# Patient Record
Sex: MALE | Race: OTHER | NOT HISPANIC OR LATINO | URBAN - METROPOLITAN AREA
[De-identification: names, ages, dates, MRNs, and addresses within clinical notes are randomized per-mention and may not be internally consistent; named-entity substitution may affect disease eponyms.]

---

## 2022-06-02 ENCOUNTER — EMERGENCY (EMERGENCY)
Facility: HOSPITAL | Age: 47
LOS: 0 days | Discharge: HOME | End: 2022-06-02
Attending: STUDENT IN AN ORGANIZED HEALTH CARE EDUCATION/TRAINING PROGRAM
Payer: COMMERCIAL

## 2022-06-02 VITALS
TEMPERATURE: 98 F | HEIGHT: 68 IN | WEIGHT: 175.05 LBS | HEART RATE: 100 BPM | SYSTOLIC BLOOD PRESSURE: 126 MMHG | DIASTOLIC BLOOD PRESSURE: 84 MMHG | RESPIRATION RATE: 19 BRPM | OXYGEN SATURATION: 97 %
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
## 2022-06-02 DIAGNOSIS — S92.342A DISPLACED FRACTURE OF FOURTH METATARSAL BONE, LEFT FOOT, INITIAL ENCOUNTER FOR CLOSED FRACTURE: ICD-10-CM

## 2022-06-02 DIAGNOSIS — S92.352A DISPLACED FRACTURE OF FIFTH METATARSAL BONE, LEFT FOOT, INITIAL ENCOUNTER FOR CLOSED FRACTURE: ICD-10-CM
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
## 2022-06-02 DIAGNOSIS — M79.89 OTHER SPECIFIED SOFT TISSUE DISORDERS: ICD-10-CM

## 2022-06-02 PROCEDURE — 99284 EMERGENCY DEPT VISIT MOD MDM: CPT

## 2022-06-02 PROCEDURE — 73610 X-RAY EXAM OF ANKLE: CPT | Mod: 26,LT

## 2022-06-02 PROCEDURE — 73630 X-RAY EXAM OF FOOT: CPT | Mod: 26,LT

## 2022-06-02 RX ORDER — OXYCODONE AND ACETAMINOPHEN 5; 325 MG/1; MG/1
1 TABLET ORAL
Qty: 12 | Refills: 0
Start: 2022-06-02 | End: 2022-06-04

## 2022-06-02 RX ORDER — HYDROCORTISONE/PRAMOXINE 2.5 %-1 %
1 CREAM WITH APPLICATOR RECTAL
Qty: 60 | Refills: 0
Start: 2022-06-02 | End: 2022-06-08

## 2022-06-02 NOTE — CONSULT NOTE ADULT - ASSESSMENT
47M with left foot 4th and 5th metatarsal fractures  - pt seen and evaluated  - afebrile, neurovascular status intact  - mild edema noted to lateral aspect of left foot, pain upon palpation on lateral column, ankle ROM 5/5, no open lesions, no bruising no ecchymosis noted, edema noted to left lateral foot  - xray read:  Fourth and fifth left metatarsal fractures.  - compressive dressing applied with surgical shoe  - pt to remain WB to heel in a surgical shoe to left foot  - pt to follow up with Dr. Joseph Waterhouse at [phone] within 1 week of discharge  - discussed with attending

## 2022-06-02 NOTE — CONSULT NOTE ADULT - SUBJECTIVE AND OBJECTIVE BOX
Patient is a 47y old  Male who presents with a chief complaint of left foot pain    HPI:  47 year old male with no past medical history presents today c/o left foot swelling, pt reports that on Friday he twisted his L foot after stepping the wrong way, pt reports having persistent swelling more than pain, he did go to an urgent care center yesterday, told to have a frature on xray and sent to the ER, pt denies any other injuries    PAST MEDICAL & SURGICAL HISTORY:  No pertinent past medical history          MEDICATIONS  (STANDING):    MEDICATIONS  (PRN):      Allergies      Intolerances        VITALS:    Vital Signs Last 24 Hrs  T(C): 36.4 (02 Jun 2022 11:11), Max: 36.4 (02 Jun 2022 11:11)  T(F): 97.5 (02 Jun 2022 11:11), Max: 97.5 (02 Jun 2022 11:11)  HR: 100 (02 Jun 2022 11:11) (100 - 100)  BP: 126/84 (02 Jun 2022 11:11) (126/84 - 126/84)  BP(mean): --  RR: 19 (02 Jun 2022 11:11) (19 - 19)  SpO2: 97% (02 Jun 2022 11:11) (97% - 97%)    LABS:                CAPILLARY BLOOD GLUCOSE              LOWER EXTREMITY PHYSICAL EXAM:    Vascular: DP/PT 2/4, B/L, CFT <3 seconds B/L, Temperature gradient warm to warm, B/L.   Neuro: Epicritic sensation intact to the level of digits, B/L.  Musculoskeletal/Ortho: mild edema noted to lateral aspect of left foot, pain upon palpation on lateral column, ankle ROM 5/5  Skin: no open lesions, no brusiing, no ecchymosis noted, edema noted to left lateral foot        RADIOLOGY & ADDITIONAL STUDIES:  < from: Xray Foot AP + Lateral + Oblique, Left (06.02.22 @ 13:01) >    ACC: [id number] EXAM:  XR FOOT COMP MIN 3 VIEWS LT                        ACC: [id number] EXAM:  XR ANKLE COMP MIN 3 VIEWS LT                          PROCEDURE DATE:  06/02/2022          INTERPRETATION:  Left ankle and left foot. Patient has local swelling.    Left ankle. 3 views.    There is mild swelling of the lateral malleolus. Bones are intact.    Left foot. 3 views.    There are midshaft fractures of the fourth and fifth metatarsals with   mild displacement.    IMPRESSION: Fourth and fifth left metatarsal fractures.    --- End of Report ---            GITA PRIETO MD; Attending Radiologist  This document has been electronically signed. Jun 2 2022  2:12PM    < end of copied text >

## 2022-06-02 NOTE — ED PROVIDER NOTE - CONSTITUTIONAL, MLM
normal... Well appearing, awake, alert, oriented to person, place, time/situation and in no apparent distress. Well appearing, awake, alert, oriented to person, place, time/situation and in no apparent distress, pt denies pain

## 2022-06-02 NOTE — ED PROVIDER NOTE - PROGRESS NOTE DETAILS
podiatry called, will come and evaluate the patient podiatry called, resident should be in the ER in 15-20 min pt seen by podiatry, pt to follow up with Dr Joseph Waterhouse, [phone], follow up in on e wwek , weight bearing to heel

## 2022-06-02 NOTE — ED PROVIDER NOTE - OBJECTIVE STATEMENT
47 year old male with no past medical history presents today c/o left foot swelling, pt reports that on Friday he twisted his L foot after stepping the wrong way 47 year old male with no past medical history presents today c/o left foot swelling, pt reports that on Friday he twisted his L foot after stepping the wrong way, pt reports having persistent swelling more than pain, he did go to an urgent care center yesterday, told to have a frature on xray and sent to the ER, pt denies any other injuries

## 2022-06-02 NOTE — ED PROVIDER NOTE - PATIENT PORTAL LINK FT
You can access the FollowMyHealth Patient Portal offered by [facility] by registering at the following website: [email or web address]. By joining [employer]’s FollowMyHealth portal, you will also be able to view your health information using other applications (apps) compatible with our system.

## 2022-06-16 ENCOUNTER — INPATIENT (INPATIENT)
Facility: HOSPITAL | Age: 47
LOS: 1 days | Discharge: HOME | End: 2022-06-18
Attending: INTERNAL MEDICINE | Admitting: INTERNAL MEDICINE
Payer: MEDICAID

## 2022-06-16 ENCOUNTER — TRANSCRIPTION ENCOUNTER (OUTPATIENT)
Age: 47
End: 2022-06-16

## 2022-06-16 VITALS
HEART RATE: 95 BPM | OXYGEN SATURATION: 100 % | HEIGHT: 68 IN | TEMPERATURE: 98 F | RESPIRATION RATE: 16 BRPM | SYSTOLIC BLOOD PRESSURE: 133 MMHG | DIASTOLIC BLOOD PRESSURE: 85 MMHG

## 2022-06-16 DIAGNOSIS — S92.309A FRACTURE OF UNSPECIFIED METATARSAL BONE(S), UNSPECIFIED FOOT, INITIAL ENCOUNTER FOR CLOSED FRACTURE: ICD-10-CM

## 2022-06-16 LAB
A1C WITH ESTIMATED AVERAGE GLUCOSE RESULT: 5 % — SIGNIFICANT CHANGE UP (ref 4–5.6)
ALBUMIN SERPL ELPH-MCNC: 3.3 G/DL — SIGNIFICANT CHANGE UP (ref 3.3–5)
ALP SERPL-CCNC: 257 U/L — HIGH (ref 40–120)
ALT FLD-CCNC: 35 U/L — SIGNIFICANT CHANGE UP (ref 4–41)
ANION GAP SERPL CALC-SCNC: 13 MMOL/L — SIGNIFICANT CHANGE UP (ref 7–14)
APTT BLD: 35.1 SEC — SIGNIFICANT CHANGE UP (ref 27–36.3)
AST SERPL-CCNC: 75 U/L — HIGH (ref 4–40)
BASOPHILS # BLD AUTO: 0.06 K/UL — SIGNIFICANT CHANGE UP (ref 0–0.2)
BASOPHILS NFR BLD AUTO: 0.5 % — SIGNIFICANT CHANGE UP (ref 0–2)
BILIRUB SERPL-MCNC: 0.9 MG/DL — SIGNIFICANT CHANGE UP (ref 0.2–1.2)
BLD GP AB SCN SERPL QL: NEGATIVE — SIGNIFICANT CHANGE UP
BUN SERPL-MCNC: 3 MG/DL — LOW (ref 7–23)
CALCIUM SERPL-MCNC: 8.7 MG/DL — SIGNIFICANT CHANGE UP (ref 8.4–10.5)
CHLORIDE SERPL-SCNC: 103 MMOL/L — SIGNIFICANT CHANGE UP (ref 98–107)
CO2 SERPL-SCNC: 24 MMOL/L — SIGNIFICANT CHANGE UP (ref 22–31)
CREAT SERPL-MCNC: 0.59 MG/DL — SIGNIFICANT CHANGE UP (ref 0.5–1.3)
EGFR: 120 ML/MIN/1.73M2 — SIGNIFICANT CHANGE UP
EOSINOPHIL # BLD AUTO: 0.25 K/UL — SIGNIFICANT CHANGE UP (ref 0–0.5)
EOSINOPHIL NFR BLD AUTO: 1.9 % — SIGNIFICANT CHANGE UP (ref 0–6)
ESTIMATED AVERAGE GLUCOSE: 97 — SIGNIFICANT CHANGE UP
GLUCOSE SERPL-MCNC: 90 MG/DL — SIGNIFICANT CHANGE UP (ref 70–99)
HCT VFR BLD CALC: 43 % — SIGNIFICANT CHANGE UP (ref 39–50)
HGB BLD-MCNC: 14.8 G/DL — SIGNIFICANT CHANGE UP (ref 13–17)
IANC: 9.57 K/UL — HIGH (ref 1.8–7.4)
IMM GRANULOCYTES NFR BLD AUTO: 0.6 % — SIGNIFICANT CHANGE UP (ref 0–1.5)
INR BLD: 1.05 RATIO — SIGNIFICANT CHANGE UP (ref 0.88–1.16)
LYMPHOCYTES # BLD AUTO: 15.7 % — SIGNIFICANT CHANGE UP (ref 13–44)
LYMPHOCYTES # BLD AUTO: 2.08 K/UL — SIGNIFICANT CHANGE UP (ref 1–3.3)
MACROCYTES BLD QL: SIGNIFICANT CHANGE UP
MANUAL SMEAR VERIFICATION: SIGNIFICANT CHANGE UP
MCHC RBC-ENTMCNC: 34.4 GM/DL — SIGNIFICANT CHANGE UP (ref 32–36)
MCHC RBC-ENTMCNC: 42.4 PG — HIGH (ref 27–34)
MCV RBC AUTO: 123.2 FL — HIGH (ref 80–100)
MONOCYTES # BLD AUTO: 1.21 K/UL — HIGH (ref 0–0.9)
MONOCYTES NFR BLD AUTO: 9.1 % — SIGNIFICANT CHANGE UP (ref 2–14)
NEUTROPHILS # BLD AUTO: 9.57 K/UL — HIGH (ref 1.8–7.4)
NEUTROPHILS NFR BLD AUTO: 72.2 % — SIGNIFICANT CHANGE UP (ref 43–77)
NRBC # BLD: 0 /100 WBCS — SIGNIFICANT CHANGE UP
NRBC # FLD: 0 K/UL — SIGNIFICANT CHANGE UP
PLAT MORPH BLD: ABNORMAL
PLATELET # BLD AUTO: 271 K/UL — SIGNIFICANT CHANGE UP (ref 150–400)
PLATELET CLUMP BLD QL SMEAR: ABNORMAL
PLATELET COUNT - ESTIMATE: NORMAL — SIGNIFICANT CHANGE UP
POTASSIUM SERPL-MCNC: 3.8 MMOL/L — SIGNIFICANT CHANGE UP (ref 3.5–5.3)
POTASSIUM SERPL-SCNC: 3.8 MMOL/L — SIGNIFICANT CHANGE UP (ref 3.5–5.3)
PROT SERPL-MCNC: 6.5 G/DL — SIGNIFICANT CHANGE UP (ref 6–8.3)
PROTHROM AB SERPL-ACNC: 12.2 SEC — SIGNIFICANT CHANGE UP (ref 10.5–13.4)
RBC # BLD: 3.49 M/UL — LOW (ref 4.2–5.8)
RBC # FLD: 13 % — SIGNIFICANT CHANGE UP (ref 10.3–14.5)
RBC BLD AUTO: ABNORMAL
RH IG SCN BLD-IMP: POSITIVE — SIGNIFICANT CHANGE UP
SARS-COV-2 RNA SPEC QL NAA+PROBE: SIGNIFICANT CHANGE UP
SODIUM SERPL-SCNC: 140 MMOL/L — SIGNIFICANT CHANGE UP (ref 135–145)
STOMATOCYTES BLD QL SMEAR: SLIGHT — SIGNIFICANT CHANGE UP
WBC # BLD: 13.25 K/UL — HIGH (ref 3.8–10.5)
WBC # FLD AUTO: 13.25 K/UL — HIGH (ref 3.8–10.5)

## 2022-06-16 PROCEDURE — 99285 EMERGENCY DEPT VISIT HI MDM: CPT

## 2022-06-16 PROCEDURE — 93306 TTE W/DOPPLER COMPLETE: CPT | Mod: 26

## 2022-06-16 RX ORDER — ACETAMINOPHEN 500 MG
650 TABLET ORAL EVERY 6 HOURS
Refills: 0 | Status: DISCONTINUED | OUTPATIENT
Start: 2022-06-16 | End: 2022-06-18

## 2022-06-16 RX ADMIN — Medication 650 MILLIGRAM(S): at 21:49

## 2022-06-16 RX ADMIN — Medication 650 MILLIGRAM(S): at 20:39

## 2022-06-16 NOTE — ED PROVIDER NOTE - ATTENDING CONTRIBUTION TO CARE
I have personally performed a face to face medical and diagnostic evaluation of the patient. I have discussed with and reviewed the Resident's note and agree with the History, ROS, Physical Exam and MDM unless otherwise indicated. A brief summary of my personal evaluation and impression can be found below.    47M no pmh no meds presents to ED at request of podiatrist for admission for surgery of fx of L 4th metatarsal reports had an inversion injury x2 weeks prior was told to come in today for surgery. NO other complaints. Denies numbness, tingling or loss of sensation. Denies loss of motor function. Denies n/v/f/c/cp/sob. Denies headache, syncope, lightheadedness, dizziness. Denies chest palpitations, abdominal pain. Denies edema. Denies dysuria, hematuria, BRBPR, tarry stools, diarrhea, constipation. Reports has no pain at this time, does not need pain meds.     All other ROS negative, except as above and as per HPI and ROS section.    VITALS: Initial triage and subsequent vitals have been reviewed by me.  GEN APPEARANCE: WDWN, alert, non-toxic, NAD  HEAD: Atraumatic.  EYES: PERRLa, EOMI, vision grossly intact.   NECK: Supple  CV: RRR, S1S2, no c/r/m/g. Cap refill <2 seconds. No bruits.   LUNGS: CTAB. No abnormal breath sounds.  ABDOMEN: Soft, NTND. No guarding or rebound.   MSK/EXT: L foot in ACE wrap, DNVI. No CVA ttp. Pelvis stable. No peripheral edema.  NEURO: Alert, follows commands. Weight bearing normal. Speech normal. Sensation and motor normal x4 extremities.   SKIN: Warm, dry and intact. No rash.  PSYCH: Appropriate    Plan/MDM: 47M no pmh no meds presents to ED at request of podiatrist for admission for surgery of fx of L 4th metatarsal reports had an inversion injury x2 weeks prior was told to come in today for surgery. NO other complaints. Denies numbness, tingling or loss of sensation. Denies loss of motor function. exam vss non toxic PE as above ddx c/f  known 4th MT fx, will get labs, discuss w pod, per note w pt will admit to dr. harris.

## 2022-06-16 NOTE — ED ADULT NURSE NOTE - CHIEF COMPLAINT QUOTE
Patient fx left fourth metatarsal 2 weeks ago and being treated by Dr. Joseph Waterhouse. Patient advised to go to [facility] ED today for admission for surgery. Patient to be admitted to Dr. Nance, podiatry per note sent by MD. Complaining of left foot pain.

## 2022-06-16 NOTE — H&P ADULT - NSHPPHYSICALEXAM_GEN_ALL_CORE
Vital Signs Last 24 Hrs  T(C): 36.7 (16 Jun 2022 11:00), Max: 36.8 (16 Jun 2022 07:18)  T(F): 98.1 (16 Jun 2022 11:00), Max: 98.2 (16 Jun 2022 07:18)  HR: 94 (16 Jun 2022 11:00) (94 - 95)  BP: 134/89 (16 Jun 2022 11:00) (133/85 - 134/89)  BP(mean): --  RR: 15 (16 Jun 2022 11:00) (15 - 16)  SpO2: 100% (16 Jun 2022 11:00) (100% - 100%)    Appearance: Normal	  HEENT:   Normal oral mucosa, PERRL, EOMI	  Lymphatic: No lymphadenopathy , no edema  Cardiovascular: Normal S1 S2, No JVD, No murmurs , Peripheral pulses palpable 2+ bilaterally  Respiratory: Lungs clear to auscultation, normal effort 	  Gastrointestinal:  Soft, Non-tender, + BS	  Skin: No rashes, No ecchymoses, No cyanosis, warm to touch  Musculoskeletal: Normal range of motion, normal strength  Psychiatry:  Mood & affect appropriate  Ext: LE dressing

## 2022-06-16 NOTE — ED PROVIDER NOTE - PHYSICAL EXAMINATION
General appearance: NAD, conversant, afebrile    Eyes: anicteric sclerae, NANCY, EOMI   HENT: Atraumatic; oropharynx clear, MMM and no ulcerations, no pharyngeal erythema or exudate   Neck: Trachea midline; Full range of motion, supple   Pulm: CTA bl, normal respiratory effort and no intercostal retractions, normal work of breathing   CV: RRR, No murmurs, rubs, or gallops. 2+ peripheral pulses.   Abdomen: Soft, non-tender, non-distended; no guarding or rebound   Extremities: L 4th metatarsal ttp, mildly swollen. Motor and sensory intact   Skin: Dry, normal temperature, turgor and texture; no rash, ulcers or subcutaneous nodules   Psych: Appropriate affect, cooperative; alert and oriented to person, place and time

## 2022-06-16 NOTE — ED PROVIDER NOTE - OBJECTIVE STATEMENT
48yo M w/ no pmh presenting with left foot pain. Pt fractured L 4th metatarsal 3w ago, was seen in ED and followed by podiatry. Presents today for admission for surgery by Dr. Joseph Waterhouse from podiatry. Pt still has pain in L lateral dorsum of foot but it is tolerable. He has been using the hard sole shoe. Occasionally gets swollen but better with ACE wrap. He has no complaints today otherwise.

## 2022-06-16 NOTE — CONSULT NOTE ADULT - ASSESSMENT
47M with left foot 4th and 5th met fx  -   - afebrile, neurovascular status intact  - xrays: 4th and 5th met fracture of left foot  - rec admit to medicine  - pod plan for left foot 4th and 5th met ORIF tomorrow 6/17 at 4pm  - NPO after midnight  - please document medical clearance under local anesthesia and IV sedation  - discussed with attending 47M with left foot 4th and 5th met fx  - pt seen and evaluated  - afebrile, neurovascular status intact  - xrays: 4th and 5th met fracture of left foot  - rec admit to medicine  - pod plan for left foot 4th and 5th met ORIF tomorrow 6/17 at 4pm  - NPO after midnight  - please document medical clearance under local anesthesia and IV sedation  - discussed with attending

## 2022-06-16 NOTE — ED ADULT NURSE NOTE - OBJECTIVE STATEMENT
pt a&ox4 sent to ED for surgery by Dr. Nance on left 4th metatarsal fx from 2 weeks ago. pt able to wiggle toes. pt denies pain at this time. pt denies pmhx. pt denies chest pain, sob, nausea, vomiting, headache, dizziness. pt respirations even and unlabored with no accessory muscle use. 18g to the LAC. labs collected and sent. pt in NAD and resting in stretcher at this time,

## 2022-06-16 NOTE — H&P ADULT - HISTORY OF PRESENT ILLNESS
Patient is a 48 Y/O Male with no pmh presenting with left foot pain. Pt fractured L 4th metatarsal 3w ago, was seen in ED and followed by podiatry. Presents today for admission for surgery by Dr. Joseph Waterhouse from podiatry. Pt still has pain in L lateral dorsum of foot but it is tolerable. He has been using the hard sole shoe. Occasionally gets swollen but better with ACE wrap. He has no complaints today otherwise

## 2022-06-16 NOTE — H&P ADULT - ASSESSMENT
Patient is a 48 Y/O Male with no pmh presenting with left foot pain. Pt fractured L 4th metatarsal 3w ago, was seen in ED and followed by podiatry. Presents today for admission for surgery by Dr. Joseph Waterhouse from podiatry. Pt still has pain in L lateral dorsum of foot but it is tolerable. He has been using the hard sole shoe. Occasionally gets swollen but better with ACE wrap. He has no complaints today otherwise.       #  fractured L 4th metatarsal  podiatry eval appreciated  pain control  EKG/Echo   plan for OR tomorrow   bedrest    DVT and gI PPX  Patient is a 46 Y/O Male with no pmh presenting with left foot pain. Pt fractured L 4th metatarsal 3w ago, was seen in ED and followed by podiatry. Presents today for admission for surgery by Dr. Joseph Waterhouse from podiatry. Pt still has pain in L lateral dorsum of foot but it is tolerable. He has been using the hard sole shoe. Occasionally gets swollen but better with ACE wrap. He has no complaints today otherwise.       #  fractured L 4th metatarsal  podiatry eval appreciated  pain control  EKG/Echo   plan for OR tomorrow   bedrest  patient is medically optimized for OR tomorrow  Echo noted     DVT and gI PPX

## 2022-06-16 NOTE — CONSULT NOTE ADULT - SUBJECTIVE AND OBJECTIVE BOX
Patient is a 47y old  Male who presents with a chief complaint of left foot fx    HPI:  46yo M w/ no pmh presenting with left foot pain. Pt fractured L 4th metatarsal 3w ago, was seen in ED and followed by podiatry. Presents today for admission for surgery by Dr. Joseph Waterhouse from podiatry. Pt still has pain in L lateral dorsum of foot but it is tolerable. He has been using the hard sole shoe. Occasionally gets swollen but better with ACE wrap. He has no complaints today otherwise    PAST MEDICAL & SURGICAL HISTORY:  No pertinent past medical history          MEDICATIONS  (STANDING):    MEDICATIONS  (PRN):  acetaminophen     Tablet .. 650 milliGRAM(s) Oral every 6 hours PRN Temp greater or equal to 38C (100.4F), Mild Pain (1 - 3)      Allergies    No Known Allergies    Intolerances        VITALS:    Vital Signs Last 24 Hrs  T(C): 36.7 (16 Jun 2022 11:00), Max: 36.8 (16 Jun 2022 07:18)  T(F): 98.1 (16 Jun 2022 11:00), Max: 98.2 (16 Jun 2022 07:18)  HR: 94 (16 Jun 2022 11:00) (94 - 95)  BP: 134/89 (16 Jun 2022 11:00) (133/85 - 134/89)  BP(mean): --  RR: 15 (16 Jun 2022 11:00) (15 - 16)  SpO2: 100% (16 Jun 2022 11:00) (100% - 100%)    LABS:                          14.8   13.25 )-----------( 271      ( 16 Jun 2022 07:54 )             43.0       06-16    140  |  103  |  3<L>  ----------------------------<  90  3.8   |  24  |  0.59    Ca    8.7      16 Jun 2022 07:54    TPro  6.5  /  Alb  3.3  /  TBili  0.9  /  DBili  x   /  AST  75<H>  /  ALT  35  /  AlkPhos  257<H>  06-16      CAPILLARY BLOOD GLUCOSE          PT/INR - ( 16 Jun 2022 07:54 )   PT: 12.2 sec;   INR: 1.05 ratio         PTT - ( 16 Jun 2022 07:54 )  PTT:35.1 sec    LOWER EXTREMITY PHYSICAL EXAM:    Vascular: DP/PT 2/4, B/L, CFT <3 seconds B/L, Temperature gradient warm to warm, B/L.   Neuro: Epicritic sensation intact to the level of digits, B/L.  Musculoskeletal/Ortho: unremarkable  Skin: no open lesions, no bruising      RADIOLOGY & ADDITIONAL STUDIES:    < from: Xray Foot AP + Lateral + Oblique, Left (06.02.22 @ 13:01) >    ACC: [id number] EXAM:  XR FOOT COMP MIN 3 VIEWS LT                        ACC: [id number] EXAM:  XR ANKLE COMP MIN 3 VIEWS LT                          PROCEDURE DATE:  06/02/2022          INTERPRETATION:  Left ankle and left foot. Patient has local swelling.    Left ankle. 3 views.    There is mild swelling of the lateral malleolus. Bones are intact.    Left foot. 3 views.    There are midshaft fractures of the fourth and fifth metatarsals with   mild displacement.    IMPRESSION: Fourth and fifth left metatarsal fractures.    --- End of Report ---            GITA PRIETO MD; Attending Radiologist  This document has been electronically signed. Jun 2 2022  2:12PM    < end of copied text >

## 2022-06-16 NOTE — ED PROVIDER NOTE - NS ED MD DISPO ISOLATION TYPES
None 57M morbidly obese male w/ PMHx of Hepatic Cirrhosis, ascites, thrombocytopenia, CHF, HTN, HLD, recent admx for Hepatic Encephalopathy, noncompliant with lactulose who was admitted to OSH  for hepatic encephalopathy, acute blood lose anemia (?variceal bleed, GI ulcer) and possible SBP. He was transferred to [facility] for endoscopy since he is high risk for planned procedure. He is also here for evaluation by Liver Transplant team, now s/p EGD showing gastritis no varices, colonoscopy without any source of bleed, now s/p VCE without source of bleed. Now with uptrending Cr concerning for pre-renal vs. HRS. New B'/L LE cellulitis on ancef.     Cellulitis of leg, right.  Plan: Off vancomycin  appreciate ID recs  c/w Ancef 2gm q8hr end date to be determined by ID likely through sunday.     Lower extremity edema.  Plan: 2/2 decompensated liver disease   Lasix 60mg IV BID  2d echo with hyperdynamic ventricle EF 75%.     Other cirrhosis of liver.  Plan: EtOH cirrhosis 2/2 GIB now resolved  - varices: present on CT, but no varices on recent EGD  - HE: present on admission to [facility], now resolved on Rifaximin 550mg PO BID and lactulose Q8hrs  - HCC: no focal lesions on CT  -+ascites s/p para x2, last on 10/22 with 5.7L removed, +SBP on 10/14 with ascitic cultures +Ecoli off treatment, Lasix  60 IV BID.      Gastrointestinal hemorrhage with melena.  Plan: - s/p egd and colonoscopy, no bleed source, VCE showing brown stool throughout without source of bleed. Likely hemorrhoidal- anemic again today  - Protonix PO daily  - c/w Carafate  - sbp ppx on cipro 500mg daily  -s/p 1 unit prbc.     Anemia due to acute blood loss.  Plan: - s/p EGD, colonoscopy and VCE without source of bleed- c-scope showing internal hemorrhoids, nonbleeding. Suspect this was source of previous blood.  - PPI as above  - Monitor HH daily, continue to monitor, transfuse if Hb <7 last unit 11/12  -Spoke with GI who rec Heme onc consult for pancytopenia as no GIB has been identified.      (acute kidney injury). Plan: Cr elevated, presumed to be from intravascular depletion. Baseline appears to be around 1.4-   - renal US - has ruled out hydro, unremarkable kidneys  - c/w midodrine to 10mg tid /dc octretide/ for presumed HRS but likely due to fluid overload  - PVRs have been normal  - strict I/O monitoring.     Congestive heart failure, unspecified HF chronicity, unspecified heart failure type.  Plan: - TTE done October 2019 shows normal systolic and diastolic function so no evidence of heart failure on this admission.  IV diuresis is 2/2 his decompensated liver disease.   - 2d echo Ef 70-75%.      Prophylactic measure.  Plan: hold pharmacologic dvt ppx given GI bleed and thrombocytopenia  SCDs.     Back pain.  Plan: Chronic back pain worse with hospital bed.  Tylenol at home will try Tramadol here.  Does not need pain meds on DC he attributes it to his lack of mobility in hospital.    57M morbidly obese male w/ PMHx of Hepatic Cirrhosis, ascites, thrombocytopenia, CHF, HTN, HLD, recent admx for Hepatic Encephalopathy, noncompliant with lactulose who was admitted to OSH  for hepatic encephalopathy, acute blood lose anemia (?variceal bleed, GI ulcer) and possible SBP. He was transferred to [facility] for endoscopy since he is high risk for planned procedure. He is also here for evaluation by Liver Transplant team, now s/p EGD showing gastritis no varices, colonoscopy without any source of bleed, now s/p VCE without source of bleed. Now with uptrending Cr concerning for pre-renal vs. HRS. New B/L LE cellulitis on Ancef.     Cellulitis of leg, right.  Plan: Off vancomycin  appreciate ID recs  c/w Ancef 2gm q8hr end date to be determined by ID likely through sunday.     Lower extremity edema.  Plan: 2/2 decompensated liver disease   Lasix 60mg IV BID  2d echo with hyperdynamic ventricle EF 75%.     Other cirrhosis of liver.  Plan: EtOH cirrhosis 2/2 GIB now resolved  - varices: present on CT, but no varices on recent EGD  - HE: present on admission to [facility], now resolved on Rifaximin 550mg PO BID and lactulose Q8hrs  - HCC: no focal lesions on CT  -+ascites s/p para x2, last on 10/22 with 5.7L removed, +SBP on 10/14 with ascitic cultures +Ecoli off treatment, Lasix  60 IV BID.      Gastrointestinal hemorrhage with melena.  Plan: - s/p egd and colonoscopy, no bleed source, VCE showing brown stool throughout without source of bleed. Likely hemorrhoidal- anemic again today  - Protonix PO daily  - c/w Carafate  - sbp ppx on cipro 500mg daily  -s/p 1 unit prbc.     Anemia due to acute blood loss.  Plan: - s/p EGD, colonoscopy and VCE without source of bleed- c-scope showing internal hemorrhoids, nonbleeding. Suspect this was source of previous blood.  - PPI as above  - Monitor HH daily, continue to monitor, transfuse if Hb <7 last unit 11/12  -Spoke with GI who rec Heme onc consult for pancytopenia as no GIB has been identified.      (acute kidney injury). Plan: Cr elevated, presumed to be from intravascular depletion. Baseline appears to be around 1.4-   - renal US - has ruled out hydro, unremarkable kidneys  - c/w midodrine to 10mg tid /dc octretide/ for presumed HRS but likely due to fluid overload  - PVRs have been normal  - strict I/O monitoring.     Congestive heart failure, unspecified HF chronicity, unspecified heart failure type.  Plan: - TTE done October 2019 shows normal systolic and diastolic function so no evidence of heart failure on this admission.  IV diuresis is 2/2 his decompensated liver disease.   - 2d echo Ef 70-75%.      Prophylactic measure.  Plan: hold pharmacologic dvt ppx given GI bleed and thrombocytopenia  SCDs.     Back pain.  Plan: Chronic back pain worse with hospital bed.  Tylenol at home will try Tramadol here.  Does not need pain meds on DC he attributes it to his lack of mobility in hospital.    57M morbidly obese male w/ PMHx of Hepatic Cirrhosis, ascites, thrombocytopenia, CHF, HTN, HLD, recent admx for Hepatic Encephalopathy, noncompliant with lactulose who was admitted to OSH  for hepatic encephalopathy, acute blood lose anemia (?variceal bleed, GI ulcer) and possible SBP. He was transferred to [facility] for endoscopy since he is high risk for planned procedure. He is also here for evaluation by Liver Transplant team, now s/p EGD showing gastritis no varices, colonoscopy without any source of bleed, now s/p VCE without source of bleed. Patient received multiple blood transfusions secondary to anemia.  Bilateral lower extremity edema - treated with lasix.  2D echo reveals hyperdynamic left ventricle with EF of 75%.  Now with uptrending Cr concerning for pre-renal vs. HRS. New B/L LE cellulitis - ID consulted - treated with Ancef - improvement noted.             Other cirrhosis of liver.  Plan: EtOH cirrhosis 2/2 GIB now resolved  - varices: present on CT, but no varices on recent EGD  - HE: present on admission to [facility], now resolved on Rifaximin 550mg PO BID and lactulose Q8hrs  - HCC: no focal lesions on CT  -+ascites s/p para x2, last on 10/22 with 5.7L removed, +SBP on 10/14 with ascitic cultures +Ecoli off treatment, Lasix  60 IV BID.      Gastrointestinal hemorrhage with melena.  Plan: - s/p egd and colonoscopy, no bleed source, VCE showing brown stool throughout without source of bleed. Likely hemorrhoidal- anemic again today  - Protonix PO daily  - c/w Carafate  - sbp ppx on cipro 500mg daily  -s/p 1 unit prbc.     Anemia due to acute blood loss.  Plan: - s/p EGD, colonoscopy and VCE without source of bleed- c-scope showing internal hemorrhoids, nonbleeding. Suspect this was source of previous blood.  - PPI as above  - Monitor HH daily, continue to monitor, transfuse if Hb <7 last unit 11/12  -Spoke with GI who rec Heme onc consult for pancytopenia as no GIB has been identified.      (acute kidney injury). Plan: Cr elevated, presumed to be from intravascular depletion. Baseline appears to be around 1.4-   - renal US - has ruled out hydro, unremarkable kidneys  - c/w midodrine to 10mg tid /dc octretide/ for presumed HRS but likely due to fluid overload  - PVRs have been normal  - strict I/O monitoring.     Congestive heart failure, unspecified HF chronicity, unspecified heart failure type.  Plan: - TTE done October 2019 shows normal systolic and diastolic function so no evidence of heart failure on this admission.  IV diuresis is 2/2 his decompensated liver disease.   - 2d echo Ef 70-75%.      Prophylactic measure.  Plan: hold pharmacologic dvt ppx given GI bleed and thrombocytopenia  SCDs.     Back pain.  Plan: Chronic back pain worse with hospital bed.  Tylenol at home will try Tramadol here.  Does not need pain meds on DC he attributes it to his lack of mobility in hospital.    57M morbidly obese male w/ PMHx of Hepatic Cirrhosis, ascites, thrombocytopenia, CHF, HTN, HLD, recent admx for Hepatic Encephalopathy, noncompliant with lactulose who was admitted to OSH  for hepatic encephalopathy, acute blood lose anemia (?variceal bleed, GI ulcer) and possible SBP. He was transferred to [facility] for endoscopy since he is high risk for planned procedure. He is also here for evaluation by Liver Transplant team, now s/p EGD showing gastritis no varices, colonoscopy without any source of bleed, now s/p VCE without source of bleed. Patient received multiple blood transfusions secondary to anemia.  Hematology was consulted for pancytopenia - they believe it is bone marrow suppression secondary to chronic alcohol use - they recommend outpatient follow up.  Bilateral lower extremity edema - treated with lasix.  2D echo reveals hyperdynamic left ventricle with EF of 75%.  Now with uptrending Cr concerning for pre-renal vs. HRS. New B/L LE cellulitis - ID consulted - treated with Ancef - improvement noted.                (acute kidney injury). Plan: Cr elevated, presumed to be from intravascular depletion. Baseline appears to be around 1.4-   - renal US - has ruled out hydro, unremarkable kidneys  - c/w midodrine to 10mg tid /dc octretide/ for presumed HRS but likely due to fluid overload  - PVRs have been normal  - strict I/O monitoring.     Congestive heart failure, unspecified HF chronicity, unspecified heart failure type.  Plan: - TTE done October 2019 shows normal systolic and diastolic function so no evidence of heart failure on this admission.  IV diuresis is 2/2 his decompensated liver disease.   - 2d echo Ef 70-75%.      Prophylactic measure.  Plan: hold pharmacologic dvt ppx given GI bleed and thrombocytopenia  SCDs.     Back pain.  Plan: Chronic back pain worse with hospital bed.  Tylenol at home will try Tramadol here.  Does not need pain meds on DC he attributes it to his lack of mobility in hospital.    57M morbidly obese male w/ PMHx of Hepatic Cirrhosis, ascites, thrombocytopenia, CHF, HTN, HLD, recent admx for Hepatic Encephalopathy, noncompliant with lactulose who was admitted to OSH  for hepatic encephalopathy, acute blood lose anemia (?variceal bleed, GI ulcer) and possible SBP. He was transferred to [facility] for endoscopy since he is high risk for planned procedure. He is also here for evaluation by Liver Transplant team, now s/p EGD showing gastritis no varices, colonoscopy without any source of bleed, now s/p VCE without source of bleed. Patient received multiple blood transfusions secondary to anemia.  Hematology was consulted for pancytopenia - they believe it is bone marrow suppression secondary to chronic alcohol use - they recommend outpatient follow up.  Hepatology consulted - treated with rifaxamin, lactulose, cipro, albumin - blood work monitored daily.  Uptrending creatinine, concerning for pre-renal vs. HRS - Renal consulted - Renal US has ruled out hydro, unremarkable kidneys; PVRs have been normal; strict I&O monitoring.  B/L LE cellulitis noted - ID consulted - treated with Ancef - improvement noted.  Bilateral lower extremity edema - treated with lasix.  2D echo reveals hyperdynamic left ventricle with EF of 75%.  Cleared for discharge by Dr. Leon, with PMD, nephrology, hepatology, and hematology follow up.       57M morbidly obese male w/ PMHx of Hepatic Cirrhosis, ascites, thrombocytopenia, CHF, HTN, HLD, recent admx for Hepatic Encephalopathy, noncompliant with lactulose who was admitted to OSH  for hepatic encephalopathy, acute blood lose anemia (?variceal bleed, GI ulcer) and possible SBP. He was transferred to [facility] for endoscopy since he is high risk for planned procedure. He is also here for evaluation by Liver Transplant team, now s/p EGD showing gastritis no varices, colonoscopy without any source of bleed, now s/p VCE without source of bleed. Patient received multiple blood transfusions secondary to anemia.  Hematology was consulted for pancytopenia - they believe it is bone marrow suppression secondary to chronic alcohol use - they recommend outpatient follow up.  Hepatology consulted - treated with rifaxamin, lactulose, cipro, albumin - blood work monitored daily.  Uptrending creatinine, concerning for pre-renal vs. HRS - Renal consulted - Renal US has ruled out hydro, unremarkable kidneys; PVRs have been normal; strict I&O monitoring.  B/L LE cellulitis noted - ID consulted - treated with Ancef - improvement noted.  Bilateral lower extremity edema - treated with lasix + aldactone.  2D echo reveals hyperdynamic left ventricle with EF of 75%.  Cleared for discharge by Dr. Leon, with PMD, nephrology, hepatology, and hematology follow up.

## 2022-06-16 NOTE — ED ADULT TRIAGE NOTE - CHIEF COMPLAINT QUOTE
Patient fx left fourth metatarsal 2 weeks ago and being treated by Dr. Joseph Waterhouse. Patient advised to go to [facility] ED today for admission for surgery. Patient to be admitted to Dr. Nance, podiatry per note sent by MD. Complaining of left foot pain.

## 2022-06-16 NOTE — ED PROVIDER NOTE - CLINICAL SUMMARY MEDICAL DECISION MAKING FREE TEXT BOX
48yo M w/ no pmh presenting with L 4th metatarsal fracture, due for surgery by podiatry tomorrow. will contact podiatry and admit. will get pre-op labs.

## 2022-06-17 ENCOUNTER — TRANSCRIPTION ENCOUNTER (OUTPATIENT)
Age: 47
End: 2022-06-17

## 2022-06-17 LAB
ALBUMIN SERPL ELPH-MCNC: 3 G/DL — LOW (ref 3.3–5)
ALP SERPL-CCNC: 231 U/L — HIGH (ref 40–120)
ALT FLD-CCNC: 31 U/L — SIGNIFICANT CHANGE UP (ref 4–41)
ANION GAP SERPL CALC-SCNC: 9 MMOL/L — SIGNIFICANT CHANGE UP (ref 7–14)
APTT BLD: 33 SEC — SIGNIFICANT CHANGE UP (ref 27–36.3)
AST SERPL-CCNC: 57 U/L — HIGH (ref 4–40)
BASOPHILS # BLD AUTO: 0.06 K/UL — SIGNIFICANT CHANGE UP (ref 0–0.2)
BASOPHILS NFR BLD AUTO: 0.6 % — SIGNIFICANT CHANGE UP (ref 0–2)
BILIRUB SERPL-MCNC: 1 MG/DL — SIGNIFICANT CHANGE UP (ref 0.2–1.2)
BLD GP AB SCN SERPL QL: NEGATIVE — SIGNIFICANT CHANGE UP
BUN SERPL-MCNC: 3 MG/DL — LOW (ref 7–23)
CALCIUM SERPL-MCNC: 8.4 MG/DL — SIGNIFICANT CHANGE UP (ref 8.4–10.5)
CHLORIDE SERPL-SCNC: 103 MMOL/L — SIGNIFICANT CHANGE UP (ref 98–107)
CO2 SERPL-SCNC: 26 MMOL/L — SIGNIFICANT CHANGE UP (ref 22–31)
CREAT SERPL-MCNC: 0.54 MG/DL — SIGNIFICANT CHANGE UP (ref 0.5–1.3)
EGFR: 123 ML/MIN/1.73M2 — SIGNIFICANT CHANGE UP
EOSINOPHIL # BLD AUTO: 0.31 K/UL — SIGNIFICANT CHANGE UP (ref 0–0.5)
EOSINOPHIL NFR BLD AUTO: 2.9 % — SIGNIFICANT CHANGE UP (ref 0–6)
FOLATE SERPL-MCNC: 2 NG/ML — LOW (ref 3.1–17.5)
GLUCOSE SERPL-MCNC: 92 MG/DL — SIGNIFICANT CHANGE UP (ref 70–99)
HCT VFR BLD CALC: 39.3 % — SIGNIFICANT CHANGE UP (ref 39–50)
HGB BLD-MCNC: 13.9 G/DL — SIGNIFICANT CHANGE UP (ref 13–17)
IANC: 7.52 K/UL — HIGH (ref 1.8–7.4)
IMM GRANULOCYTES NFR BLD AUTO: 0.5 % — SIGNIFICANT CHANGE UP (ref 0–1.5)
INR BLD: 1.05 RATIO — SIGNIFICANT CHANGE UP (ref 0.88–1.16)
LYMPHOCYTES # BLD AUTO: 1.75 K/UL — SIGNIFICANT CHANGE UP (ref 1–3.3)
LYMPHOCYTES # BLD AUTO: 16.5 % — SIGNIFICANT CHANGE UP (ref 13–44)
MCHC RBC-ENTMCNC: 35.4 GM/DL — SIGNIFICANT CHANGE UP (ref 32–36)
MCHC RBC-ENTMCNC: 43 PG — HIGH (ref 27–34)
MCV RBC AUTO: 121.7 FL — HIGH (ref 80–100)
MONOCYTES # BLD AUTO: 0.92 K/UL — HIGH (ref 0–0.9)
MONOCYTES NFR BLD AUTO: 8.7 % — SIGNIFICANT CHANGE UP (ref 2–14)
NEUTROPHILS # BLD AUTO: 7.52 K/UL — HIGH (ref 1.8–7.4)
NEUTROPHILS NFR BLD AUTO: 70.8 % — SIGNIFICANT CHANGE UP (ref 43–77)
NRBC # BLD: 0 /100 WBCS — SIGNIFICANT CHANGE UP
NRBC # FLD: 0 K/UL — SIGNIFICANT CHANGE UP
PLATELET # BLD AUTO: 229 K/UL — SIGNIFICANT CHANGE UP (ref 150–400)
POTASSIUM SERPL-MCNC: 4.4 MMOL/L — SIGNIFICANT CHANGE UP (ref 3.5–5.3)
POTASSIUM SERPL-SCNC: 4.4 MMOL/L — SIGNIFICANT CHANGE UP (ref 3.5–5.3)
PROT SERPL-MCNC: 6.1 G/DL — SIGNIFICANT CHANGE UP (ref 6–8.3)
PROTHROM AB SERPL-ACNC: 12.2 SEC — SIGNIFICANT CHANGE UP (ref 10.5–13.4)
RBC # BLD: 3.23 M/UL — LOW (ref 4.2–5.8)
RBC # FLD: 12.8 % — SIGNIFICANT CHANGE UP (ref 10.3–14.5)
RH IG SCN BLD-IMP: POSITIVE — SIGNIFICANT CHANGE UP
SODIUM SERPL-SCNC: 138 MMOL/L — SIGNIFICANT CHANGE UP (ref 135–145)
TSH SERPL-MCNC: 2.03 UIU/ML — SIGNIFICANT CHANGE UP (ref 0.27–4.2)
VIT B12 SERPL-MCNC: 293 PG/ML — SIGNIFICANT CHANGE UP (ref 200–900)
WBC # BLD: 10.61 K/UL — HIGH (ref 3.8–10.5)
WBC # FLD AUTO: 10.61 K/UL — HIGH (ref 3.8–10.5)

## 2022-06-17 PROCEDURE — 73630 X-RAY EXAM OF FOOT: CPT | Mod: 26,LT

## 2022-06-17 DEVICE — IMPLANTABLE DEVICE: Type: IMPLANTABLE DEVICE | Site: LEFT | Status: FUNCTIONAL

## 2022-06-17 DEVICE — SCREW LOKG SLF-TPNG W/ STARDRIVE RECESS 2.X16MM: Type: IMPLANTABLE DEVICE | Site: LEFT | Status: FUNCTIONAL

## 2022-06-17 DEVICE — SCREW LOKG SLF-TPNG W/ STARDRIVE RECESS 2.X12MM: Type: IMPLANTABLE DEVICE | Site: LEFT | Status: FUNCTIONAL

## 2022-06-17 DEVICE — K-WIRE SYNTHES TROCAR POINT 1.6MM X 150MM: Type: IMPLANTABLE DEVICE | Site: LEFT | Status: FUNCTIONAL

## 2022-06-17 DEVICE — SCREW LOKG SLF-TPNG W/ STARDRIVE RECESS 2.X14MM: Type: IMPLANTABLE DEVICE | Site: LEFT | Status: FUNCTIONAL

## 2022-06-17 RX ORDER — OXYCODONE AND ACETAMINOPHEN 5; 325 MG/1; MG/1
1 TABLET ORAL EVERY 4 HOURS
Refills: 0 | Status: DISCONTINUED | OUTPATIENT
Start: 2022-06-17 | End: 2022-06-18

## 2022-06-17 RX ORDER — OXYCODONE HYDROCHLORIDE 5 MG/1
5 TABLET ORAL ONCE
Refills: 0 | Status: DISCONTINUED | OUTPATIENT
Start: 2022-06-17 | End: 2022-06-17

## 2022-06-17 RX ORDER — SODIUM CHLORIDE 9 MG/ML
1000 INJECTION, SOLUTION INTRAVENOUS
Refills: 0 | Status: DISCONTINUED | OUTPATIENT
Start: 2022-06-17 | End: 2022-06-17

## 2022-06-17 RX ORDER — ONDANSETRON 8 MG/1
4 TABLET, FILM COATED ORAL ONCE
Refills: 0 | Status: DISCONTINUED | OUTPATIENT
Start: 2022-06-17 | End: 2022-06-17

## 2022-06-17 RX ORDER — FOLIC ACID 0.8 MG
1 TABLET ORAL DAILY
Refills: 0 | Status: DISCONTINUED | OUTPATIENT
Start: 2022-06-17 | End: 2022-06-18

## 2022-06-17 RX ORDER — HYDROMORPHONE HYDROCHLORIDE 2 MG/ML
0.5 INJECTION INTRAMUSCULAR; INTRAVENOUS; SUBCUTANEOUS
Refills: 0 | Status: DISCONTINUED | OUTPATIENT
Start: 2022-06-17 | End: 2022-06-17

## 2022-06-17 RX ORDER — PREGABALIN 225 MG/1
1000 CAPSULE ORAL DAILY
Refills: 0 | Status: DISCONTINUED | OUTPATIENT
Start: 2022-06-17 | End: 2022-06-18

## 2022-06-17 RX ADMIN — PREGABALIN 1000 MICROGRAM(S): 225 CAPSULE ORAL at 21:56

## 2022-06-17 RX ADMIN — OXYCODONE AND ACETAMINOPHEN 1 TABLET(S): 5; 325 TABLET ORAL at 21:55

## 2022-06-17 RX ADMIN — PREGABALIN 1000 MICROGRAM(S): 225 CAPSULE ORAL at 11:32

## 2022-06-17 RX ADMIN — Medication 1 MILLIGRAM(S): at 21:55

## 2022-06-17 RX ADMIN — OXYCODONE AND ACETAMINOPHEN 1 TABLET(S): 5; 325 TABLET ORAL at 23:12

## 2022-06-17 NOTE — DISCHARGE NOTE PROVIDER - HOSPITAL COURSE
Patient is a 46 Y/O Male with no pmh presenting with left foot pain. Pt fractured L 4th metatarsal 3w ago, was seen in ED and followed by podiatry. Presents today for admission for surgery by Dr. Joseph Waterhouse from podiatry. Pt still has pain in L lateral dorsum of foot but it is tolerable. He has been using the hard sole shoe. Occasionally gets swollen but better with ACE wrap. TTE EF 64%, with diastolic LV dysfunction, per attnd medically optimized for surgery. S/P ORIF 6/17/22, assessed by podiatry able to ambulate with crutches. To follow up with podiatry outpatient for further care.     On _________, discussed with .__________, patient is medically cleared and optimized for discharge today. All medications were reviewed with attending, and sent to mutually agreed upon pharmacy.  Reviewed discharge medications with patient; All new medications requiring new prescription sent to pharmacy of patients choice. Reviewed need for prescription for previous home medicaitons and new prescriptions sent if requested. Patient in agreement and understands.   Patient is a 46 Y/O Male with no pmh presenting with left foot pain. Pt fractured L 4th metatarsal 3w ago, was seen in ED and followed by podiatry. Presents today for admission for surgery by Dr. Joseph Waterhouse from podiatry. Pt still has pain in L lateral dorsum of foot but it is tolerable. He has been using the hard sole shoe. Occasionally gets swollen but better with ACE wrap. TTE EF 64%, with diastolic LV dysfunction, per attnd medically optimized for surgery. S/P ORIF 6/17/22, assessed by podiatry able to ambulate with crutches. To follow up with podiatry outpatient for further care.     Case reviewed with attending; patient is medically cleared and optimized for discharge today.   Reviewed discharge medications with patient; All new medications requiring new prescription sent to pharmacy of patients choice. Reviewed need for prescription for previous home medicaitons and new prescriptions sent if requested. Patient in agreement and understands.   Patient is a 46 Y/O Male with no pmh presenting with left foot pain. Pt fractured L 4th metatarsal 3w ago, was seen in ED and followed by podiatry. Presents today for admission for surgery by Dr. Joseph Waterhouse from podiatry. Pt still has pain in L lateral dorsum of foot but it is tolerable. He has been using the hard sole shoe. Occasionally gets swollen but better with ACE wrap. TTE EF 64%, with diastolic LV dysfunction, per attnd medically optimized for surgery. S/P ORIF 6/17/22, assessed by podiatry able to ambulate with crutches. To follow up with podiatry outpatient for further care.     Case reviewed with attending; patient is medically cleared and optimized for discharge today.

## 2022-06-17 NOTE — PRE-OP CHECKLIST - COMMENTS
Patient Instructions by Roz Fontenot MD at 12/28/17 02:50 PM     Author:  Roz Fontenot MD Service:  (none) Author Type:  Physician     Filed:  12/28/17 02:51 PM Encounter Date:  12/28/2017 Status:  Signed     :  Roz Fontenot MD (Physician)            Try ice and over the counter antiinflammatory such as ibuprofen/aleve (naproxen) and avoid excessive chewing (ie gum, raisins, caramels etc).  May take a few weeks to completely settle, and may recur.    followup with your pcp if needed.      See handout provided as well       Revision History        User Key Date/Time User Provider Type Action    > [N/A] 12/28/17 02:51 PM Roz Fontenot MD Physician Sign             pt had water with medication

## 2022-06-17 NOTE — DISCHARGE NOTE PROVIDER - NSDCFUADDAPPT_GEN_ALL_CORE_FT
Podiatry Discharge Instructions:  Follow up: Please follow up with Dr. Waterhouse within 1 week of discharge from the hospital, please call [phone] for appointment and discuss that you recently were seen in the hospital.  Wound Care: Please leave your dressing clean dry intact until your follow up appointment.  Weight bearing: Please remain non-weight bearing to left foot using crutches.

## 2022-06-17 NOTE — DISCHARGE NOTE PROVIDER - CARE PROVIDER_API CALL
Waterhouse, Joseph C (DPM)  Surgery  [address] [place] [address]  Phone: [phone]  Fax: [phone]  Follow Up Time: 1 week

## 2022-06-17 NOTE — DISCHARGE NOTE PROVIDER - NSDCMRMEDTOKEN_GEN_ALL_CORE_FT
Percocet 5 mg-325 mg oral tablet: 1 tab(s) orally every 6 hours MDD:four   acetaminophen 325 mg oral tablet: 2 tab(s) orally every 6 hours, As needed, Temp greater or equal to 38C (100.4F), Mild Pain (1 - 3)  Percocet 5 mg-325 mg oral tablet: 1 tab(s) orally every 6 hours MDD:four

## 2022-06-17 NOTE — PRE-OP CHECKLIST - ISOLATION PRECAUTIONS
NOTIFICATION RETURN TO WORK / SCHOOL 
 
2/17/2020 8:27 AM 
 
Mr. Matthew [facility] [address] To Whom It May Concern: 
 
Parker Meeks is currently under the care of Clif Michel Dr. He was seen in our office today (02/17/20). He will return to school today. If there are questions or concerns please have the patient contact our office.  
 
 
 
Sincerely, 
 
 
Steffi Rushing MD 
 
                                
 
 none

## 2022-06-17 NOTE — DISCHARGE NOTE PROVIDER - NSFOLLOWUPCLINICS_GEN_ALL_ED_FT
[facility] General Internal Medicine  General Internal Medicine  [address] [place] [address]  Phone: [phone]  Fax:

## 2022-06-17 NOTE — BRIEF OPERATIVE NOTE - COMMENTS
s/p left foot 4/5th metatarsal ORIF   remain NWB to left foot using crutches   keep dressing clean dry and intact until follow

## 2022-06-18 ENCOUNTER — TRANSCRIPTION ENCOUNTER (OUTPATIENT)
Age: 47
End: 2022-06-18

## 2022-06-18 VITALS
RESPIRATION RATE: 18 BRPM | OXYGEN SATURATION: 96 % | SYSTOLIC BLOOD PRESSURE: 116 MMHG | DIASTOLIC BLOOD PRESSURE: 76 MMHG | HEART RATE: 79 BPM | TEMPERATURE: 98 F

## 2022-06-18 LAB
ANION GAP SERPL CALC-SCNC: 11 MMOL/L — SIGNIFICANT CHANGE UP (ref 7–14)
BASOPHILS # BLD AUTO: 0.04 K/UL — SIGNIFICANT CHANGE UP (ref 0–0.2)
BASOPHILS NFR BLD AUTO: 0.3 % — SIGNIFICANT CHANGE UP (ref 0–2)
BUN SERPL-MCNC: 4 MG/DL — LOW (ref 7–23)
CALCIUM SERPL-MCNC: 8.3 MG/DL — LOW (ref 8.4–10.5)
CHLORIDE SERPL-SCNC: 103 MMOL/L — SIGNIFICANT CHANGE UP (ref 98–107)
CO2 SERPL-SCNC: 23 MMOL/L — SIGNIFICANT CHANGE UP (ref 22–31)
CREAT SERPL-MCNC: 0.55 MG/DL — SIGNIFICANT CHANGE UP (ref 0.5–1.3)
D DIMER BLD IA.RAPID-MCNC: 154 NG/ML DDU — SIGNIFICANT CHANGE UP
EGFR: 122 ML/MIN/1.73M2 — SIGNIFICANT CHANGE UP
EOSINOPHIL # BLD AUTO: 0.23 K/UL — SIGNIFICANT CHANGE UP (ref 0–0.5)
EOSINOPHIL NFR BLD AUTO: 2 % — SIGNIFICANT CHANGE UP (ref 0–6)
GLUCOSE SERPL-MCNC: 95 MG/DL — SIGNIFICANT CHANGE UP (ref 70–99)
HCT VFR BLD CALC: 36.2 % — LOW (ref 39–50)
HGB BLD-MCNC: 12.7 G/DL — LOW (ref 13–17)
IANC: 9.01 K/UL — HIGH (ref 1.8–7.4)
IMM GRANULOCYTES NFR BLD AUTO: 0.6 % — SIGNIFICANT CHANGE UP (ref 0–1.5)
LYMPHOCYTES # BLD AUTO: 1.32 K/UL — SIGNIFICANT CHANGE UP (ref 1–3.3)
LYMPHOCYTES # BLD AUTO: 11.2 % — LOW (ref 13–44)
MAGNESIUM SERPL-MCNC: 1.9 MG/DL — SIGNIFICANT CHANGE UP (ref 1.6–2.6)
MCHC RBC-ENTMCNC: 35.1 GM/DL — SIGNIFICANT CHANGE UP (ref 32–36)
MCHC RBC-ENTMCNC: 42.5 PG — HIGH (ref 27–34)
MCV RBC AUTO: 121.1 FL — HIGH (ref 80–100)
MONOCYTES # BLD AUTO: 1.12 K/UL — HIGH (ref 0–0.9)
MONOCYTES NFR BLD AUTO: 9.5 % — SIGNIFICANT CHANGE UP (ref 2–14)
NEUTROPHILS # BLD AUTO: 9.01 K/UL — HIGH (ref 1.8–7.4)
NEUTROPHILS NFR BLD AUTO: 76.4 % — SIGNIFICANT CHANGE UP (ref 43–77)
NRBC # BLD: 0 /100 WBCS — SIGNIFICANT CHANGE UP
NRBC # FLD: 0 K/UL — SIGNIFICANT CHANGE UP
PHOSPHATE SERPL-MCNC: 3.5 MG/DL — SIGNIFICANT CHANGE UP (ref 2.5–4.5)
PLATELET # BLD AUTO: 196 K/UL — SIGNIFICANT CHANGE UP (ref 150–400)
POTASSIUM SERPL-MCNC: 3.9 MMOL/L — SIGNIFICANT CHANGE UP (ref 3.5–5.3)
POTASSIUM SERPL-SCNC: 3.9 MMOL/L — SIGNIFICANT CHANGE UP (ref 3.5–5.3)
RBC # BLD: 2.99 M/UL — LOW (ref 4.2–5.8)
RBC # FLD: 12.9 % — SIGNIFICANT CHANGE UP (ref 10.3–14.5)
SODIUM SERPL-SCNC: 137 MMOL/L — SIGNIFICANT CHANGE UP (ref 135–145)
WBC # BLD: 11.79 K/UL — HIGH (ref 3.8–10.5)
WBC # FLD AUTO: 11.79 K/UL — HIGH (ref 3.8–10.5)

## 2022-06-18 PROCEDURE — 71045 X-RAY EXAM CHEST 1 VIEW: CPT | Mod: 26

## 2022-06-18 RX ORDER — ACETAMINOPHEN 500 MG
2 TABLET ORAL
Qty: 0 | Refills: 0 | DISCHARGE
Start: 2022-06-18

## 2022-06-18 RX ADMIN — OXYCODONE AND ACETAMINOPHEN 1 TABLET(S): 5; 325 TABLET ORAL at 08:24

## 2022-06-18 RX ADMIN — Medication 650 MILLIGRAM(S): at 11:55

## 2022-06-18 RX ADMIN — OXYCODONE AND ACETAMINOPHEN 1 TABLET(S): 5; 325 TABLET ORAL at 04:55

## 2022-06-18 RX ADMIN — PREGABALIN 1000 MICROGRAM(S): 225 CAPSULE ORAL at 11:19

## 2022-06-18 RX ADMIN — OXYCODONE AND ACETAMINOPHEN 1 TABLET(S): 5; 325 TABLET ORAL at 03:22

## 2022-06-18 RX ADMIN — OXYCODONE AND ACETAMINOPHEN 1 TABLET(S): 5; 325 TABLET ORAL at 09:34

## 2022-06-18 RX ADMIN — PREGABALIN 1000 MICROGRAM(S): 225 CAPSULE ORAL at 11:20

## 2022-06-18 RX ADMIN — Medication 650 MILLIGRAM(S): at 11:19

## 2022-06-18 RX ADMIN — Medication 1 MILLIGRAM(S): at 11:19

## 2022-06-18 NOTE — DISCHARGE NOTE NURSING/CASE MANAGEMENT/SOCIAL WORK - NSDCPEFALRISK_GEN_ALL_CORE
For information on Fall & Injury Prevention, visit: [email or web address] OR  [email or web address] OR  https://www.cdc.gov/steadi/patient.html
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
## 2022-06-18 NOTE — PHYSICAL THERAPY INITIAL EVALUATION ADULT - ADDITIONAL COMMENTS
Pt performed crutch training with PT however appeared to be very unsteady and unsafe to amb with axillary crutches, hence pt trained with rolling walker with min assist /CG at times to maintain safety and balance. Pt advised to amb with assist only until regain more balance or training with the rolling walker.

## 2022-06-18 NOTE — PROGRESS NOTE ADULT - ASSESSMENT
47M with s/p left foot 4th and 5th met ORIF  - pt seen and evaluated  - afebrile, neurovascular status intact  - xrays: 4th and 5th met ORIF  - posterior splint kept CDI  - pod stable for d/c   - follow up info in discharge paperwork  - discussed with attending
Patient is a 46 Y/O Male with no pmh presenting with left foot pain. Pt fractured L 4th metatarsal 3w ago, was seen in ED and followed by podiatry. Presents today for admission for surgery by Dr. Joseph Waterhouse from podiatry. Pt still has pain in L lateral dorsum of foot but it is tolerable. He has been using the hard sole shoe. Occasionally gets swollen but better with ACE wrap. He has no complaints today otherwise.       #  fractured L 4th metatarsal  podiatry eval appreciated  pain control  EKG/Echo   Echo noted   S/P OR  NWB per podiatry   D/C planing     # macrocytic anemia  low folic acid and B12 level  started on supplement  D/C on supplement     DVT and gI PPX 
Patient is a 48 Y/O Male with no pmh presenting with left foot pain. Pt fractured L 4th metatarsal 3w ago, was seen in ED and followed by podiatry. Presents today for admission for surgery by Dr. Joseph Waterhouse from podiatry. Pt still has pain in L lateral dorsum of foot but it is tolerable. He has been using the hard sole shoe. Occasionally gets swollen but better with ACE wrap. He has no complaints today otherwise.       #  fractured L 4th metatarsal  podiatry eval appreciated  pain control  EKG/Echo   plan for OR  patient is medically optimized for OR tomorrow  Echo noted     # macrocytic anemia  low folic acid and B12 level  started on supplement  D/C on supplement     DVT and gI PPX

## 2022-06-18 NOTE — PHYSICAL THERAPY INITIAL EVALUATION ADULT - ACTIVE RANGE OF MOTION EXAMINATION, REHAB EVAL
except not tested on left ankle/foot s/p Sx/bilateral upper extremity Active ROM was WFL (within functional limits)/bilateral  lower extremity Active ROM was WFL (within functional limits)

## 2022-06-18 NOTE — DISCHARGE NOTE NURSING/CASE MANAGEMENT/SOCIAL WORK - PATIENT PORTAL LINK FT
You can access the FollowMyHealth Patient Portal offered by [facility] by registering at the following website: [email or web address]. By joining [facility]’s FollowMyHealth portal, you will also be able to view your health information using other applications (apps) compatible with our system.

## 2022-06-18 NOTE — PROGRESS NOTE ADULT - SUBJECTIVE AND OBJECTIVE BOX
Name of Patient : PRIYANKA PITTMAN  MRN: [id number]  Date of visit: 06-17-22    Subjective: Patient seen and examined. No new events except as noted.   doing okay  OR today     REVIEW OF SYSTEMS:    CONSTITUTIONAL: No weakness, fevers or chills  EYES/ENT: No visual changes;  No vertigo or throat pain   NECK: No pain or stiffness  RESPIRATORY: No cough, wheezing, hemoptysis; No shortness of breath  CARDIOVASCULAR: No chest pain or palpitations  GASTROINTESTINAL: No abdominal or epigastric pain. No nausea, vomiting, or hematemesis; No diarrhea or constipation. No melena or hematochezia.  GENITOURINARY: No dysuria, frequency or hematuria  NEUROLOGICAL: No numbness or weakness  SKIN: No itching, burning, rashes, or lesions   All other review of systems is negative unless indicated above.    MEDICATIONS:  MEDICATIONS  (STANDING):  cyanocobalamin 1000 MICROGram(s) Oral daily  cyanocobalamin Injectable 1000 MICROGram(s) IntraMuscular daily  folic acid 1 milliGRAM(s) Oral daily      PHYSICAL EXAM:  T(C): 36.7 (06-17-22 @ 17:25), Max: 37.1 (06-17-22 @ 09:43)  HR: 67 (06-17-22 @ 18:30) (67 - 84)  BP: 122/72 (06-17-22 @ 18:30) (102/70 - 141/85)  RR: 17 (06-17-22 @ 18:30) (12 - 20)  SpO2: 98% (06-17-22 @ 18:30) (94% - 100%)  Wt(kg): --  I&O's Summary        Appearance: Normal	  HEENT:  PERRLA   Lymphatic: No lymphadenopathy   Cardiovascular: Normal S1 S2, no JVD  Respiratory: normal effort , clear  Gastrointestinal:  Soft, Non-tender  Skin: No rashes,  warm to touch  Psychiatry:  Mood & affect appropriate  Musculuskeletal: LE dressing       All labs, Imaging and EKGs personally reviewed                             13.9   10.61 )-----------( 229      ( 17 Jun 2022 06:20 )             39.3               06-17    138  |  103  |  3<L>  ----------------------------<  92  4.4   |  26  |  0.54    Ca    8.4      17 Jun 2022 06:20    TPro  6.1  /  Alb  3.0<L>  /  TBili  1.0  /  DBili  x   /  AST  57<H>  /  ALT  31  /  AlkPhos  231<H>  06-17    PT/INR - ( 17 Jun 2022 06:20 )   PT: 12.2 sec;   INR: 1.05 ratio         PTT - ( 17 Jun 2022 06:20 )  PTT:33.0 sec                                     
Patient is a 47y old  Male who presents with a chief complaint of LT foot pain (17 Jun 2022 10:03)       INTERVAL HPI/OVERNIGHT EVENTS:  Patient seen and evaluated at bedside.  Pt is resting comfortable in NAD. Denies N/V/F/C.    Allergies    No Known Allergies    Intolerances        Vital Signs Last 24 Hrs  T(C): 36.9 (18 Jun 2022 10:57), Max: 36.9 (18 Jun 2022 05:52)  T(F): 98.5 (18 Jun 2022 10:57), Max: 98.5 (18 Jun 2022 10:57)  HR: 79 (18 Jun 2022 10:57) (67 - 92)  BP: 116/76 (18 Jun 2022 10:57) (102/70 - 123/85)  BP(mean): 87 (17 Jun 2022 18:04) (61 - 87)  RR: 18 (18 Jun 2022 10:57) (12 - 20)  SpO2: 96% (18 Jun 2022 10:57) (93% - 100%)    LABS:                        12.7   11.79 )-----------( 196      ( 18 Jun 2022 05:55 )             36.2     06-18    137  |  103  |  4<L>  ----------------------------<  95  3.9   |  23  |  0.55    Ca    8.3<L>      18 Jun 2022 05:55  Phos  3.5     06-18  Mg     1.90     06-18    TPro  6.1  /  Alb  3.0<L>  /  TBili  1.0  /  DBili  x   /  AST  57<H>  /  ALT  31  /  AlkPhos  231<H>  06-17    PT/INR - ( 17 Jun 2022 06:20 )   PT: 12.2 sec;   INR: 1.05 ratio         PTT - ( 17 Jun 2022 06:20 )  PTT:33.0 sec    CAPILLARY BLOOD GLUCOSE          Lower Extremity Physical Exam:  Vascular: DP/PT 2/4, B/L, CFT <3 seconds B/L, Temperature gradient warm to warm, B/L.   Neuro: Epicritic sensation intact to the level of digits, B/L.  Musculoskeletal/Ortho: unremarkable  Skin: keep CDI        RADIOLOGY & ADDITIONAL TESTS:  
Patient is a 47y old  Male who presents with a chief complaint of LT foot pain (17 Jun 2022 10:03)      INTERVAL HPI/OVERNIGHT EVENTS:   Pt is scheduled for left foot 4th and 5th met ORIF with Dr. Waterhouse at 1pm . Patient is aware of procedure and is NPO since midnight.    MEDICATIONS  (STANDING):  cyanocobalamin 1000 MICROGram(s) Oral daily  cyanocobalamin Injectable 1000 MICROGram(s) IntraMuscular daily  folic acid 1 milliGRAM(s) Oral daily    MEDICATIONS  (PRN):  acetaminophen     Tablet .. 650 milliGRAM(s) Oral every 6 hours PRN Temp greater or equal to 38C (100.4F), Mild Pain (1 - 3)      Allergies    No Known Allergies    Intolerances        Vital Signs Last 24 Hrs  T(C): 37.1 (17 Jun 2022 09:43), Max: 37.1 (17 Jun 2022 09:43)  T(F): 98.8 (17 Jun 2022 09:43), Max: 98.8 (17 Jun 2022 09:43)  HR: 84 (17 Jun 2022 09:43) (79 - 98)  BP: 133/88 (17 Jun 2022 09:43) (130/77 - 141/85)  BP(mean): --  RR: 19 (17 Jun 2022 09:43) (16 - 19)  SpO2: 97% (17 Jun 2022 09:43) (95% - 100%)    LABS:                        13.9   10.61 )-----------( 229      ( 17 Jun 2022 06:20 )             39.3     06-17    138  |  103  |  3<L>  ----------------------------<  92  4.4   |  26  |  0.54    Ca    8.4      17 Jun 2022 06:20    TPro  6.1  /  Alb  3.0<L>  /  TBili  1.0  /  DBili  x   /  AST  57<H>  /  ALT  31  /  AlkPhos  231<H>  06-17    PT/INR - ( 17 Jun 2022 06:20 )   PT: 12.2 sec;   INR: 1.05 ratio         PTT - ( 17 Jun 2022 06:20 )  PTT:33.0 sec    CAPILLARY BLOOD GLUCOSE          RADIOLOGY & ADDITIONAL TESTS:    Plan:   To OR today at 1pm with Dr. Waterhouse for left foot 4th and 5th met ORIF.   CXR on sunrise.  EKG on sunrise.  Medical/Cardiac clearance since 6/17 and documented in chart.  Consent signed and in chart.  Procedure was explained to patient in detail. All alternatives, risks and complications were discussed. All questions answered.
Name of Patient : PRIYANKA PITTMAN  MRN: [id number]  Date of visit: 06-18-22       Subjective: Patient seen and examined. No new events except as noted.   S/P OR   doing okay  non weight bearing     REVIEW OF SYSTEMS:    CONSTITUTIONAL: No weakness, fevers or chills  EYES/ENT: No visual changes;  No vertigo or throat pain   NECK: No pain or stiffness  RESPIRATORY: No cough, wheezing, hemoptysis; No shortness of breath  CARDIOVASCULAR: No chest pain or palpitations  GASTROINTESTINAL: No abdominal or epigastric pain. No nausea, vomiting, or hematemesis; No diarrhea or constipation. No melena or hematochezia.  GENITOURINARY: No dysuria, frequency or hematuria  NEUROLOGICAL: No numbness or weakness  SKIN: No itching, burning, rashes, or lesions   All other review of systems is negative unless indicated above.    MEDICATIONS:  MEDICATIONS  (STANDING):  cyanocobalamin 1000 MICROGram(s) Oral daily  cyanocobalamin Injectable 1000 MICROGram(s) IntraMuscular daily  folic acid 1 milliGRAM(s) Oral daily      PHYSICAL EXAM:  T(C): 36.9 (06-18-22 @ 10:57), Max: 36.9 (06-18-22 @ 05:52)  HR: 79 (06-18-22 @ 10:57) (67 - 92)  BP: 116/76 (06-18-22 @ 10:57) (108/72 - 123/85)  RR: 18 (06-18-22 @ 10:57) (17 - 19)  SpO2: 96% (06-18-22 @ 10:57) (93% - 98%)  Wt(kg): --  I&O's Summary    17 Jun 2022 07:01  -  18 Jun 2022 07:00  --------------------------------------------------------  IN: 240 mL / OUT: 0 mL / NET: 240 mL          Appearance: Normal	  HEENT:  PERRLA   Lymphatic: No lymphadenopathy   Cardiovascular: Normal S1 S2, no JVD  Respiratory: normal effort , clear  Gastrointestinal:  Soft, Non-tender  Skin: No rashes,  warm to touch  Psychiatry:  Mood & affect appropriate  Musculuskeletal: LE dressing       All labs, Imaging and EKGs personally reviewed         06-17-22 @ 07:01  -  06-18-22 @ 07:00  --------------------------------------------------------  IN: 240 mL / OUT: 0 mL / NET: 240 mL                            12.7   11.79 )-----------( 196      ( 18 Jun 2022 05:55 )             36.2               06-18    137  |  103  |  4<L>  ----------------------------<  95  3.9   |  23  |  0.55    Ca    8.3<L>      18 Jun 2022 05:55  Phos  3.5     06-18  Mg     1.90     06-18    TPro  6.1  /  Alb  3.0<L>  /  TBili  1.0  /  DBili  x   /  AST  57<H>  /  ALT  31  /  AlkPhos  231<H>  06-17    PT/INR - ( 17 Jun 2022 06:20 )   PT: 12.2 sec;   INR: 1.05 ratio         PTT - ( 17 Jun 2022 06:20 )  PTT:33.0 sec

## 2022-06-18 NOTE — DISCHARGE NOTE NURSING/CASE MANAGEMENT/SOCIAL WORK - NSDCFUADDAPPT_GEN_ALL_CORE_FT
Podiatry Discharge Instructions:  Follow up: Please follow up with Dr. Waterhouse within 1 week of discharge from the hospital, please call [phone] for appointment and discuss that you recently were seen in the hospital.  Wound Care: Please leave your dressing clean dry intact until your follow up appointment.  Weight bearing: Please remain non-weight bearing to left foot using crutches.

## 2022-07-19 NOTE — PRE-OP CHECKLIST - HEART RATE (BEATS/MIN)
Pt called requesting a work note for being off from 7/5/22 to 7/19/22. She states that she was sick with sinus issues and symptoms of ovid even though her test was negative. She need a note for her employer. She went back to work today. She will pick the note up and drop off blank [facility] paperwork around 3pm today.  Please give pt a call [phone]

## 2022-08-22 ENCOUNTER — APPOINTMENT (OUTPATIENT)
Dept: URBAN - METROPOLITAN AREA CLINIC 208 | Age: 47
Setting detail: DERMATOLOGY
End: 2022-09-01

## 2022-08-22 DIAGNOSIS — L72.8 OTHER FOLLICULAR CYSTS OF THE SKIN AND SUBCUTANEOUS TISSUE: ICD-10-CM

## 2022-08-22 DIAGNOSIS — K64.4 RESIDUAL HEMORRHOIDAL SKIN TAGS: ICD-10-CM

## 2022-08-22 PROCEDURE — OTHER ORDER TESTS: OTHER

## 2022-08-22 PROCEDURE — OTHER PRESCRIPTION: OTHER

## 2022-08-22 PROCEDURE — OTHER COUNSELING: OTHER

## 2022-08-22 PROCEDURE — OTHER PRESCRIPTION MEDICATION MANAGEMENT: OTHER

## 2022-08-22 PROCEDURE — 99203 OFFICE O/P NEW LOW 30 MIN: CPT

## 2022-08-22 RX ORDER — DOXYCYCLINE HYCLATE 100 MG/1
CAPSULE, GELATIN COATED ORAL
Qty: 20 | Refills: 0 | Status: ERX | COMMUNITY
Start: 2022-08-22

## 2022-08-22 RX ORDER — CLINDAMYCIN PHOSPHATE 10 MG/G
GEL TOPICAL
Qty: 60 | Refills: 0 | Status: ERX | COMMUNITY
Start: 2022-08-22

## 2022-08-22 NOTE — PROCEDURE: COUNSELING
Patient Specific Counseling (Will Not Stick From Patient To Patient): Wash with hibiclens in the shower once daily until healed\\nAdd sitz baths or warm compresses for comfort.\\n\\nLower on the right buttock – abscess vs cyst:\\n\\nUse over the counter Hibiclens wash daily in the shower (can find at any drugstore):\\nhttps://www.[employer].Me/Hibiclens-Antimicrobial-Antiseptic-Cleanser-Cleansing/dp/K53JZ45PR7/ref=pd_lpo_2?pd_rd_i=X44ZR24PM6&psc=1\\n\\nTake doxycycline pills for 10 days.\\nUse clindamycin gel twice daily while flared. Patient Specific Counseling (Will Not Stick From Patient To Patient): Wash with hibiclens in the shower once daily until healed\\nAdd sitz baths or warm compresses for comfort.\\n\\nLower on the right buttock – abscess vs cyst:\\n\\nUse over the counter Hibiclens wash daily in the shower (can find at any drugstore):\\nhttps://www.[employer]/Hibiclens-Antimicrobial-Antiseptic-Cleanser-Cleansing/dp/J75CG63RK6/ref=pd_lpo_2?pd_rd_i=K35OX14HX9&psc=1\\n\\nTake doxycycline pills for 10 days.\\nUse clindamycin gel twice daily while flared.

## 2022-08-22 NOTE — PROCEDURE: PRESCRIPTION MEDICATION MANAGEMENT
Render In Strict Bullet Format?: No
Initiate Treatment: Hibiclens wash in the shower daily\\nClindamycin gel twice daily after showering\\nDoxycycline 100mg twice daily for 10 days
Detail Level: Zone

## 2022-08-22 NOTE — PROCEDURE: COUNSELING
Patient Specific Counseling (Will Not Stick From Patient To Patient): Add preparation h for comfort, follow-up with a GI doctor\\n\\nAround the anal opening - hemorrhoids:\\n\\nhttps://www.[facility].org/education/hemorrhoids\\nhttps://[facility].org/ckr-ext/Dcmnt?wykl=294576348\\n\\nYou can try the following over the counter medication – make sure to pick one with active ingredients that include phenylephrine:\\nhttps://[employer].readness.com/store/c/preparation-h-hemorrhoid-symptom-treatment-cream/ID=prod6407157-product?ext=[employer]__pla_with_promotion_online&gclsrc=aw.ds&&gclid=[id number]-N3fKnoly1naCvOVrbu0eZk2IZ_LgobaWORBZtfHYaAjF6EALw_wcB\\n\\nUltimately, you may need to see a GI doctor or colorectal surgeon for treatment Patient Specific Counseling (Will Not Stick From Patient To Patient): Add preparation h for comfort, follow-up with a GI doctor\\n\\nAround the anal opening - hemorrhoids:\\n\\nhttps://www.[facility].org/education/hemorrhoids\\nhttps://[facility].org/ckr-ext/Dcmnt?zxfg=808693989\\n\\nYou can try the following over the counter medication – make sure to pick one with active ingredients that include phenylephrine:\\nhttps://[employer]/store/c/preparation-h-hemorrhoid-symptom-treatment-cream/ID=prod6407157-product?ext=[employer]__pla_with_promotion_online&gclsrc=aw.ds&&gclid=[id number]ZCw2C-N3fKnoly1naCvOVrbu0eZk2IZ_LgobaWORBZtfHYaAjF6EALw_wcB\\n\\nUltimately, you may need to see a GI doctor or colorectal surgeon for treatment

## 2022-08-22 NOTE — HPI: SKIN LESION (MOLE CHECK)
Hpi Title: Evaluation of a Skin Lesion
How Severe Are Your Spot(S)?: moderate
Have Your Spot(S) Been Treated In The Past?: has not been treated
Additional History: Comes and goes in severity. Doesn’t have drainage but there has been some bleeding and it feels raw.

## 2023-02-20 NOTE — DISCHARGE NOTE PROVIDER - NSDCCPCAREPLAN_GEN_ALL_CORE_FT
Yefri Butler is a 84 yo male with history for hypertension no longer taking amlodipine who presents to hospital for left chest wall discomfort associated with palpitation and shortness of breath that woke patient up this am. Denies headaches, dizziness, change in vision, c diaphoresis, orthopnea, PND, abdominal pain, nausea, vomiting, or extremities weakness/numbness. No new physical limitation in the past month. Prior to event, in usual state of health.  No history of MI or TIA/stroke. Never smoker. EKG a fib RVR then converted to NSR diltiazem IV. Nitro placed on chest as well. Troponin 0.046.   CXR similar to last year 1/2022.    PRINCIPAL DISCHARGE DIAGNOSIS  Diagnosis: Metatarsal fracture  Assessment and Plan of Treatment: You were admitted to the hospital for surgery by Dr. Waterhouse. Medical attending saw you and cleared you for surgery. Currently you are recovering, and to follow up with your podiatrist for further care and evaluation in 1 week. Use crutches and take pain medication as needed.

## 2024-02-12 ENCOUNTER — TRANSCRIPTION ENCOUNTER (OUTPATIENT)
Age: 49
End: 2024-02-12

## 2024-02-12 ENCOUNTER — INPATIENT (INPATIENT)
Facility: HOSPITAL | Age: 49
LOS: 23 days | Discharge: NURSING, REHAB OR LONG-TERM CARE | End: 2024-03-07
Attending: INTERNAL MEDICINE | Admitting: INTERNAL MEDICINE
Payer: COMMERCIAL

## 2024-02-12 VITALS
WEIGHT: 164.91 LBS | HEIGHT: 68 IN | HEART RATE: 106 BPM | RESPIRATION RATE: 18 BRPM | SYSTOLIC BLOOD PRESSURE: 120 MMHG | TEMPERATURE: 98 F | DIASTOLIC BLOOD PRESSURE: 78 MMHG

## 2024-02-12 LAB
ALBUMIN SERPL ELPH-MCNC: 2.9 G/DL — LOW (ref 3.3–5)
ALP SERPL-CCNC: 408 U/L — HIGH (ref 40–120)
ALT FLD-CCNC: 52 U/L — SIGNIFICANT CHANGE UP (ref 12–78)
ANION GAP SERPL CALC-SCNC: 9 MMOL/L — SIGNIFICANT CHANGE UP (ref 5–17)
AST SERPL-CCNC: 138 U/L — HIGH (ref 15–37)
BASOPHILS # BLD AUTO: 0.05 K/UL — SIGNIFICANT CHANGE UP (ref 0–0.2)
BASOPHILS NFR BLD AUTO: 0.4 % — SIGNIFICANT CHANGE UP (ref 0–2)
BILIRUB SERPL-MCNC: 1.5 MG/DL — HIGH (ref 0.2–1.2)
BUN SERPL-MCNC: 5 MG/DL — LOW (ref 7–23)
CALCIUM SERPL-MCNC: 8.7 MG/DL — SIGNIFICANT CHANGE UP (ref 8.5–10.1)
CHLORIDE SERPL-SCNC: 108 MMOL/L — SIGNIFICANT CHANGE UP (ref 96–108)
CO2 SERPL-SCNC: 24 MMOL/L — SIGNIFICANT CHANGE UP (ref 22–31)
CREAT SERPL-MCNC: 0.87 MG/DL — SIGNIFICANT CHANGE UP (ref 0.5–1.3)
EGFR: 106 ML/MIN/1.73M2 — SIGNIFICANT CHANGE UP
EOSINOPHIL # BLD AUTO: 0.14 K/UL — SIGNIFICANT CHANGE UP (ref 0–0.5)
EOSINOPHIL NFR BLD AUTO: 1.1 % — SIGNIFICANT CHANGE UP (ref 0–6)
ETHANOL SERPL-MCNC: 75 MG/DL — HIGH (ref 0–10)
GLUCOSE SERPL-MCNC: 132 MG/DL — HIGH (ref 70–99)
HCT VFR BLD CALC: 50.4 % — HIGH (ref 39–50)
HGB BLD-MCNC: 16 G/DL — SIGNIFICANT CHANGE UP (ref 13–17)
IMM GRANULOCYTES NFR BLD AUTO: 0.2 % — SIGNIFICANT CHANGE UP (ref 0–0.9)
LIDOCAIN IGE QN: 45 U/L — SIGNIFICANT CHANGE UP (ref 13–75)
LYMPHOCYTES # BLD AUTO: 1.99 K/UL — SIGNIFICANT CHANGE UP (ref 1–3.3)
LYMPHOCYTES # BLD AUTO: 15.4 % — SIGNIFICANT CHANGE UP (ref 13–44)
MCHC RBC-ENTMCNC: 31.7 G/DL — LOW (ref 32–36)
MCHC RBC-ENTMCNC: 32.4 PG — SIGNIFICANT CHANGE UP (ref 27–34)
MCV RBC AUTO: 102 FL — HIGH (ref 80–100)
MONOCYTES # BLD AUTO: 0.71 K/UL — SIGNIFICANT CHANGE UP (ref 0–0.9)
MONOCYTES NFR BLD AUTO: 5.5 % — SIGNIFICANT CHANGE UP (ref 2–14)
NEUTROPHILS # BLD AUTO: 9.97 K/UL — HIGH (ref 1.8–7.4)
NEUTROPHILS NFR BLD AUTO: 77.4 % — HIGH (ref 43–77)
NRBC # BLD: 0 /100 WBCS — SIGNIFICANT CHANGE UP (ref 0–0)
PLATELET # BLD AUTO: 312 K/UL — SIGNIFICANT CHANGE UP (ref 150–400)
POTASSIUM SERPL-MCNC: 3.8 MMOL/L — SIGNIFICANT CHANGE UP (ref 3.5–5.3)
POTASSIUM SERPL-SCNC: 3.8 MMOL/L — SIGNIFICANT CHANGE UP (ref 3.5–5.3)
PROT SERPL-MCNC: 8.6 GM/DL — HIGH (ref 6–8.3)
RBC # BLD: 4.94 M/UL — SIGNIFICANT CHANGE UP (ref 4.2–5.8)
RBC # FLD: 14.7 % — HIGH (ref 10.3–14.5)
SODIUM SERPL-SCNC: 141 MMOL/L — SIGNIFICANT CHANGE UP (ref 135–145)
WBC # BLD: 12.89 K/UL — HIGH (ref 3.8–10.5)
WBC # FLD AUTO: 12.89 K/UL — HIGH (ref 3.8–10.5)

## 2024-02-12 PROCEDURE — 99053 MED SERV 10PM-8AM 24 HR FAC: CPT

## 2024-02-12 PROCEDURE — 99285 EMERGENCY DEPT VISIT HI MDM: CPT

## 2024-02-12 RX ORDER — SODIUM CHLORIDE 9 MG/ML
1000 INJECTION INTRAMUSCULAR; INTRAVENOUS; SUBCUTANEOUS ONCE
Refills: 0 | Status: COMPLETED | OUTPATIENT
Start: 2024-02-12 | End: 2024-02-12

## 2024-02-12 RX ORDER — ONDANSETRON 8 MG/1
4 TABLET, FILM COATED ORAL ONCE
Refills: 0 | Status: COMPLETED | OUTPATIENT
Start: 2024-02-12 | End: 2024-02-12

## 2024-02-12 RX ADMIN — SODIUM CHLORIDE 1000 MILLILITER(S): 9 INJECTION INTRAMUSCULAR; INTRAVENOUS; SUBCUTANEOUS at 23:03

## 2024-02-12 RX ADMIN — Medication 30 MILLILITER(S): at 23:03

## 2024-02-12 RX ADMIN — ONDANSETRON 4 MILLIGRAM(S): 8 TABLET, FILM COATED ORAL at 23:03

## 2024-02-12 NOTE — ED ADULT NURSE NOTE - NSFALLUNIVINTERV_ED_ALL_ED
Bed/Stretcher in lowest position, wheels locked, appropriate side rails in place/Call bell, personal items and telephone in reach/Instruct patient to call for assistance before getting out of bed/chair/stretcher/Non-slip footwear applied when patient is off stretcher/Bruno to call system/Physically safe environment - no spills, clutter or unnecessary equipment/Purposeful proactive rounding/Room/bathroom lighting operational, light cord in reach

## 2024-02-12 NOTE — ED ADULT NURSE NOTE - OBJECTIVE STATEMENT
Pt is a 48y M AOx3 with no sig pmh. pt reports generalized abdominal pain accompanied with nausea and vomiting for a few hours. When arriving in the ED pt began having chills. Pt endorses drinking late last night into early morning.

## 2024-02-12 NOTE — ED ADULT TRIAGE NOTE - CHIEF COMPLAINT QUOTE
PW severe ABD pain a/w nausea x " few hours" admits to drinking  Vodka last night into early morning .

## 2024-02-12 NOTE — ED ADULT NURSE NOTE - ED STAT RN HANDOFF DETAILS
Covering for primary RN. Handoff report given to MALLY Elizabeth in the OR. RN made aware of pts current condition/test results/reason for admission for intra-abdominal free air of unknown etiology. pt is AOx3, resting in stretcher at this time. NADN. pt is on room air. pts IVs are patent and intact no redness/swelling noted at the sites. pt has 16F gautam placed by MALLY Cool. rounding and safety checks completed. pt is vitally stable upon leaving the ED. pre-op checklist completed by MALLY Cool. any issues endorsed to oncoming RN for followup.

## 2024-02-13 ENCOUNTER — TRANSCRIPTION ENCOUNTER (OUTPATIENT)
Age: 49
End: 2024-02-13

## 2024-02-13 DIAGNOSIS — R19.8 OTHER SPECIFIED SYMPTOMS AND SIGNS INVOLVING THE DIGESTIVE SYSTEM AND ABDOMEN: ICD-10-CM

## 2024-02-13 DIAGNOSIS — Z98.890 OTHER SPECIFIED POSTPROCEDURAL STATES: Chronic | ICD-10-CM

## 2024-02-13 LAB
ALBUMIN SERPL ELPH-MCNC: 2.1 G/DL — LOW (ref 3.3–5)
ALBUMIN SERPL ELPH-MCNC: 2.5 G/DL — LOW (ref 3.3–5)
ALP SERPL-CCNC: 170 U/L — HIGH (ref 40–120)
ALP SERPL-CCNC: 240 U/L — HIGH (ref 40–120)
ALT FLD-CCNC: 33 U/L — SIGNIFICANT CHANGE UP (ref 12–78)
ALT FLD-CCNC: 34 U/L — SIGNIFICANT CHANGE UP (ref 12–78)
ANION GAP SERPL CALC-SCNC: 5 MMOL/L — SIGNIFICANT CHANGE UP (ref 5–17)
ANION GAP SERPL CALC-SCNC: 5 MMOL/L — SIGNIFICANT CHANGE UP (ref 5–17)
ANISOCYTOSIS BLD QL: SLIGHT — SIGNIFICANT CHANGE UP
APPEARANCE UR: ABNORMAL
APTT BLD: 35.9 SEC — HIGH (ref 24.5–35.6)
APTT BLD: 36.3 SEC — HIGH (ref 24.5–35.6)
AST SERPL-CCNC: 63 U/L — HIGH (ref 15–37)
AST SERPL-CCNC: 87 U/L — HIGH (ref 15–37)
BACTERIA # UR AUTO: ABNORMAL /HPF
BASE EXCESS BLDA CALC-SCNC: -3.8 MMOL/L — LOW (ref -2–3)
BASOPHILS # BLD AUTO: 0 K/UL — SIGNIFICANT CHANGE UP (ref 0–0.2)
BASOPHILS # BLD AUTO: 0 K/UL — SIGNIFICANT CHANGE UP (ref 0–0.2)
BASOPHILS NFR BLD AUTO: 0 % — SIGNIFICANT CHANGE UP (ref 0–2)
BASOPHILS NFR BLD AUTO: 0 % — SIGNIFICANT CHANGE UP (ref 0–2)
BILIRUB SERPL-MCNC: 2.8 MG/DL — HIGH (ref 0.2–1.2)
BILIRUB SERPL-MCNC: 3.5 MG/DL — HIGH (ref 0.2–1.2)
BILIRUB UR-MCNC: ABNORMAL
BLD GP AB SCN SERPL QL: SIGNIFICANT CHANGE UP
BLOOD GAS COMMENTS ARTERIAL: SIGNIFICANT CHANGE UP
BUN SERPL-MCNC: 12 MG/DL — SIGNIFICANT CHANGE UP (ref 7–23)
BUN SERPL-MCNC: 7 MG/DL — SIGNIFICANT CHANGE UP (ref 7–23)
CALCIUM SERPL-MCNC: 7.3 MG/DL — LOW (ref 8.5–10.1)
CALCIUM SERPL-MCNC: 7.5 MG/DL — LOW (ref 8.5–10.1)
CHLORIDE SERPL-SCNC: 108 MMOL/L — SIGNIFICANT CHANGE UP (ref 96–108)
CHLORIDE SERPL-SCNC: 108 MMOL/L — SIGNIFICANT CHANGE UP (ref 96–108)
CO2 BLDA-SCNC: 23 MMOL/L — SIGNIFICANT CHANGE UP (ref 19–24)
CO2 SERPL-SCNC: 24 MMOL/L — SIGNIFICANT CHANGE UP (ref 22–31)
CO2 SERPL-SCNC: 26 MMOL/L — SIGNIFICANT CHANGE UP (ref 22–31)
COLOR SPEC: SIGNIFICANT CHANGE UP
CREAT SERPL-MCNC: 0.83 MG/DL — SIGNIFICANT CHANGE UP (ref 0.5–1.3)
CREAT SERPL-MCNC: 1.45 MG/DL — HIGH (ref 0.5–1.3)
DIFF PNL FLD: NEGATIVE — SIGNIFICANT CHANGE UP
EGFR: 107 ML/MIN/1.73M2 — SIGNIFICANT CHANGE UP
EGFR: 59 ML/MIN/1.73M2 — LOW
EOSINOPHIL # BLD AUTO: 0 K/UL — SIGNIFICANT CHANGE UP (ref 0–0.5)
EOSINOPHIL # BLD AUTO: 0.27 K/UL — SIGNIFICANT CHANGE UP (ref 0–0.5)
EOSINOPHIL NFR BLD AUTO: 0 % — SIGNIFICANT CHANGE UP (ref 0–6)
EOSINOPHIL NFR BLD AUTO: 1 % — SIGNIFICANT CHANGE UP (ref 0–6)
EPI CELLS # UR: SIGNIFICANT CHANGE UP
GAS PNL BLDA: SIGNIFICANT CHANGE UP
GLUCOSE SERPL-MCNC: 126 MG/DL — HIGH (ref 70–99)
GLUCOSE SERPL-MCNC: 130 MG/DL — HIGH (ref 70–99)
GLUCOSE UR QL: NEGATIVE MG/DL — SIGNIFICANT CHANGE UP
HCO3 BLDA-SCNC: 22 MMOL/L — SIGNIFICANT CHANGE UP (ref 21–28)
HCT VFR BLD CALC: 38.7 % — LOW (ref 39–50)
HCT VFR BLD CALC: 42.7 % — SIGNIFICANT CHANGE UP (ref 39–50)
HGB BLD-MCNC: 12.5 G/DL — LOW (ref 13–17)
HGB BLD-MCNC: 13.4 G/DL — SIGNIFICANT CHANGE UP (ref 13–17)
HOROWITZ INDEX BLDA+IHG-RTO: 60 — SIGNIFICANT CHANGE UP
HYPOCHROMIA BLD QL: SLIGHT — SIGNIFICANT CHANGE UP
INR BLD: 1.13 RATIO — SIGNIFICANT CHANGE UP (ref 0.85–1.18)
INR BLD: 1.19 RATIO — HIGH (ref 0.85–1.18)
KETONES UR-MCNC: ABNORMAL MG/DL
LACTATE SERPL-SCNC: 1.7 MMOL/L — SIGNIFICANT CHANGE UP (ref 0.7–2)
LACTATE SERPL-SCNC: 2.4 MMOL/L — HIGH (ref 0.7–2)
LEUKOCYTE ESTERASE UR-ACNC: NEGATIVE — SIGNIFICANT CHANGE UP
LYMPHOCYTES # BLD AUTO: 0.58 K/UL — LOW (ref 1–3.3)
LYMPHOCYTES # BLD AUTO: 2.43 K/UL — SIGNIFICANT CHANGE UP (ref 1–3.3)
LYMPHOCYTES # BLD AUTO: 4 % — LOW (ref 13–44)
LYMPHOCYTES # BLD AUTO: 9 % — LOW (ref 13–44)
MACROCYTES BLD QL: SLIGHT — SIGNIFICANT CHANGE UP
MAGNESIUM SERPL-MCNC: 1.4 MG/DL — LOW (ref 1.6–2.6)
MAGNESIUM SERPL-MCNC: 1.8 MG/DL — SIGNIFICANT CHANGE UP (ref 1.6–2.6)
MANUAL SMEAR VERIFICATION: SIGNIFICANT CHANGE UP
MANUAL SMEAR VERIFICATION: YES — SIGNIFICANT CHANGE UP
MCHC RBC-ENTMCNC: 31.4 G/DL — LOW (ref 32–36)
MCHC RBC-ENTMCNC: 32.3 G/DL — SIGNIFICANT CHANGE UP (ref 32–36)
MCHC RBC-ENTMCNC: 32.3 PG — SIGNIFICANT CHANGE UP (ref 27–34)
MCHC RBC-ENTMCNC: 33.2 PG — SIGNIFICANT CHANGE UP (ref 27–34)
MCV RBC AUTO: 102.9 FL — HIGH (ref 80–100)
MCV RBC AUTO: 102.9 FL — HIGH (ref 80–100)
METAMYELOCYTES # FLD: 1 % — HIGH (ref 0–0)
MICROCYTES BLD QL: SLIGHT — SIGNIFICANT CHANGE UP
MONOCYTES # BLD AUTO: 1.35 K/UL — HIGH (ref 0–0.9)
MONOCYTES # BLD AUTO: 1.61 K/UL — HIGH (ref 0–0.9)
MONOCYTES NFR BLD AUTO: 11 % — SIGNIFICANT CHANGE UP (ref 2–14)
MONOCYTES NFR BLD AUTO: 5 % — SIGNIFICANT CHANGE UP (ref 2–14)
NEUTROPHILS # BLD AUTO: 12.27 K/UL — HIGH (ref 1.8–7.4)
NEUTROPHILS # BLD AUTO: 22.94 K/UL — HIGH (ref 1.8–7.4)
NEUTROPHILS NFR BLD AUTO: 58 % — SIGNIFICANT CHANGE UP (ref 43–77)
NEUTROPHILS NFR BLD AUTO: 63 % — SIGNIFICANT CHANGE UP (ref 43–77)
NEUTS BAND # BLD: 21 % — HIGH (ref 0–8)
NEUTS BAND # BLD: 27 % — HIGH (ref 0–8)
NITRITE UR-MCNC: NEGATIVE — SIGNIFICANT CHANGE UP
NRBC # BLD: 0 /100 WBCS — SIGNIFICANT CHANGE UP (ref 0–0)
NRBC # BLD: 0 /100 WBCS — SIGNIFICANT CHANGE UP (ref 0–0)
NRBC # BLD: SIGNIFICANT CHANGE UP /100 WBCS (ref 0–0)
NRBC # BLD: SIGNIFICANT CHANGE UP /100 WBCS (ref 0–0)
PCO2 BLDA: 39 MMHG — SIGNIFICANT CHANGE UP (ref 32–46)
PH BLDA: 7.35 — SIGNIFICANT CHANGE UP (ref 7.35–7.45)
PH UR: 6 — SIGNIFICANT CHANGE UP (ref 5–8)
PHOSPHATE SERPL-MCNC: 3.3 MG/DL — SIGNIFICANT CHANGE UP (ref 2.5–4.5)
PHOSPHATE SERPL-MCNC: 3.6 MG/DL — SIGNIFICANT CHANGE UP (ref 2.5–4.5)
PLAT MORPH BLD: NORMAL — SIGNIFICANT CHANGE UP
PLAT MORPH BLD: NORMAL — SIGNIFICANT CHANGE UP
PLATELET # BLD AUTO: 186 K/UL — SIGNIFICANT CHANGE UP (ref 150–400)
PLATELET # BLD AUTO: 236 K/UL — SIGNIFICANT CHANGE UP (ref 150–400)
PO2 BLDA: 105 MMHG — SIGNIFICANT CHANGE UP (ref 83–108)
POLYCHROMASIA BLD QL SMEAR: SLIGHT — SIGNIFICANT CHANGE UP
POTASSIUM SERPL-MCNC: 3.7 MMOL/L — SIGNIFICANT CHANGE UP (ref 3.5–5.3)
POTASSIUM SERPL-MCNC: 4.5 MMOL/L — SIGNIFICANT CHANGE UP (ref 3.5–5.3)
POTASSIUM SERPL-SCNC: 3.7 MMOL/L — SIGNIFICANT CHANGE UP (ref 3.5–5.3)
POTASSIUM SERPL-SCNC: 4.5 MMOL/L — SIGNIFICANT CHANGE UP (ref 3.5–5.3)
PROT SERPL-MCNC: 6.3 GM/DL — SIGNIFICANT CHANGE UP (ref 6–8.3)
PROT SERPL-MCNC: 6.5 GM/DL — SIGNIFICANT CHANGE UP (ref 6–8.3)
PROT UR-MCNC: 30 MG/DL
PROTHROM AB SERPL-ACNC: 13.4 SEC — HIGH (ref 9.5–13)
PROTHROM AB SERPL-ACNC: 14.2 SEC — HIGH (ref 9.5–13)
RBC # BLD: 3.76 M/UL — LOW (ref 4.2–5.8)
RBC # BLD: 4.15 M/UL — LOW (ref 4.2–5.8)
RBC # FLD: 14.6 % — HIGH (ref 10.3–14.5)
RBC # FLD: 14.9 % — HIGH (ref 10.3–14.5)
RBC BLD AUTO: ABNORMAL
RBC BLD AUTO: SIGNIFICANT CHANGE UP
RBC CASTS # UR COMP ASSIST: SIGNIFICANT CHANGE UP /HPF (ref 0–4)
SAO2 % BLDA: 99 % — HIGH (ref 94–98)
SODIUM SERPL-SCNC: 137 MMOL/L — SIGNIFICANT CHANGE UP (ref 135–145)
SODIUM SERPL-SCNC: 139 MMOL/L — SIGNIFICANT CHANGE UP (ref 135–145)
SP GR SPEC: >1.03 — HIGH (ref 1–1.03)
STOMATOCYTES BLD QL SMEAR: SLIGHT — SIGNIFICANT CHANGE UP
UROBILINOGEN FLD QL: 1 MG/DL — SIGNIFICANT CHANGE UP (ref 0.2–1)
WBC # BLD: 14.61 K/UL — HIGH (ref 3.8–10.5)
WBC # BLD: 26.99 K/UL — HIGH (ref 3.8–10.5)
WBC # FLD AUTO: 14.61 K/UL — HIGH (ref 3.8–10.5)
WBC # FLD AUTO: 26.99 K/UL — HIGH (ref 3.8–10.5)
WBC UR QL: SIGNIFICANT CHANGE UP /HPF (ref 0–5)

## 2024-02-13 PROCEDURE — 49000 EXPLORATION OF ABDOMEN: CPT | Mod: AS

## 2024-02-13 PROCEDURE — 99222 1ST HOSP IP/OBS MODERATE 55: CPT | Mod: 57

## 2024-02-13 PROCEDURE — 71045 X-RAY EXAM CHEST 1 VIEW: CPT | Mod: 26

## 2024-02-13 PROCEDURE — 74177 CT ABD & PELVIS W/CONTRAST: CPT | Mod: 26,MA

## 2024-02-13 PROCEDURE — 99291 CRITICAL CARE FIRST HOUR: CPT | Mod: 25

## 2024-02-13 PROCEDURE — 43840 GSTRRPHY SUTR DUOL/GSTR ULCR: CPT | Mod: AS

## 2024-02-13 PROCEDURE — 71045 X-RAY EXAM CHEST 1 VIEW: CPT | Mod: 26,77

## 2024-02-13 PROCEDURE — 36569 INSJ PICC 5 YR+ W/O IMAGING: CPT

## 2024-02-13 PROCEDURE — 99223 1ST HOSP IP/OBS HIGH 75: CPT

## 2024-02-13 PROCEDURE — 93010 ELECTROCARDIOGRAM REPORT: CPT

## 2024-02-13 RX ORDER — SODIUM CHLORIDE 9 MG/ML
1000 INJECTION, SOLUTION INTRAVENOUS
Refills: 0 | Status: DISCONTINUED | OUTPATIENT
Start: 2024-02-13 | End: 2024-02-13

## 2024-02-13 RX ORDER — ALBUMIN HUMAN 25 %
100 VIAL (ML) INTRAVENOUS EVERY 8 HOURS
Refills: 0 | Status: DISCONTINUED | OUTPATIENT
Start: 2024-02-13 | End: 2024-02-14

## 2024-02-13 RX ORDER — NOREPINEPHRINE BITARTRATE/D5W 8 MG/250ML
0.05 PLASTIC BAG, INJECTION (ML) INTRAVENOUS
Qty: 8 | Refills: 0 | Status: DISCONTINUED | OUTPATIENT
Start: 2024-02-13 | End: 2024-02-19

## 2024-02-13 RX ORDER — HYDROMORPHONE HYDROCHLORIDE 2 MG/ML
0.5 INJECTION INTRAMUSCULAR; INTRAVENOUS; SUBCUTANEOUS EVERY 4 HOURS
Refills: 0 | Status: DISCONTINUED | OUTPATIENT
Start: 2024-02-13 | End: 2024-02-14

## 2024-02-13 RX ORDER — ENOXAPARIN SODIUM 100 MG/ML
40 INJECTION SUBCUTANEOUS EVERY 24 HOURS
Refills: 0 | Status: DISCONTINUED | OUTPATIENT
Start: 2024-02-14 | End: 2024-02-14

## 2024-02-13 RX ORDER — MAGNESIUM SULFATE 500 MG/ML
2 VIAL (ML) INJECTION ONCE
Refills: 0 | Status: COMPLETED | OUTPATIENT
Start: 2024-02-13 | End: 2024-02-13

## 2024-02-13 RX ORDER — SODIUM CHLORIDE 9 MG/ML
10 INJECTION INTRAMUSCULAR; INTRAVENOUS; SUBCUTANEOUS
Refills: 0 | Status: DISCONTINUED | OUTPATIENT
Start: 2024-02-13 | End: 2024-02-19

## 2024-02-13 RX ORDER — SODIUM CHLORIDE 9 MG/ML
1000 INJECTION, SOLUTION INTRAVENOUS ONCE
Refills: 0 | Status: COMPLETED | OUTPATIENT
Start: 2024-02-13 | End: 2024-02-13

## 2024-02-13 RX ORDER — PIPERACILLIN AND TAZOBACTAM 4; .5 G/20ML; G/20ML
3.38 INJECTION, POWDER, LYOPHILIZED, FOR SOLUTION INTRAVENOUS EVERY 8 HOURS
Refills: 0 | Status: COMPLETED | OUTPATIENT
Start: 2024-02-13 | End: 2024-02-20

## 2024-02-13 RX ORDER — FOLIC ACID 0.8 MG
1 TABLET ORAL ONCE
Refills: 0 | Status: DISCONTINUED | OUTPATIENT
Start: 2024-02-13 | End: 2024-02-13

## 2024-02-13 RX ORDER — ONDANSETRON 8 MG/1
4 TABLET, FILM COATED ORAL EVERY 6 HOURS
Refills: 0 | Status: DISCONTINUED | OUTPATIENT
Start: 2024-02-13 | End: 2024-02-19

## 2024-02-13 RX ORDER — KETOROLAC TROMETHAMINE 30 MG/ML
30 SYRINGE (ML) INJECTION ONCE
Refills: 0 | Status: DISCONTINUED | OUTPATIENT
Start: 2024-02-13 | End: 2024-02-13

## 2024-02-13 RX ORDER — PANTOPRAZOLE SODIUM 20 MG/1
40 TABLET, DELAYED RELEASE ORAL DAILY
Refills: 0 | Status: DISCONTINUED | OUTPATIENT
Start: 2024-02-13 | End: 2024-02-23

## 2024-02-13 RX ORDER — PHENYLEPHRINE HYDROCHLORIDE 10 MG/ML
0.2 INJECTION INTRAVENOUS
Qty: 40 | Refills: 0 | Status: DISCONTINUED | OUTPATIENT
Start: 2024-02-13 | End: 2024-02-13

## 2024-02-13 RX ORDER — ALBUMIN HUMAN 25 %
250 VIAL (ML) INTRAVENOUS ONCE
Refills: 0 | Status: COMPLETED | OUTPATIENT
Start: 2024-02-13 | End: 2024-02-13

## 2024-02-13 RX ORDER — CHLORHEXIDINE GLUCONATE 213 G/1000ML
1 SOLUTION TOPICAL
Refills: 0 | Status: DISCONTINUED | OUTPATIENT
Start: 2024-02-13 | End: 2024-02-13

## 2024-02-13 RX ORDER — ALBUMIN HUMAN 25 %
50 VIAL (ML) INTRAVENOUS EVERY 8 HOURS
Refills: 0 | Status: DISCONTINUED | OUTPATIENT
Start: 2024-02-13 | End: 2024-02-13

## 2024-02-13 RX ORDER — FLUCONAZOLE 150 MG/1
800 TABLET ORAL ONCE
Refills: 0 | Status: DISCONTINUED | OUTPATIENT
Start: 2024-02-13 | End: 2024-02-13

## 2024-02-13 RX ORDER — HYDROMORPHONE HYDROCHLORIDE 2 MG/ML
0.5 INJECTION INTRAMUSCULAR; INTRAVENOUS; SUBCUTANEOUS
Refills: 0 | Status: DISCONTINUED | OUTPATIENT
Start: 2024-02-13 | End: 2024-02-13

## 2024-02-13 RX ORDER — CHLORHEXIDINE GLUCONATE 213 G/1000ML
1 SOLUTION TOPICAL
Refills: 0 | Status: DISCONTINUED | OUTPATIENT
Start: 2024-02-13 | End: 2024-03-07

## 2024-02-13 RX ORDER — INFLUENZA VIRUS VACCINE 15; 15; 15; 15 UG/.5ML; UG/.5ML; UG/.5ML; UG/.5ML
0.5 SUSPENSION INTRAMUSCULAR ONCE
Refills: 0 | Status: DISCONTINUED | OUTPATIENT
Start: 2024-02-13 | End: 2024-03-07

## 2024-02-13 RX ORDER — VASOPRESSIN 20 [USP'U]/ML
0.04 INJECTION INTRAVENOUS
Qty: 40 | Refills: 0 | Status: DISCONTINUED | OUTPATIENT
Start: 2024-02-13 | End: 2024-02-17

## 2024-02-13 RX ORDER — CHLORHEXIDINE GLUCONATE 213 G/1000ML
15 SOLUTION TOPICAL EVERY 12 HOURS
Refills: 0 | Status: DISCONTINUED | OUTPATIENT
Start: 2024-02-13 | End: 2024-02-24

## 2024-02-13 RX ORDER — MORPHINE SULFATE 50 MG/1
4 CAPSULE, EXTENDED RELEASE ORAL ONCE
Refills: 0 | Status: DISCONTINUED | OUTPATIENT
Start: 2024-02-13 | End: 2024-02-13

## 2024-02-13 RX ORDER — PIPERACILLIN AND TAZOBACTAM 4; .5 G/20ML; G/20ML
3.38 INJECTION, POWDER, LYOPHILIZED, FOR SOLUTION INTRAVENOUS ONCE
Refills: 0 | Status: COMPLETED | OUTPATIENT
Start: 2024-02-13 | End: 2024-02-13

## 2024-02-13 RX ORDER — FENTANYL CITRATE 50 UG/ML
100 INJECTION INTRAVENOUS ONCE
Refills: 0 | Status: DISCONTINUED | OUTPATIENT
Start: 2024-02-13 | End: 2024-02-13

## 2024-02-13 RX ORDER — SODIUM CHLORIDE 9 MG/ML
1000 INJECTION, SOLUTION INTRAVENOUS
Refills: 0 | Status: DISCONTINUED | OUTPATIENT
Start: 2024-02-13 | End: 2024-02-14

## 2024-02-13 RX ORDER — THIAMINE MONONITRATE (VIT B1) 100 MG
100 TABLET ORAL DAILY
Refills: 0 | Status: DISCONTINUED | OUTPATIENT
Start: 2024-02-13 | End: 2024-02-14

## 2024-02-13 RX ORDER — FENTANYL CITRATE 50 UG/ML
50 INJECTION INTRAVENOUS ONCE
Refills: 0 | Status: DISCONTINUED | OUTPATIENT
Start: 2024-02-13 | End: 2024-02-13

## 2024-02-13 RX ORDER — DEXMEDETOMIDINE HYDROCHLORIDE IN 0.9% SODIUM CHLORIDE 4 UG/ML
0.2 INJECTION INTRAVENOUS
Qty: 200 | Refills: 0 | Status: DISCONTINUED | OUTPATIENT
Start: 2024-02-13 | End: 2024-02-14

## 2024-02-13 RX ORDER — PHENOBARBITAL 60 MG
390 TABLET ORAL ONCE
Refills: 0 | Status: DISCONTINUED | OUTPATIENT
Start: 2024-02-13 | End: 2024-02-13

## 2024-02-13 RX ORDER — PHENOBARBITAL 60 MG
260 TABLET ORAL
Refills: 0 | Status: DISCONTINUED | OUTPATIENT
Start: 2024-02-13 | End: 2024-02-13

## 2024-02-13 RX ORDER — HYDROMORPHONE HYDROCHLORIDE 2 MG/ML
1 INJECTION INTRAMUSCULAR; INTRAVENOUS; SUBCUTANEOUS EVERY 6 HOURS
Refills: 0 | Status: DISCONTINUED | OUTPATIENT
Start: 2024-02-13 | End: 2024-02-14

## 2024-02-13 RX ORDER — FLUCONAZOLE 150 MG/1
400 TABLET ORAL ONCE
Refills: 0 | Status: COMPLETED | OUTPATIENT
Start: 2024-02-13 | End: 2024-02-13

## 2024-02-13 RX ORDER — FOLIC ACID 0.8 MG
1 TABLET ORAL DAILY
Refills: 0 | Status: DISCONTINUED | OUTPATIENT
Start: 2024-02-13 | End: 2024-02-23

## 2024-02-13 RX ADMIN — SODIUM CHLORIDE 75 MILLILITER(S): 9 INJECTION, SOLUTION INTRAVENOUS at 08:29

## 2024-02-13 RX ADMIN — DEXMEDETOMIDINE HYDROCHLORIDE IN 0.9% SODIUM CHLORIDE 3.74 MICROGRAM(S)/KG/HR: 4 INJECTION INTRAVENOUS at 19:17

## 2024-02-13 RX ADMIN — Medication 125 MILLILITER(S): at 09:59

## 2024-02-13 RX ADMIN — FLUCONAZOLE 100 MILLIGRAM(S): 150 TABLET ORAL at 08:05

## 2024-02-13 RX ADMIN — CHLORHEXIDINE GLUCONATE 1 APPLICATION(S): 213 SOLUTION TOPICAL at 07:32

## 2024-02-13 RX ADMIN — Medication 100 MILLIGRAM(S): at 13:45

## 2024-02-13 RX ADMIN — PIPERACILLIN AND TAZOBACTAM 25 GRAM(S): 4; .5 INJECTION, POWDER, LYOPHILIZED, FOR SOLUTION INTRAVENOUS at 13:57

## 2024-02-13 RX ADMIN — Medication 25 GRAM(S): at 09:29

## 2024-02-13 RX ADMIN — HYDROMORPHONE HYDROCHLORIDE 0.5 MILLIGRAM(S): 2 INJECTION INTRAMUSCULAR; INTRAVENOUS; SUBCUTANEOUS at 22:01

## 2024-02-13 RX ADMIN — VASOPRESSIN 6 UNIT(S)/MIN: 20 INJECTION INTRAVENOUS at 18:03

## 2024-02-13 RX ADMIN — HYDROMORPHONE HYDROCHLORIDE 1 MILLIGRAM(S): 2 INJECTION INTRAMUSCULAR; INTRAVENOUS; SUBCUTANEOUS at 22:34

## 2024-02-13 RX ADMIN — DEXMEDETOMIDINE HYDROCHLORIDE IN 0.9% SODIUM CHLORIDE 3.74 MICROGRAM(S)/KG/HR: 4 INJECTION INTRAVENOUS at 17:28

## 2024-02-13 RX ADMIN — HYDROMORPHONE HYDROCHLORIDE 0.5 MILLIGRAM(S): 2 INJECTION INTRAMUSCULAR; INTRAVENOUS; SUBCUTANEOUS at 21:02

## 2024-02-13 RX ADMIN — SODIUM CHLORIDE 1000 MILLILITER(S): 9 INJECTION, SOLUTION INTRAVENOUS at 02:35

## 2024-02-13 RX ADMIN — PHENYLEPHRINE HYDROCHLORIDE 5.61 MICROGRAM(S)/KG/MIN: 10 INJECTION INTRAVENOUS at 17:27

## 2024-02-13 RX ADMIN — Medication 25 GRAM(S): at 19:34

## 2024-02-13 RX ADMIN — Medication 390 MILLIGRAM(S): at 09:39

## 2024-02-13 RX ADMIN — Medication 30 MILLIGRAM(S): at 01:07

## 2024-02-13 RX ADMIN — PHENYLEPHRINE HYDROCHLORIDE 5.61 MICROGRAM(S)/KG/MIN: 10 INJECTION INTRAVENOUS at 09:16

## 2024-02-13 RX ADMIN — FENTANYL CITRATE 100 MICROGRAM(S): 50 INJECTION INTRAVENOUS at 11:50

## 2024-02-13 RX ADMIN — Medication 7.01 MICROGRAM(S)/KG/MIN: at 09:38

## 2024-02-13 RX ADMIN — Medication 260 MILLIGRAM(S): at 11:56

## 2024-02-13 RX ADMIN — MORPHINE SULFATE 4 MILLIGRAM(S): 50 CAPSULE, EXTENDED RELEASE ORAL at 02:20

## 2024-02-13 RX ADMIN — Medication 260 MILLIGRAM(S): at 15:52

## 2024-02-13 RX ADMIN — SODIUM CHLORIDE 1000 MILLILITER(S): 9 INJECTION, SOLUTION INTRAVENOUS at 15:52

## 2024-02-13 RX ADMIN — Medication 7.01 MICROGRAM(S)/KG/MIN: at 17:27

## 2024-02-13 RX ADMIN — FENTANYL CITRATE 100 MICROGRAM(S): 50 INJECTION INTRAVENOUS at 10:48

## 2024-02-13 RX ADMIN — Medication 1 MILLIGRAM(S): at 13:45

## 2024-02-13 RX ADMIN — Medication 100 MILLILITER(S): at 13:56

## 2024-02-13 RX ADMIN — PIPERACILLIN AND TAZOBACTAM 25 GRAM(S): 4; .5 INJECTION, POWDER, LYOPHILIZED, FOR SOLUTION INTRAVENOUS at 22:35

## 2024-02-13 RX ADMIN — FENTANYL CITRATE 50 MICROGRAM(S): 50 INJECTION INTRAVENOUS at 15:14

## 2024-02-13 RX ADMIN — PIPERACILLIN AND TAZOBACTAM 200 GRAM(S): 4; .5 INJECTION, POWDER, LYOPHILIZED, FOR SOLUTION INTRAVENOUS at 02:35

## 2024-02-13 RX ADMIN — DEXMEDETOMIDINE HYDROCHLORIDE IN 0.9% SODIUM CHLORIDE 3.74 MICROGRAM(S)/KG/HR: 4 INJECTION INTRAVENOUS at 09:15

## 2024-02-13 RX ADMIN — ONDANSETRON 4 MILLIGRAM(S): 8 TABLET, FILM COATED ORAL at 21:02

## 2024-02-13 RX ADMIN — HYDROMORPHONE HYDROCHLORIDE 1 MILLIGRAM(S): 2 INJECTION INTRAMUSCULAR; INTRAVENOUS; SUBCUTANEOUS at 23:34

## 2024-02-13 RX ADMIN — CHLORHEXIDINE GLUCONATE 15 MILLILITER(S): 213 SOLUTION TOPICAL at 18:07

## 2024-02-13 RX ADMIN — Medication 30 MILLIGRAM(S): at 00:37

## 2024-02-13 RX ADMIN — DEXMEDETOMIDINE HYDROCHLORIDE IN 0.9% SODIUM CHLORIDE 3.74 MICROGRAM(S)/KG/HR: 4 INJECTION INTRAVENOUS at 21:02

## 2024-02-13 RX ADMIN — Medication 100 MILLILITER(S): at 21:28

## 2024-02-13 RX ADMIN — DEXMEDETOMIDINE HYDROCHLORIDE IN 0.9% SODIUM CHLORIDE 3.74 MICROGRAM(S)/KG/HR: 4 INJECTION INTRAVENOUS at 07:32

## 2024-02-13 RX ADMIN — PANTOPRAZOLE SODIUM 40 MILLIGRAM(S): 20 TABLET, DELAYED RELEASE ORAL at 11:56

## 2024-02-13 RX ADMIN — FENTANYL CITRATE 50 MICROGRAM(S): 50 INJECTION INTRAVENOUS at 13:44

## 2024-02-13 NOTE — H&P ADULT - ASSESSMENT
49 y/o male PMHx cirrhosis, ETOH, open reduction of metatarsal fracture 2022 presents c/o epigastric pain for a few hours. Admitted with a perforated viscus.

## 2024-02-13 NOTE — PHARMACOTHERAPY INTERVENTION NOTE - COMMENTS
Recommended initiating thiamine, folic acid and multivitamin for SANCHO. Will need to start multivitamin once patient can tolerate PO medication.

## 2024-02-13 NOTE — CHART NOTE - NSCHARTNOTEFT_GEN_A_CORE
:  DENYS Guerrero dr    Indication:  SHOCK    PROCEDURE:  [x ] LIMITED ECHO  [x ] LIMITED CHEST  [ ] LIMITED RETROPERITONEAL  [ ] LIMITED ABDOMINAL  [ ] LIMITED DVT  [ ] NEEDLE GUIDANCE VASCULAR  [ ] NEEDLE GUIDANCE THORACENTESIS  [ ] NEEDLE GUIDANCE PARACENTESIS  [ ] NEEDLE GUIDANCE PERICARDIOCENTESIS  [ ] OTHER    FINDINGS:  Chest: A-line predominant, normal aeration pattern bilat. No effusions bilat.    ECHO: Grossly normal RV and LV function with no wall motion abnormalities, nor septal bowing/flattening  No pericardial effusion  IVC: unable to eval  LVOT/VTi: 22cm    INTERPRETATION:  grossly nl lung exam  grossly nl LV function. unlikely cardiogenic component to shock state    images uploaded to [employer]

## 2024-02-13 NOTE — CONSULT NOTE ADULT - CRITICAL CARE SERVICES PROVIDED
Patient tolerated all treatment without incident and was discharged post with neulasta onpro for injection tomorrow at 1330  Patient familiar with injection and removal of onpro, time to remove written on AVS as well as instructions on how to use her at home zofran and miralax if neede for constipation  Patient without other questions/concerns and was discharged to IR to have her port stitch removed, Elaine Hopping aware she is coming  Patient will RTO 2/10/22 for her next cycle  Patient is critically ill, requiring critical care services.

## 2024-02-13 NOTE — PROGRESS NOTE ADULT - ASSESSMENT
47 Y/O male POD 0 s/p [place] patch for perforated duodenal ulcer. Currently in ICU. Intubated, sedated on increasing pressor support.       PLAN:     - IVF; appropriate rehydration   - Continue ABx   - GI PPx  - Hold DVT ppx for 24 hours  - Maintain gautam catheter; strict I&Os   - Continue care per ICU   - Discussed with Dr. Blanco

## 2024-02-13 NOTE — ED PROVIDER NOTE - OBJECTIVE STATEMENT
Patient is a 48-year-old gentleman with past medical history of alcohol abuse who presents to the ED because of abdominal pain.  Patient [place]  Has pain in the upper abdomen.  Has had a couple episodes of vomiting.  No chest pain, no shortness of breath, no cough, no fever.  No history of any abdominal surgeries.  Does not have history of alcohol withdrawal.  Says he drinks a couple of shots on the weekends. No hematemesis.

## 2024-02-13 NOTE — CONSULT NOTE ADULT - SUBJECTIVE AND OBJECTIVE BOX
[facility]  INFECTIOUS DISEASES   [address] [place] [address]  Tel: [phone]     Fax: [phone]  ==============================================================================  DO Jony Baron MD Alexandra Gutman, NP   ==============================================================================    PRIYANKA PITTMAN  [id number]  Male  48y ([birth year])        Patient is a 48y old  Male who presents with a chief complaint of Perforated viscus (13 Feb 2024 07:34)      HPI:  47 y/o male PMHx cirrhosis, ETOH, open reduction of metatarsal fracture 2022 presents c/o epigastric pain for a few hours. Vomited when he came to the ER. Last BM in the afternoon. Last flatus before the BM. Patient denies fever, chills, constipation, diarrhea, melena, hematochezia, dysuria, hematuria, chest pain, shortness of breath, dizziness, cough.   (13 Feb 2024 03:01)    s/p ex lap with kyrie patch for repair of duodenal ulcer.   ID consulted for workup and antibiotic management     PAST MEDICAL & SURGICAL HISTORY:  H/O cirrhosis      S/P foot surgery          Allergies  No Known Allergies        ANTIMICROBIALS:  piperacillin/tazobactam IVPB.. 3.375 every 8 hours      MEDICATIONS  (STANDING):    fluconAZOLE IVPB   100 mL/Hr IV Intermittent (02-13-24 @ 08:05)    piperacillin/tazobactam IVPB...   200 mL/Hr IV Intermittent (02-13-24 @ 02:35)        OTHER MEDS: MEDICATIONS  (STANDING):  dexMEDEtomidine Infusion 0.2 <Continuous>  HYDROmorphone  Injectable 0.5 every 3 hours PRN  influenza   Vaccine 0.5 once  norepinephrine Infusion 0.05 <Continuous>  ondansetron Injectable 4 every 6 hours PRN  pantoprazole  Injectable 40 daily  PHENobarbital Injectable 260 every 3 hours  phenylephrine    Infusion 0.2 <Continuous>      SOCIAL HISTORY:     Smoking Cigarettes [ ]Active [ ] Former [ ]Denies   ETOH [ ]denies [ ]Former [x ]Current Use   Drug Use [ ]Never [ ] Former [ ] Active     FAMILY HISTORY:  FH: diabetes mellitus        REVIEW OF SYSTEMS  [  ] ROS unobtainable because:    [  x] All other systems negative except as noted below:	    Constitutional:  [ ] fever [ ] chills  [ ] weight loss  [ ] weakness  Skin:  [ ] rash [ ] phlebitis	  Eyes: [ ] icterus [ ] pain  [ ] discharge	  ENMT: [ ] sore throat  [ ] thrush [ ] ulcers [ ] exudates  Respiratory: [ ] dyspnea [ ] hemoptysis [ ] cough [ ] sputum	  Cardiovascular:  [ ] chest pain [ ] palpitations [ ] edema	  Gastrointestinal:  [ ] nausea [ x] vomiting [ ] diarrhea [ ] constipation [ x] pain	  Genitourinary:  [ ] dysuria [ ] frequency [ ] hematuria [ ] discharge [ ] flank pain  [ ] incontinence  Musculoskeletal:  [ ] myalgias [ ] arthralgias [ ] arthritis  [ ] back pain  Neurological:  [ ] headache [ ] seizures  [ ] confusion/altered mental status  Psychiatric:  [ ] anxiety [ ] depression	  Hematology/Lymphatics:  [ ] lymphadenopathy  Endocrine:  [ ] adrenal [ ] thyroid  Allergic/Immunologic:	 [ ] transplant [ ] seasonal    Vital Signs Last 24 Hrs  T(F): 98.2 (02-13-24 @ 07:30), Max: 98.5 (02-13-24 @ 04:17)    Vital Signs Last 24 Hrs  HR: 87 (02-13-24 @ 11:30) (70 - 109)  BP: 124/76 (02-13-24 @ 11:30) (78/58 - 161/88)  RR: 18 (02-13-24 @ 11:30)  SpO2: 96% (02-13-24 @ 11:30) (93% - 100%)  Wt(kg): --    PHYSICAL EXAM:  Constitutional: non-toxic, no distress  HEAD/EYES: anicteric, no conjunctival injection  ENT:  supple, no thrush  Cardiovascular:   normal S1, S2, no murmur, no edema  Respiratory:  clear BS bilaterally, no wheezes, no rales  GI:  soft, non-tender, normal bowel sounds  :  no gautam, no CVA tenderness  Musculoskeletal:  no synovitis, normal ROM  Neurologic: awake and alert, normal strength, no focal findings  Skin:  no rash, no erythema, no phlebitis  Heme/Onc: no lymphadenopathy   Psychiatric:  awake, alert, appropriate mood      WBC Count: 14.61 K/uL (02-13 @ 07:30)  WBC Count: 12.89 K/uL (02-12 @ 22:50)      Auto Neutrophil %: 63.0 % (02-13-24 @ 07:30)  Auto Neutrophil #: 12.27 K/uL *H* (02-13-24 @ 07:30)  Auto Neutrophil %: 77.4 % *H* (02-12-24 @ 22:50)  Auto Neutrophil #: 9.97 K/uL *H* (02-12-24 @ 22:50)                            13.4   14.61 )-----------( 186      ( 13 Feb 2024 07:30 )             42.7       02-13    139  |  108  |  7   ----------------------------<  130<H>  3.7   |  26  |  0.83    Ca    7.5<L>      13 Feb 2024 07:30  Phos  3.3     02-13  Mg     1.4     02-13    TPro  6.3  /  Alb  2.1<L>  /  TBili  2.8<H>  /  DBili  x   /  AST  87<H>  /  ALT  34  /  AlkPhos  240<H>  02-13      Creatinine Trend: 0.83<--, 0.87<--          Lipase: 45 U/L (02-12-24 @ 22:50)      Blood Gas Arterial, Lactate: 1.60 mmol/L (02-13-24 @ 08:46)  Lactate, Blood: 1.7 mmol/L (02-13-24 @ 06:52)  Lactate, Blood: 2.4 mmol/L (02-13-24 @ 02:26)      MICROBIOLOGY:      RADIOLOGY:    ACC: [id number] EXAM:  CT ABDOMEN AND PELVIS IC   ORDERED BY: SCOTT ALAS     PROCEDURE DATE:  02/13/2024          INTERPRETATION:  CLINICAL INFORMATION: Upper abdominal pain    COMPARISON: None.    CONTRAST/COMPLICATIONS:  IV Contrast: Omnipaque 350  80 cc administered  Oral Contrast: None    PROCEDURE:  CT of the Abdomen and Pelvis was performed.  Sagittal and coronal reformats were performed.    FINDINGS:  LOWER CHEST: Trace right pleural effusion. Bibasilar atelectasis.    LIVER: Hepatomegaly with mildly nodular hepatic contour and diffuse   heterogeneous regions of hypoenhancement  BILE DUCTS: Normal caliber.  GALLBLADDER: Collapsed and circumferentially thick-walled gallbladder   with pericholecystic edema  SPLEEN: Mild splenomegaly, 13.5cm craniocaudal  PANCREAS: Within normal limits.  ADRENALS: Within normal limits.  KIDNEYS/URETERS: Right upper pole renal cyst. No hydronephrosis on either   side.    BLADDER: Minimally distended.  REPRODUCTIVE ORGANS: Prostate within normal limits.    BOWEL: No bowel obstruction. Appendix is normal. Thick-walled loops of   small bowel in the left upper quadrant, may be related to edema in the   setting of portal hypertension or enteritis.  PERITONEUM: Small volume abdominal and pelvic free fluid. Scattered foci   of free intraperitoneal air, with the largest pocket just inferior to the   gallbladder fossa  VESSELS: Portosystemic collaterals including esophageal varices and   recanalized paraumbilical vein  RETROPERITONEUM/LYMPH NODES: No lymphadenopathy.  ABDOMINAL WALL: Within normal limits.  BONES: No acute osseous abnormality.    IMPRESSION:  Scattered foci of free intraperitoneal air, consistent with perforated   viscus.    Source is not immediately clear. The largest pocket is justinferior to   the gallbladder fossa.    Cirrhosis with sequelae of portal hypertension including portosystemic   collaterals, splenomegaly, and ascites.    Hepatomegaly with heterogeneous parenchymal enhancement with diffuse   regions of relative hypoenhancement, possibly representing a fatty or   other infiltrative process. Hepatic protocol MRI is recommended for   further evaluation.    Collapsed and circumferentially thick-walled gallbladder with   pericholecystic edema. This appearance is nonspecific and can be seen in   the setting of hepatocellular disease.    Findings were discussed with Dr. SCOTT ALAS [phone] 2/13/2024 2:10 AM   by Dr. Nena Fischer with read back confirmation.    NENA FISCHER MD;Attending Radiologist  This document has been electronically signed. Feb 13 2024  2:10AM      I have personally reviewed the above imaging  [facility]  INFECTIOUS DISEASES   [address] [place] [address]  Tel: [phone]     Fax: [phone]  ==============================================================================  DO Jony Baron MD Alexandra Gutman, NP   ==============================================================================    PRIYANKA PITTMAN  [id number]  Male  48y ([birth year])        Patient is a 48y old  Male who presents with a chief complaint of Perforated viscus (13 Feb 2024 07:34)      HPI:  47 y/o male PMHx cirrhosis, ETOH, open reduction of metatarsal fracture 2022 presents c/o epigastric pain for a few hours. Vomited when he came to the ER. Last BM in the afternoon. Last flatus before the BM. Patient denies fever, chills, constipation, diarrhea, melena, hematochezia, dysuria, hematuria, chest pain, shortness of breath, dizziness, cough.   (13 Feb 2024 03:01)    s/p ex lap with kyrie patch for repair of duodenal ulcer.   ID consulted for workup and antibiotic management     PAST MEDICAL & SURGICAL HISTORY:  H/O cirrhosis      S/P foot surgery          Allergies  No Known Allergies        ANTIMICROBIALS:  piperacillin/tazobactam IVPB.. 3.375 every 8 hours      MEDICATIONS  (STANDING):    fluconAZOLE IVPB   100 mL/Hr IV Intermittent (02-13-24 @ 08:05)    piperacillin/tazobactam IVPB...   200 mL/Hr IV Intermittent (02-13-24 @ 02:35)        OTHER MEDS: MEDICATIONS  (STANDING):  dexMEDEtomidine Infusion 0.2 <Continuous>  HYDROmorphone  Injectable 0.5 every 3 hours PRN  influenza   Vaccine 0.5 once  norepinephrine Infusion 0.05 <Continuous>  ondansetron Injectable 4 every 6 hours PRN  pantoprazole  Injectable 40 daily  PHENobarbital Injectable 260 every 3 hours  phenylephrine    Infusion 0.2 <Continuous>      SOCIAL HISTORY:     Smoking Cigarettes [ ]Active [ ] Former [ ]Denies   ETOH [ ]denies [ ]Former [x ]Current Use   Drug Use [ ]Never [ ] Former [ ] Active     FAMILY HISTORY:  FH: diabetes mellitus        REVIEW OF SYSTEMS  [ x ] ROS unobtainable because:  intubated and sedated   [  ] All other systems negative except as noted below:	    Constitutional:  [ ] fever [ ] chills  [ ] weight loss  [ ] weakness  Skin:  [ ] rash [ ] phlebitis	  Eyes: [ ] icterus [ ] pain  [ ] discharge	  ENMT: [ ] sore throat  [ ] thrush [ ] ulcers [ ] exudates  Respiratory: [ ] dyspnea [ ] hemoptysis [ ] cough [ ] sputum	  Cardiovascular:  [ ] chest pain [ ] palpitations [ ] edema	  Gastrointestinal:  [ ] nausea [ x] vomiting [ ] diarrhea [ ] constipation [ x] pain	  Genitourinary:  [ ] dysuria [ ] frequency [ ] hematuria [ ] discharge [ ] flank pain  [ ] incontinence  Musculoskeletal:  [ ] myalgias [ ] arthralgias [ ] arthritis  [ ] back pain  Neurological:  [ ] headache [ ] seizures  [ ] confusion/altered mental status  Psychiatric:  [ ] anxiety [ ] depression	  Hematology/Lymphatics:  [ ] lymphadenopathy  Endocrine:  [ ] adrenal [ ] thyroid  Allergic/Immunologic:	 [ ] transplant [ ] seasonal    Vital Signs Last 24 Hrs  T(F): 98.2 (02-13-24 @ 07:30), Max: 98.5 (02-13-24 @ 04:17)    Vital Signs Last 24 Hrs  HR: 87 (02-13-24 @ 11:30) (70 - 109)  BP: 124/76 (02-13-24 @ 11:30) (78/58 - 161/88)  RR: 18 (02-13-24 @ 11:30)  SpO2: 96% (02-13-24 @ 11:30) (93% - 100%)  Wt(kg): --    PHYSICAL EXAM:  Constitutional: non-toxic, no distress, intubated   HEAD/EYES: anicteric, no conjunctival injection  ENT:  supple, no thrush, +ETT, + right IJ cvc   Cardiovascular:   normal S1, S2, no murmur, no edema  Respiratory:  clear BS bilaterally, no wheezes, no rales  GI:  soft, non-tender, normal bowel sounds, surgical site covered, one FOX drain with serosanguinous fluid   :  +gautam  Musculoskeletal:  no synovitis  Neurologic: sedated   Skin:  no rash, no erythema, no phlebitis  Heme/Onc: no lymphadenopathy   Psychiatric:  sedated       WBC Count: 14.61 K/uL (02-13 @ 07:30)  WBC Count: 12.89 K/uL (02-12 @ 22:50)      Auto Neutrophil %: 63.0 % (02-13-24 @ 07:30)  Auto Neutrophil #: 12.27 K/uL *H* (02-13-24 @ 07:30)  Auto Neutrophil %: 77.4 % *H* (02-12-24 @ 22:50)  Auto Neutrophil #: 9.97 K/uL *H* (02-12-24 @ 22:50)                            13.4   14.61 )-----------( 186      ( 13 Feb 2024 07:30 )             42.7       02-13    139  |  108  |  7   ----------------------------<  130<H>  3.7   |  26  |  0.83    Ca    7.5<L>      13 Feb 2024 07:30  Phos  3.3     02-13  Mg     1.4     02-13    TPro  6.3  /  Alb  2.1<L>  /  TBili  2.8<H>  /  DBili  x   /  AST  87<H>  /  ALT  34  /  AlkPhos  240<H>  02-13      Creatinine Trend: 0.83<--, 0.87<--          Lipase: 45 U/L (02-12-24 @ 22:50)      Blood Gas Arterial, Lactate: 1.60 mmol/L (02-13-24 @ 08:46)  Lactate, Blood: 1.7 mmol/L (02-13-24 @ 06:52)  Lactate, Blood: 2.4 mmol/L (02-13-24 @ 02:26)      MICROBIOLOGY:      RADIOLOGY:    ACC: [id number] EXAM:  CT ABDOMEN AND PELVIS IC   ORDERED BY: SCOTT ALAS     PROCEDURE DATE:  02/13/2024          INTERPRETATION:  CLINICAL INFORMATION: Upper abdominal pain    COMPARISON: None.    CONTRAST/COMPLICATIONS:  IV Contrast: Omnipaque 350  80 cc administered  Oral Contrast: None    PROCEDURE:  CT of the Abdomen and Pelvis was performed.  Sagittal and coronal reformats were performed.    FINDINGS:  LOWER CHEST: Trace right pleural effusion. Bibasilar atelectasis.    LIVER: Hepatomegaly with mildly nodular hepatic contour and diffuse   heterogeneous regions of hypoenhancement  BILE DUCTS: Normal caliber.  GALLBLADDER: Collapsed and circumferentially thick-walled gallbladder   with pericholecystic edema  SPLEEN: Mild splenomegaly, 13.5cm craniocaudal  PANCREAS: Within normal limits.  ADRENALS: Within normal limits.  KIDNEYS/URETERS: Right upper pole renal cyst. No hydronephrosis on either   side.    BLADDER: Minimally distended.  REPRODUCTIVE ORGANS: Prostate within normal limits.    BOWEL: No bowel obstruction. Appendix is normal. Thick-walled loops of   small bowel in the left upper quadrant, may be related to edema in the   setting of portal hypertension or enteritis.  PERITONEUM: Small volume abdominal and pelvic free fluid. Scattered foci   of free intraperitoneal air, with the largest pocket just inferior to the   gallbladder fossa  VESSELS: Portosystemic collaterals including esophageal varices and   recanalized paraumbilical vein  RETROPERITONEUM/LYMPH NODES: No lymphadenopathy.  ABDOMINAL WALL: Within normal limits.  BONES: No acute osseous abnormality.    IMPRESSION:  Scattered foci of free intraperitoneal air, consistent with perforated   viscus.    Source is not immediately clear. The largest pocket is justinferior to   the gallbladder fossa.    Cirrhosis with sequelae of portal hypertension including portosystemic   collaterals, splenomegaly, and ascites.    Hepatomegaly with heterogeneous parenchymal enhancement with diffuse   regions of relative hypoenhancement, possibly representing a fatty or   other infiltrative process. Hepatic protocol MRI is recommended for   further evaluation.    Collapsed and circumferentially thick-walled gallbladder with   pericholecystic edema. This appearance is nonspecific and can be seen in   the setting of hepatocellular disease.    Findings were discussed with Dr. SCOTT ALAS [phone] 2/13/2024 2:10 AM   by Dr. Nena Fischer with read back confirmation.    NENA FISCHER MD;Attending Radiologist  This document has been electronically signed. Feb 13 2024  2:10AM      I have personally reviewed the above imaging

## 2024-02-13 NOTE — H&P ADULT - NSHPPHYSICALEXAM_GEN_ALL_CORE
PHYSICAL EXAM:  CONSTITUTIONAL: NAD, well-developed  HEAD:  Atraumatic, Normocephalic  EYES: Conjunctiva and sclera clear  ENMT: No tonsillar erythema, exudates, or enlargement; Moist mucous membranes, No lesions  NECK: Supple, No JVD, Normal thyroid  NERVOUS SYSTEM:  Alert & Oriented X3  RESPIRATORY: Clear to auscultation bilaterally; No rales, rhonchi, wheezing  CARDIOVASCULAR: Regular rate and rhythm. S1S2  GASTROINTESTINAL: +distended, diffuse tenderness with guarding  MUSCULOSKELETAL: 2+ Peripheral Pulses, No clubbing, cyanosis, or edema

## 2024-02-13 NOTE — PROGRESS NOTE ADULT - SUBJECTIVE AND OBJECTIVE BOX
S/P kyrie kaur   48y year old Male seen and examined at bedside in the ICU. Intubated, sedated, increasing pressor support.       Vital Signs Last 24 Hrs  T(F): 98.2 (02-13-24 @ 07:30), Max: 98.5 (02-13-24 @ 04:17)  HR: 78 (02-13-24 @ 12:00)  BP: 120/83 (02-13-24 @ 12:00)  RR: 18 (02-13-24 @ 12:00)  SpO2: 94% (02-13-24 @ 12:00)  Wt(kg): --   CAPILLARY BLOOD GLUCOSE          GENERAL: Intubated, sedated, on pressor support   CHEST/LUNG: Clear to auscultation bilaterally, respirations nonlabored  HEART: +S1S2, regular rate and rhythm  ABDOMEN: Soft, nondistended; dressings C/D/I, FOX drain with serosanguinous output    EXTREMITIES:  No calf tenderness, no edema. Intermittent pneumatic compression devices b/l LE

## 2024-02-13 NOTE — PRE-OP CHECKLIST - LOOSE TEETH
no responds to pain/responds to verbal commands/alert and oriented x 3/cranial nerves intact/sensation intact

## 2024-02-13 NOTE — CONSULT NOTE ADULT - ASSESSMENT
47 y/o male PMHx cirrhosis, ETOH, open reduction of metatarsal fracture 2022 presents c/o epigastric pain for a few hours. Vomited when he came to the ER. Last BM in the afternoon. Last flatus before the BM. Patient denies fever, chills, constipation, diarrhea, melena, hematochezia, dysuria, hematuria, chest pain, shortness of breath, dizziness, cough.  CT (I personally reviewed) Scattered foci of free intraperitoneal air, consistent with perforated viscus.  imaging also shows cirrhosis presumably from chronic alcohol use   was started on Zosyn   would start workup on cirrhosis as well     Plan:  continue Zosyn   blood cultures x2 sets  Calculate MELD score   send HIV  Hepatitis A Igm and IgG, HbsAg, HbsAb, HbcAb IgM and total, HCV Ab   surgical site per surgery     Discussed with [facility]     Carrington Gilliam, DO  Infectious Disease Attending  Reachable via Microsoft Teams or ID office: [phone]  After 5pm/weekends please call [phone] for all inquiries and new consults     47 y/o male PMHx cirrhosis, ETOH, open reduction of metatarsal fracture 2022 presents c/o epigastric pain for a few hours. Vomited when he came to the ER. Last BM in the afternoon. Last flatus before the BM. Patient denies fever, chills, constipation, diarrhea, melena, hematochezia, dysuria, hematuria, chest pain, shortness of breath, dizziness, cough.  CT (I personally reviewed) Scattered foci of free intraperitoneal air, consistent with perforated viscus.  imaging also shows cirrhosis presumably from chronic alcohol use   was started on Zosyn and fluconazole   would start workup on cirrhosis as well     Plan:  continue Zosyn   would stop fluconazole given cirrhosis and already abnormal LFTs-await culture results and if fungal organisms will re-assess need for an antifungal   blood cultures x2 sets  Calculate MELD score   send HIV  Hepatitis A Igm and IgG, HbsAg, HbsAb, HbcAb IgM and total, HCV Ab   surgical site per surgery   wean off ventilator as tolerated   consider alcohol withdrawal given cirrhosis and positive etoh level       Discussed with [facility]     Carrington Gilliam, DO  Infectious Disease Attending  Reachable via Microsoft Teams or ID office: [phone]  After 5pm/weekends please call [phone] for all inquiries and new consults

## 2024-02-13 NOTE — BRIEF OPERATIVE NOTE - NSICDXBRIEFPROCEDURE_GEN_ALL_CORE_FT
PROCEDURES:  Exploratory laparotomy 13-Feb-2024 06:49:01  Lian Rader  Closure of perforated duodenum using omental patch 13-Feb-2024 06:49:32  Lian Rader

## 2024-02-13 NOTE — H&P ADULT - PROBLEM SELECTOR PLAN 1
-Admit patient   -Emergent OR booked  -Pre-op labs, NPO, IV hydration  -Antibiotics  -Barber  -GI ppx, DVT ppx  -2 units of PRBC on hold for the OR  -CXR, EKG  -Will notify ICU  -Discussed with Dr Blanco

## 2024-02-13 NOTE — CONSULT NOTE ADULT - CRITICAL CARE ATTENDING COMMENT
48 year old with pmh etoh abuse and ?cirrhosis presents s/p duodenal perf. patient hypotensive following case. on escalating doses of pressor. bedside pocus shows good lv function. no pericardial effusion. no rv strain. will give albumin. monitor urine and drain out put. abx. id consult.  may go through etoh withdrawal. ciwa. monitor in ICU. no plan to extubation today. given increasing pressors requirements and high likelihood of etoh withdrawal

## 2024-02-13 NOTE — ED PROVIDER NOTE - CLINICAL SUMMARY MEDICAL DECISION MAKING FREE TEXT BOX
Ddx: Pancreatitis/ Alcoholic gastritis/ biliary colic  Plan: Cbc, cmp, lipase, CT abd, pain control, fluids, alcohol, level, reassess

## 2024-02-13 NOTE — CONSULT NOTE ADULT - ASSESSMENT
49 y/o male PMHx cirrhosis, ETOH, open reduction of metatarsal fracture 2022 presents c/o epigastric pain for a few hours. Admitted with a perforated viscus. Now s/p ex lap with Closure of perforated duodenum with kyrie patch. Pt left intubated and pt admitted to ICU for post operative care.    # Neuro:  //intuabted for procedure  - pt was awake and alert preop   - sedation: predex  - oositive etoh level on admission. no active signs of withdrawal. will lift sedation and assess for withdrawal and if none, will extubate    #Resp:  inutbated for procedure  -satting adequately on minimal vent settings, maintain sats>92%   - cxr wnl   -f/u ABG    #CV:  // septic shock vs vasoplegia from sedation  -HD stable prior to OR  -on low dose Lucio likely from sedation vs infx from open abd   - MAP goal>65  -will cont with IVF resuscitation   -POCUS     #GI:  // s/p exlap and kyrie patch for duodenal perf  -Diet: ngt to int suction  - GI ppx: PPI  - FOX drain serosang  -f/u surg recs    #Renal:  - fluids: LR @75/hr (s/p 4L IVF)  - gautam, cont to monitor strict I/Os  -crn wnl  - replete lytes as appropriate     #ID:  // abd coverage for perfed viscus  -cont zosyn and diflucan  -ID consult  -f/u bcx  - afebrile, wbc nl      #Heme:  //DVT ppx  - cbc stable  -minimal ebl during case  -SCDs/chemoppx: HSQ ppx tomorrow    #Endo:  //sliding scale   - maintaining goal glucose<180   - cont to monitor FS    will discuss with Dr Arauz

## 2024-02-13 NOTE — CONSULT NOTE ADULT - SUBJECTIVE AND OBJECTIVE BOX
CHIEF COMPLAINT: abd pain    HPI:  47 y/o male PMHx cirrhosis, ETOH, open reduction of metatarsal fracture 2022 presents c/o epigastric pain for a few hours. Admitted with a perforated viscus. Now s/p ex lap with Closure of perforated duodenum with kyrie patch. Pt recieved 3L IVF preop and 1L of LR intra op with EBL 30cc. Pt left intubated and pt admitted to ICU for post operative care.    PAST MEDICAL & SURGICAL HISTORY:  H/O cirrhosis      S/P foot surgery          FAMILY HISTORY:  FH: diabetes mellitus        SOCIAL HISTORY:  Smoking: unable to eval   EtOH Use: unable to eval   Marital Status:  Occupation:  Recent Travel: unable to eval   Country of Birth:  Advance Directives:    Allergies    No Known Allergies    Intolerances        HOME MEDICATIONS:    REVIEW OF SYSTEMS:  Constitutional:   Eyes:  ENT:  CV:  Resp:  GI:  :  MSK:  Integumentary:  Neurological:  Psychiatric:  Endocrine:  Hematologic/Lymphatic:  Allergic/Immunologic:  [ ] All other systems negative  [x ] Unable to assess ROS because intubated     OBJECTIVE:  ICU Vital Signs Last 24 Hrs  T(C): 36.9 (13 Feb 2024 04:17), Max: 36.9 (13 Feb 2024 04:17)  T(F): 98.5 (13 Feb 2024 04:17), Max: 98.5 (13 Feb 2024 04:17)  HR: 105 (13 Feb 2024 07:00) (104 - 109)  BP: 158/105 (13 Feb 2024 07:00) (105/69 - 158/105)  BP(mean): 121 (13 Feb 2024 07:00) (81 - 121)  ABP: --  ABP(mean): --  RR: 16 (13 Feb 2024 07:00) (16 - 19)  SpO2: 100% (13 Feb 2024 07:00) (95% - 100%)    O2 Parameters below as of 13 Feb 2024 04:17  Patient On (Oxygen Delivery Method): room air          Mode: AC/ CMV (Assist Control/ Continuous Mandatory Ventilation), RR (machine): 14, TV (machine): 450, FiO2: 100, PEEP: 5, ITime: 1, MAP: 9, PIP: 26    CAPILLARY BLOOD GLUCOSE          PHYSICAL EXAM:  GENERAL: intubated and sedated   HEAD:  Atraumatic, normocephalic  EYES: EOMI, PERRL  NECK: Supple, trachea midline, no JVD  HEART: Regular rate and rhythm  LUNGS: Unlabored respirations.  Clear to auscultation bilaterally, no crackles, wheezing, or rhonchi  ABDOMEN: Soft, nondistended with midline incision and one FOX drain   EXTREMITIES: 2+ peripheral pulses bilaterally. No clubbing, cyanosis, or edema  NERVOUS SYSTEM:  intubated and sedated and recently paralyzed      HOSPITAL MEDICATIONS:  MEDICATIONS  (STANDING):  chlorhexidine 0.12% Liquid 15 milliLiter(s) Oral Mucosa every 12 hours  chlorhexidine 2% Cloths 1 Application(s) Topical <User Schedule>  dexMEDEtomidine Infusion 0.2 MICROgram(s)/kG/Hr (3.74 mL/Hr) IV Continuous <Continuous>  fluconAZOLE IVPB 400 milliGRAM(s) IV Intermittent once  lactated ringers. 1000 milliLiter(s) (125 mL/Hr) IV Continuous <Continuous>  pantoprazole  Injectable 40 milliGRAM(s) IV Push daily  phenylephrine    Infusion 0.2 MICROgram(s)/kG/Min (5.61 mL/Hr) IV Continuous <Continuous>  piperacillin/tazobactam IVPB.. 3.375 Gram(s) IV Intermittent every 8 hours    MEDICATIONS  (PRN):  HYDROmorphone  Injectable 0.5 milliGRAM(s) IV Push every 3 hours PRN signs of pain while intubated  ondansetron Injectable 4 milliGRAM(s) IV Push every 6 hours PRN Nausea      LABS:                        16.0   12.89 )-----------( 312      ( 12 Feb 2024 22:50 )             50.4     02-12    141  |  108  |  5<L>  ----------------------------<  132<H>  3.8   |  24  |  0.87    Ca    8.7      12 Feb 2024 22:50    TPro  8.6<H>  /  Alb  2.9<L>  /  TBili  1.5<H>  /  DBili  x   /  AST  138<H>  /  ALT  52  /  AlkPhos  408<H>  02-12    PT/INR - ( 13 Feb 2024 02:25 )   PT: 13.4 sec;   INR: 1.13 ratio         PTT - ( 13 Feb 2024 02:25 )  PTT:35.9 sec  Urinalysis Basic - ( 13 Feb 2024 03:24 )    Color: Dark Yellow / Appearance: Cloudy / SG: >1.030 / pH: x  Gluc: x / Ketone: Trace mg/dL  / Bili: Small / Urobili: 1.0 mg/dL   Blood: x / Protein: 30 mg/dL / Nitrite: Negative   Leuk Esterase: Negative / RBC: 0-2 /HPF / WBC 0-2 /HPF   Sq Epi: x / Non Sq Epi: x / Bacteria: Few /HPF            MICROBIOLOGY: pending    RADIOLOGY:  [ x] Reviewed and interpreted by me

## 2024-02-13 NOTE — PATIENT PROFILE ADULT - FALL HARM RISK - HARM RISK INTERVENTIONS
Assistance with ambulation/Assistance OOB with selected safe patient handling equipment/Communicate Risk of Fall with Harm to all staff/Monitor for mental status changes/Monitor gait and stability/Reinforce activity limits and safety measures with patient and family/Sit up slowly, dangle for a short time, stand at bedside before walking/Tailored Fall Risk Interventions/Toileting schedule using arm’s reach rule for commode and bathroom/Use of alarms - bed, chair and/or voice tab/Visual Cue: Yellow wristband and red socks/Bed in lowest position, wheels locked, appropriate side rails in place/Call bell, personal items and telephone in reach/Instruct patient to call for assistance before getting out of bed or chair/Non-slip footwear when patient is out of bed/[place] to call system/Physically safe environment - no spills, clutter or unnecessary equipment/Purposeful Proactive Rounding/Room/bathroom lighting operational, light cord in reach

## 2024-02-13 NOTE — H&P ADULT - NSHPREVIEWOFSYSTEMS_GEN_ALL_CORE
REVIEW OF SYSTEMS:  CONSTITUTIONAL: No fever, weight loss, or fatigue  EYES: No eye pain, visual disturbances, discharge  ENMT:  No difficulty hearing, tinnitus, vertigo; No sinus or throat pain  NECK: No pain or stiffness  BREASTS: No pain, masses, or nipple discharge  RESPIRATORY: No cough, wheezing, chills or hemoptysis; No shortness of breath  CARDIOVASCULAR: No chest pain, palpitations, dizziness, or leg swelling  GASTROINTESTINAL: +epigastric pain, nausea, vomiting. No hematemesis; No diarrhea or constipation. No melena or hematochezia.  GENITOURINARY: No dysuria, frequency, hematuria, or incontinence  NEUROLOGICAL: No headaches, memory loss, loss of strength, numbness, or tremors  SKIN: No itching, burning, rashes, or lesions   LYMPH NODES: No enlarged glands  ENDOCRINE: No heat or cold intolerance; No hair loss  MUSCULOSKELETAL: No joint pain or swelling; No muscle, back, or extremity pain  PSYCHIATRIC: No depression, anxiety, mood swings, or difficulty sleeping  HEME/LYMPH: No easy bruising, or bleeding gums  ALLERY AND IMMUNOLOGIC: No hives or eczema

## 2024-02-13 NOTE — H&P ADULT - HISTORY OF PRESENT ILLNESS
49 y/o male PMHx cirrhosis, ETOH, open reduction of metatarsal fracture 2022 presents c/o epigastric pain for a few hours. Vomited when he came to the ER. Last BM in the afternoon. Last flatus before the BM. Patient denies fever, chills, constipation, diarrhea, melena, hematochezia, dysuria, hematuria, chest pain, shortness of breath, dizziness, cough.

## 2024-02-14 LAB
ALBUMIN SERPL ELPH-MCNC: 2.5 G/DL — LOW (ref 3.3–5)
ALBUMIN SERPL ELPH-MCNC: 2.6 G/DL — LOW (ref 3.3–5)
ALP SERPL-CCNC: 107 U/L — SIGNIFICANT CHANGE UP (ref 40–120)
ALP SERPL-CCNC: 139 U/L — HIGH (ref 40–120)
ALT FLD-CCNC: 27 U/L — SIGNIFICANT CHANGE UP (ref 12–78)
ALT FLD-CCNC: 28 U/L — SIGNIFICANT CHANGE UP (ref 12–78)
ANION GAP SERPL CALC-SCNC: 3 MMOL/L — LOW (ref 5–17)
ANION GAP SERPL CALC-SCNC: 6 MMOL/L — SIGNIFICANT CHANGE UP (ref 5–17)
ANISOCYTOSIS BLD QL: SLIGHT — SIGNIFICANT CHANGE UP
APTT BLD: 46.8 SEC — HIGH (ref 24.5–35.6)
AST SERPL-CCNC: 59 U/L — HIGH (ref 15–37)
AST SERPL-CCNC: 77 U/L — HIGH (ref 15–37)
BASE EXCESS BLDA CALC-SCNC: -5.2 MMOL/L — LOW (ref -2–3)
BASOPHILS # BLD AUTO: 0 K/UL — SIGNIFICANT CHANGE UP (ref 0–0.2)
BASOPHILS NFR BLD AUTO: 0 % — SIGNIFICANT CHANGE UP (ref 0–2)
BILIRUB SERPL-MCNC: 3.4 MG/DL — HIGH (ref 0.2–1.2)
BILIRUB SERPL-MCNC: 4.8 MG/DL — HIGH (ref 0.2–1.2)
BLOOD GAS COMMENTS ARTERIAL: SIGNIFICANT CHANGE UP
BUN SERPL-MCNC: 15 MG/DL — SIGNIFICANT CHANGE UP (ref 7–23)
BUN SERPL-MCNC: 22 MG/DL — SIGNIFICANT CHANGE UP (ref 7–23)
CALCIUM SERPL-MCNC: 7 MG/DL — LOW (ref 8.5–10.1)
CALCIUM SERPL-MCNC: 7 MG/DL — LOW (ref 8.5–10.1)
CHLORIDE SERPL-SCNC: 107 MMOL/L — SIGNIFICANT CHANGE UP (ref 96–108)
CHLORIDE SERPL-SCNC: 107 MMOL/L — SIGNIFICANT CHANGE UP (ref 96–108)
CK SERPL-CCNC: 1133 U/L — HIGH (ref 26–308)
CO2 BLDA-SCNC: 25 MMOL/L — HIGH (ref 19–24)
CO2 SERPL-SCNC: 24 MMOL/L — SIGNIFICANT CHANGE UP (ref 22–31)
CO2 SERPL-SCNC: 26 MMOL/L — SIGNIFICANT CHANGE UP (ref 22–31)
CREAT SERPL-MCNC: 1.45 MG/DL — HIGH (ref 0.5–1.3)
CREAT SERPL-MCNC: 1.59 MG/DL — HIGH (ref 0.5–1.3)
CULTURE RESULTS: NO GROWTH — SIGNIFICANT CHANGE UP
EGFR: 53 ML/MIN/1.73M2 — LOW
EGFR: 59 ML/MIN/1.73M2 — LOW
EOSINOPHIL # BLD AUTO: 0 K/UL — SIGNIFICANT CHANGE UP (ref 0–0.5)
EOSINOPHIL NFR BLD AUTO: 0 % — SIGNIFICANT CHANGE UP (ref 0–6)
GAS PNL BLDA: SIGNIFICANT CHANGE UP
GAS PNL BLDA: SIGNIFICANT CHANGE UP
GLUCOSE SERPL-MCNC: 110 MG/DL — HIGH (ref 70–99)
GLUCOSE SERPL-MCNC: 124 MG/DL — HIGH (ref 70–99)
GRAM STN FLD: SIGNIFICANT CHANGE UP
HAV IGM SER-ACNC: SIGNIFICANT CHANGE UP
HBV CORE IGM SER-ACNC: SIGNIFICANT CHANGE UP
HBV SURFACE AG SER-ACNC: SIGNIFICANT CHANGE UP
HCO3 BLDA-SCNC: 23 MMOL/L — SIGNIFICANT CHANGE UP (ref 21–28)
HCT VFR BLD CALC: 36.6 % — LOW (ref 39–50)
HCV AB S/CO SERPL IA: 0.16 S/CO — SIGNIFICANT CHANGE UP (ref 0–0.99)
HCV AB SERPL-IMP: SIGNIFICANT CHANGE UP
HGB BLD-MCNC: 11.4 G/DL — LOW (ref 13–17)
HIV 1+2 AB+HIV1 P24 AG SERPL QL IA: SIGNIFICANT CHANGE UP
HOROWITZ INDEX BLDA+IHG-RTO: 80 — SIGNIFICANT CHANGE UP
HYPOCHROMIA BLD QL: SLIGHT — SIGNIFICANT CHANGE UP
INR BLD: 1.51 RATIO — HIGH (ref 0.85–1.18)
INR BLD: 1.7 RATIO — HIGH (ref 0.85–1.18)
LACTATE SERPL-SCNC: 1 MMOL/L — SIGNIFICANT CHANGE UP (ref 0.7–2)
LACTATE SERPL-SCNC: 1.5 MMOL/L — SIGNIFICANT CHANGE UP (ref 0.7–2)
LYMPHOCYTES # BLD AUTO: 1.91 K/UL — SIGNIFICANT CHANGE UP (ref 1–3.3)
LYMPHOCYTES # BLD AUTO: 9 % — LOW (ref 13–44)
MACROCYTES BLD QL: SLIGHT — SIGNIFICANT CHANGE UP
MAGNESIUM SERPL-MCNC: 2.4 MG/DL — SIGNIFICANT CHANGE UP (ref 1.6–2.6)
MAGNESIUM SERPL-MCNC: 2.6 MG/DL — SIGNIFICANT CHANGE UP (ref 1.6–2.6)
MANUAL SMEAR VERIFICATION: SIGNIFICANT CHANGE UP
MCHC RBC-ENTMCNC: 31.1 G/DL — LOW (ref 32–36)
MCHC RBC-ENTMCNC: 32.3 PG — SIGNIFICANT CHANGE UP (ref 27–34)
MCV RBC AUTO: 103.7 FL — HIGH (ref 80–100)
MELD SCORE WITH DIALYSIS: 29 POINTS — SIGNIFICANT CHANGE UP
MELD SCORE WITHOUT DIALYSIS: 20 POINTS — SIGNIFICANT CHANGE UP
MONOCYTES # BLD AUTO: 1.91 K/UL — HIGH (ref 0–0.9)
MONOCYTES NFR BLD AUTO: 9 % — SIGNIFICANT CHANGE UP (ref 2–14)
NEUTROPHILS # BLD AUTO: 17.43 K/UL — HIGH (ref 1.8–7.4)
NEUTROPHILS NFR BLD AUTO: 74 % — SIGNIFICANT CHANGE UP (ref 43–77)
NEUTS BAND # BLD: 8 % — SIGNIFICANT CHANGE UP (ref 0–8)
NRBC # BLD: 0 /100 WBCS — SIGNIFICANT CHANGE UP (ref 0–0)
NRBC # BLD: SIGNIFICANT CHANGE UP /100 WBCS (ref 0–0)
PCO2 BLDA: 57 MMHG — HIGH (ref 32–46)
PH BLDA: 7.22 — LOW (ref 7.35–7.45)
PHENOBARB SERPL-MCNC: 16.1 UG/ML — SIGNIFICANT CHANGE UP (ref 15–40)
PHOSPHATE SERPL-MCNC: 3 MG/DL — SIGNIFICANT CHANGE UP (ref 2.5–4.5)
PHOSPHATE SERPL-MCNC: 3.6 MG/DL — SIGNIFICANT CHANGE UP (ref 2.5–4.5)
PLAT MORPH BLD: NORMAL — SIGNIFICANT CHANGE UP
PLATELET # BLD AUTO: 196 K/UL — SIGNIFICANT CHANGE UP (ref 150–400)
PO2 BLDA: 73 MMHG — LOW (ref 83–108)
POIKILOCYTOSIS BLD QL AUTO: SLIGHT — SIGNIFICANT CHANGE UP
POTASSIUM SERPL-MCNC: 4 MMOL/L — SIGNIFICANT CHANGE UP (ref 3.5–5.3)
POTASSIUM SERPL-MCNC: 5.1 MMOL/L — SIGNIFICANT CHANGE UP (ref 3.5–5.3)
POTASSIUM SERPL-SCNC: 4 MMOL/L — SIGNIFICANT CHANGE UP (ref 3.5–5.3)
POTASSIUM SERPL-SCNC: 5.1 MMOL/L — SIGNIFICANT CHANGE UP (ref 3.5–5.3)
PROT SERPL-MCNC: 6.1 GM/DL — SIGNIFICANT CHANGE UP (ref 6–8.3)
PROT SERPL-MCNC: 6.4 GM/DL — SIGNIFICANT CHANGE UP (ref 6–8.3)
PROTHROM AB SERPL-ACNC: 17.9 SEC — HIGH (ref 9.5–13)
PROTHROM AB SERPL-ACNC: 19.9 SEC — HIGH (ref 9.5–13)
RBC # BLD: 3.53 M/UL — LOW (ref 4.2–5.8)
RBC # FLD: 15 % — HIGH (ref 10.3–14.5)
RBC BLD AUTO: ABNORMAL
SAO2 % BLDA: 94 % — SIGNIFICANT CHANGE UP (ref 94–98)
SODIUM SERPL-SCNC: 136 MMOL/L — SIGNIFICANT CHANGE UP (ref 135–145)
SODIUM SERPL-SCNC: 137 MMOL/L — SIGNIFICANT CHANGE UP (ref 135–145)
SPECIMEN SOURCE: SIGNIFICANT CHANGE UP
SPECIMEN SOURCE: SIGNIFICANT CHANGE UP
WBC # BLD: 21.25 K/UL — HIGH (ref 3.8–10.5)
WBC # FLD AUTO: 21.25 K/UL — HIGH (ref 3.8–10.5)

## 2024-02-14 PROCEDURE — 76604 US EXAM CHEST: CPT | Mod: 26

## 2024-02-14 PROCEDURE — 93306 TTE W/DOPPLER COMPLETE: CPT | Mod: 26

## 2024-02-14 PROCEDURE — 99233 SBSQ HOSP IP/OBS HIGH 50: CPT

## 2024-02-14 PROCEDURE — 76705 ECHO EXAM OF ABDOMEN: CPT | Mod: 26

## 2024-02-14 PROCEDURE — 93308 TTE F-UP OR LMTD: CPT | Mod: 26

## 2024-02-14 PROCEDURE — 99291 CRITICAL CARE FIRST HOUR: CPT

## 2024-02-14 PROCEDURE — 71045 X-RAY EXAM CHEST 1 VIEW: CPT | Mod: 26

## 2024-02-14 RX ORDER — MIDAZOLAM HYDROCHLORIDE 1 MG/ML
2 INJECTION, SOLUTION INTRAMUSCULAR; INTRAVENOUS ONCE
Refills: 0 | Status: DISCONTINUED | OUTPATIENT
Start: 2024-02-14 | End: 2024-02-14

## 2024-02-14 RX ORDER — ACETAMINOPHEN 500 MG
1000 TABLET ORAL ONCE
Refills: 0 | Status: COMPLETED | OUTPATIENT
Start: 2024-02-14 | End: 2024-02-14

## 2024-02-14 RX ORDER — SODIUM CHLORIDE 9 MG/ML
1000 INJECTION, SOLUTION INTRAVENOUS ONCE
Refills: 0 | Status: COMPLETED | OUTPATIENT
Start: 2024-02-14 | End: 2024-02-14

## 2024-02-14 RX ORDER — FENTANYL CITRATE 50 UG/ML
0.5 INJECTION INTRAVENOUS
Qty: 2500 | Refills: 0 | Status: DISCONTINUED | OUTPATIENT
Start: 2024-02-14 | End: 2024-02-21

## 2024-02-14 RX ORDER — HEPARIN SODIUM 5000 [USP'U]/ML
5000 INJECTION INTRAVENOUS; SUBCUTANEOUS EVERY 8 HOURS
Refills: 0 | Status: DISCONTINUED | OUTPATIENT
Start: 2024-02-14 | End: 2024-02-16

## 2024-02-14 RX ORDER — FENTANYL CITRATE 50 UG/ML
100 INJECTION INTRAVENOUS ONCE
Refills: 0 | Status: DISCONTINUED | OUTPATIENT
Start: 2024-02-14 | End: 2024-02-14

## 2024-02-14 RX ORDER — THIAMINE MONONITRATE (VIT B1) 100 MG
500 TABLET ORAL DAILY
Refills: 0 | Status: COMPLETED | OUTPATIENT
Start: 2024-02-14 | End: 2024-02-16

## 2024-02-14 RX ORDER — PHENOBARBITAL 60 MG
130 TABLET ORAL EVERY 6 HOURS
Refills: 0 | Status: DISCONTINUED | OUTPATIENT
Start: 2024-02-14 | End: 2024-02-19

## 2024-02-14 RX ORDER — ALBUMIN HUMAN 25 %
100 VIAL (ML) INTRAVENOUS EVERY 8 HOURS
Refills: 0 | Status: DISCONTINUED | OUTPATIENT
Start: 2024-02-14 | End: 2024-02-19

## 2024-02-14 RX ORDER — PROPOFOL 10 MG/ML
20 INJECTION, EMULSION INTRAVENOUS
Qty: 1000 | Refills: 0 | Status: DISCONTINUED | OUTPATIENT
Start: 2024-02-14 | End: 2024-02-24

## 2024-02-14 RX ORDER — SODIUM CHLORIDE 9 MG/ML
1000 INJECTION, SOLUTION INTRAVENOUS
Refills: 0 | Status: DISCONTINUED | OUTPATIENT
Start: 2024-02-14 | End: 2024-02-15

## 2024-02-14 RX ADMIN — PIPERACILLIN AND TAZOBACTAM 25 GRAM(S): 4; .5 INJECTION, POWDER, LYOPHILIZED, FOR SOLUTION INTRAVENOUS at 23:10

## 2024-02-14 RX ADMIN — PIPERACILLIN AND TAZOBACTAM 25 GRAM(S): 4; .5 INJECTION, POWDER, LYOPHILIZED, FOR SOLUTION INTRAVENOUS at 06:49

## 2024-02-14 RX ADMIN — SODIUM CHLORIDE 1000 MILLILITER(S): 9 INJECTION, SOLUTION INTRAVENOUS at 12:35

## 2024-02-14 RX ADMIN — Medication 1000 MILLIGRAM(S): at 01:05

## 2024-02-14 RX ADMIN — DEXMEDETOMIDINE HYDROCHLORIDE IN 0.9% SODIUM CHLORIDE 3.74 MICROGRAM(S)/KG/HR: 4 INJECTION INTRAVENOUS at 01:07

## 2024-02-14 RX ADMIN — HYDROMORPHONE HYDROCHLORIDE 0.5 MILLIGRAM(S): 2 INJECTION INTRAMUSCULAR; INTRAVENOUS; SUBCUTANEOUS at 02:04

## 2024-02-14 RX ADMIN — DEXMEDETOMIDINE HYDROCHLORIDE IN 0.9% SODIUM CHLORIDE 3.74 MICROGRAM(S)/KG/HR: 4 INJECTION INTRAVENOUS at 06:11

## 2024-02-14 RX ADMIN — Medication 1000 MILLIGRAM(S): at 14:12

## 2024-02-14 RX ADMIN — Medication 130 MILLIGRAM(S): at 23:41

## 2024-02-14 RX ADMIN — HYDROMORPHONE HYDROCHLORIDE 1 MILLIGRAM(S): 2 INJECTION INTRAMUSCULAR; INTRAVENOUS; SUBCUTANEOUS at 05:15

## 2024-02-14 RX ADMIN — SODIUM CHLORIDE 1000 MILLILITER(S): 9 INJECTION, SOLUTION INTRAVENOUS at 00:18

## 2024-02-14 RX ADMIN — MIDAZOLAM HYDROCHLORIDE 2 MILLIGRAM(S): 1 INJECTION, SOLUTION INTRAMUSCULAR; INTRAVENOUS at 03:00

## 2024-02-14 RX ADMIN — DEXMEDETOMIDINE HYDROCHLORIDE IN 0.9% SODIUM CHLORIDE 3.74 MICROGRAM(S)/KG/HR: 4 INJECTION INTRAVENOUS at 09:22

## 2024-02-14 RX ADMIN — CHLORHEXIDINE GLUCONATE 1 APPLICATION(S): 213 SOLUTION TOPICAL at 05:42

## 2024-02-14 RX ADMIN — FENTANYL CITRATE 3.74 MICROGRAM(S)/KG/HR: 50 INJECTION INTRAVENOUS at 11:14

## 2024-02-14 RX ADMIN — MIDAZOLAM HYDROCHLORIDE 2 MILLIGRAM(S): 1 INJECTION, SOLUTION INTRAMUSCULAR; INTRAVENOUS at 04:15

## 2024-02-14 RX ADMIN — Medication 1 MILLIGRAM(S): at 11:15

## 2024-02-14 RX ADMIN — Medication 7.01 MICROGRAM(S)/KG/MIN: at 09:22

## 2024-02-14 RX ADMIN — CHLORHEXIDINE GLUCONATE 15 MILLILITER(S): 213 SOLUTION TOPICAL at 17:22

## 2024-02-14 RX ADMIN — DEXMEDETOMIDINE HYDROCHLORIDE IN 0.9% SODIUM CHLORIDE 3.74 MICROGRAM(S)/KG/HR: 4 INJECTION INTRAVENOUS at 02:49

## 2024-02-14 RX ADMIN — SODIUM CHLORIDE 125 MILLILITER(S): 9 INJECTION, SOLUTION INTRAVENOUS at 21:34

## 2024-02-14 RX ADMIN — Medication 130 MILLIGRAM(S): at 12:26

## 2024-02-14 RX ADMIN — DEXMEDETOMIDINE HYDROCHLORIDE IN 0.9% SODIUM CHLORIDE 3.74 MICROGRAM(S)/KG/HR: 4 INJECTION INTRAVENOUS at 00:18

## 2024-02-14 RX ADMIN — PROPOFOL 8.98 MICROGRAM(S)/KG/MIN: 10 INJECTION, EMULSION INTRAVENOUS at 07:47

## 2024-02-14 RX ADMIN — DEXMEDETOMIDINE HYDROCHLORIDE IN 0.9% SODIUM CHLORIDE 3.74 MICROGRAM(S)/KG/HR: 4 INJECTION INTRAVENOUS at 07:47

## 2024-02-14 RX ADMIN — PROPOFOL 8.98 MICROGRAM(S)/KG/MIN: 10 INJECTION, EMULSION INTRAVENOUS at 12:50

## 2024-02-14 RX ADMIN — Medication 50 MILLILITER(S): at 21:25

## 2024-02-14 RX ADMIN — SODIUM CHLORIDE 75 MILLILITER(S): 9 INJECTION, SOLUTION INTRAVENOUS at 05:46

## 2024-02-14 RX ADMIN — Medication 130 MILLIGRAM(S): at 17:36

## 2024-02-14 RX ADMIN — DEXMEDETOMIDINE HYDROCHLORIDE IN 0.9% SODIUM CHLORIDE 3.74 MICROGRAM(S)/KG/HR: 4 INJECTION INTRAVENOUS at 04:28

## 2024-02-14 RX ADMIN — Medication 100 MILLILITER(S): at 05:43

## 2024-02-14 RX ADMIN — HYDROMORPHONE HYDROCHLORIDE 0.5 MILLIGRAM(S): 2 INJECTION INTRAMUSCULAR; INTRAVENOUS; SUBCUTANEOUS at 01:04

## 2024-02-14 RX ADMIN — HEPARIN SODIUM 5000 UNIT(S): 5000 INJECTION INTRAVENOUS; SUBCUTANEOUS at 14:01

## 2024-02-14 RX ADMIN — HEPARIN SODIUM 5000 UNIT(S): 5000 INJECTION INTRAVENOUS; SUBCUTANEOUS at 21:25

## 2024-02-14 RX ADMIN — Medication 7.01 MICROGRAM(S)/KG/MIN: at 23:33

## 2024-02-14 RX ADMIN — FENTANYL CITRATE 100 MICROGRAM(S): 50 INJECTION INTRAVENOUS at 11:14

## 2024-02-14 RX ADMIN — PROPOFOL 8.98 MICROGRAM(S)/KG/MIN: 10 INJECTION, EMULSION INTRAVENOUS at 20:16

## 2024-02-14 RX ADMIN — SODIUM CHLORIDE 75 MILLILITER(S): 9 INJECTION, SOLUTION INTRAVENOUS at 09:22

## 2024-02-14 RX ADMIN — ENOXAPARIN SODIUM 40 MILLIGRAM(S): 100 INJECTION SUBCUTANEOUS at 09:09

## 2024-02-14 RX ADMIN — Medication 50 MILLILITER(S): at 15:00

## 2024-02-14 RX ADMIN — HYDROMORPHONE HYDROCHLORIDE 1 MILLIGRAM(S): 2 INJECTION INTRAMUSCULAR; INTRAVENOUS; SUBCUTANEOUS at 04:10

## 2024-02-14 RX ADMIN — Medication 400 MILLIGRAM(S): at 00:25

## 2024-02-14 RX ADMIN — CHLORHEXIDINE GLUCONATE 15 MILLILITER(S): 213 SOLUTION TOPICAL at 05:42

## 2024-02-14 RX ADMIN — Medication 105 MILLIGRAM(S): at 12:22

## 2024-02-14 RX ADMIN — FENTANYL CITRATE 100 MICROGRAM(S): 50 INJECTION INTRAVENOUS at 12:55

## 2024-02-14 RX ADMIN — PIPERACILLIN AND TAZOBACTAM 25 GRAM(S): 4; .5 INJECTION, POWDER, LYOPHILIZED, FOR SOLUTION INTRAVENOUS at 13:57

## 2024-02-14 RX ADMIN — PANTOPRAZOLE SODIUM 40 MILLIGRAM(S): 20 TABLET, DELAYED RELEASE ORAL at 11:14

## 2024-02-14 RX ADMIN — Medication 7.01 MICROGRAM(S)/KG/MIN: at 00:18

## 2024-02-14 RX ADMIN — SODIUM CHLORIDE 125 MILLILITER(S): 9 INJECTION, SOLUTION INTRAVENOUS at 12:49

## 2024-02-14 RX ADMIN — Medication 400 MILLIGRAM(S): at 13:55

## 2024-02-14 RX ADMIN — VASOPRESSIN 6 UNIT(S)/MIN: 20 INJECTION INTRAVENOUS at 09:22

## 2024-02-14 NOTE — PROVIDER CONTACT NOTE (EICU) - ACTION/TREATMENT ORDERED:
E-alerted by bedside team, requesting ABG and LA orders, both placed as requested, results to be followed up by bedside provider.
versed 2 mg IVP x1 for sedation

## 2024-02-14 NOTE — PROGRESS NOTE ADULT - SUBJECTIVE AND OBJECTIVE BOX
[facility]  INFECTIOUS DISEASES   [address] [place] [address]  Tel: [phone]     Fax: [phone]  ==============================================================================  DO Jony Baron MD Alexandra Gutman, NP   ==============================================================================      PRIYANKA PITTMAN  [id number]  48y ([birth year])      Interval History: remains intubated , requiring a lot for sedation, on pressors, awaiting cultures, continuing antibiotics     ROS:    [x ] Unobtainable because:intubated and sedated   [ ] All other systems negative except as noted    Constitutional: no fever, no chills  Head: no trauma  Eyes: no vision changes, no eye pain  ENT:  no sore throat, no rhinorrhea  Cardiovascular:  no chest pain, no palpitation  Respiratory:  no SOB, no cough  GI:  no abd pain, no vomiting, no diarrhea  urinary: no dysuria, no hematuria, no flank pain  musculoskeletal:  no joint pain, no joint swelling  skin:  no rash  neurology:  no headache, no seizure, no change in mental status  psych: no anxiety, no depression         Allergies  No Known Allergies        ANTIMICROBIALS:  piperacillin/tazobactam IVPB.. 3.375 every 8 hours      OTHER MEDS:  albumin human 25% IVPB 100 milliLiter(s) IV Intermittent every 8 hours  chlorhexidine 0.12% Liquid 15 milliLiter(s) Oral Mucosa every 12 hours  chlorhexidine 2% Cloths 1 Application(s) Topical <User Schedule>  fentaNYL   Infusion. 0.5 MICROgram(s)/kG/Hr IV Continuous <Continuous>  folic acid Injectable 1 milliGRAM(s) IV Push daily  heparin   Injectable 5000 Unit(s) SubCutaneous every 8 hours  influenza   Vaccine 0.5 milliLiter(s) IntraMuscular once  lactated ringers. 1000 milliLiter(s) IV Continuous <Continuous>  norepinephrine Infusion 0.05 MICROgram(s)/kG/Min IV Continuous <Continuous>  ondansetron Injectable 4 milliGRAM(s) IV Push every 6 hours PRN  pantoprazole  Injectable 40 milliGRAM(s) IV Push daily  PHENobarbital Injectable 130 milliGRAM(s) IV Push every 6 hours  propofol Infusion 20 MICROgram(s)/kG/Min IV Continuous <Continuous>  sodium chloride 0.9% lock flush 10 milliLiter(s) IV Push every 1 hour PRN  thiamine IVPB 500 milliGRAM(s) IV Intermittent daily  vasopressin Infusion 0.04 Unit(s)/Min IV Continuous <Continuous>      Physical Exam:  Vital Signs Last 24 Hrs  T(C): 37 (15 Feb 2024 01:30), Max: 41.3 (14 Feb 2024 04:00)  T(F): 98.6 (15 Feb 2024 01:30), Max: 106.3 (14 Feb 2024 04:00)  HR: 80 (15 Feb 2024 01:30) (64 - 91)  BP: 97/66 (15 Feb 2024 00:00) (96/79 - 97/66)  BP(mean): 76 (15 Feb 2024 00:00) (76 - 86)  RR: 23 (15 Feb 2024 01:30) (15 - 25)  SpO2: 94% (15 Feb 2024 01:30) (86% - 99%)    Parameters below as of 15 Feb 2024 01:00  Patient On (Oxygen Delivery Method): ventilator    O2 Concentration (%): 80    General:    NAD,  non toxic, remains intubated   Head: atraumatic, normocephalic  Eye: normal sclera and conjunctiva  ENT:    no oral lesions, neck supple, OGT  Cardio:     regular S1, S2,  no murmur  Respiratory:    clear b/l,    no wheezing  abd:     soft,   BS +,   no tenderness, surgical site intact, FOX drain   :   no CVAT,  +suprapubic tenderness,   no  gautam  Musculoskeletal:   no joint swelling,   no edema  vascular: central lines, +PIV   Skin:    no rash, multiple sites of wounds all covered   Neurologic:     no focal deficit  psych: normal affect    WBC Count: 21.25 K/uL (02-14 @ 02:00)  WBC Count: 26.99 K/uL (02-13 @ 17:30)  WBC Count: 14.61 K/uL (02-13 @ 07:30)  WBC Count: 12.89 K/uL (02-12 @ 22:50)                            11.4   21.25 )-----------( 196      ( 14 Feb 2024 02:00 )             36.6       02-14    137  |  107  |  22  ----------------------------<  110<H>  4.0   |  24  |  1.59<H>    Ca    7.0<L>      14 Feb 2024 13:50  Phos  3.0     02-14  Mg     2.4     02-14    TPro  6.1  /  Alb  2.5<L>  /  TBili  4.8<H>  /  DBili  x   /  AST  77<H>  /  ALT  28  /  AlkPhos  107  02-14      Urinalysis Basic - ( 14 Feb 2024 13:50 )    Color: x / Appearance: x / SG: x / pH: x  Gluc: 110 mg/dL / Ketone: x  / Bili: x / Urobili: x   Blood: x / Protein: x / Nitrite: x   Leuk Esterase: x / RBC: x / WBC x   Sq Epi: x / Non Sq Epi: x / Bacteria: x        Lipase: 45 U/L (02-12-24 @ 22:50)    Creatinine Trend: 1.59<--, 1.45<--, 1.45<--, 0.83<--, 0.87<--  Blood Gas Arterial, Lactate: 1.30 mmol/L (02-14-24 @ 13:54)  Lactate, Blood: 1.0 mmol/L (02-14-24 @ 05:40)  Lactate, Blood: 1.5 mmol/L (02-14-24 @ 02:00)  Blood Gas Arterial, Lactate: 1.80 mmol/L (02-13-24 @ 17:45)  Blood Gas Arterial, Lactate: 1.60 mmol/L (02-13-24 @ 08:46)  Lactate, Blood: 1.7 mmol/L (02-13-24 @ 06:52)      MICROBIOLOGY:  v  .Blood Blood  02-13-24   No growth at 24 hours  --  --      .Blood Blood  02-13-24   No growth at 24 hours  --  --      Peritoneal peritoneal fluida c/s  02-13-24 --  --    Few polymorphonuclear leukocytes seen per low power field  No organisms seen per oil power field      Clean Catch Clean Catch (Midstream)  02-13-24   No growth  --  --        RADIOLOGY:

## 2024-02-14 NOTE — CHART NOTE - NSCHARTNOTEFT_GEN_A_CORE
General Surgery PA    Patient seen and examined at bedside. Fevers improved. Currently requiring pressors. Intubated and sedated.    Abdominal exam is benign, FOX draining serous, ascitic fluid, Softly distended. Midline incision C/D/I.  : Minimal urine output    Recommended bolus and increasing IVF, discussed with ICU  ICU management appreciated  Will likely perform UGIS on POD #5  VTE prophylaxis  Discussed with Dr. Blanco

## 2024-02-14 NOTE — CHART NOTE - NSCHARTNOTEFT_GEN_A_CORE
:  DENYS Guerrero dr    Indication:  SHOCK    PROCEDURE:  [x ] LIMITED ECHO  [x ] LIMITED CHEST  [ ] LIMITED RETROPERITONEAL  [x ] LIMITED ABDOMINAL  [ ] LIMITED DVT  [ ] NEEDLE GUIDANCE VASCULAR  [ ] NEEDLE GUIDANCE THORACENTESIS  [ ] NEEDLE GUIDANCE PARACENTESIS  [ ] NEEDLE GUIDANCE PERICARDIOCENTESIS  [ ] OTHER    FINDINGS:  Chest: A-line predominant, bibasilar dense consolidations L>R    ECHO: LV> RV Grossly normal RV and LV function with no wall motion abnormalities, nor septal bowing/flattening  No pericardial effusion  IVC: unable to eval    Abd: small volume ascities with very large liver     INTERPRETATION:  lung exam with bibasilar consolidations L>R c/f pna  grossly nl lv function. unlikely cardogenic component to shock state    images uploaded to [employer]

## 2024-02-14 NOTE — PROGRESS NOTE ADULT - SUBJECTIVE AND OBJECTIVE BOX
SURGERY PROGRESS HPI:  POD#1  Pt seen and examined at bedside intubated in the ICU.    Vital Signs Last 24 Hrs  T(C): 41.3 (14 Feb 2024 04:00), Max: 41.3 (14 Feb 2024 04:00)  T(F): 106.3 (14 Feb 2024 04:00), Max: 106.3 (14 Feb 2024 04:00)  HR: 83 (14 Feb 2024 04:15) (70 - 109)  BP: 110/71 (14 Feb 2024 02:00) (78/58 - 161/88)  BP(mean): 84 (14 Feb 2024 02:00) (65 - 121)  RR: 16 (14 Feb 2024 04:15) (14 - 29)  SpO2: 86% (14 Feb 2024 04:15) (86% - 100%)  Parameters below as of 14 Feb 2024 04:15  Patient On (Oxygen Delivery Method): ventilator  O2 Concentration (%): 80    PHYSICAL EXAM:    CONSTITUTIONAL: NAD  HEENT: NGT in place  RESPIRATORY: Intubated. Clear to ausculation, bilaterally   CARDIOVASCULAR: RRR S1S2  GASTROINTESTINAL: Dressing clean/dry/intact. FOX with serosanguinous output. non distended, soft, appropriate incisional tenderness  : Barber indwelling with clear yellow urine  MUSCULOSKELETAL: calf soft, non tender b/l    I&O's Detail    12 Feb 2024 07:01  -  13 Feb 2024 07:00  --------------------------------------------------------  IN:  Total IN: 0 mL    OUT:    Drain (mL): 70 mL  Total OUT: 70 mL    Total NET: -70 mL      13 Feb 2024 07:01  -  14 Feb 2024 05:21  --------------------------------------------------------  IN:    Dexmedetomidine: 344.4 mL    IV PiggyBack: 800 mL    Lactated Ringers: 1125 mL    Lactated Ringers Bolus: 1000 mL    Norepinephrine: 393.7 mL    Phenylephrine: 281.5 mL    Sodium Chloride 0.9% Bolus: 1000 mL    Vasopressin: 60 mL  Total IN: 5004.6 mL    OUT:    Drain (mL): 950 mL    Indwelling Catheter - Urethral (mL): 200 mL    Voided (mL): 580 mL  Total OUT: 1730 mL    Total NET: 3274.6 mL    LABS:                        11.4   21.25 )-----------( 196      ( 14 Feb 2024 02:00 )             36.6     02-14    136  |  107  |  15  ----------------------------<  124<H>  5.1   |  26  |  1.45<H>    Ca    7.0<L>      14 Feb 2024 02:00  Phos  3.6     02-14  Mg     2.6     02-14    TPro  6.4  /  Alb  2.6<L>  /  TBili  3.4<H>  /  DBili  x   /  AST  59<H>  /  ALT  27  /  AlkPhos  139<H>  02-14    PT/INR - ( 14 Feb 2024 02:00 )   PT: 17.9 sec;   INR: 1.51 ratio       PTT - ( 13 Feb 2024 07:30 )  PTT:36.3 sec    Urinalysis Basic - ( 14 Feb 2024 02:00 )    Color: x / Appearance: x / SG: x / pH: x  Gluc: 124 mg/dL / Ketone: x  / Bili: x / Urobili: x   Blood: x / Protein: x / Nitrite: x   Leuk Esterase: x / RBC: x / WBC x   Sq Epi: x / Non Sq Epi: x / Bacteria: x        Assessment: 48M with duodenal perforation s/p ex lap, kyrie patch POD#1. Intubated. On Levophed and vasopressin. Febrile.    Plan:  -NPO, NGT, IV hydration  -UGIS planning POD#5  -continue local wound care, FOX care  -antibiotics per ID  -Barber, monitor urine output  -f/u labs

## 2024-02-14 NOTE — PROGRESS NOTE ADULT - ATTENDING COMMENTS
Mr. Arvizu is a 48M w/ history of cirrhosis, ETOH abuse (drinks a few glasses daily), open reduction of metatarsal fracture 2022 presents w/perforated viscus, s/p repair, now intubated with distributive shock and increased pressor requirements.  Neuro: Sedate with propofol and fentanyl gtt for vent synchrony and pain control. Loaded with phenobarb yesterday. Will add standing for JOEY agonism to help whenever he goes into withdrawal (alcohol level still elevated on admission). Phenobarb level pending. Thiamine 500mg q8hrs for 3 days for Wernicke's.  Respiratory: Patient with hypoxic respiratory failure. FiO2 requirements increasing. Bibasilar consolidations- aspiration pna vs atelectasis from pressure from abdomen? Continue with full ventilatory support. Increase peep.   Cardiovascular: POCUS with elevated VTi, less likely cardiogenic. Distributive shock. On dual vasopressors- levophed and vasopressin. Continue with IVF to keep up with FOX output.   GI: S/p Aden patch and abdominal washout for perforated duodenum. D/t hx cirrhosis, will hold off on anti-fungal coverage at this time. Reassess if cultures come back positive. NGT to suction for now. Awaiting RToBF.  Renal: EMILIA, with decreased urine output. Slightly uptrending UOP over the course of early afternoon. EMILIA has large differential: ATN from septic shock, hypoperfusion, hepatorenal syndrome, abdominal compartment syndrome-continue abdominal exams, surgery following.  Endocrine: FS q 6hrs, ISS.  ID: Continue with zosyn for septic shock. Follow up all culture data. Patient persistently febrile to 103-105. Will send a CK, but unlikely to be malignant hyperthermia. Bandemia improving.   Hematology: H&H stable. Heparin subq for DVT ppx.    Discussed with wife, mother, and brother at bedside.

## 2024-02-14 NOTE — PROGRESS NOTE ADULT - SUBJECTIVE AND OBJECTIVE BOX
History of Present Illness:  Reason for Admission: Perforated viscus  HPI:   48M w/ PMH of cirrhosis, ETOH abuse, open reduction of metatarsal fracture 2022 presents with diffuse epigastric pain X a few hours. Vomited when he came to the ER. Last BM in the afternoon. Last flatus before the BM. Patient denies fever, chills, constipation, diarrhea, melena, hematochezia, dysuria, hematuria, chest pain, shortness of breath, dizziness, cough.       Interval HPI: Pt seen and examined at bedside intubated and on pressor support in the ICU. POD#1 from exploratory laparotomy with closure of perforated duodenum using omental patch.     Vital Signs Last 24 Hrs  T(C): 41.3 (14 Feb 2024 04:00), Max: 41.3 (14 Feb 2024 04:00)  T(F): 106.3 (14 Feb 2024 04:00), Max: 106.3 (14 Feb 2024 04:00)  HR: 83 (14 Feb 2024 04:15) (70 - 109)  BP: 110/71 (14 Feb 2024 02:00) (78/58 - 161/88)  BP(mean): 84 (14 Feb 2024 02:00) (65 - 121)  RR: 16 (14 Feb 2024 04:15) (14 - 29)  SpO2: 86% (14 Feb 2024 04:15) (86% - 100%)  Parameters below as of 14 Feb 2024 04:15  Patient On (Oxygen Delivery Method): ventilator  O2 Concentration (%): 80      PHYSICAL EXAM:    GENERAL: NAD, lying in bed  HEAD:  Atraumatic, normocephalic, NGT in place  EYES: PERRLA, conjunctiva and sclera clear  NECK: Supple, trachea midline, no JVD  HEART: Regular rate and rhythm, S1 S2  LUNGS: Intubated.  coarse breath sounds to auscultation bilaterally  ABDOMEN: firm/taut to palpation and rounded, dressing clean, dry and intact, RUQ FOX drain in place with serosanguinous out put   EXTREMITIES: 2+ peripheral pulses bilaterally. No clubbing, cyanosis, or edema  NERVOUS SYSTEM:  Sedated      I&O's Detail    12 Feb 2024 07:01  -  13 Feb 2024 07:00  --------------------------------------------------------  IN:  Total IN: 0 mL    OUT:    Drain (mL): 70 mL  Total OUT: 70 mL    Total NET: -70 mL      13 Feb 2024 07:01  -  14 Feb 2024 05:21  --------------------------------------------------------  IN:    Dexmedetomidine: 344.4 mL    IV PiggyBack: 800 mL    Lactated Ringers: 1125 mL    Lactated Ringers Bolus: 1000 mL    Norepinephrine: 393.7 mL    Phenylephrine: 281.5 mL    Sodium Chloride 0.9% Bolus: 1000 mL    Vasopressin: 60 mL  Total IN: 5004.6 mL    OUT:    Drain (mL): 950 mL    Indwelling Catheter - Urethral (mL): 200 mL    Voided (mL): 580 mL  Total OUT: 1730 mL    Total NET: 3274.6 mL    LABS:                        11.4   21.25 )-----------( 196      ( 14 Feb 2024 02:00 )             36.6     02-14    136  |  107  |  15  ----------------------------<  124<H>  5.1   |  26  |  1.45<H>    Ca    7.0<L>      14 Feb 2024 02:00  Phos  3.6     02-14  Mg     2.6     02-14    TPro  6.4  /  Alb  2.6<L>  /  TBili  3.4<H>  /  DBili  x   /  AST  59<H>  /  ALT  27  /  AlkPhos  139<H>  02-14    PT/INR - ( 14 Feb 2024 02:00 )   PT: 17.9 sec;   INR: 1.51 ratio       PTT - ( 13 Feb 2024 07:30 )  PTT:36.3 sec    Urinalysis Basic - ( 14 Feb 2024 02:00 )    Color: x / Appearance: x / SG: x / pH: x  Gluc: 124 mg/dL / Ketone: x  / Bili: x / Urobili: x   Blood: x / Protein: x / Nitrite: x   Leuk Esterase: x / RBC: x / WBC x   Sq Epi: x / Non Sq Epi: x / Bacteria: x        Assessment: 48M with duodenal perforation s/p ex lap, kyrie patch POD#1. Intubated. On Levophed and vasopressin. Febrile.    Plan:  -NPO, NGT, IV hydration  -UGIS planning POD#5  -continue local wound care, FOX care  -antibiotics per ID  -Barber, monitor urine output  -f/u labs   History of Present Illness:  Reason for Admission: Perforated viscus  HPI:   48M w/ PMH of cirrhosis, ETOH abuse, open reduction of metatarsal fracture 2022 presents with diffuse epigastric pain X a few hours with several episodes of vomiting. Patient endorsed drinking a lot last night. In the ED, patient experienced another episode of vomiting. Last BM in the afternoon. Last flatus before the BM. Denies chest pain, shortness of breath, cough, or fever.  No history of any abdominal surgeries.  Does not have history of alcohol withdrawal.  Says he drinks a couple of shots on the weekends. No hematemesis.  Initial CTAP notable for intra-abdominal free air of unknown etiology.     Interval HPI: Pt seen and examined at bedside intubated and on pressor support in the ICU. Now POD#1 from exploratory laparotomy with closure of perforated duodenum using omental patch.     Vital Signs Last 24 Hrs  T(C): 41.3 (14 Feb 2024 04:00), Max: 41.3 (14 Feb 2024 04:00)  T(F): 106.3 (14 Feb 2024 04:00), Max: 106.3 (14 Feb 2024 04:00)  HR: 83 (14 Feb 2024 04:15) (70 - 109)  BP: 110/71 (14 Feb 2024 02:00) (78/58 - 161/88)  BP(mean): 84 (14 Feb 2024 02:00) (65 - 121)  RR: 16 (14 Feb 2024 04:15) (14 - 29)  SpO2: 86% (14 Feb 2024 04:15) (86% - 100%)  Parameters below as of 14 Feb 2024 04:15  Patient On (Oxygen Delivery Method): ventilator  O2 Concentration (%): 80      PHYSICAL EXAM:    GENERAL: NAD, lying in bed  HEAD:  Atraumatic, normocephalic, NGT in place  EYES: PERRLA, conjunctiva and sclera clear  NECK: Supple, trachea midline, no JVD  HEART: Regular rate and rhythm, S1 S2  LUNGS: Intubated.  coarse breath sounds to auscultation bilaterally  ABDOMEN: firm/taut to palpation and rounded, dressing clean, dry and intact, RUQ FOX drain in place with serosanguinous out put   EXTREMITIES: 2+ peripheral pulses bilaterally. No clubbing, cyanosis, or edema  NERVOUS SYSTEM:  Sedated      I&O's Detail    12 Feb 2024 07:01  -  13 Feb 2024 07:00  --------------------------------------------------------  IN:  Total IN: 0 mL    OUT:    Drain (mL): 70 mL  Total OUT: 70 mL    Total NET: -70 mL      13 Feb 2024 07:01  -  14 Feb 2024 05:21  --------------------------------------------------------  IN:    Dexmedetomidine: 344.4 mL    IV PiggyBack: 800 mL    Lactated Ringers: 1125 mL    Lactated Ringers Bolus: 1000 mL    Norepinephrine: 393.7 mL    Phenylephrine: 281.5 mL    Sodium Chloride 0.9% Bolus: 1000 mL    Vasopressin: 60 mL  Total IN: 5004.6 mL    OUT:    Drain (mL): 950 mL    Indwelling Catheter - Urethral (mL): 200 mL    Voided (mL): 580 mL  Total OUT: 1730 mL    Total NET: 3274.6 mL    LABS:                        11.4   21.25 )-----------( 196      ( 14 Feb 2024 02:00 )             36.6     02-14    136  |  107  |  15  ----------------------------<  124<H>  5.1   |  26  |  1.45<H>    Ca    7.0<L>      14 Feb 2024 02:00  Phos  3.6     02-14  Mg     2.6     02-14    TPro  6.4  /  Alb  2.6<L>  /  TBili  3.4<H>  /  DBili  x   /  AST  59<H>  /  ALT  27  /  AlkPhos  139<H>  02-14    PT/INR - ( 14 Feb 2024 02:00 )   PT: 17.9 sec;   INR: 1.51 ratio       PTT - ( 13 Feb 2024 07:30 )  PTT:36.3 sec    Urinalysis Basic - ( 14 Feb 2024 02:00 )    Color: x / Appearance: x / SG: x / pH: x  Gluc: 124 mg/dL / Ketone: x  / Bili: x / Urobili: x   Blood: x / Protein: x / Nitrite: x   Leuk Esterase: x / RBC: x / WBC x   Sq Epi: x / Non Sq Epi: x / Bacteria: x        Assessment: 48M with duodenal perforation s/p ex lap, kyrie patch POD#1. Intubated. On Levophed and vasopressin. Febrile.    Plan:  -NPO, NGT, IV hydration  -UGIS planning POD#5  -continue local wound care, FOX care  -antibiotics per ID  -Barber, monitor urine output  -f/u labs   History of Present Illness:  Reason for Admission: Perforated viscus  HPI:   48M w/ PMH of cirrhosis, ETOH abuse, open reduction of metatarsal fracture 2022 presents with diffuse epigastric pain X a few hours with several episodes of vomiting. Patient endorsed drinking a lot last night. In the ED, patient experienced another episode of vomiting. Last BM in the afternoon. Last flatus before the BM. Denies chest pain, shortness of breath, cough, or fever.  No history of any abdominal surgeries.  Does not have history of alcohol withdrawal.  Says he drinks a couple of shots on the weekends. No hematemesis.  Initial CTAP notable for intra-abdominal free air of unknown etiology, sent to OR emergently.    Interval HPI: Pt seen and examined at bedside intubated and on pressor support in the ICU. Now POD#1 from exploratory laparotomy with closure of perforated duodenum using omental patch.     Vital Signs Last 24 Hrs  T(C): 41.3 (14 Feb 2024 04:00), Max: 41.3 (14 Feb 2024 04:00)  T(F): 106.3 (14 Feb 2024 04:00), Max: 106.3 (14 Feb 2024 04:00)  HR: 83 (14 Feb 2024 04:15) (70 - 109)  BP: 110/71 (14 Feb 2024 02:00) (78/58 - 161/88)  BP(mean): 84 (14 Feb 2024 02:00) (65 - 121)  RR: 16 (14 Feb 2024 04:15) (14 - 29)  SpO2: 86% (14 Feb 2024 04:15) (86% - 100%)  Parameters below as of 14 Feb 2024 04:15  Patient On (Oxygen Delivery Method): ventilator  O2 Concentration (%): 80      PHYSICAL EXAM:    GENERAL: NAD, lying in bed  HEAD:  Atraumatic, normocephalic, NGT in place  EYES: PERRLA, conjunctiva and sclera clear  NECK: Supple, trachea midline, no JVD  HEART: Regular rate and rhythm, S1 S2  LUNGS: Intubated.  coarse breath sounds to auscultation bilaterally  ABDOMEN: firm/taut to palpation and rounded, dressing clean, dry and intact, RUQ FOX drain in place with serosanguinous out put   EXTREMITIES: 2+ peripheral pulses bilaterally. No clubbing, cyanosis, or edema  NERVOUS SYSTEM:  Sedated      I&O's Detail    12 Feb 2024 07:01  -  13 Feb 2024 07:00  --------------------------------------------------------  IN:  Total IN: 0 mL    OUT:    Drain (mL): 70 mL  Total OUT: 70 mL    Total NET: -70 mL      13 Feb 2024 07:01  -  14 Feb 2024 05:21  --------------------------------------------------------  IN:    Dexmedetomidine: 344.4 mL    IV PiggyBack: 800 mL    Lactated Ringers: 1125 mL    Lactated Ringers Bolus: 1000 mL    Norepinephrine: 393.7 mL    Phenylephrine: 281.5 mL    Sodium Chloride 0.9% Bolus: 1000 mL    Vasopressin: 60 mL  Total IN: 5004.6 mL    OUT:    Drain (mL): 950 mL    Indwelling Catheter - Urethral (mL): 200 mL    Voided (mL): 580 mL  Total OUT: 1730 mL    Total NET: 3274.6 mL    LABS:                        11.4   21.25 )-----------( 196      ( 14 Feb 2024 02:00 )             36.6     02-14    136  |  107  |  15  ----------------------------<  124<H>  5.1   |  26  |  1.45<H>    Ca    7.0<L>      14 Feb 2024 02:00  Phos  3.6     02-14  Mg     2.6     02-14    TPro  6.4  /  Alb  2.6<L>  /  TBili  3.4<H>  /  DBili  x   /  AST  59<H>  /  ALT  27  /  AlkPhos  139<H>  02-14    PT/INR - ( 14 Feb 2024 02:00 )   PT: 17.9 sec;   INR: 1.51 ratio       PTT - ( 13 Feb 2024 07:30 )  PTT:36.3 sec    Urinalysis Basic - ( 14 Feb 2024 02:00 )    Color: x / Appearance: x / SG: x / pH: x  Gluc: 124 mg/dL / Ketone: x  / Bili: x / Urobili: x   Blood: x / Protein: x / Nitrite: x   Leuk Esterase: x / RBC: x / WBC x   Sq Epi: x / Non Sq Epi: x / Bacteria: x        Assessment: 48M with duodenal perforation s/p ex lap, kyrie patch POD#1. Intubated. On Levophed and vasopressin. Febrile.    Plan:  -NPO, NGT, IV hydration  -UGIS planning POD#5  -continue local wound care, FOX care  -antibiotics per ID  -Barber, monitor urine output  -f/u labs   History of Present Illness:  Reason for Admission: Perforated viscus  HPI:   48M w/ PMH of cirrhosis, ETOH abuse, open reduction of metatarsal fracture 2022 presents with diffuse epigastric pain X a few hours with several episodes of vomiting. Patient endorsed drinking a lot last night. In the ED, patient experienced another episode of vomiting. Last BM in the afternoon. Last flatus before the BM. Denies chest pain, shortness of breath, cough, or fever.  No history of any abdominal surgeries.  Does not have history of alcohol withdrawal.  Says he drinks a couple of shots on the weekends. No hematemesis.  Initial CTAP notable for intra-abdominal free air of unknown etiology, sent to OR emergently.    Interval HPI: Pt seen and examined at bedside intubated and on pressor support in the ICU. Now POD#1 from exploratory laparotomy with closure of perforated duodenum using omental patch.     Vital Signs Last 24 Hrs  T(C): 41.3 (14 Feb 2024 04:00), Max: 41.3 (14 Feb 2024 04:00)  T(F): 106.3 (14 Feb 2024 04:00), Max: 106.3 (14 Feb 2024 04:00)  HR: 83 (14 Feb 2024 04:15) (70 - 109)  BP: 110/71 (14 Feb 2024 02:00) (78/58 - 161/88)  BP(mean): 84 (14 Feb 2024 02:00) (65 - 121)  RR: 16 (14 Feb 2024 04:15) (14 - 29)  SpO2: 86% (14 Feb 2024 04:15) (86% - 100%)  Parameters below as of 14 Feb 2024 04:15  Patient On (Oxygen Delivery Method): ventilator  O2 Concentration (%): 80      PHYSICAL EXAM:    GENERAL: NAD, lying in bed  HEAD:  Atraumatic, normocephalic, NGT in place  EYES: PERRLA, conjunctiva and sclera clear  NECK: Supple, trachea midline, no JVD  HEART: Regular rate and rhythm, S1 S2  LUNGS: Intubated.  coarse breath sounds to auscultation bilaterally  ABDOMEN: firm/taut to palpation and rounded, dressing clean, dry and intact, RUQ FOX drain in place with serosanguinous out put   EXTREMITIES: 2+ peripheral pulses bilaterally. No clubbing, cyanosis, or edema  NERVOUS SYSTEM:  Sedated, not following commands but moving all extremities      I&O's Detail    12 Feb 2024 07:01  -  13 Feb 2024 07:00  --------------------------------------------------------  IN:  Total IN: 0 mL    OUT:    Drain (mL): 70 mL  Total OUT: 70 mL    Total NET: -70 mL      13 Feb 2024 07:01  -  14 Feb 2024 05:21  --------------------------------------------------------  IN:    Dexmedetomidine: 344.4 mL    IV PiggyBack: 800 mL    Lactated Ringers: 1125 mL    Lactated Ringers Bolus: 1000 mL    Norepinephrine: 393.7 mL    Phenylephrine: 281.5 mL    Sodium Chloride 0.9% Bolus: 1000 mL    Vasopressin: 60 mL  Total IN: 5004.6 mL    OUT:    Drain (mL): 950 mL    Indwelling Catheter - Urethral (mL): 200 mL    Voided (mL): 580 mL  Total OUT: 1730 mL    Total NET: 3274.6 mL    LABS:                        11.4   21.25 )-----------( 196      ( 14 Feb 2024 02:00 )             36.6     02-14    136  |  107  |  15  ----------------------------<  124<H>  5.1   |  26  |  1.45<H>    Ca    7.0<L>      14 Feb 2024 02:00  Phos  3.6     02-14  Mg     2.6     02-14    TPro  6.4  /  Alb  2.6<L>  /  TBili  3.4<H>  /  DBili  x   /  AST  59<H>  /  ALT  27  /  AlkPhos  139<H>  02-14    PT/INR - ( 14 Feb 2024 02:00 )   PT: 17.9 sec;   INR: 1.51 ratio       PTT - ( 13 Feb 2024 07:30 )  PTT:36.3 sec    Urinalysis Basic - ( 14 Feb 2024 02:00 )    Color: x / Appearance: x / SG: x / pH: x  Gluc: 124 mg/dL / Ketone: x  / Bili: x / Urobili: x   Blood: x / Protein: x / Nitrite: x   Leuk Esterase: x / RBC: x / WBC x   Sq Epi: x / Non Sq Epi: x / Bacteria: x        Assessment: 48M with duodenal perforation s/p ex lap, kyrie patch POD#1. Intubated. On Levophed and vasopressin. Febrile.    Plan:  -NPO, NGT, IV hydration  -UGIS planning POD#5  -continue local wound care, FOX care  -antibiotics per ID  -Barber, monitor urine output  -f/u labs   History of Present Illness:  Reason for Admission: Perforated viscus  HPI:   48M w/ PMH of cirrhosis, ETOH abuse, open reduction of metatarsal fracture 2022 presents with diffuse epigastric pain X a few hours with several episodes of vomiting. Patient endorsed drinking a lot last night. In the ED, patient experienced another episode of vomiting. Last BM in the afternoon. Last flatus before the BM. Denies chest pain, shortness of breath, cough, or fever.  No history of any abdominal surgeries.  Does not have history of alcohol withdrawal.  Says he drinks a couple of shots on the weekends. No hematemesis.  Initial CTAP notable for intra-abdominal free air of unknown etiology, sent to OR emergently.    Interval HPI: Pt seen and examined at bedside intubated and on pressor support in the ICU. Now POD#1 from exploratory laparotomy with closure of perforated duodenum using omental patch.     Vital Signs Last 24 Hrs  T(C): 41.3 (14 Feb 2024 04:00), Max: 41.3 (14 Feb 2024 04:00)  T(F): 106.3 (14 Feb 2024 04:00), Max: 106.3 (14 Feb 2024 04:00)  HR: 83 (14 Feb 2024 04:15) (70 - 109)  BP: 110/71 (14 Feb 2024 02:00) (78/58 - 161/88)  BP(mean): 84 (14 Feb 2024 02:00) (65 - 121)  RR: 16 (14 Feb 2024 04:15) (14 - 29)  SpO2: 86% (14 Feb 2024 04:15) (86% - 100%)  Parameters below as of 14 Feb 2024 04:15  Patient On (Oxygen Delivery Method): ventilator  O2 Concentration (%): 80      PHYSICAL EXAM:    GENERAL: NAD, lying in bed  HEAD:  Atraumatic, normocephalic, NGT in place  EYES: PERRLA, conjunctiva and sclera clear  NECK: Trachea midline, no JVD  HEART: Regular rate and rhythm, S1 S2  LUNGS: Intubated.  coarse breath sounds to auscultation bilaterally  ABDOMEN: firm/taut to palpation and rounded, dressing clean, dry and intact, RUQ FOX drain in place with serosanguinous out put   EXTREMITIES: 2+ peripheral pulses bilaterally. No clubbing, cyanosis, or edema  NERVOUS SYSTEM:  Sedated, not following commands but moving all extremities      I&O's Detail    12 Feb 2024 07:01  -  13 Feb 2024 07:00  --------------------------------------------------------  IN:  Total IN: 0 mL    OUT:    Drain (mL): 70 mL  Total OUT: 70 mL    Total NET: -70 mL      13 Feb 2024 07:01  -  14 Feb 2024 05:21  --------------------------------------------------------  IN:    Dexmedetomidine: 344.4 mL    IV PiggyBack: 800 mL    Lactated Ringers: 1125 mL    Lactated Ringers Bolus: 1000 mL    Norepinephrine: 393.7 mL    Phenylephrine: 281.5 mL    Sodium Chloride 0.9% Bolus: 1000 mL    Vasopressin: 60 mL  Total IN: 5004.6 mL    OUT:    Drain (mL): 950 mL    Indwelling Catheter - Urethral (mL): 200 mL    Voided (mL): 580 mL  Total OUT: 1730 mL    Total NET: 3274.6 mL    LABS:                        11.4   21.25 )-----------( 196      ( 14 Feb 2024 02:00 )             36.6     02-14    136  |  107  |  15  ----------------------------<  124<H>  5.1   |  26  |  1.45<H>    Ca    7.0<L>      14 Feb 2024 02:00  Phos  3.6     02-14  Mg     2.6     02-14    TPro  6.4  /  Alb  2.6<L>  /  TBili  3.4<H>  /  DBili  x   /  AST  59<H>  /  ALT  27  /  AlkPhos  139<H>  02-14    PT/INR - ( 14 Feb 2024 02:00 )   PT: 17.9 sec;   INR: 1.51 ratio       PTT - ( 13 Feb 2024 07:30 )  PTT:36.3 sec    Urinalysis Basic - ( 14 Feb 2024 02:00 )    Color: x / Appearance: x / SG: x / pH: x  Gluc: 124 mg/dL / Ketone: x  / Bili: x / Urobili: x   Blood: x / Protein: x / Nitrite: x   Leuk Esterase: x / RBC: x / WBC x   Sq Epi: x / Non Sq Epi: x / Bacteria: x

## 2024-02-14 NOTE — PROGRESS NOTE ADULT - ASSESSMENT
Assessment and Plan:    Assessment:  48M w/ PMH of cirrhosis, ETOH abuse, with last drink on day of admission, admitted for duodenal perforation. Now s/p kyrie patch POD1, remains intubated on levo and vaso gtt, shock 2/2 sepsis vs hypovolemia.    Plan:    #Neuro:  -Encephalopathy 2/2 to sepsis vs alcohol withdrawal vs Wernicke's vs hepatic  . Although alcohol withdrawal unlikely given patients last drink was on day of admission.  -Sedated with propofol and fentanyl gtt  -Phenobarb loaded yesterday, start phenobarb 130mg Q8 for alcohol withdrawal     #Cardiovascular:  -Shock state requiring pressor support, levophed and vasopressin, 2/2 sepsis vs hypovolemia  -Maintain MAP > 65  -Obtain TTE    #Pulm:  -Intubated on full ventilator support  -POCUS notable for bilateral consolidations in the lower lobes  -Maintain O2 > 92%  -SBT daily    #GI:  Duodenal perforation:  -s/p kyrie patch POD1, surgery following  -RUQ FOX drain in place, high output noted since procedure  -Cont. LR 125cc/hr for fluid replacement/resuscitation in addition to albumin Q8 due to ascites   Cirrhosis:  -Initial CTAP notable for Cirrhosis with sequelae of portal hypertension including portosystemic   collaterals, splenomegaly, and ascites.  -Consider Hepatic protocol MRI when stable       Assessment and Plan:    Assessment:  48M w/ PMH of cirrhosis, ETOH abuse, with last drink on day of admission, admitted for duodenal perforation. Now s/p kyrie patch POD1, remains intubated on levo and vaso gtt, shock 2/2 sepsis vs hypovolemia.    Plan:    #Neuro:  -Encephalopathy 2/2 to sepsis vs alcohol withdrawal vs Wernicke's vs hepatic  . Although alcohol withdrawal unlikely given patients last drink was on day of admission.  -Sedated with propofol and fentanyl gtt  -Phenobarb loaded yesterday, start phenobarb 130mg Q8 for alcohol withdrawal     #Cardiovascular:  -Shock state requiring pressor support, levophed and vasopressin, 2/2 sepsis vs hypovolemia  -Maintain MAP > 65  -Obtain TTE  -Low threshold for stress-dose steroids    #Pulm:  -Intubated on full ventilator support  -POCUS notable for bilateral consolidations in the lower lobes  -Maintain O2 > 92%  -SBT daily    #GI:  Duodenal perforation:  -s/p kyrie patch POD1, surgery following  -RUQ FOX drain in place, high output noted since procedure  -NPO, NGT to LCWS  -Cont. LR 125cc/hr for fluid replacement/resuscitation in addition to albumin Q8 due to ascites   -GI PPX with protonix 40 mg daily  -MELD score 20  Cirrhosis:  -Initial CTAP notable for Cirrhosis with sequelae of portal hypertension including portosystemic   collaterals, splenomegaly, and ascites.  -Consider Hepatic protocol MRI when stable  -monitor BM, bowel regiment with senna and miralax once  able to tolerate PO intake low threshold for lactulose  -trend coags    #Renal:  EMILIA: 2/2 hypovolemia vs hepatorenal syndrome  -Cont. LR 125cc/hr  -strict I/Os, gautam in place  -monitor electrolytes, replete as needed  -       Assessment and Plan:    Assessment:  48M w/ PMH of cirrhosis, ETOH abuse, with last drink on day of admission, admitted for duodenal perforation. Now s/p kyrie patch POD1, remains intubated on levo and vaso gtt, shock 2/2 sepsis vs hypovolemia.    Plan:    #Neuro:  -Encephalopathy 2/2 to sepsis vs alcohol withdrawal vs Wernicke's vs hepatic  . Although alcohol withdrawal unlikely given patients last drink was on day of admission.  -Sedated with propofol and fentanyl gtt  -Phenobarb loaded yesterday, start phenobarb 130mg Q8 for alcohol withdrawal     #Cardiovascular:  -Shock state requiring pressor support, levophed and vasopressin, 2/2 sepsis vs hypovolemia  -Maintain MAP > 65  -Obtain TTE  -Low threshold for stress-dose steroids    #Pulm:  -Intubated on full ventilator support  -POCUS notable for bilateral consolidations in the lower lobes  -Maintain O2 > 92%, increasing O2 requirements over night, now on 80% FiO2  -SBT daily    #GI:  Duodenal perforation:  -s/p kyrie patch POD1, surgery following  -RUQ FOX drain in place, high output noted since procedure  -NPO, NGT to LCWS  -Cont. LR 125cc/hr for fluid replacement/resuscitation in addition to albumin Q8 due to ascites   -GI PPX with protonix 40 mg daily  -MELD score 20  Cirrhosis:  -Initial CTAP notable for Cirrhosis with sequelae of portal hypertension including portosystemic   collaterals, splenomegaly, and ascites.  -Consider Hepatic protocol MRI when stable  -monitor BM, bowel regiment with senna and miralax once  able to tolerate PO intake low threshold for lactulose  -Obtain cirrhosis work-up    #Renal:  EMILIA: 2/2 hypovolemia vs hepatorenal syndrome  -Cont. LR 125cc/hr  -strict I/Os, gautam in place  -monitor electrolytes, replete as needed    #ID:  -Septic shock 2/2 duodenal perforation  -cont. zosyn, avoid antifungal abx due to cirrhosis until culture results available, as per ID  -febrile to 105F O/N, maintain cooling blanket, tylenol PRN   -Blood, urine, peritoneal fluid sent f/u results  -Obtain HIV, hepatitis panel    #Endo:  -FS Q6    #Heme:  -DVT PPX dc lovenox, start SQ heparin    #Ethics - full code     Assessment and Plan:    Assessment:  48M w/ PMH of cirrhosis, ETOH abuse, with last drink on day of admission, admitted for duodenal perforation. Now s/p kyrie patch POD1, remains intubated on levo and vaso gtt, shock 2/2 sepsis vs hypovolemia.    Plan:    #Neuro:  -Keep sedated today for increasing O2 requirements/vent synchrony, SAT when able  -Sedated with propofol and fentanyl gtt  -Phenobarb loaded yesterday, start phenobarb 130mg Q8, monitor for alcohol withdrawal     #Cardiovascular:  -Shock state requiring pressor support, levophed and vasopressin, 2/2 sepsis vs hypovolemia  -Maintain MAP > 65  -Obtain TTE  -Low threshold for stress-dose steroids    #Pulm:  -Intubated on full ventilator support  -POCUS notable for bilateral consolidations in the lower lobes  -Maintain O2 > 92%, increasing O2 requirements over night, now on 80% FiO2  -SBT daily    #GI:  Duodenal perforation:  -s/p kyrie patch POD1, surgery following  -RUQ FOX drain in place, high output noted since procedure  -NPO, NGT to LCWS  -Cont. LR 125cc/hr for fluid replacement/resuscitation in addition to albumin Q8 due to ascites   -GI PPX with protonix 40 mg daily  -MELD score 20  Cirrhosis:  -Initial CTAP notable for Cirrhosis with sequelae of portal hypertension including portosystemic   collaterals, splenomegaly, and ascites.  -Consider Hepatic protocol MRI when stable  -monitor BM, bowel regiment with senna and miralax once  able to tolerate PO intake low threshold for lactulose    #Renal:  EMILIA: 2/2 hypovolemia vs hepatorenal syndrome vs ATN  -Cont. LR 125cc/hr  -strict I/Os, gautam in place  -monitor electrolytes, replete as needed    #ID:  -Septic shock 2/2 duodenal perforation  -cont. zosyn, avoid antifungal abx due to cirrhosis until culture results available, as per ID  -febrile to 105F O/N, maintain cooling blanket, tylenol PRN   -Blood, urine, peritoneal fluid sent f/u results  -Obtain HIV, hepatitis panel    #Endo:  -FS Q6    #Heme:  -DVT PPX dc lovenox, start SQ heparin    #Ethics - full code

## 2024-02-14 NOTE — PROGRESS NOTE ADULT - ASSESSMENT
47 y/o male PMHx cirrhosis, ETOH, open reduction of metatarsal fracture 2022 presents c/o epigastric pain for a few hours. Vomited when he came to the ER. Last BM in the afternoon. Last flatus before the BM. Patient denies fever, chills, constipation, diarrhea, melena, hematochezia, dysuria, hematuria, chest pain, shortness of breath, dizziness, cough.  CT (I personally reviewed) Scattered foci of free intraperitoneal air, consistent with perforated viscus.  imaging also shows cirrhosis presumably from chronic alcohol use   was started on Zosyn and fluconazole   would start workup on cirrhosis as well   will follow cultures and target out therapy if able     Plan:  continue Zosyn   would stop fluconazole given cirrhosis and already abnormal LFTs-await culture results and if fungal organisms will re-assess need for an antifungal   blood cultures x2 sets  Calculate MELD score :29 high mortality rate  send HIV  Hepatitis A Igm and IgG, HbsAg, HbsAb, HbcAb IgM and total, HCV Ab   surgical site per surgery   wean off ventilator as tolerated   consider alcohol withdrawal given cirrhosis and positive etoh level       Discussed with [facility]     Carrington Gilliam, DO  Infectious Disease Attending  Reachable via Microsoft Teams or ID office: [phone]  After 5pm/weekends please call [phone] for all inquiries and new consults

## 2024-02-15 LAB
ALBUMIN SERPL ELPH-MCNC: 2.7 G/DL — LOW (ref 3.3–5)
ALP SERPL-CCNC: 108 U/L — SIGNIFICANT CHANGE UP (ref 40–120)
ALT FLD-CCNC: 29 U/L — SIGNIFICANT CHANGE UP (ref 12–78)
ANION GAP SERPL CALC-SCNC: 7 MMOL/L — SIGNIFICANT CHANGE UP (ref 5–17)
ANION GAP SERPL CALC-SCNC: 7 MMOL/L — SIGNIFICANT CHANGE UP (ref 5–17)
AST SERPL-CCNC: 86 U/L — HIGH (ref 15–37)
BASE EXCESS BLDA CALC-SCNC: -5.2 MMOL/L — LOW (ref -2–3)
BASE EXCESS BLDA CALC-SCNC: -6.2 MMOL/L — LOW (ref -2–3)
BASOPHILS # BLD AUTO: 0.19 K/UL — SIGNIFICANT CHANGE UP (ref 0–0.2)
BASOPHILS NFR BLD AUTO: 0.9 % — SIGNIFICANT CHANGE UP (ref 0–2)
BILIRUB SERPL-MCNC: 5.9 MG/DL — HIGH (ref 0.2–1.2)
BLOOD GAS COMMENTS ARTERIAL: SIGNIFICANT CHANGE UP
BLOOD GAS COMMENTS ARTERIAL: SIGNIFICANT CHANGE UP
BUN SERPL-MCNC: 22 MG/DL — SIGNIFICANT CHANGE UP (ref 7–23)
BUN SERPL-MCNC: 25 MG/DL — HIGH (ref 7–23)
CALCIUM SERPL-MCNC: 7 MG/DL — LOW (ref 8.5–10.1)
CALCIUM SERPL-MCNC: 7.1 MG/DL — LOW (ref 8.5–10.1)
CHLORIDE SERPL-SCNC: 105 MMOL/L — SIGNIFICANT CHANGE UP (ref 96–108)
CHLORIDE SERPL-SCNC: 106 MMOL/L — SIGNIFICANT CHANGE UP (ref 96–108)
CK SERPL-CCNC: 1172 U/L — HIGH (ref 26–308)
CO2 BLDA-SCNC: 22 MMOL/L — SIGNIFICANT CHANGE UP (ref 19–24)
CO2 BLDA-SCNC: 25 MMOL/L — HIGH (ref 19–24)
CO2 SERPL-SCNC: 22 MMOL/L — SIGNIFICANT CHANGE UP (ref 22–31)
CO2 SERPL-SCNC: 24 MMOL/L — SIGNIFICANT CHANGE UP (ref 22–31)
CREAT SERPL-MCNC: 1.17 MG/DL — SIGNIFICANT CHANGE UP (ref 0.5–1.3)
CREAT SERPL-MCNC: 1.27 MG/DL — SIGNIFICANT CHANGE UP (ref 0.5–1.3)
EGFR: 69 ML/MIN/1.73M2 — SIGNIFICANT CHANGE UP
EGFR: 76 ML/MIN/1.73M2 — SIGNIFICANT CHANGE UP
EOSINOPHIL # BLD AUTO: 0.44 K/UL — SIGNIFICANT CHANGE UP (ref 0–0.5)
EOSINOPHIL NFR BLD AUTO: 2.1 % — SIGNIFICANT CHANGE UP (ref 0–6)
GAS PNL BLDA: SIGNIFICANT CHANGE UP
GAS PNL BLDA: SIGNIFICANT CHANGE UP
GLUCOSE SERPL-MCNC: 101 MG/DL — HIGH (ref 70–99)
GLUCOSE SERPL-MCNC: 109 MG/DL — HIGH (ref 70–99)
HCO3 BLDA-SCNC: 21 MMOL/L — SIGNIFICANT CHANGE UP (ref 21–28)
HCO3 BLDA-SCNC: 24 MMOL/L — SIGNIFICANT CHANGE UP (ref 21–28)
HCT VFR BLD CALC: 35.4 % — LOW (ref 39–50)
HGB BLD-MCNC: 11 G/DL — LOW (ref 13–17)
HOROWITZ INDEX BLDA+IHG-RTO: 80 — SIGNIFICANT CHANGE UP
HOROWITZ INDEX BLDA+IHG-RTO: 90 — SIGNIFICANT CHANGE UP
IMM GRANULOCYTES NFR BLD AUTO: 1.7 % — HIGH (ref 0–0.9)
INR BLD: 1.49 RATIO — HIGH (ref 0.85–1.18)
LYMPHOCYTES # BLD AUTO: 0.91 K/UL — LOW (ref 1–3.3)
LYMPHOCYTES # BLD AUTO: 4.4 % — LOW (ref 13–44)
MAGNESIUM SERPL-MCNC: 2.3 MG/DL — SIGNIFICANT CHANGE UP (ref 1.6–2.6)
MAGNESIUM SERPL-MCNC: 2.5 MG/DL — SIGNIFICANT CHANGE UP (ref 1.6–2.6)
MCHC RBC-ENTMCNC: 31.1 G/DL — LOW (ref 32–36)
MCHC RBC-ENTMCNC: 32.5 PG — SIGNIFICANT CHANGE UP (ref 27–34)
MCV RBC AUTO: 104.7 FL — HIGH (ref 80–100)
MONOCYTES # BLD AUTO: 1.09 K/UL — HIGH (ref 0–0.9)
MONOCYTES NFR BLD AUTO: 5.3 % — SIGNIFICANT CHANGE UP (ref 2–14)
NEUTROPHILS # BLD AUTO: 17.72 K/UL — HIGH (ref 1.8–7.4)
NEUTROPHILS NFR BLD AUTO: 85.6 % — HIGH (ref 43–77)
NRBC # BLD: 0 /100 WBCS — SIGNIFICANT CHANGE UP (ref 0–0)
PCO2 BLDA: 45 MMHG — SIGNIFICANT CHANGE UP (ref 32–46)
PCO2 BLDA: 60 MMHG — HIGH (ref 32–46)
PH BLDA: 7.2 — LOW (ref 7.35–7.45)
PH BLDA: 7.27 — LOW (ref 7.35–7.45)
PHOSPHATE SERPL-MCNC: 3.1 MG/DL — SIGNIFICANT CHANGE UP (ref 2.5–4.5)
PHOSPHATE SERPL-MCNC: 3.3 MG/DL — SIGNIFICANT CHANGE UP (ref 2.5–4.5)
PLATELET # BLD AUTO: 154 K/UL — SIGNIFICANT CHANGE UP (ref 150–400)
PO2 BLDA: 61 MMHG — LOW (ref 83–108)
PO2 BLDA: 89 MMHG — SIGNIFICANT CHANGE UP (ref 83–108)
POTASSIUM SERPL-MCNC: 3.4 MMOL/L — LOW (ref 3.5–5.3)
POTASSIUM SERPL-MCNC: 3.7 MMOL/L — SIGNIFICANT CHANGE UP (ref 3.5–5.3)
POTASSIUM SERPL-SCNC: 3.4 MMOL/L — LOW (ref 3.5–5.3)
POTASSIUM SERPL-SCNC: 3.7 MMOL/L — SIGNIFICANT CHANGE UP (ref 3.5–5.3)
PROT SERPL-MCNC: 6.3 GM/DL — SIGNIFICANT CHANGE UP (ref 6–8.3)
PROTHROM AB SERPL-ACNC: 17.5 SEC — HIGH (ref 9.5–13)
RBC # BLD: 3.38 M/UL — LOW (ref 4.2–5.8)
RBC # FLD: 15.3 % — HIGH (ref 10.3–14.5)
SAO2 % BLDA: 90.4 % — LOW (ref 94–98)
SAO2 % BLDA: 96.8 % — SIGNIFICANT CHANGE UP (ref 94–98)
SODIUM SERPL-SCNC: 135 MMOL/L — SIGNIFICANT CHANGE UP (ref 135–145)
SODIUM SERPL-SCNC: 136 MMOL/L — SIGNIFICANT CHANGE UP (ref 135–145)
WBC # BLD: 20.7 K/UL — HIGH (ref 3.8–10.5)
WBC # FLD AUTO: 20.7 K/UL — HIGH (ref 3.8–10.5)

## 2024-02-15 PROCEDURE — 93308 TTE F-UP OR LMTD: CPT | Mod: 26

## 2024-02-15 PROCEDURE — 99291 CRITICAL CARE FIRST HOUR: CPT

## 2024-02-15 PROCEDURE — 76604 US EXAM CHEST: CPT | Mod: 26

## 2024-02-15 PROCEDURE — 99233 SBSQ HOSP IP/OBS HIGH 50: CPT

## 2024-02-15 PROCEDURE — 71045 X-RAY EXAM CHEST 1 VIEW: CPT | Mod: 26

## 2024-02-15 RX ORDER — IPRATROPIUM/ALBUTEROL SULFATE 18-103MCG
3 AEROSOL WITH ADAPTER (GRAM) INHALATION EVERY 6 HOURS
Refills: 0 | Status: DISCONTINUED | OUTPATIENT
Start: 2024-02-15 | End: 2024-03-01

## 2024-02-15 RX ORDER — FUROSEMIDE 40 MG
40 TABLET ORAL ONCE
Refills: 0 | Status: COMPLETED | OUTPATIENT
Start: 2024-02-15 | End: 2024-02-15

## 2024-02-15 RX ORDER — PHENOBARBITAL 60 MG
130 TABLET ORAL
Refills: 0 | Status: DISCONTINUED | OUTPATIENT
Start: 2024-02-15 | End: 2024-02-15

## 2024-02-15 RX ORDER — PHENOBARBITAL 60 MG
130 TABLET ORAL
Refills: 0 | Status: DISCONTINUED | OUTPATIENT
Start: 2024-02-15 | End: 2024-02-16

## 2024-02-15 RX ORDER — SODIUM CHLORIDE 9 MG/ML
4 INJECTION INTRAMUSCULAR; INTRAVENOUS; SUBCUTANEOUS EVERY 6 HOURS
Refills: 0 | Status: DISCONTINUED | OUTPATIENT
Start: 2024-02-15 | End: 2024-02-17

## 2024-02-15 RX ORDER — POTASSIUM CHLORIDE 20 MEQ
10 PACKET (EA) ORAL
Refills: 0 | Status: COMPLETED | OUTPATIENT
Start: 2024-02-15 | End: 2024-02-15

## 2024-02-15 RX ADMIN — PIPERACILLIN AND TAZOBACTAM 25 GRAM(S): 4; .5 INJECTION, POWDER, LYOPHILIZED, FOR SOLUTION INTRAVENOUS at 06:52

## 2024-02-15 RX ADMIN — PIPERACILLIN AND TAZOBACTAM 25 GRAM(S): 4; .5 INJECTION, POWDER, LYOPHILIZED, FOR SOLUTION INTRAVENOUS at 14:59

## 2024-02-15 RX ADMIN — PIPERACILLIN AND TAZOBACTAM 25 GRAM(S): 4; .5 INJECTION, POWDER, LYOPHILIZED, FOR SOLUTION INTRAVENOUS at 21:39

## 2024-02-15 RX ADMIN — CHLORHEXIDINE GLUCONATE 15 MILLILITER(S): 213 SOLUTION TOPICAL at 17:53

## 2024-02-15 RX ADMIN — HEPARIN SODIUM 5000 UNIT(S): 5000 INJECTION INTRAVENOUS; SUBCUTANEOUS at 05:48

## 2024-02-15 RX ADMIN — Medication 100 MILLIEQUIVALENT(S): at 19:06

## 2024-02-15 RX ADMIN — Medication 130 MILLIGRAM(S): at 12:16

## 2024-02-15 RX ADMIN — CHLORHEXIDINE GLUCONATE 1 APPLICATION(S): 213 SOLUTION TOPICAL at 05:49

## 2024-02-15 RX ADMIN — SODIUM CHLORIDE 4 MILLILITER(S): 9 INJECTION INTRAMUSCULAR; INTRAVENOUS; SUBCUTANEOUS at 11:24

## 2024-02-15 RX ADMIN — Medication 3 MILLILITER(S): at 11:24

## 2024-02-15 RX ADMIN — CHLORHEXIDINE GLUCONATE 15 MILLILITER(S): 213 SOLUTION TOPICAL at 05:42

## 2024-02-15 RX ADMIN — PANTOPRAZOLE SODIUM 40 MILLIGRAM(S): 20 TABLET, DELAYED RELEASE ORAL at 12:17

## 2024-02-15 RX ADMIN — PROPOFOL 8.98 MICROGRAM(S)/KG/MIN: 10 INJECTION, EMULSION INTRAVENOUS at 04:28

## 2024-02-15 RX ADMIN — HEPARIN SODIUM 5000 UNIT(S): 5000 INJECTION INTRAVENOUS; SUBCUTANEOUS at 21:40

## 2024-02-15 RX ADMIN — SODIUM CHLORIDE 4 MILLILITER(S): 9 INJECTION INTRAMUSCULAR; INTRAVENOUS; SUBCUTANEOUS at 17:04

## 2024-02-15 RX ADMIN — PROPOFOL 8.98 MICROGRAM(S)/KG/MIN: 10 INJECTION, EMULSION INTRAVENOUS at 17:53

## 2024-02-15 RX ADMIN — Medication 7.01 MICROGRAM(S)/KG/MIN: at 09:32

## 2024-02-15 RX ADMIN — Medication 105 MILLIGRAM(S): at 12:17

## 2024-02-15 RX ADMIN — Medication 50 MILLILITER(S): at 05:37

## 2024-02-15 RX ADMIN — Medication 100 MILLIEQUIVALENT(S): at 20:25

## 2024-02-15 RX ADMIN — HEPARIN SODIUM 5000 UNIT(S): 5000 INJECTION INTRAVENOUS; SUBCUTANEOUS at 14:59

## 2024-02-15 RX ADMIN — FENTANYL CITRATE 3.74 MICROGRAM(S)/KG/HR: 50 INJECTION INTRAVENOUS at 17:54

## 2024-02-15 RX ADMIN — Medication 100 MILLIEQUIVALENT(S): at 06:00

## 2024-02-15 RX ADMIN — SODIUM CHLORIDE 4 MILLILITER(S): 9 INJECTION INTRAMUSCULAR; INTRAVENOUS; SUBCUTANEOUS at 23:37

## 2024-02-15 RX ADMIN — Medication 130 MILLIGRAM(S): at 05:49

## 2024-02-15 RX ADMIN — VASOPRESSIN 6 UNIT(S)/MIN: 20 INJECTION INTRAVENOUS at 09:32

## 2024-02-15 RX ADMIN — SODIUM CHLORIDE 125 MILLILITER(S): 9 INJECTION, SOLUTION INTRAVENOUS at 05:39

## 2024-02-15 RX ADMIN — Medication 50 MILLILITER(S): at 14:59

## 2024-02-15 RX ADMIN — Medication 7.01 MICROGRAM(S)/KG/MIN: at 17:53

## 2024-02-15 RX ADMIN — PROPOFOL 8.98 MICROGRAM(S)/KG/MIN: 10 INJECTION, EMULSION INTRAVENOUS at 15:34

## 2024-02-15 RX ADMIN — Medication 1 MILLIGRAM(S): at 12:17

## 2024-02-15 RX ADMIN — Medication 100 MILLIEQUIVALENT(S): at 04:30

## 2024-02-15 RX ADMIN — Medication 3 MILLILITER(S): at 17:04

## 2024-02-15 RX ADMIN — Medication 100 MILLIEQUIVALENT(S): at 21:39

## 2024-02-15 RX ADMIN — PROPOFOL 8.98 MICROGRAM(S)/KG/MIN: 10 INJECTION, EMULSION INTRAVENOUS at 05:36

## 2024-02-15 RX ADMIN — Medication 130 MILLIGRAM(S): at 17:53

## 2024-02-15 RX ADMIN — Medication 3 MILLILITER(S): at 23:37

## 2024-02-15 RX ADMIN — Medication 40 MILLIGRAM(S): at 12:16

## 2024-02-15 RX ADMIN — FENTANYL CITRATE 3.74 MICROGRAM(S)/KG/HR: 50 INJECTION INTRAVENOUS at 05:39

## 2024-02-15 NOTE — PROGRESS NOTE ADULT - ASSESSMENT
49 y/o male PMHx cirrhosis, ETOH, open reduction of metatarsal fracture 2022 presents c/o epigastric pain for a few hours. Vomited when he came to the ER. Last BM in the afternoon. Last flatus before the BM. Patient denies fever, chills, constipation, diarrhea, melena, hematochezia, dysuria, hematuria, chest pain, shortness of breath, dizziness, cough.  CT (I personally reviewed) Scattered foci of free intraperitoneal air, consistent with perforated viscus.  imaging also shows cirrhosis presumably from chronic alcohol use   was started on Zosyn and fluconazole   would start workup on cirrhosis as well   will follow cultures and target out therapy if able     2/15: cultures positive for several Streptococcus species, will continue antibiotics, still febrile and on pressors, weaning vent as tolerated. patient remains critically ill, 3rd spacing and getting albumin      Plan:  continue Zosyn   stopped fluconazole given cirrhosis and already abnormal LFTs-await culture results and if fungal organisms will re-assess need for an antifungal   blood cultures x2 sets  Calculate MELD score :29 high mortality rate  HIV nonreactive  Hepatitis A Igm and IgG, HbsAg, HbsAb, HbcAb IgM and total, HCV Ab   surgical site per surgery   wean off ventilator as tolerated     Discussed with [facility]     Carrington Gilliam, DO  Infectious Disease Attending  Reachable via Microsoft Teams or ID office: [phone]  After 5pm/weekends please call [phone] for all inquiries and new consults

## 2024-02-15 NOTE — PROGRESS NOTE ADULT - SUBJECTIVE AND OBJECTIVE BOX
[facility]  INFECTIOUS DISEASES   [address] [place] [address]  Tel: [phone]     Fax: [phone]  ==============================================================================  DO Jony Baron MD Alexandra Gutman, NP   ==============================================================================      PRIYANKA PITTMAN  MRN-[id number]  48y ([birth year])      Interval History: remains intubated , requiring  sedation, on pressors, Streptococcus in cultures, remains on antibiotics     ROS:    [x ] Unobtainable because:intubated and sedated   [ ] All other systems negative except as noted    Constitutional: no fever, no chills  Head: no trauma  Eyes: no vision changes, no eye pain  ENT:  no sore throat, no rhinorrhea  Cardiovascular:  no chest pain, no palpitation  Respiratory:  no SOB, no cough  GI:  no abd pain, no vomiting, no diarrhea  urinary: no dysuria, no hematuria, no flank pain  musculoskeletal:  no joint pain, no joint swelling  skin:  no rash  neurology:  no headache, no seizure, no change in mental status  psych: no anxiety, no depression         Allergies  No Known Allergies      ANTIMICROBIALS  influenza   Vaccine 0.5 once  piperacillin/tazobactam IVPB.. 3.375 every 8 hours      MEDICATIONS  (STANDING):  albuterol/ipratropium for Nebulization 3 every 6 hours  fentaNYL   Infusion. 0.5 <Continuous>  heparin   Injectable 5000 every 8 hours  influenza   Vaccine 0.5 once  norepinephrine Infusion 0.05 <Continuous>  pantoprazole  Injectable 40 daily  PHENobarbital Injectable 130 every 6 hours  propofol Infusion 20 <Continuous>  sodium chloride 3%  Inhalation 4 every 6 hours  vasopressin Infusion 0.04 <Continuous>      PRN  ondansetron Injectable 4 milliGRAM(s) IV Push every 6 hours PRN  PHENobarbital Injectable 130 milliGRAM(s) IV Push every 1 hour PRN      Physical Exam:  Vital Signs Last 24 Hrs  T(F): 100.3 (02-15-24 @ 16:00), Max: 101.5 (02-15-24 @ 08:00)  HR: 98 (02-15-24 @ 16:20)  BP: 123/57 (02-15-24 @ 16:00)  RR: 15 (02-15-24 @ 16:00)  SpO2: 91% (02-15-24 @ 16:20) (89% - 100%)  Wt(kg): --      General:    NAD,  non toxic, remains intubated   Head: atraumatic, normocephalic  Eye: normal sclera and conjunctiva  ENT:    neck supple, +ETT, NGT with green contents   Cardio:     regular S1, S2,  no murmur  Respiratory:    coarse BS b/l,    no wheezing  abd:     soft,   BS +,   no tenderness, surgical site intact, FOX drain with serous fluid   :    no suprapubic tenderness, , +gautam  Musculoskeletal:   no joint swelling,   no edema  vascular: central lines, +PIV   Skin:    no rash,   Neurologic:     no focal deficit  psych: normal affect    WBC Count: 20.70 K/uL (02-15 @ 03:20)  WBC Count: 21.25 K/uL (02-14 @ 02:00)  WBC Count: 26.99 K/uL (02-13 @ 17:30)  WBC Count: 14.61 K/uL (02-13 @ 07:30)  WBC Count: 12.89 K/uL (02-12 @ 22:50)                            11.0   20.70 )-----------( 154      ( 15 Feb 2024 03:20 )             35.4       02-15    135  |  106  |  25<H>  ----------------------------<  109<H>  3.7   |  22  |  1.27    Ca    7.1<L>      15 Feb 2024 03:20  Phos  3.1     02-15  Mg     2.5     02-15    TPro  6.3  /  Alb  2.7<L>  /  TBili  5.9<H>  /  DBili  x   /  AST  86<H>  /  ALT  29  /  AlkPhos  108  02-15      Creatinine Trend: 1.27<--, 1.59<--, 1.45<--, 1.45<--, 0.83<--, 0.87<--        Blood Gas Arterial, Lactate: 1.30 mmol/L (02-14-24 @ 13:54)  Lactate, Blood: 1.0 mmol/L (02-14-24 @ 05:40)  Lactate, Blood: 1.5 mmol/L (02-14-24 @ 02:00)  Blood Gas Arterial, Lactate: 1.80 mmol/L (02-13-24 @ 17:45)        MICROBIOLOGY:  Culture - Body Fluid with Gram Stain (02.13.24 @ 05:10)    Gram Stain:   Few polymorphonuclear leukocytes seen per low power field  No organisms seen per oil power field   Specimen Source: Peritoneal peritoneal fluida c/s   Culture Results:   Rare Streptococcus salivarius/vestibularis group  Rare Streptococcus mitis/oralis group        .Blood Blood  02-13-24   No growth at 24 hours  --  --      .Blood Blood  02-13-24   No growth at 24 hours  --  --      Peritoneal peritoneal fluida c/s  02-13-24 --  --    Few polymorphonuclear leukocytes seen per low power field  No organisms seen per oil power field      Clean Catch Clean Catch (Midstream)  02-13-24   No growth  --  --        RADIOLOGY:

## 2024-02-15 NOTE — PROGRESS NOTE ADULT - SUBJECTIVE AND OBJECTIVE BOX
Progress Note    PRIYANKA PITTMAN 48y (1975) Male [id number]  02-13-24 (2d)      Chief Complaint: INTRA ABDOMINAL FREE AIR OF UNKNOWN ETIOLOGY        Subjective:  Overnight, patient had an episode of emesis with likely aspiration. Still febrile, but fever curve improving.     Review of Systems: Unable to obtain.      PAST MEDICAL & SURGICAL HISTORY:  No pertinent past medical history [184995172]  H/O cirrhosis [Z87.19]  S/P foot surgery [Z98.890]      albumin human 25% IVPB 100 milliLiter(s) IV Intermittent every 8 hours  albuterol/ipratropium for Nebulization 3 milliLiter(s) Nebulizer every 6 hours  chlorhexidine 0.12% Liquid 15 milliLiter(s) Oral Mucosa every 12 hours  chlorhexidine 2% Cloths 1 Application(s) Topical <User Schedule>  fentaNYL   Infusion. 0.5 MICROgram(s)/kG/Hr IV Continuous <Continuous>  folic acid Injectable 1 milliGRAM(s) IV Push daily  heparin   Injectable 5000 Unit(s) SubCutaneous every 8 hours  influenza   Vaccine 0.5 milliLiter(s) IntraMuscular once  norepinephrine Infusion 0.05 MICROgram(s)/kG/Min IV Continuous <Continuous>  ondansetron Injectable 4 milliGRAM(s) IV Push every 6 hours PRN  pantoprazole  Injectable 40 milliGRAM(s) IV Push daily  PHENobarbital Injectable 130 milliGRAM(s) IV Push every 1 hour PRN  PHENobarbital Injectable 130 milliGRAM(s) IV Push every 6 hours  piperacillin/tazobactam IVPB.. 3.375 Gram(s) IV Intermittent every 8 hours  propofol Infusion 20 MICROgram(s)/kG/Min IV Continuous <Continuous>  sodium chloride 0.9% lock flush 10 milliLiter(s) IV Push every 1 hour PRN  sodium chloride 3%  Inhalation 4 milliLiter(s) Inhalation every 6 hours  thiamine IVPB 500 milliGRAM(s) IV Intermittent daily  vasopressin Infusion 0.04 Unit(s)/Min IV Continuous <Continuous>    Objective:  T(C): 37.7 (02-15-24 @ 12:00), Max: 39.2 (02-14-24 @ 14:00)  HR: 95 (02-15-24 @ 12:02) (64 - 102)  BP: 118/66 (02-15-24 @ 12:00) (97/60 - 119/71)  RR: 20 (02-15-24 @ 12:00) (17 - 23)  SpO2: 92% (02-15-24 @ 12:02) (91% - 100%)    PE:  GENERAL: NAD, lying in bed  HEAD:  Atraumatic, normocephalic, NGT in place  EYES: PERRLA, conjunctiva and sclera clear  NECK: Trachea midline, no JVD  HEART: Regular rate and rhythm, S1 S2  LUNGS: Intubated.  coarse breath sounds to auscultation bilaterally  ABDOMEN: firm/taut to palpation and rounded, dressing clean, dry and intact, RUQ FOX drain in place with serosanguinous out put   EXTREMITIES: 2+ peripheral pulses bilaterally. No clubbing, cyanosis, or edema  NERVOUS SYSTEM:  Sedated, not following commands but moving all extremities      02-14-24 @ 07:01  -  02-15-24 @ 07:00  --------------------------------------------------------  IN: 5539.4 mL / OUT: 2395 mL / NET: 3144.4 mL    02-15-24 @ 07:01  -  02-15-24 @ 13:25  --------------------------------------------------------  IN: 36 mL / OUT: 195 mL / NET: -159 mL        CAPILLARY BLOOD GLUCOSE      (02-15 @ 03:20)                      11.0  20.70 )-----------( 154                 35.4    Neutrophils = 17.72 (85.6%)  Lymphocytes = 0.91 (4.4%)  Eosinophils = 0.44 (2.1%)  Basophils = 0.19 (0.9%)  Monocytes = 1.09 (5.3%)  Bands = --%    02-15    135  |  106  |  25<H>  ----------------------------<  109<H>  3.7   |  22  |  1.27    Ca    7.1<L>      15 Feb 2024 03:20  Phos  3.1     02-15  Mg     2.5     02-15    TPro  6.3  /  Alb  2.7<L>  /  TBili  5.9<H>  /  DBili  x   /  AST  86<H>  /  ALT  29  /  AlkPhos  108  02-15    ( 15 Feb 2024 03:20 )   PT: 17.5 sec;   INR: 1.49 ratio;       PTT:46.8 sec  CARDIAC MARKERS ( 15 Feb 2024 03:20 )  Trop x     / CK 1172 U/L<H> / CKMB x       CARDIAC MARKERS ( 14 Feb 2024 13:50 )  Trop x     / CK 1133 U/L<H> / CKMB x           RVP:      Arterial Blood Gas:  02-15 @ 02:13  7.27/45/89/21/96.8/-6.2  ABG lactate: --  Arterial Blood Gas:  02-14 @ 13:54  7.33/41/72/22/93.0/-4.1  ABG lactate: --      Tox:         Urinalysis Basic - ( 15 Feb 2024 03:20 )    Color: x / Appearance: x / SG: x / pH: x  Gluc: 109 mg/dL / Ketone: x  / Bili: x / Urobili: x   Blood: x / Protein: x / Nitrite: x   Leuk Esterase: x / RBC: x / WBC x   Sq Epi: x / Non Sq Epi: x / Bacteria: x        WBC Trend: 20.70<--, 21.25<--, 26.99<--    Hb Trend: 11.0<--, 11.4<--, 12.5<--, 13.4<--, 16.0<--        New imaging in last 24 hours:  Consult notes reviewed:

## 2024-02-15 NOTE — PROGRESS NOTE ADULT - ASSESSMENT
Mr. Arvizu is a 48M w/ history of cirrhosis, ETOH abuse (drinks a few glasses daily), open reduction of metatarsal fracture 2022 presents w/perforated viscus, s/p repair, now intubated with distributive shock and increased pressor requirements.  Neuro: Sedate with propofol and fentanyl gtt for vent synchrony and pain control. Loaded with phenobarb yesterday. Standing phenobarb as well as prns for JOEY agonism to help whenever he goes into withdrawal (alcohol level still elevated on admission). Phenobarb level 16. Thiamine 500mg q8hrs for 3 days for Wernicke's.  Respiratory: Patient with hypoxic respiratory failure. FiO2 requirements stable. Now with possible aspiration event. CXR with congestion. Bibasilar consolidations- aspiration pna vs atelectasis from pressure from abdomen? Continue with full ventilatory support. Start diuresis today.   Cardiovascular: POCUS  enlarged IVC today. Continues to require vasopressin and levophed. Will diurese.    GI: S/p Aden patch and abdominal washout for perforated duodenum. D/t hx cirrhosis, will hold off on anti-fungal coverage at this time. Reassess if cultures come back positive. NGT to suction for now. Had a BM today. To f/u with surgery regarding feeds. Plan for UGIS post of day 5.  Renal: EMILIA, with decreased urine output. Slightly uptrending UOP over the course of early afternoon. EMILIA has large differential: ATN from septic shock, hypoperfusion, hepatorenal syndrome, abdominal compartment syndrome-continue abdominal exams, surgery following.  Endocrine: FS q 6hrs, ISS.  ID: Continue with zosyn for septic shock. Follow up all culture data. Fever curve improving. Bandemia improving.   Hematology: H&H stable. Heparin subq for DVT ppx.    Discussed with wife, mother, and sister on the phone and at bedside.      Patient is critically ill, requiring critical care services.

## 2024-02-15 NOTE — DIETITIAN INITIAL EVALUATION ADULT - PERTINENT LABORATORY DATA
02-15    135  |  106  |  25<H>  ----------------------------<  109<H>  3.7   |  22  |  1.27    Ca    7.1<L>      15 Feb 2024 03:20  Phos  3.1     02-15  Mg     2.5     02-15    TPro  6.3  /  Alb  2.7<L>  /  TBili  5.9<H>  /  DBili  x   /  AST  86<H>  /  ALT  29  /  AlkPhos  108  02-15

## 2024-02-15 NOTE — DIETITIAN INITIAL EVALUATION ADULT - PERTINENT MEDS FT
MEDICATIONS  (STANDING):  albumin human 25% IVPB 100 milliLiter(s) IV Intermittent every 8 hours  albuterol/ipratropium for Nebulization 3 milliLiter(s) Nebulizer every 6 hours  chlorhexidine 0.12% Liquid 15 milliLiter(s) Oral Mucosa every 12 hours  chlorhexidine 2% Cloths 1 Application(s) Topical <User Schedule>  fentaNYL   Infusion. 0.5 MICROgram(s)/kG/Hr (3.74 mL/Hr) IV Continuous <Continuous>  folic acid Injectable 1 milliGRAM(s) IV Push daily  heparin   Injectable 5000 Unit(s) SubCutaneous every 8 hours  influenza   Vaccine 0.5 milliLiter(s) IntraMuscular once  norepinephrine Infusion 0.05 MICROgram(s)/kG/Min (7.01 mL/Hr) IV Continuous <Continuous>  pantoprazole  Injectable 40 milliGRAM(s) IV Push daily  PHENobarbital Injectable 130 milliGRAM(s) IV Push every 6 hours  piperacillin/tazobactam IVPB.. 3.375 Gram(s) IV Intermittent every 8 hours  propofol Infusion 20 MICROgram(s)/kG/Min (8.98 mL/Hr) IV Continuous <Continuous>  sodium chloride 3%  Inhalation 4 milliLiter(s) Inhalation every 6 hours  thiamine IVPB 500 milliGRAM(s) IV Intermittent daily  vasopressin Infusion 0.04 Unit(s)/Min (6 mL/Hr) IV Continuous <Continuous>    MEDICATIONS  (PRN):  ondansetron Injectable 4 milliGRAM(s) IV Push every 6 hours PRN Nausea  PHENobarbital Injectable 130 milliGRAM(s) IV Push every 1 hour PRN agitation  sodium chloride 0.9% lock flush 10 milliLiter(s) IV Push every 1 hour PRN Pre/post blood products, medications, blood draw, and to maintain line patency

## 2024-02-15 NOTE — DIETITIAN INITIAL EVALUATION ADULT - OTHER INFO
Pt s/p ex-lap and closure of perforated duodenum (02/13). Remains intubated at this time. Pt with RN at time of visit. RD remains available.

## 2024-02-15 NOTE — PROGRESS NOTE ADULT - SUBJECTIVE AND OBJECTIVE BOX
Patient seen and examined at bedside. Remains intubated, sedated and on vasopressors.   No acute overnight events per family at bedside.     Vital Signs Last 24 Hrs  T(F): 98.8 (02-15-24 @ 05:00), Max: 105.3 (02-14-24 @ 06:00)  HR: 85 (02-15-24 @ 05:02)  BP: 98/60 (02-15-24 @ 04:30)  RR: 22 (02-15-24 @ 05:00)  SpO2: 95% (02-15-24 @ 05:02)    PHYSICAL EXAM:  GENERAL: sedated  CHEST/LUNG: intubated  HEART: Regular rate and rhythm; S1 & S2 appreciated  ABDOMEN: soft, non tender, distended. FOX x1 with serosanguineous output. Midline incision with intact staples.   EXTREMITIES:  no calf tenderness, No edema    I&O's Detail    13 Feb 2024 07:01  -  14 Feb 2024 07:00  --------------------------------------------------------  IN:    Dexmedetomidine: 428.7 mL    IV PiggyBack: 900 mL    Lactated Ringers: 1350 mL    Lactated Ringers Bolus: 1000 mL    Norepinephrine: 453.3 mL    Phenylephrine: 281.5 mL    Sodium Chloride 0.9% Bolus: 1000 mL    Vasopressin: 78 mL  Total IN: 5491.5 mL    OUT:    Drain (mL): 1050 mL    Indwelling Catheter - Urethral (mL): 240 mL    Nasogastric/Oral tube (mL): 300 mL    Voided (mL): 580 mL  Total OUT: 2170 mL    Total NET: 3321.5 mL      14 Feb 2024 07:01  -  15 Feb 2024 05:47  --------------------------------------------------------  IN:    Dexmedetomidine: 56.1 mL    FentaNYL: 212.6 mL    IV PiggyBack: 100 mL    IV PiggyBack: 300 mL    IV PiggyBack: 100 mL    Lactated Ringers: 225 mL    Lactated Ringers: 2125 mL    Lactated Ringers Bolus: 1000 mL    Norepinephrine: 470.5 mL    Propofol: 334.1 mL    Vasopressin: 132 mL  Total IN: 5055.3 mL    OUT:    Drain (mL): 1010 mL    Indwelling Catheter - Urethral (mL): 660 mL    Nasogastric/Oral tube (mL): 300 mL  Total OUT: 1970 mL    Total NET: 3085.3 mL    LABS:                        11.0   20.70 )-----------( 154      ( 15 Feb 2024 03:20 )             35.4     02-15    135  |  106  |  25<H>  ----------------------------<  109<H>  3.7   |  22  |  1.27    Ca    7.1<L>      15 Feb 2024 03:20  Phos  3.1     02-15  Mg     2.5     02-15    TPro  6.3  /  Alb  2.7<L>  /  TBili  5.9<H>  /  DBili  x   /  AST  86<H>  /  ALT  29  /  AlkPhos  108  02-15    PT/INR - ( 15 Feb 2024 03:20 )   PT: 17.5 sec;   INR: 1.49 ratio         PTT - ( 14 Feb 2024 13:50 )  PTT:46.8 sec    RADIOLOGY & ADDITIONAL STUDIES:    A/P  48M with duodenal perforation s/p ex lap, kyrie patch POD#2. Intubated. On Levophed and vasopressin.   - NPO, IVF, NGT  - UGIS planning POD#5  - continue local wound care, monitor FOX output   - abx per ID  - Barber, monitor urine output  -  sawyer vent and pressors as tolerated  - f/u AM labs  - will d/w Dr. Blanco

## 2024-02-15 NOTE — CHART NOTE - NSCHARTNOTEFT_GEN_A_CORE
: THIERRY Bynum    INDICATION: shock    PROCEDURE:  [ X] LIMITED ECHO  [X ] LIMITED CHEST  [ ] LIMITED RETROPERITONEAL  [ ] LIMITED ABDOMINAL  [ ] LIMITED DVT  [ ] NEEDLE GUIDANCE VASCULAR  [ ] NEEDLE GUIDANCE THORACENTESIS  [ ] NEEDLE GUIDANCE PARACENTESIS  [ ] NEEDLE GUIDANCE PERICARDIOCENTESIS  [ ] OTHER    FINDINGS:  Diffuse B lines. Trace-small pleural effusions bilaterally.   RV smaller than LV. No pericardial effusion. IVC plump, ~2.25cm.      INTERPRETATION:  Patient appears fluid overloaded.    Images uploaded to qpath.

## 2024-02-15 NOTE — DIETITIAN INITIAL EVALUATION ADULT - NSFNSGIIOFT_GEN_A_CORE
02-14-24 @ 07:01  -  02-15-24 @ 07:00  --------------------------------------------------------  OUT:    Nasogastric/Oral tube (mL): 500 mL  Total OUT: 500 mL    Total NET: -500 mL

## 2024-02-16 LAB
-  CEFTRIAXONE: SIGNIFICANT CHANGE UP
-  PENICILLIN: SIGNIFICANT CHANGE UP
-  VANCOMYCIN: SIGNIFICANT CHANGE UP
ALBUMIN SERPL ELPH-MCNC: 2.9 G/DL — LOW (ref 3.3–5)
ALBUMIN SERPL ELPH-MCNC: 3 G/DL — LOW (ref 3.3–5)
ALP SERPL-CCNC: 94 U/L — SIGNIFICANT CHANGE UP (ref 40–120)
ALP SERPL-CCNC: 98 U/L — SIGNIFICANT CHANGE UP (ref 40–120)
ALT FLD-CCNC: 33 U/L — SIGNIFICANT CHANGE UP (ref 12–78)
ALT FLD-CCNC: 35 U/L — SIGNIFICANT CHANGE UP (ref 12–78)
ANION GAP SERPL CALC-SCNC: 7 MMOL/L — SIGNIFICANT CHANGE UP (ref 5–17)
ANION GAP SERPL CALC-SCNC: 9 MMOL/L — SIGNIFICANT CHANGE UP (ref 5–17)
ANION GAP SERPL CALC-SCNC: 9 MMOL/L — SIGNIFICANT CHANGE UP (ref 5–17)
APTT BLD: 44.7 SEC — HIGH (ref 24.5–35.6)
AST SERPL-CCNC: 80 U/L — HIGH (ref 15–37)
AST SERPL-CCNC: 85 U/L — HIGH (ref 15–37)
BASE EXCESS BLDA CALC-SCNC: -4.2 MMOL/L — LOW (ref -2–3)
BILIRUB DIRECT SERPL-MCNC: 4.6 MG/DL — HIGH (ref 0–0.3)
BILIRUB DIRECT SERPL-MCNC: 4.6 MG/DL — HIGH (ref 0–0.3)
BILIRUB SERPL-MCNC: 6 MG/DL — HIGH (ref 0.2–1.2)
BILIRUB SERPL-MCNC: 6.2 MG/DL — HIGH (ref 0.2–1.2)
BLOOD GAS COMMENTS ARTERIAL: SIGNIFICANT CHANGE UP
BUN SERPL-MCNC: 14 MG/DL — SIGNIFICANT CHANGE UP (ref 7–23)
BUN SERPL-MCNC: 18 MG/DL — SIGNIFICANT CHANGE UP (ref 7–23)
BUN SERPL-MCNC: 20 MG/DL — SIGNIFICANT CHANGE UP (ref 7–23)
CALCIUM SERPL-MCNC: 7.1 MG/DL — LOW (ref 8.5–10.1)
CALCIUM SERPL-MCNC: 7.2 MG/DL — LOW (ref 8.5–10.1)
CALCIUM SERPL-MCNC: 7.3 MG/DL — LOW (ref 8.5–10.1)
CHLORIDE SERPL-SCNC: 104 MMOL/L — SIGNIFICANT CHANGE UP (ref 96–108)
CHLORIDE SERPL-SCNC: 104 MMOL/L — SIGNIFICANT CHANGE UP (ref 96–108)
CHLORIDE SERPL-SCNC: 105 MMOL/L — SIGNIFICANT CHANGE UP (ref 96–108)
CO2 BLDA-SCNC: 25 MMOL/L — HIGH (ref 19–24)
CO2 SERPL-SCNC: 23 MMOL/L — SIGNIFICANT CHANGE UP (ref 22–31)
CO2 SERPL-SCNC: 26 MMOL/L — SIGNIFICANT CHANGE UP (ref 22–31)
CO2 SERPL-SCNC: 26 MMOL/L — SIGNIFICANT CHANGE UP (ref 22–31)
CREAT SERPL-MCNC: 0.73 MG/DL — SIGNIFICANT CHANGE UP (ref 0.5–1.3)
CREAT SERPL-MCNC: 0.96 MG/DL — SIGNIFICANT CHANGE UP (ref 0.5–1.3)
CREAT SERPL-MCNC: 1.07 MG/DL — SIGNIFICANT CHANGE UP (ref 0.5–1.3)
EGFR: 112 ML/MIN/1.73M2 — SIGNIFICANT CHANGE UP
EGFR: 85 ML/MIN/1.73M2 — SIGNIFICANT CHANGE UP
EGFR: 97 ML/MIN/1.73M2 — SIGNIFICANT CHANGE UP
FOLATE SERPL-MCNC: 4.4 NG/ML — LOW
GAS PNL BLDA: SIGNIFICANT CHANGE UP
GLUCOSE SERPL-MCNC: 118 MG/DL — HIGH (ref 70–99)
GLUCOSE SERPL-MCNC: 128 MG/DL — HIGH (ref 70–99)
GLUCOSE SERPL-MCNC: 130 MG/DL — HIGH (ref 70–99)
GRAM STN FLD: ABNORMAL
HCO3 BLDA-SCNC: 24 MMOL/L — SIGNIFICANT CHANGE UP (ref 21–28)
HCT VFR BLD CALC: 35.9 % — LOW (ref 39–50)
HCT VFR BLD CALC: 36.2 % — LOW (ref 39–50)
HGB BLD-MCNC: 11 G/DL — LOW (ref 13–17)
HGB BLD-MCNC: 11 G/DL — LOW (ref 13–17)
HOROWITZ INDEX BLDA+IHG-RTO: 100 — SIGNIFICANT CHANGE UP
INR BLD: 1.15 RATIO — SIGNIFICANT CHANGE UP (ref 0.85–1.18)
MAGNESIUM SERPL-MCNC: 2.2 MG/DL — SIGNIFICANT CHANGE UP (ref 1.6–2.6)
MAGNESIUM SERPL-MCNC: 2.3 MG/DL — SIGNIFICANT CHANGE UP (ref 1.6–2.6)
MAGNESIUM SERPL-MCNC: 2.4 MG/DL — SIGNIFICANT CHANGE UP (ref 1.6–2.6)
MCHC RBC-ENTMCNC: 30.4 G/DL — LOW (ref 32–36)
MCHC RBC-ENTMCNC: 30.6 G/DL — LOW (ref 32–36)
MCHC RBC-ENTMCNC: 32.3 PG — SIGNIFICANT CHANGE UP (ref 27–34)
MCHC RBC-ENTMCNC: 32.4 PG — SIGNIFICANT CHANGE UP (ref 27–34)
MCV RBC AUTO: 105.9 FL — HIGH (ref 80–100)
MCV RBC AUTO: 106.2 FL — HIGH (ref 80–100)
METHOD TYPE: SIGNIFICANT CHANGE UP
NRBC # BLD: 0 /100 WBCS — SIGNIFICANT CHANGE UP (ref 0–0)
NRBC # BLD: 0 /100 WBCS — SIGNIFICANT CHANGE UP (ref 0–0)
PCO2 BLDA: 55 MMHG — HIGH (ref 32–46)
PH BLDA: 7.24 — LOW (ref 7.35–7.45)
PHOSPHATE SERPL-MCNC: 2 MG/DL — LOW (ref 2.5–4.5)
PHOSPHATE SERPL-MCNC: 2 MG/DL — LOW (ref 2.5–4.5)
PHOSPHATE SERPL-MCNC: 2.8 MG/DL — SIGNIFICANT CHANGE UP (ref 2.5–4.5)
PLATELET # BLD AUTO: 138 K/UL — LOW (ref 150–400)
PLATELET # BLD AUTO: 145 K/UL — LOW (ref 150–400)
PO2 BLDA: 96 MMHG — SIGNIFICANT CHANGE UP (ref 83–108)
POTASSIUM SERPL-MCNC: 3.2 MMOL/L — LOW (ref 3.5–5.3)
POTASSIUM SERPL-MCNC: 3.2 MMOL/L — LOW (ref 3.5–5.3)
POTASSIUM SERPL-MCNC: 3.5 MMOL/L — SIGNIFICANT CHANGE UP (ref 3.5–5.3)
POTASSIUM SERPL-SCNC: 3.2 MMOL/L — LOW (ref 3.5–5.3)
POTASSIUM SERPL-SCNC: 3.2 MMOL/L — LOW (ref 3.5–5.3)
POTASSIUM SERPL-SCNC: 3.5 MMOL/L — SIGNIFICANT CHANGE UP (ref 3.5–5.3)
PROCALCITONIN SERPL-MCNC: 15.9 NG/ML — HIGH (ref 0.02–0.1)
PROT SERPL-MCNC: 6.5 GM/DL — SIGNIFICANT CHANGE UP (ref 6–8.3)
PROT SERPL-MCNC: 6.5 GM/DL — SIGNIFICANT CHANGE UP (ref 6–8.3)
PROTHROM AB SERPL-ACNC: 13.6 SEC — HIGH (ref 9.5–13)
RBC # BLD: 3.39 M/UL — LOW (ref 4.2–5.8)
RBC # BLD: 3.41 M/UL — LOW (ref 4.2–5.8)
RBC # FLD: 15.5 % — HIGH (ref 10.3–14.5)
RBC # FLD: 15.7 % — HIGH (ref 10.3–14.5)
SAO2 % BLDA: 99.8 % — HIGH (ref 94–98)
SODIUM SERPL-SCNC: 137 MMOL/L — SIGNIFICANT CHANGE UP (ref 135–145)
SODIUM SERPL-SCNC: 137 MMOL/L — SIGNIFICANT CHANGE UP (ref 135–145)
SODIUM SERPL-SCNC: 139 MMOL/L — SIGNIFICANT CHANGE UP (ref 135–145)
SPECIMEN SOURCE: SIGNIFICANT CHANGE UP
VIT B12 SERPL-MCNC: 1374 PG/ML — HIGH (ref 232–1245)
WBC # BLD: 17.34 K/UL — HIGH (ref 3.8–10.5)
WBC # BLD: 17.68 K/UL — HIGH (ref 3.8–10.5)
WBC # FLD AUTO: 17.34 K/UL — HIGH (ref 3.8–10.5)
WBC # FLD AUTO: 17.68 K/UL — HIGH (ref 3.8–10.5)

## 2024-02-16 PROCEDURE — 31624 DX BRONCHOSCOPE/LAVAGE: CPT

## 2024-02-16 PROCEDURE — 99233 SBSQ HOSP IP/OBS HIGH 50: CPT

## 2024-02-16 PROCEDURE — 31645 BRNCHSC W/THER ASPIR 1ST: CPT

## 2024-02-16 PROCEDURE — 31500 INSERT EMERGENCY AIRWAY: CPT

## 2024-02-16 PROCEDURE — 99292 CRITICAL CARE ADDL 30 MIN: CPT | Mod: 25

## 2024-02-16 PROCEDURE — 71045 X-RAY EXAM CHEST 1 VIEW: CPT | Mod: 26

## 2024-02-16 PROCEDURE — 99291 CRITICAL CARE FIRST HOUR: CPT | Mod: 25

## 2024-02-16 PROCEDURE — 74018 RADEX ABDOMEN 1 VIEW: CPT | Mod: 26

## 2024-02-16 RX ORDER — CISATRACURIUM BESYLATE 2 MG/ML
8.5 INJECTION INTRAVENOUS ONCE
Refills: 0 | Status: COMPLETED | OUTPATIENT
Start: 2024-02-16 | End: 2024-02-16

## 2024-02-16 RX ORDER — BUMETANIDE 0.25 MG/ML
2 INJECTION INTRAMUSCULAR; INTRAVENOUS ONCE
Refills: 0 | Status: COMPLETED | OUTPATIENT
Start: 2024-02-16 | End: 2024-02-16

## 2024-02-16 RX ORDER — CISATRACURIUM BESYLATE 2 MG/ML
3 INJECTION INTRAVENOUS
Qty: 200 | Refills: 0 | Status: DISCONTINUED | OUTPATIENT
Start: 2024-02-16 | End: 2024-02-17

## 2024-02-16 RX ORDER — ENOXAPARIN SODIUM 100 MG/ML
40 INJECTION SUBCUTANEOUS EVERY 24 HOURS
Refills: 0 | Status: DISCONTINUED | OUTPATIENT
Start: 2024-02-16 | End: 2024-03-07

## 2024-02-16 RX ORDER — POTASSIUM CHLORIDE 20 MEQ
20 PACKET (EA) ORAL
Refills: 0 | Status: COMPLETED | OUTPATIENT
Start: 2024-02-16 | End: 2024-02-17

## 2024-02-16 RX ORDER — POTASSIUM CHLORIDE 20 MEQ
10 PACKET (EA) ORAL
Refills: 0 | Status: COMPLETED | OUTPATIENT
Start: 2024-02-16 | End: 2024-02-16

## 2024-02-16 RX ORDER — BUMETANIDE 0.25 MG/ML
2 INJECTION INTRAMUSCULAR; INTRAVENOUS
Qty: 20 | Refills: 0 | Status: DISCONTINUED | OUTPATIENT
Start: 2024-02-16 | End: 2024-02-17

## 2024-02-16 RX ORDER — SODIUM,POTASSIUM PHOSPHATES 278-250MG
1 POWDER IN PACKET (EA) ORAL ONCE
Refills: 0 | Status: COMPLETED | OUTPATIENT
Start: 2024-02-16 | End: 2024-02-16

## 2024-02-16 RX ORDER — FUROSEMIDE 40 MG
40 TABLET ORAL
Refills: 0 | Status: DISCONTINUED | OUTPATIENT
Start: 2024-02-16 | End: 2024-02-16

## 2024-02-16 RX ORDER — POTASSIUM PHOSPHATE, MONOBASIC POTASSIUM PHOSPHATE, DIBASIC 236; 224 MG/ML; MG/ML
15 INJECTION, SOLUTION INTRAVENOUS ONCE
Refills: 0 | Status: COMPLETED | OUTPATIENT
Start: 2024-02-16 | End: 2024-02-17

## 2024-02-16 RX ADMIN — Medication 100 MILLIEQUIVALENT(S): at 20:07

## 2024-02-16 RX ADMIN — Medication 40 MILLIGRAM(S): at 13:54

## 2024-02-16 RX ADMIN — BUMETANIDE 10 MG/HR: 0.25 INJECTION INTRAMUSCULAR; INTRAVENOUS at 17:14

## 2024-02-16 RX ADMIN — Medication 50 MILLILITER(S): at 00:06

## 2024-02-16 RX ADMIN — Medication 7.01 MICROGRAM(S)/KG/MIN: at 23:28

## 2024-02-16 RX ADMIN — Medication 3 MILLILITER(S): at 23:11

## 2024-02-16 RX ADMIN — PROPOFOL 8.98 MICROGRAM(S)/KG/MIN: 10 INJECTION, EMULSION INTRAVENOUS at 08:17

## 2024-02-16 RX ADMIN — Medication 105 MILLIGRAM(S): at 18:12

## 2024-02-16 RX ADMIN — PIPERACILLIN AND TAZOBACTAM 25 GRAM(S): 4; .5 INJECTION, POWDER, LYOPHILIZED, FOR SOLUTION INTRAVENOUS at 21:20

## 2024-02-16 RX ADMIN — Medication 130 MILLIGRAM(S): at 17:15

## 2024-02-16 RX ADMIN — Medication 130 MILLIGRAM(S): at 05:41

## 2024-02-16 RX ADMIN — CHLORHEXIDINE GLUCONATE 15 MILLILITER(S): 213 SOLUTION TOPICAL at 05:41

## 2024-02-16 RX ADMIN — SODIUM CHLORIDE 4 MILLILITER(S): 9 INJECTION INTRAMUSCULAR; INTRAVENOUS; SUBCUTANEOUS at 23:15

## 2024-02-16 RX ADMIN — Medication 100 MILLIEQUIVALENT(S): at 05:41

## 2024-02-16 RX ADMIN — PANTOPRAZOLE SODIUM 40 MILLIGRAM(S): 20 TABLET, DELAYED RELEASE ORAL at 11:26

## 2024-02-16 RX ADMIN — Medication 1 MILLIGRAM(S): at 13:54

## 2024-02-16 RX ADMIN — VASOPRESSIN 6 UNIT(S)/MIN: 20 INJECTION INTRAVENOUS at 00:05

## 2024-02-16 RX ADMIN — Medication 50 MILLIEQUIVALENT(S): at 21:13

## 2024-02-16 RX ADMIN — Medication 50 MILLILITER(S): at 21:21

## 2024-02-16 RX ADMIN — CHLORHEXIDINE GLUCONATE 15 MILLILITER(S): 213 SOLUTION TOPICAL at 17:15

## 2024-02-16 RX ADMIN — BUMETANIDE 2 MILLIGRAM(S): 0.25 INJECTION INTRAMUSCULAR; INTRAVENOUS at 17:16

## 2024-02-16 RX ADMIN — SODIUM CHLORIDE 4 MILLILITER(S): 9 INJECTION INTRAMUSCULAR; INTRAVENOUS; SUBCUTANEOUS at 11:22

## 2024-02-16 RX ADMIN — PIPERACILLIN AND TAZOBACTAM 25 GRAM(S): 4; .5 INJECTION, POWDER, LYOPHILIZED, FOR SOLUTION INTRAVENOUS at 05:40

## 2024-02-16 RX ADMIN — CHLORHEXIDINE GLUCONATE 1 APPLICATION(S): 213 SOLUTION TOPICAL at 08:16

## 2024-02-16 RX ADMIN — Medication 50 MILLIEQUIVALENT(S): at 22:59

## 2024-02-16 RX ADMIN — Medication 7.01 MICROGRAM(S)/KG/MIN: at 13:56

## 2024-02-16 RX ADMIN — Medication 100 MILLIEQUIVALENT(S): at 18:29

## 2024-02-16 RX ADMIN — PROPOFOL 8.98 MICROGRAM(S)/KG/MIN: 10 INJECTION, EMULSION INTRAVENOUS at 05:59

## 2024-02-16 RX ADMIN — Medication 50 MILLILITER(S): at 05:39

## 2024-02-16 RX ADMIN — PIPERACILLIN AND TAZOBACTAM 25 GRAM(S): 4; .5 INJECTION, POWDER, LYOPHILIZED, FOR SOLUTION INTRAVENOUS at 13:55

## 2024-02-16 RX ADMIN — Medication 50 MILLILITER(S): at 13:55

## 2024-02-16 RX ADMIN — ENOXAPARIN SODIUM 40 MILLIGRAM(S): 100 INJECTION SUBCUTANEOUS at 11:21

## 2024-02-16 RX ADMIN — Medication 7.01 MICROGRAM(S)/KG/MIN: at 08:17

## 2024-02-16 RX ADMIN — Medication 130 MILLIGRAM(S): at 11:26

## 2024-02-16 RX ADMIN — Medication 130 MILLIGRAM(S): at 00:07

## 2024-02-16 RX ADMIN — Medication 3 MILLILITER(S): at 11:22

## 2024-02-16 RX ADMIN — PROPOFOL 8.98 MICROGRAM(S)/KG/MIN: 10 INJECTION, EMULSION INTRAVENOUS at 01:02

## 2024-02-16 RX ADMIN — CISATRACURIUM BESYLATE 8.5 MILLIGRAM(S): 2 INJECTION INTRAVENOUS at 11:16

## 2024-02-16 RX ADMIN — Medication 100 MILLIEQUIVALENT(S): at 08:13

## 2024-02-16 RX ADMIN — Medication 1 PACKET(S): at 17:32

## 2024-02-16 RX ADMIN — Medication 3 MILLILITER(S): at 05:14

## 2024-02-16 RX ADMIN — SODIUM CHLORIDE 4 MILLILITER(S): 9 INJECTION INTRAMUSCULAR; INTRAVENOUS; SUBCUTANEOUS at 17:09

## 2024-02-16 RX ADMIN — PROPOFOL 8.98 MICROGRAM(S)/KG/MIN: 10 INJECTION, EMULSION INTRAVENOUS at 23:28

## 2024-02-16 RX ADMIN — Medication 100 MILLIEQUIVALENT(S): at 09:07

## 2024-02-16 RX ADMIN — SODIUM CHLORIDE 4 MILLILITER(S): 9 INJECTION INTRAMUSCULAR; INTRAVENOUS; SUBCUTANEOUS at 05:14

## 2024-02-16 RX ADMIN — VASOPRESSIN 6 UNIT(S)/MIN: 20 INJECTION INTRAVENOUS at 13:56

## 2024-02-16 RX ADMIN — Medication 100 MILLIEQUIVALENT(S): at 19:31

## 2024-02-16 RX ADMIN — CISATRACURIUM BESYLATE 15.3 MICROGRAM(S)/KG/MIN: 2 INJECTION INTRAVENOUS at 11:17

## 2024-02-16 RX ADMIN — Medication 40 MILLIGRAM(S): at 17:16

## 2024-02-16 RX ADMIN — FENTANYL CITRATE 3.74 MICROGRAM(S)/KG/HR: 50 INJECTION INTRAVENOUS at 09:55

## 2024-02-16 RX ADMIN — Medication 130 MILLIGRAM(S): at 23:28

## 2024-02-16 RX ADMIN — Medication 7.01 MICROGRAM(S)/KG/MIN: at 01:03

## 2024-02-16 RX ADMIN — Medication 3 MILLILITER(S): at 17:09

## 2024-02-16 RX ADMIN — PROPOFOL 8.98 MICROGRAM(S)/KG/MIN: 10 INJECTION, EMULSION INTRAVENOUS at 13:56

## 2024-02-16 NOTE — PROGRESS NOTE ADULT - SUBJECTIVE AND OBJECTIVE BOX
Progress Note    PRIYANKA PITTMAN 48y (1975) Male [id number]  02-13-24 (3d)      Chief Complaint: Perforated viscus    Subjective:  Hypoxic overnight, requiring FiO2 increase to 100%.     Review of Systems: Unable to obtain.      PAST MEDICAL & SURGICAL HISTORY:  No pertinent past medical history [867613588]    H/O cirrhosis [Z87.19]    S/P foot surgery [Z98.890]      albumin human 25% IVPB 100 milliLiter(s) IV Intermittent every 8 hours  albuterol/ipratropium for Nebulization 3 milliLiter(s) Nebulizer every 6 hours  chlorhexidine 0.12% Liquid 15 milliLiter(s) Oral Mucosa every 12 hours  chlorhexidine 2% Cloths 1 Application(s) Topical <User Schedule>  cisatracurium Infusion 3 MICROgram(s)/kG/Min IV Continuous <Continuous>  enoxaparin Injectable 40 milliGRAM(s) SubCutaneous every 24 hours  fentaNYL   Infusion. 0.5 MICROgram(s)/kG/Hr IV Continuous <Continuous>  folic acid Injectable 1 milliGRAM(s) IV Push daily  furosemide   Injectable 40 milliGRAM(s) IV Push two times a day  influenza   Vaccine 0.5 milliLiter(s) IntraMuscular once  methylPREDNISolone sodium succinate Injectable 40 milliGRAM(s) IV Push two times a day  norepinephrine Infusion 0.05 MICROgram(s)/kG/Min IV Continuous <Continuous>  ondansetron Injectable 4 milliGRAM(s) IV Push every 6 hours PRN  pantoprazole  Injectable 40 milliGRAM(s) IV Push daily  PHENobarbital Injectable 130 milliGRAM(s) IV Push every 6 hours  piperacillin/tazobactam IVPB.. 3.375 Gram(s) IV Intermittent every 8 hours  propofol Infusion 20 MICROgram(s)/kG/Min IV Continuous <Continuous>  sodium chloride 0.9% lock flush 10 milliLiter(s) IV Push every 1 hour PRN  sodium chloride 3%  Inhalation 4 milliLiter(s) Inhalation every 6 hours  thiamine IVPB 500 milliGRAM(s) IV Intermittent daily  vasopressin Infusion 0.04 Unit(s)/Min IV Continuous <Continuous>    Objective:  T(C): 37.1 (02-16-24 @ 08:00), Max: 37.9 (02-15-24 @ 16:00)  HR: 91 (02-16-24 @ 13:30) (90 - 107)  BP: 127/67 (02-16-24 @ 13:00) (115/60 - 134/68)  RR: 28 (02-16-24 @ 13:30) (15 - 29)  SpO2: 96% (02-16-24 @ 13:30) (87% - 100%)    PE:  GENERAL: NAD, lying in bed  HEAD:  Atraumatic, normocephalic, NGT in place  EYES: PERRLA, conjunctiva and sclera clear  NECK: Trachea midline, no JVD  HEART: Regular rate and rhythm, S1 S2  LUNGS: Intubated.  coarse breath sounds to auscultation bilaterally  ABDOMEN: firm/taut to palpation and rounded, dressing clean, dry and intact, RUQ FOX drain in place with serosanguinous out put. possibly more distended today than yesterday,  EXTREMITIES: 2+ peripheral pulses bilaterally. No clubbing, cyanosis, or edema  NERVOUS SYSTEM:  Sedated, not following commands but moving all extremities  02-15-24 @ 07:01  -  02-16-24 @ 07:00  --------------------------------------------------------  IN: 2260 mL / OUT: 3835 mL / NET: -1575 mL    02-16-24 @ 07:01  -  02-16-24 @ 14:05  --------------------------------------------------------  IN: 444 mL / OUT: 440 mL / NET: 4 mL        CAPILLARY BLOOD GLUCOSE      (02-16 @ 12:45)                      11.0  17.34 )-----------( 138                 36.2    Neutrophils = -- (--%)  Lymphocytes = -- (--%)  Eosinophils = -- (--%)  Basophils = -- (--%)  Monocytes = -- (--%)  Bands = --%    02-16    137  |  105  |  20  ----------------------------<  128<H>  3.5   |  23  |  0.96    Ca    7.1<L>      16 Feb 2024 02:20  Phos  2.8     02-16  Mg     2.3     02-16    TPro  6.5  /  Alb  3.0<L>  /  TBili  6.0<H>  /  DBili  x   /  AST  85<H>  /  ALT  35  /  AlkPhos  94  02-16    ( 16 Feb 2024 12:45 )   PT: 13.6 sec;   INR: 1.15 ratio;       PTT:44.7 sec  CARDIAC MARKERS ( 15 Feb 2024 03:20 )  Trop x     / CK 1172 U/L<H> / CKMB x           RVP:      Arterial Blood Gas:  02-16 @ 12:00  7.28/48/75/23/96.7/-4.3  ABG lactate: --  Arterial Blood Gas:  02-16 @ 09:02  7.27/49/71/22/95.6/-4.6  ABG lactate: --  Arterial Blood Gas:  02-16 @ 04:00  7.24/55/96/24/99.8/-4.2  ABG lactate: --  Arterial Blood Gas:  02-16 @ 01:00  7.24/54/83/23/97.4/-4.6  ABG lactate: --  Arterial Blood Gas:  02-15 @ 22:27  7.20/60/61/24/90.4/-5.2  ABG lactate: --      Tox:         Urinalysis Basic - ( 16 Feb 2024 02:20 )    Color: x / Appearance: x / SG: x / pH: x  Gluc: 128 mg/dL / Ketone: x  / Bili: x / Urobili: x   Blood: x / Protein: x / Nitrite: x   Leuk Esterase: x / RBC: x / WBC x   Sq Epi: x / Non Sq Epi: x / Bacteria: x        WBC Trend: 17.34<--, 17.68<--, 20.70<--    Hb Trend: 11.0<--, 11.0<--, 11.0<--, 11.4<--, 12.5<--        New imaging in last 24 hours:  Consult notes reviewed:

## 2024-02-16 NOTE — PROGRESS NOTE ADULT - SUBJECTIVE AND OBJECTIVE BOX
Postoperative Day #:2  Pt seen and examined at bedside, no acute issues overnight, intubated, sedated on pressors.      T(F): 98.8 (02-16-24 @ 08:00), Max: 100.3 (02-15-24 @ 16:00)  HR: 92 (02-16-24 @ 10:00) (90 - 107)  BP: 121/65 (02-16-24 @ 09:00) (115/60 - 134/68)  RR: 28 (02-16-24 @ 10:00) (15 - 29)  SpO2: 95% (02-16-24 @ 10:00) (87% - 100%)  Wt(kg): --  CAPILLARY BLOOD GLUCOSE        PHYSICAL EXAM:  GENERAL: sedated  CHEST/LUNG: intubated, good air entry  HEART: Regular rate and rhythm; S1 & S2   ABDOMEN: soft, nontender, distended. FOX x1 with serosanguineous output 1200cc/24hrs. Midline incision with intact staples.   : Barber inplace with clear urine output 2550cc/24hrs  EXTREMITIES:  no calf tenderness, No edema        LABS:                        11.0   17.68 )-----------( 145      ( 16 Feb 2024 02:20 )             35.9     02-16    137  |  105  |  20  ----------------------------<  128<H>  3.5   |  23  |  0.96    Ca    7.1<L>      16 Feb 2024 02:20  Phos  2.8     02-16  Mg     2.3     02-16    TPro  6.5  /  Alb  3.0<L>  /  TBili  6.0<H>  /  DBili  x   /  AST  85<H>  /  ALT  35  /  AlkPhos  94  02-16    PT/INR - ( 15 Feb 2024 03:20 )   PT: 17.5 sec;   INR: 1.49 ratio         PTT - ( 14 Feb 2024 13:50 )  PTT:46.8 sec  I&O's Detail    15 Feb 2024 07:01  -  16 Feb 2024 07:00  --------------------------------------------------------  IN:    FentaNYL: 393.8 mL    IV PiggyBack: 100 mL    IV PiggyBack: 100 mL    IV PiggyBack: 300 mL    Norepinephrine: 713.4 mL    Propofol: 508.8 mL    Vasopressin: 144 mL  Total IN: 2260 mL    OUT:    Drain (mL): 1230 mL    Indwelling Catheter - Urethral (mL): 2555 mL    Nasogastric/Oral tube (mL): 50 mL  Total OUT: 3835 mL    Total NET: -1575 mL      16 Feb 2024 07:01  -  16 Feb 2024 11:39  --------------------------------------------------------  IN:  Total IN: 0 mL    OUT:    Drain (mL): 90 mL    Indwelling Catheter - Urethral (mL): 150 mL  Total OUT: 240 mL    Total NET: -240 mL        Culture Results:   No growth at 48 Hours (02-13 @ 07:30)  Culture Results:   No growth at 48 Hours (02-13 @ 07:05)  Culture Results:   Rare Streptococcus salivarius/vestibularis group  Rare Streptococcus mitis/oralis group *!* (02-13 @ 05:10)  Culture Results:   No growth (02-13 @ 03:24)      A/P  48M with duodenal perforation s/p ex lap, kyrie patch POD#3. Intubated. On Levophed and vasopressin.   - NPO, IVF, NGT  - UGIS planning Monday  - continue local wound care, monitor FOX output   - abx per RADHA Barber, monitor urine output  -  Wean vent and pressors as tolerated  - cont care per ICU  - discussed with Dr. Blanco

## 2024-02-16 NOTE — PROGRESS NOTE ADULT - ASSESSMENT
Mr. Arvizu is a 48M w/ history of cirrhosis, ETOH abuse (drinks a few glasses daily), open reduction of metatarsal fracture 2022 presents w/perforated viscus, s/p repair, now intubated with distributive shock and increased pressor requirements. ARDS.  Neuro: Sedate with propofol and fentanyl gtt for vent synchrony and pain control. S/p phenobarb load. Continuing standing phenobarb for JOEY agonism to help whenever he goes into withdrawal (alcohol level still elevated on admission). Phenobarbital in AM. Thiamine 500mg q8hrs for 3 days for Wernicke's. Paralyze today.  Respiratory: Patient with hypoxic respiratory failure. FiO2 requirements increased overnight. ARDS. PF ratio <200. May be from the aspiration event, also compounded by the IVF. Will diurese aggressively. Paralyze. Increase PEEP. Steroids BID. Driving pressure on peep of 15- 18. Will continue to monitor and adjust vent with serial ABGs.  Cardiovascular: Continues to require vasopressin and levophed. Will diurese.    GI: S/p Aden patch and abdominal washout for perforated duodenum. D/t hx cirrhosis, will hold off on anti-fungal coverage at this time. Reassess if cultures come back positive. NGT to suction for now. Had a BMs yesterday. To f/u with surgery regarding feeds. Plan for UGIS post of day 5.   Renal: EMILIA improving. Bumex drip.  Endocrine: FS q 6hrs, ISS.  ID: Continue with zosyn for septic shock. Follow up all culture data. Fever curve improving. Bandemia improving. Reculture today. Procalcitonin.  Hematology: H&H stable. Heparin subq for DVT ppx.    Discussed with wife, mother, and sister on the phone and at bedside.    Also with patient's primary care doctor- Dr. Reddy [phone]    Patient is critically ill, requiring critical care services.

## 2024-02-16 NOTE — PROCEDURE NOTE - NSBRONCHHISTORY_GEN_A_CORE_FT
Post intubation emergent bronchoscopy performed for bilious secretions in airway. Bronchoscopy cart obtained. Patient and equipment prepped. Upon initial insertion, ETT noted to be overriding the airway. ETT pulled back 2cm. Copious bilious drainage noted in bilateral airways. Suctioned. Sample obtained for culture. bronchoscope withdrawn.

## 2024-02-16 NOTE — PROGRESS NOTE ADULT - ASSESSMENT
49 y/o male PMHx cirrhosis, ETOH, open reduction of metatarsal fracture 2022 presents c/o epigastric pain for a few hours. Vomited when he came to the ER. Last BM in the afternoon. Last flatus before the BM. Patient denies fever, chills, constipation, diarrhea, melena, hematochezia, dysuria, hematuria, chest pain, shortness of breath, dizziness, cough.  CT (I personally reviewed) Scattered foci of free intraperitoneal air, consistent with perforated viscus.  imaging also shows cirrhosis presumably from chronic alcohol use   was started on Zosyn and fluconazole   would start workup on cirrhosis as well   will follow cultures and target out therapy if able     2/15: cultures positive for several Streptococcus species, will continue antibiotics, still febrile and on pressors, weaning vent as tolerated. patient remains critically ill, 3rd spacing and getting albumin    216: remains intubated , requiring  sedation, on pressors, Streptococcus in cultures, remains on antibiotics, very quiet bowel sounds, no BMs on day shift thus far, little output on NGT      Plan:  continue Zosyn   stopped fluconazole given cirrhosis and already abnormal LFTs-await culture results and if fungal organisms will re-assess need for an antifungal   blood cultures x2 sets  Calculate MELD score :29 high mortality rate  HIV nonreactive  Hepatitis A Igm negative, HbsAg negative, HbsAb, HbcAb IgM negative, HCV Ab negative   surgical site per surgery   wean off ventilator as tolerated   recommend KUB rule out obstruction and or ileus   patient remains very sick, critically ill, and high risk of mortality     Discussed with [facility]     Carrington Gilliam, DO  Infectious Disease Attending  Reachable via Microsoft Teams or ID office: [phone]  After 5pm/weekends please call [phone] for all inquiries and new consults

## 2024-02-16 NOTE — CHART NOTE - NSCHARTNOTEFT_GEN_A_CORE
came to bedside to reassess patient. Tidal volumes <140s and coarse sounds with every breath on ventilator audible. Obtained glideoscope as well as tube exchange catheter. Looked with glide. Balloon noted to be outside of cords. Tube removed and patient bagged. New endotracheal tube replaced successfully. green, bilious fluid coming out of ETT. Decision made for emergent bronchoscopy. Bronchoscopy performed- see note.   Discussed with family.    Additional critical care time excluding any procedures: 50 minutes.

## 2024-02-16 NOTE — PROGRESS NOTE ADULT - SUBJECTIVE AND OBJECTIVE BOX
[facility]  INFECTIOUS DISEASES   [address] [place] [address]  Tel: [phone]     Fax: [phone]  ==============================================================================  DO Jony Baron MD Alexandra Gutman, NP   ==============================================================================      PRIYANKA PITTMAN  [id number]  48y ([birth year])      Interval History: remains intubated , requiring  sedation, on pressors, Streptococcus in cultures, remains on antibiotics, paralyzed today, going into ARDS, remains febrile      ROS:    [x ] Unobtainable because:intubated and sedated   [ ] All other systems negative except as noted    Constitutional: no fever, no chills  Head: no trauma  Eyes: no vision changes, no eye pain  ENT:  no sore throat, no rhinorrhea  Cardiovascular:  no chest pain, no palpitation  Respiratory:  no SOB, no cough  GI:  no abd pain, no vomiting, no diarrhea  urinary: no dysuria, no hematuria, no flank pain  musculoskeletal:  no joint pain, no joint swelling  skin:  no rash  neurology:  no headache, no seizure, no change in mental status  psych: no anxiety, no depression         Allergies  No Known Allergies      ANTIMICROBIALS  influenza   Vaccine 0.5 once  piperacillin/tazobactam IVPB.. 3.375 every 8 hours      MEDICATIONS  (STANDING):  albuterol/ipratropium for Nebulization 3 every 6 hours  buMETAnide Infusion 2 <Continuous>  buMETAnide Injectable 2 once  cisatracurium Infusion 3 <Continuous>  enoxaparin Injectable 40 every 24 hours  fentaNYL   Infusion. 0.5 <Continuous>  influenza   Vaccine 0.5 once  methylPREDNISolone sodium succinate Injectable 40 two times a day  norepinephrine Infusion 0.05 <Continuous>  pantoprazole  Injectable 40 daily  PHENobarbital Injectable 130 every 6 hours  propofol Infusion 20 <Continuous>  sodium chloride 3%  Inhalation 4 every 6 hours  vasopressin Infusion 0.04 <Continuous>      PRN  ondansetron Injectable 4 milliGRAM(s) IV Push every 6 hours PRN      Physical Exam:  Vital Signs Last 24 Hrs  T(F): 98.8 (02-16-24 @ 08:00), Max: 100.3 (02-15-24 @ 16:00)  HR: 102 (02-16-24 @ 14:30)  BP: 123/66 (02-16-24 @ 14:00)  RR: 35 (02-16-24 @ 14:30)  SpO2: 94% (02-16-24 @ 14:30) (87% - 100%)  Wt(kg): --    General:    NAD,  non toxic, remains intubated   Head: atraumatic, normocephalic  Eye: normal sclera and conjunctiva  ENT:    neck supple, +ETT, NGT with green contents   Cardio:     regular S1, S2,  no murmur  Respiratory:    coarse BS b/l,    no wheezing  abd:     soft,   hypoactive BS ,   no tenderness though paralyzed, surgical site intact, FOX drain with serous fluid   :   +gautam  Musculoskeletal:   no joint swelling,   +edema  vascular: +central lines, +PIV, A-line    Skin:    no rash,   Neurologic:    sedated   psych: sedated     WBC Count: 20.70 K/uL (02-15 @ 03:20)  WBC Count: 21.25 K/uL (02-14 @ 02:00)  WBC Count: 26.99 K/uL (02-13 @ 17:30)  WBC Count: 14.61 K/uL (02-13 @ 07:30)  WBC Count: 12.89 K/uL (02-12 @ 22:50)                            11.0   20.70 )-----------( 154      ( 15 Feb 2024 03:20 )             35.4       02-15    135  |  106  |  25<H>  ----------------------------<  109<H>  3.7   |  22  |  1.27    Ca    7.1<L>      15 Feb 2024 03:20  Phos  3.1     02-15  Mg     2.5     02-15    TPro  6.3  /  Alb  2.7<L>  /  TBili  5.9<H>  /  DBili  x   /  AST  86<H>  /  ALT  29  /  AlkPhos  108  02-15      Creatinine Trend: 1.27<--, 1.59<--, 1.45<--, 1.45<--, 0.83<--, 0.87<--        Blood Gas Arterial, Lactate: 1.30 mmol/L (02-14-24 @ 13:54)  Lactate, Blood: 1.0 mmol/L (02-14-24 @ 05:40)  Lactate, Blood: 1.5 mmol/L (02-14-24 @ 02:00)  Blood Gas Arterial, Lactate: 1.80 mmol/L (02-13-24 @ 17:45)        MICROBIOLOGY:  Culture - Body Fluid with Gram Stain (02.13.24 @ 05:10)    Gram Stain:   Few polymorphonuclear leukocytes seen per low power field  No organisms seen per oil power field   Specimen Source: Peritoneal peritoneal fluida c/s   Culture Results:   Rare Streptococcus salivarius/vestibularis group  Rare Streptococcus mitis/oralis group        .Blood Blood  02-13-24   No growth at 24 hours  --  --      .Blood Blood  02-13-24   No growth at 24 hours  --  --      Peritoneal peritoneal fluida c/s  02-13-24 --  --    Few polymorphonuclear leukocytes seen per low power field  No organisms seen per oil power field      Clean Catch Clean Catch (Midstream)  02-13-24   No growth  --  --        RADIOLOGY:    < from: Xray Chest 1 View- PORTABLE-Urgent (Xray Chest 1 View- PORTABLE-Urgent .) (02.15.24 @ 12:42) >  IMPRESSION: Endotracheal tube position much improved. Increasing advanced   bilateral infiltrates.    < end of copied text >

## 2024-02-16 NOTE — PHARMACOTHERAPY INTERVENTION NOTE - COMMENTS
Recommended initiation of Lovenox for DVT prophylaxis.  Recommended switching agent for venous thromboembolism prophylaxis from heparin SQ to enoxaparin SQ 40 mg daily due to ease of administration (reduced frequency) and lower incidence of thrombocytopenia including HIT.

## 2024-02-17 LAB
-  CEFTRIAXONE: SIGNIFICANT CHANGE UP
-  PENICILLIN: SIGNIFICANT CHANGE UP
-  VANCOMYCIN: SIGNIFICANT CHANGE UP
ALBUMIN SERPL ELPH-MCNC: 2.9 G/DL — LOW (ref 3.3–5)
ALBUMIN SERPL ELPH-MCNC: 3 G/DL — LOW (ref 3.3–5)
ALP SERPL-CCNC: 100 U/L — SIGNIFICANT CHANGE UP (ref 40–120)
ALP SERPL-CCNC: 103 U/L — SIGNIFICANT CHANGE UP (ref 40–120)
ALT FLD-CCNC: 35 U/L — SIGNIFICANT CHANGE UP (ref 12–78)
ALT FLD-CCNC: 36 U/L — SIGNIFICANT CHANGE UP (ref 12–78)
ANION GAP SERPL CALC-SCNC: 6 MMOL/L — SIGNIFICANT CHANGE UP (ref 5–17)
ANION GAP SERPL CALC-SCNC: 6 MMOL/L — SIGNIFICANT CHANGE UP (ref 5–17)
ANION GAP SERPL CALC-SCNC: 9 MMOL/L — SIGNIFICANT CHANGE UP (ref 5–17)
AST SERPL-CCNC: 63 U/L — HIGH (ref 15–37)
AST SERPL-CCNC: 67 U/L — HIGH (ref 15–37)
BASOPHILS # BLD AUTO: 0.01 K/UL — SIGNIFICANT CHANGE UP (ref 0–0.2)
BASOPHILS NFR BLD AUTO: 0.1 % — SIGNIFICANT CHANGE UP (ref 0–2)
BILIRUB SERPL-MCNC: 7 MG/DL — HIGH (ref 0.2–1.2)
BILIRUB SERPL-MCNC: 7 MG/DL — HIGH (ref 0.2–1.2)
BUN SERPL-MCNC: 13 MG/DL — SIGNIFICANT CHANGE UP (ref 7–23)
BUN SERPL-MCNC: 15 MG/DL — SIGNIFICANT CHANGE UP (ref 7–23)
BUN SERPL-MCNC: 21 MG/DL — SIGNIFICANT CHANGE UP (ref 7–23)
CALCIUM SERPL-MCNC: 7.3 MG/DL — LOW (ref 8.5–10.1)
CALCIUM SERPL-MCNC: 7.3 MG/DL — LOW (ref 8.5–10.1)
CALCIUM SERPL-MCNC: 7.4 MG/DL — LOW (ref 8.5–10.1)
CHLORIDE SERPL-SCNC: 102 MMOL/L — SIGNIFICANT CHANGE UP (ref 96–108)
CHLORIDE SERPL-SCNC: 103 MMOL/L — SIGNIFICANT CHANGE UP (ref 96–108)
CHLORIDE SERPL-SCNC: 103 MMOL/L — SIGNIFICANT CHANGE UP (ref 96–108)
CO2 SERPL-SCNC: 26 MMOL/L — SIGNIFICANT CHANGE UP (ref 22–31)
CO2 SERPL-SCNC: 27 MMOL/L — SIGNIFICANT CHANGE UP (ref 22–31)
CO2 SERPL-SCNC: 31 MMOL/L — SIGNIFICANT CHANGE UP (ref 22–31)
CREAT SERPL-MCNC: 0.72 MG/DL — SIGNIFICANT CHANGE UP (ref 0.5–1.3)
CREAT SERPL-MCNC: 0.81 MG/DL — SIGNIFICANT CHANGE UP (ref 0.5–1.3)
CREAT SERPL-MCNC: 1.17 MG/DL — SIGNIFICANT CHANGE UP (ref 0.5–1.3)
EGFR: 108 ML/MIN/1.73M2 — SIGNIFICANT CHANGE UP
EGFR: 112 ML/MIN/1.73M2 — SIGNIFICANT CHANGE UP
EGFR: 76 ML/MIN/1.73M2 — SIGNIFICANT CHANGE UP
EOSINOPHIL # BLD AUTO: 0.01 K/UL — SIGNIFICANT CHANGE UP (ref 0–0.5)
EOSINOPHIL NFR BLD AUTO: 0.1 % — SIGNIFICANT CHANGE UP (ref 0–6)
GAS PNL BLDA: SIGNIFICANT CHANGE UP
GLUCOSE BLDC GLUCOMTR-MCNC: 142 MG/DL — HIGH (ref 70–99)
GLUCOSE SERPL-MCNC: 156 MG/DL — HIGH (ref 70–99)
GLUCOSE SERPL-MCNC: 161 MG/DL — HIGH (ref 70–99)
GLUCOSE SERPL-MCNC: 162 MG/DL — HIGH (ref 70–99)
HCT VFR BLD CALC: 34.9 % — LOW (ref 39–50)
HGB BLD-MCNC: 11 G/DL — LOW (ref 13–17)
IMM GRANULOCYTES NFR BLD AUTO: 0.8 % — SIGNIFICANT CHANGE UP (ref 0–0.9)
LYMPHOCYTES # BLD AUTO: 0.31 K/UL — LOW (ref 1–3.3)
LYMPHOCYTES # BLD AUTO: 2.4 % — LOW (ref 13–44)
MAGNESIUM SERPL-MCNC: 2 MG/DL — SIGNIFICANT CHANGE UP (ref 1.6–2.6)
MCHC RBC-ENTMCNC: 31.5 G/DL — LOW (ref 32–36)
MCHC RBC-ENTMCNC: 32.5 PG — SIGNIFICANT CHANGE UP (ref 27–34)
MCV RBC AUTO: 103.3 FL — HIGH (ref 80–100)
METHOD TYPE: SIGNIFICANT CHANGE UP
MONOCYTES # BLD AUTO: 0.68 K/UL — SIGNIFICANT CHANGE UP (ref 0–0.9)
MONOCYTES NFR BLD AUTO: 5.2 % — SIGNIFICANT CHANGE UP (ref 2–14)
NEUTROPHILS # BLD AUTO: 12.04 K/UL — HIGH (ref 1.8–7.4)
NEUTROPHILS NFR BLD AUTO: 91.4 % — HIGH (ref 43–77)
NRBC # BLD: 0 /100 WBCS — SIGNIFICANT CHANGE UP (ref 0–0)
PHENOBARB SERPL-MCNC: 32.6 UG/ML — SIGNIFICANT CHANGE UP (ref 15–40)
PHOSPHATE SERPL-MCNC: 1.8 MG/DL — LOW (ref 2.5–4.5)
PHOSPHATE SERPL-MCNC: 2.1 MG/DL — LOW (ref 2.5–4.5)
PHOSPHATE SERPL-MCNC: 2.4 MG/DL — LOW (ref 2.5–4.5)
PLATELET # BLD AUTO: 143 K/UL — LOW (ref 150–400)
POTASSIUM SERPL-MCNC: 3.5 MMOL/L — SIGNIFICANT CHANGE UP (ref 3.5–5.3)
POTASSIUM SERPL-MCNC: 4 MMOL/L — SIGNIFICANT CHANGE UP (ref 3.5–5.3)
POTASSIUM SERPL-MCNC: 4.5 MMOL/L — SIGNIFICANT CHANGE UP (ref 3.5–5.3)
POTASSIUM SERPL-SCNC: 3.5 MMOL/L — SIGNIFICANT CHANGE UP (ref 3.5–5.3)
POTASSIUM SERPL-SCNC: 4 MMOL/L — SIGNIFICANT CHANGE UP (ref 3.5–5.3)
POTASSIUM SERPL-SCNC: 4.5 MMOL/L — SIGNIFICANT CHANGE UP (ref 3.5–5.3)
PROT SERPL-MCNC: 6.6 GM/DL — SIGNIFICANT CHANGE UP (ref 6–8.3)
PROT SERPL-MCNC: 6.8 GM/DL — SIGNIFICANT CHANGE UP (ref 6–8.3)
RBC # BLD: 3.38 M/UL — LOW (ref 4.2–5.8)
RBC # FLD: 15.4 % — HIGH (ref 10.3–14.5)
SODIUM SERPL-SCNC: 136 MMOL/L — SIGNIFICANT CHANGE UP (ref 135–145)
SODIUM SERPL-SCNC: 137 MMOL/L — SIGNIFICANT CHANGE UP (ref 135–145)
SODIUM SERPL-SCNC: 140 MMOL/L — SIGNIFICANT CHANGE UP (ref 135–145)
WBC # BLD: 13.16 K/UL — HIGH (ref 3.8–10.5)
WBC # FLD AUTO: 13.16 K/UL — HIGH (ref 3.8–10.5)

## 2024-02-17 PROCEDURE — 99291 CRITICAL CARE FIRST HOUR: CPT

## 2024-02-17 RX ORDER — FLUCONAZOLE 150 MG/1
TABLET ORAL
Refills: 0 | Status: DISCONTINUED | OUTPATIENT
Start: 2024-02-17 | End: 2024-02-22

## 2024-02-17 RX ORDER — FLUCONAZOLE 150 MG/1
400 TABLET ORAL EVERY 24 HOURS
Refills: 0 | Status: DISCONTINUED | OUTPATIENT
Start: 2024-02-18 | End: 2024-02-22

## 2024-02-17 RX ORDER — SODIUM CHLORIDE 9 MG/ML
3 INJECTION INTRAMUSCULAR; INTRAVENOUS; SUBCUTANEOUS ONCE
Refills: 0 | Status: DISCONTINUED | OUTPATIENT
Start: 2024-02-17 | End: 2024-02-17

## 2024-02-17 RX ORDER — POTASSIUM PHOSPHATE, MONOBASIC POTASSIUM PHOSPHATE, DIBASIC 236; 224 MG/ML; MG/ML
30 INJECTION, SOLUTION INTRAVENOUS ONCE
Refills: 0 | Status: COMPLETED | OUTPATIENT
Start: 2024-02-17 | End: 2024-02-17

## 2024-02-17 RX ORDER — FLUCONAZOLE 150 MG/1
400 TABLET ORAL ONCE
Refills: 0 | Status: COMPLETED | OUTPATIENT
Start: 2024-02-17 | End: 2024-02-17

## 2024-02-17 RX ORDER — POTASSIUM PHOSPHATE, MONOBASIC POTASSIUM PHOSPHATE, DIBASIC 236; 224 MG/ML; MG/ML
15 INJECTION, SOLUTION INTRAVENOUS ONCE
Refills: 0 | Status: COMPLETED | OUTPATIENT
Start: 2024-02-17 | End: 2024-02-17

## 2024-02-17 RX ORDER — POTASSIUM CHLORIDE 20 MEQ
20 PACKET (EA) ORAL ONCE
Refills: 0 | Status: COMPLETED | OUTPATIENT
Start: 2024-02-17 | End: 2024-02-17

## 2024-02-17 RX ADMIN — ENOXAPARIN SODIUM 40 MILLIGRAM(S): 100 INJECTION SUBCUTANEOUS at 12:39

## 2024-02-17 RX ADMIN — FLUCONAZOLE 100 MILLIGRAM(S): 150 TABLET ORAL at 12:39

## 2024-02-17 RX ADMIN — PIPERACILLIN AND TAZOBACTAM 25 GRAM(S): 4; .5 INJECTION, POWDER, LYOPHILIZED, FOR SOLUTION INTRAVENOUS at 14:38

## 2024-02-17 RX ADMIN — Medication 1 DROP(S): at 06:12

## 2024-02-17 RX ADMIN — Medication 50 MILLILITER(S): at 13:30

## 2024-02-17 RX ADMIN — Medication 1 DROP(S): at 13:08

## 2024-02-17 RX ADMIN — Medication 100 MILLIEQUIVALENT(S): at 22:00

## 2024-02-17 RX ADMIN — Medication 40 MILLIGRAM(S): at 17:26

## 2024-02-17 RX ADMIN — PANTOPRAZOLE SODIUM 40 MILLIGRAM(S): 20 TABLET, DELAYED RELEASE ORAL at 12:39

## 2024-02-17 RX ADMIN — Medication 130 MILLIGRAM(S): at 23:44

## 2024-02-17 RX ADMIN — Medication 40 MILLIGRAM(S): at 05:07

## 2024-02-17 RX ADMIN — FENTANYL CITRATE 3.74 MICROGRAM(S)/KG/HR: 50 INJECTION INTRAVENOUS at 03:07

## 2024-02-17 RX ADMIN — PROPOFOL 8.98 MICROGRAM(S)/KG/MIN: 10 INJECTION, EMULSION INTRAVENOUS at 12:38

## 2024-02-17 RX ADMIN — CHLORHEXIDINE GLUCONATE 15 MILLILITER(S): 213 SOLUTION TOPICAL at 05:07

## 2024-02-17 RX ADMIN — BUMETANIDE 10 MG/HR: 0.25 INJECTION INTRAMUSCULAR; INTRAVENOUS at 03:51

## 2024-02-17 RX ADMIN — Medication 50 MILLILITER(S): at 21:42

## 2024-02-17 RX ADMIN — Medication 130 MILLIGRAM(S): at 17:26

## 2024-02-17 RX ADMIN — PROPOFOL 8.98 MICROGRAM(S)/KG/MIN: 10 INJECTION, EMULSION INTRAVENOUS at 05:22

## 2024-02-17 RX ADMIN — Medication 3 MILLILITER(S): at 06:18

## 2024-02-17 RX ADMIN — VASOPRESSIN 6 UNIT(S)/MIN: 20 INJECTION INTRAVENOUS at 06:31

## 2024-02-17 RX ADMIN — Medication 1 MILLIGRAM(S): at 13:30

## 2024-02-17 RX ADMIN — Medication 1 DROP(S): at 18:05

## 2024-02-17 RX ADMIN — FENTANYL CITRATE 3.74 MICROGRAM(S)/KG/HR: 50 INJECTION INTRAVENOUS at 18:50

## 2024-02-17 RX ADMIN — CHLORHEXIDINE GLUCONATE 15 MILLILITER(S): 213 SOLUTION TOPICAL at 17:26

## 2024-02-17 RX ADMIN — Medication 50 MILLILITER(S): at 05:06

## 2024-02-17 RX ADMIN — FENTANYL CITRATE 3.74 MICROGRAM(S)/KG/HR: 50 INJECTION INTRAVENOUS at 10:16

## 2024-02-17 RX ADMIN — CHLORHEXIDINE GLUCONATE 1 APPLICATION(S): 213 SOLUTION TOPICAL at 06:13

## 2024-02-17 RX ADMIN — Medication 130 MILLIGRAM(S): at 12:39

## 2024-02-17 RX ADMIN — Medication 50 MILLIEQUIVALENT(S): at 00:17

## 2024-02-17 RX ADMIN — PROPOFOL 8.98 MICROGRAM(S)/KG/MIN: 10 INJECTION, EMULSION INTRAVENOUS at 20:39

## 2024-02-17 RX ADMIN — PIPERACILLIN AND TAZOBACTAM 25 GRAM(S): 4; .5 INJECTION, POWDER, LYOPHILIZED, FOR SOLUTION INTRAVENOUS at 21:46

## 2024-02-17 RX ADMIN — Medication 130 MILLIGRAM(S): at 05:07

## 2024-02-17 RX ADMIN — SODIUM CHLORIDE 4 MILLILITER(S): 9 INJECTION INTRAMUSCULAR; INTRAVENOUS; SUBCUTANEOUS at 06:24

## 2024-02-17 RX ADMIN — POTASSIUM PHOSPHATE, MONOBASIC POTASSIUM PHOSPHATE, DIBASIC 62.5 MILLIMOLE(S): 236; 224 INJECTION, SOLUTION INTRAVENOUS at 06:31

## 2024-02-17 RX ADMIN — Medication 3 MILLILITER(S): at 23:33

## 2024-02-17 RX ADMIN — Medication 3 MILLILITER(S): at 17:04

## 2024-02-17 RX ADMIN — CISATRACURIUM BESYLATE 15.3 MICROGRAM(S)/KG/MIN: 2 INJECTION INTRAVENOUS at 07:47

## 2024-02-17 RX ADMIN — PIPERACILLIN AND TAZOBACTAM 25 GRAM(S): 4; .5 INJECTION, POWDER, LYOPHILIZED, FOR SOLUTION INTRAVENOUS at 05:07

## 2024-02-17 RX ADMIN — Medication 7.01 MICROGRAM(S)/KG/MIN: at 20:39

## 2024-02-17 RX ADMIN — POTASSIUM PHOSPHATE, MONOBASIC POTASSIUM PHOSPHATE, DIBASIC 62.5 MILLIMOLE(S): 236; 224 INJECTION, SOLUTION INTRAVENOUS at 01:43

## 2024-02-17 RX ADMIN — POTASSIUM PHOSPHATE, MONOBASIC POTASSIUM PHOSPHATE, DIBASIC 83.33 MILLIMOLE(S): 236; 224 INJECTION, SOLUTION INTRAVENOUS at 23:58

## 2024-02-17 RX ADMIN — Medication 3 MILLILITER(S): at 11:36

## 2024-02-17 RX ADMIN — SODIUM CHLORIDE 4 MILLILITER(S): 9 INJECTION INTRAMUSCULAR; INTRAVENOUS; SUBCUTANEOUS at 11:38

## 2024-02-17 RX ADMIN — Medication 1 DROP(S): at 01:00

## 2024-02-17 NOTE — PROGRESS NOTE ADULT - SUBJECTIVE AND OBJECTIVE BOX
CC: Patient is a 48y old  Male who presents with a chief complaint of Perforated viscus (16 Feb 2024 14:54)      ## HPI:HPI:  47 y/o male PMHx cirrhosis, ETOH, open reduction of metatarsal fracture 2022 presents c/o epigastric pain for a few hours. Vomited when he came to the ER. Last BM in the afternoon. Last flatus before the BM. Patient denies fever, chills, constipation, diarrhea, melena, hematochezia, dysuria, hematuria, chest pain, shortness of breath, dizziness, cough.   (13 Feb 2024 03:01)      O/N: reintubated after tube came out    ## ROS:  unable to assess ros due to mental status   ## EXAM  ICU Vital Signs Last 24 Hrs  T(C): 37.3 (17 Feb 2024 07:30), Max: 37.3 (16 Feb 2024 18:00)  T(F): 99.1 (17 Feb 2024 07:30), Max: 99.1 (16 Feb 2024 18:00)  HR: 76 (17 Feb 2024 13:30) (71 - 101)  BP: 98/60 (17 Feb 2024 13:00) (95/60 - 138/69)  BP(mean): 71 (17 Feb 2024 13:00) (69 - 89)  ABP: 120/68 (17 Feb 2024 13:30) (94/50 - 135/69)  ABP(mean): 84 (17 Feb 2024 13:30) (-8 - 92)  RR: 28 (17 Feb 2024 13:30) (28 - 28)  SpO2: 98% (17 Feb 2024 13:30) (94% - 100%)    O2 Parameters below as of 17 Feb 2024 12:00  Patient On (Oxygen Delivery Method): ventilator          CON : NAD  EENT : EOMI, MMM  NECK : Full ROM  RESP : CTAB no increased WOB  CARD : rrr no m/r/g  ABD : S NT ND NABS no rebound  EXT : No edema  NEURO :sedated  ## Labs:  Lab Results:  CBC  CBC Full  -  ( 17 Feb 2024 03:03 )  WBC Count : 13.16 K/uL  RBC Count : 3.38 M/uL  Hemoglobin : 11.0 g/dL  Hematocrit : 34.9 %  Platelet Count - Automated : 143 K/uL  Mean Cell Volume : 103.3 fl  Mean Cell Hemoglobin : 32.5 pg  Mean Cell Hemoglobin Concentration : 31.5 g/dL  Auto Neutrophil # : 12.04 K/uL  Auto Lymphocyte # : 0.31 K/uL  Auto Monocyte # : 0.68 K/uL  Auto Eosinophil # : 0.01 K/uL  Auto Basophil # : 0.01 K/uL  Auto Neutrophil % : 91.4 %  Auto Lymphocyte % : 2.4 %  Auto Monocyte % : 5.2 %  Auto Eosinophil % : 0.1 %  Auto Basophil % : 0.1 %    .		Differential:	[] Automated		[] Manual  Chemistry                        11.0   13.16 )-----------( 143      ( 17 Feb 2024 03:03 )             34.9     02-17    137  |  102  |  15  ----------------------------<  162<H>  4.0   |  26  |  0.81    Ca    7.3<L>      17 Feb 2024 08:32  Phos  2.4     02-17  Mg     2.0     02-17    TPro  6.8  /  Alb  3.0<L>  /  TBili  7.0<H>  /  DBili  x   /  AST  63<H>  /  ALT  35  /  AlkPhos  100  02-17    LIVER FUNCTIONS - ( 17 Feb 2024 08:32 )  Alb: 3.0 g/dL / Pro: 6.8 gm/dL / ALK PHOS: 100 U/L / ALT: 35 U/L / AST: 63 U/L / GGT: x           PT/INR - ( 16 Feb 2024 12:45 )   PT: 13.6 sec;   INR: 1.15 ratio         PTT - ( 16 Feb 2024 12:45 )  PTT:44.7 sec  Urinalysis Basic - ( 17 Feb 2024 08:32 )    Color: x / Appearance: x / SG: x / pH: x  Gluc: 162 mg/dL / Ketone: x  / Bili: x / Urobili: x   Blood: x / Protein: x / Nitrite: x   Leuk Esterase: x / RBC: x / WBC x   Sq Epi: x / Non Sq Epi: x / Bacteria: x        ABG - ( 17 Feb 2024 08:50 )  pH, Arterial: 7.36  pH, Blood: x     /  pCO2: 48    /  pO2: 164   / HCO3: 27    / Base Excess: 1.2   /  SaO2: 99.4                      MICROBIOLOGY/CULTURES:  Culture Results:   Normal Respiratory Angeles present (02-16 @ 15:16)  Culture Results:   No growth at 4 days (02-13 @ 07:30)  Culture Results:   No growth at 4 days (02-13 @ 07:05)  Culture Results:   Rare Streptococcus salivarius/vestibularis group Susceptibility to follow.  Rare Streptococcus mitis/oralis group  Rare Candida albicans Referred to Mycology (02-13 @ 05:10)  Culture Results:   No growth (02-13 @ 03:24)          ## Medications:  MEDICATIONS  (STANDING):  albumin human 25% IVPB 100 milliLiter(s) IV Intermittent every 8 hours  albuterol/ipratropium for Nebulization 3 milliLiter(s) Nebulizer every 6 hours  artificial  tears Solution 1 Drop(s) Both EYES every 6 hours  chlorhexidine 0.12% Liquid 15 milliLiter(s) Oral Mucosa every 12 hours  chlorhexidine 2% Cloths 1 Application(s) Topical <User Schedule>  enoxaparin Injectable 40 milliGRAM(s) SubCutaneous every 24 hours  fentaNYL   Infusion. 0.5 MICROgram(s)/kG/Hr (3.74 mL/Hr) IV Continuous <Continuous>  fluconAZOLE IVPB      folic acid Injectable 1 milliGRAM(s) IV Push daily  influenza   Vaccine 0.5 milliLiter(s) IntraMuscular once  methylPREDNISolone sodium succinate Injectable 40 milliGRAM(s) IV Push two times a day  norepinephrine Infusion 0.05 MICROgram(s)/kG/Min (7.01 mL/Hr) IV Continuous <Continuous>  pantoprazole  Injectable 40 milliGRAM(s) IV Push daily  PHENobarbital Injectable 130 milliGRAM(s) IV Push every 6 hours  piperacillin/tazobactam IVPB.. 3.375 Gram(s) IV Intermittent every 8 hours  propofol Infusion 20 MICROgram(s)/kG/Min (8.98 mL/Hr) IV Continuous <Continuous>    ## O/E:I&O's Summary    16 Feb 2024 07:01  -  17 Feb 2024 07:00  --------------------------------------------------------  IN: 1951.1 mL / OUT: 4860 mL / NET: -2908.9 mL    17 Feb 2024 07:01  -  17 Feb 2024 14:38  --------------------------------------------------------  IN: 604.4 mL / OUT: 1580 mL / NET: -975.6 mL        POCUS :   DVT PPX lovenox  ## Code status:  Goals of care discussion: [X] yes [ ] no - spoke with wife bedside regarding patient's prognosis. no change in goals of care  [x] full code  [ ] DNR  [ ] DNI  [ ] JOSSELINE

## 2024-02-17 NOTE — PROGRESS NOTE ADULT - SUBJECTIVE AND OBJECTIVE BOX
POD4 s/p ex lap and kyrie patch  Patient seen and examined bedside, intubated and sedated       T(F): 99.1 (02-17-24 @ 07:30), Max: 99.1 (02-16-24 @ 18:00)  HR: 76 (02-17-24 @ 13:30) (71 - 101)  BP: 98/60 (02-17-24 @ 13:00) (95/60 - 138/69)  RR: 28 (02-17-24 @ 13:30) (28 - 28)  SpO2: 98% (02-17-24 @ 13:30) (94% - 100%)  Wt(kg): --  CAPILLARY BLOOD GLUCOSE      POCT Blood Glucose.: 142 mg/dL (17 Feb 2024 12:03)      PHYSICAL EXAM:  GENERAL: sedated  CHEST/LUNG: intubated on vent  HEART: NSR on tele  ABDOMEN: soft, nontender, mildly distended. FOX x1 with serous output 550cc/24hrs. Midline incision with intact staples.   NGT with 50 cc of bilious output      LABS:                        11.0   13.16 )-----------( 143      ( 17 Feb 2024 03:03 )             34.9     02-17    137  |  102  |  15  ----------------------------<  162<H>  4.0   |  26  |  0.81    Ca    7.3<L>      17 Feb 2024 08:32  Phos  2.4     02-17  Mg     2.0     02-17    TPro  6.8  /  Alb  3.0<L>  /  TBili  7.0<H>  /  DBili  x   /  AST  63<H>  /  ALT  35  /  AlkPhos  100  02-17    PT/INR - ( 16 Feb 2024 12:45 )   PT: 13.6 sec;   INR: 1.15 ratio         PTT - ( 16 Feb 2024 12:45 )  PTT:44.7 sec    I&O:     Culture Results:   Normal Respiratory Angeles present *!* (02-16 @ 15:16)  Culture Results:   No growth at 4 days (02-13 @ 07:30)  Culture Results:   No growth at 4 days (02-13 @ 07:05)  Culture Results:   Rare Streptococcus salivarius/vestibularis group Susceptibility to follow.  Rare Streptococcus mitis/oralis group  Rare Candida albicans Referred to Mycology *!* (02-13 @ 05:10)  Culture Results:   No growth (02-13 @ 03:24)    A/P: 47 yo M POD4 s/p ex lap, kyrie patch for duodenal perforation.   Intubated and sedated. On Levophed and vasopressin.   - c/w NPO, IVF, protonix, NGT monitor output  - UGIS planning for Monday   - monitor FOX output   - c/w abx per ID  -  Wean vent and pressors as tolerated  - continue care per ICU team  - case discussed with Dr. Blanco

## 2024-02-17 NOTE — PROGRESS NOTE ADULT - ASSESSMENT
Mr. Arvizu is a 48M w/ history of cirrhosis, ETOH abuse (drinks a few glasses daily), open reduction of metatarsal fracture 2022 presents w/perforated viscus, s/p repair, now intubated with distributive shock and increased pressor requirements. ARDS.  Neuro: Sedate with propofol and fentanyl gtt for vent synchrony and pain control. S/p phenobarb load. Continuing standing phenobarb for JOEY agonism to help whenever he goes into withdrawal (alcohol level still elevated on admission). Phenobarbital in AM. Thiamine 500mg q8hrs for 3 days for Wernicke's.   Respiratory: Patient with hypoxic respiratory failure. FiO2 requirements increased overnight. ARDS. PF ratio <200. May be from the aspiration event, also compounded by the IVF. Will diurese aggressively. . . Steroids BID.   paralysis held. peep down to 12. plateau 27   Will continue to monitor and adjust vent with serial ABGs.  Cardiovascular: Continues to require vasopressin and levophed. Will diurese.    GI: S/p Aden patch and abdominal washout for perforated duodenum. D/t hx cirrhosis, blood culture showing fungus. restarted fluconazole. ID following  NGT to suction for now. Had a BMs yesterday. To f/u with surgery regarding feeds. Plan for UGIS post of day 5.   Renal: EMILIA improving. Bumex changed to 2 mg ivp X 1.  Endocrine: FS q 6hrs, ISS.  ID: Continue with zosyn for septic shock. Follow up all culture data. Fever curve improving. Bandemia improving. Reculture today. Procalcitonin.  Hematology: H&H stable. Heparin subq for DVT ppx.    Discussed with wife at bedside    Patient is critically ill, requiring critical care services.

## 2024-02-18 LAB
-  FLUCONAZOLE: SIGNIFICANT CHANGE UP
ALBUMIN SERPL ELPH-MCNC: 3.2 G/DL — LOW (ref 3.3–5)
ALP SERPL-CCNC: 102 U/L — SIGNIFICANT CHANGE UP (ref 40–120)
ALT FLD-CCNC: 34 U/L — SIGNIFICANT CHANGE UP (ref 12–78)
ANION GAP SERPL CALC-SCNC: 6 MMOL/L — SIGNIFICANT CHANGE UP (ref 5–17)
AST SERPL-CCNC: 64 U/L — HIGH (ref 15–37)
BASOPHILS # BLD AUTO: 0.03 K/UL — SIGNIFICANT CHANGE UP (ref 0–0.2)
BASOPHILS NFR BLD AUTO: 0.3 % — SIGNIFICANT CHANGE UP (ref 0–2)
BILIRUB SERPL-MCNC: 6.6 MG/DL — HIGH (ref 0.2–1.2)
BUN SERPL-MCNC: 24 MG/DL — HIGH (ref 7–23)
CALCIUM SERPL-MCNC: 7 MG/DL — LOW (ref 8.5–10.1)
CHLORIDE SERPL-SCNC: 105 MMOL/L — SIGNIFICANT CHANGE UP (ref 96–108)
CO2 SERPL-SCNC: 30 MMOL/L — SIGNIFICANT CHANGE UP (ref 22–31)
CREAT SERPL-MCNC: 1.25 MG/DL — SIGNIFICANT CHANGE UP (ref 0.5–1.3)
CULTURE RESULTS: ABNORMAL
CULTURE RESULTS: ABNORMAL
CULTURE RESULTS: SIGNIFICANT CHANGE UP
CULTURE RESULTS: SIGNIFICANT CHANGE UP
EGFR: 71 ML/MIN/1.73M2 — SIGNIFICANT CHANGE UP
EOSINOPHIL # BLD AUTO: 0 K/UL — SIGNIFICANT CHANGE UP (ref 0–0.5)
EOSINOPHIL NFR BLD AUTO: 0 % — SIGNIFICANT CHANGE UP (ref 0–6)
GAS PNL BLDA: SIGNIFICANT CHANGE UP
GLUCOSE BLDC GLUCOMTR-MCNC: 140 MG/DL — HIGH (ref 70–99)
GLUCOSE BLDC GLUCOMTR-MCNC: 151 MG/DL — HIGH (ref 70–99)
GLUCOSE BLDC GLUCOMTR-MCNC: 161 MG/DL — HIGH (ref 70–99)
GLUCOSE SERPL-MCNC: 153 MG/DL — HIGH (ref 70–99)
GRAM STN FLD: ABNORMAL
GRAM STN FLD: ABNORMAL
GRAM STN FLD: SIGNIFICANT CHANGE UP
HCT VFR BLD CALC: 30.7 % — LOW (ref 39–50)
HGB BLD-MCNC: 9.8 G/DL — LOW (ref 13–17)
IMM GRANULOCYTES NFR BLD AUTO: 1.5 % — HIGH (ref 0–0.9)
LYMPHOCYTES # BLD AUTO: 0.48 K/UL — LOW (ref 1–3.3)
LYMPHOCYTES # BLD AUTO: 4.4 % — LOW (ref 13–44)
MAGNESIUM SERPL-MCNC: 2 MG/DL — SIGNIFICANT CHANGE UP (ref 1.6–2.6)
MCHC RBC-ENTMCNC: 31.9 G/DL — LOW (ref 32–36)
MCHC RBC-ENTMCNC: 32.2 PG — SIGNIFICANT CHANGE UP (ref 27–34)
MCV RBC AUTO: 101 FL — HIGH (ref 80–100)
METHOD TYPE: SIGNIFICANT CHANGE UP
MONOCYTES # BLD AUTO: 0.87 K/UL — SIGNIFICANT CHANGE UP (ref 0–0.9)
MONOCYTES NFR BLD AUTO: 7.9 % — SIGNIFICANT CHANGE UP (ref 2–14)
NEUTROPHILS # BLD AUTO: 9.41 K/UL — HIGH (ref 1.8–7.4)
NEUTROPHILS NFR BLD AUTO: 85.9 % — HIGH (ref 43–77)
NRBC # BLD: 0 /100 WBCS — SIGNIFICANT CHANGE UP (ref 0–0)
ORGANISM # SPEC MICROSCOPIC CNT: ABNORMAL
ORGANISM # SPEC MICROSCOPIC CNT: SIGNIFICANT CHANGE UP
PHOSPHATE SERPL-MCNC: 2.8 MG/DL — SIGNIFICANT CHANGE UP (ref 2.5–4.5)
PLATELET # BLD AUTO: 163 K/UL — SIGNIFICANT CHANGE UP (ref 150–400)
POTASSIUM SERPL-MCNC: 4 MMOL/L — SIGNIFICANT CHANGE UP (ref 3.5–5.3)
POTASSIUM SERPL-SCNC: 4 MMOL/L — SIGNIFICANT CHANGE UP (ref 3.5–5.3)
PROT SERPL-MCNC: 6.7 GM/DL — SIGNIFICANT CHANGE UP (ref 6–8.3)
RBC # BLD: 3.04 M/UL — LOW (ref 4.2–5.8)
RBC # FLD: 15.4 % — HIGH (ref 10.3–14.5)
SODIUM SERPL-SCNC: 141 MMOL/L — SIGNIFICANT CHANGE UP (ref 135–145)
SPECIMEN SOURCE: SIGNIFICANT CHANGE UP
WBC # BLD: 10.95 K/UL — HIGH (ref 3.8–10.5)
WBC # FLD AUTO: 10.95 K/UL — HIGH (ref 3.8–10.5)

## 2024-02-18 PROCEDURE — 71045 X-RAY EXAM CHEST 1 VIEW: CPT | Mod: 26

## 2024-02-18 PROCEDURE — 99291 CRITICAL CARE FIRST HOUR: CPT

## 2024-02-18 PROCEDURE — 99232 SBSQ HOSP IP/OBS MODERATE 35: CPT | Mod: 24

## 2024-02-18 RX ORDER — DEXMEDETOMIDINE HYDROCHLORIDE IN 0.9% SODIUM CHLORIDE 4 UG/ML
0.2 INJECTION INTRAVENOUS
Qty: 200 | Refills: 0 | Status: DISCONTINUED | OUTPATIENT
Start: 2024-02-18 | End: 2024-02-18

## 2024-02-18 RX ORDER — BUMETANIDE 0.25 MG/ML
2 INJECTION INTRAMUSCULAR; INTRAVENOUS ONCE
Refills: 0 | Status: COMPLETED | OUTPATIENT
Start: 2024-02-18 | End: 2024-02-18

## 2024-02-18 RX ORDER — SODIUM CHLORIDE 9 MG/ML
1000 INJECTION, SOLUTION INTRAVENOUS ONCE
Refills: 0 | Status: COMPLETED | OUTPATIENT
Start: 2024-02-18 | End: 2024-02-18

## 2024-02-18 RX ADMIN — Medication 7.01 MICROGRAM(S)/KG/MIN: at 08:33

## 2024-02-18 RX ADMIN — CHLORHEXIDINE GLUCONATE 15 MILLILITER(S): 213 SOLUTION TOPICAL at 05:04

## 2024-02-18 RX ADMIN — Medication 50 MILLILITER(S): at 15:40

## 2024-02-18 RX ADMIN — Medication 3 MILLILITER(S): at 23:14

## 2024-02-18 RX ADMIN — Medication 3 MILLILITER(S): at 11:27

## 2024-02-18 RX ADMIN — FENTANYL CITRATE 3.74 MICROGRAM(S)/KG/HR: 50 INJECTION INTRAVENOUS at 23:28

## 2024-02-18 RX ADMIN — SODIUM CHLORIDE 1000 MILLILITER(S): 9 INJECTION, SOLUTION INTRAVENOUS at 09:46

## 2024-02-18 RX ADMIN — CHLORHEXIDINE GLUCONATE 1 APPLICATION(S): 213 SOLUTION TOPICAL at 05:17

## 2024-02-18 RX ADMIN — PROPOFOL 8.98 MICROGRAM(S)/KG/MIN: 10 INJECTION, EMULSION INTRAVENOUS at 23:24

## 2024-02-18 RX ADMIN — PROPOFOL 8.98 MICROGRAM(S)/KG/MIN: 10 INJECTION, EMULSION INTRAVENOUS at 03:58

## 2024-02-18 RX ADMIN — Medication 1 DROP(S): at 17:36

## 2024-02-18 RX ADMIN — Medication 40 MILLIGRAM(S): at 05:04

## 2024-02-18 RX ADMIN — DEXMEDETOMIDINE HYDROCHLORIDE IN 0.9% SODIUM CHLORIDE 4.25 MICROGRAM(S)/KG/HR: 4 INJECTION INTRAVENOUS at 12:57

## 2024-02-18 RX ADMIN — Medication 130 MILLIGRAM(S): at 17:31

## 2024-02-18 RX ADMIN — Medication 50 MILLILITER(S): at 05:06

## 2024-02-18 RX ADMIN — PANTOPRAZOLE SODIUM 40 MILLIGRAM(S): 20 TABLET, DELAYED RELEASE ORAL at 11:25

## 2024-02-18 RX ADMIN — FENTANYL CITRATE 3.74 MICROGRAM(S)/KG/HR: 50 INJECTION INTRAVENOUS at 05:01

## 2024-02-18 RX ADMIN — Medication 1 MILLIGRAM(S): at 11:37

## 2024-02-18 RX ADMIN — Medication 50 MILLILITER(S): at 21:28

## 2024-02-18 RX ADMIN — BUMETANIDE 2 MILLIGRAM(S): 0.25 INJECTION INTRAMUSCULAR; INTRAVENOUS at 03:57

## 2024-02-18 RX ADMIN — FENTANYL CITRATE 3.74 MICROGRAM(S)/KG/HR: 50 INJECTION INTRAVENOUS at 08:33

## 2024-02-18 RX ADMIN — FENTANYL CITRATE 3.74 MICROGRAM(S)/KG/HR: 50 INJECTION INTRAVENOUS at 14:57

## 2024-02-18 RX ADMIN — PROPOFOL 8.98 MICROGRAM(S)/KG/MIN: 10 INJECTION, EMULSION INTRAVENOUS at 08:33

## 2024-02-18 RX ADMIN — ENOXAPARIN SODIUM 40 MILLIGRAM(S): 100 INJECTION SUBCUTANEOUS at 11:25

## 2024-02-18 RX ADMIN — PIPERACILLIN AND TAZOBACTAM 25 GRAM(S): 4; .5 INJECTION, POWDER, LYOPHILIZED, FOR SOLUTION INTRAVENOUS at 05:11

## 2024-02-18 RX ADMIN — PIPERACILLIN AND TAZOBACTAM 25 GRAM(S): 4; .5 INJECTION, POWDER, LYOPHILIZED, FOR SOLUTION INTRAVENOUS at 15:33

## 2024-02-18 RX ADMIN — PIPERACILLIN AND TAZOBACTAM 25 GRAM(S): 4; .5 INJECTION, POWDER, LYOPHILIZED, FOR SOLUTION INTRAVENOUS at 21:29

## 2024-02-18 RX ADMIN — Medication 130 MILLIGRAM(S): at 11:25

## 2024-02-18 RX ADMIN — Medication 130 MILLIGRAM(S): at 05:55

## 2024-02-18 RX ADMIN — CHLORHEXIDINE GLUCONATE 15 MILLILITER(S): 213 SOLUTION TOPICAL at 17:30

## 2024-02-18 RX ADMIN — Medication 40 MILLIGRAM(S): at 17:31

## 2024-02-18 RX ADMIN — FLUCONAZOLE 100 MILLIGRAM(S): 150 TABLET ORAL at 12:57

## 2024-02-18 RX ADMIN — Medication 3 MILLILITER(S): at 17:11

## 2024-02-18 RX ADMIN — Medication 1 DROP(S): at 06:00

## 2024-02-18 RX ADMIN — DEXMEDETOMIDINE HYDROCHLORIDE IN 0.9% SODIUM CHLORIDE 4.25 MICROGRAM(S)/KG/HR: 4 INJECTION INTRAVENOUS at 19:20

## 2024-02-18 RX ADMIN — PROPOFOL 8.98 MICROGRAM(S)/KG/MIN: 10 INJECTION, EMULSION INTRAVENOUS at 17:46

## 2024-02-18 RX ADMIN — Medication 1 DROP(S): at 00:00

## 2024-02-18 RX ADMIN — Medication 130 MILLIGRAM(S): at 23:23

## 2024-02-18 RX ADMIN — Medication 1 DROP(S): at 11:26

## 2024-02-18 RX ADMIN — Medication 1 APPLICATION(S): at 23:28

## 2024-02-18 RX ADMIN — Medication 3 MILLILITER(S): at 05:09

## 2024-02-18 NOTE — PROVIDER CONTACT NOTE (CHANGE IN STATUS NOTIFICATION) - BACKGROUND
S/p exlap for bowel perf.
Writer has taken care of this patient numerous times with continuity of care. First time pt was receiving precedex gtt (maxed @ 1.5) on 2/13, rectal temps tmax'ed overnight 41.3c & unresponsive to IV tylenol and minimally responsive to the cooling blanket. Temp broke during day shift on 2/14 after precedex d/c & sedation changed to propofol/fent @ approx 11am.
admit w bowel perf now s/p ex lap
s/p ex lap 2/13

## 2024-02-18 NOTE — PROGRESS NOTE ADULT - ASSESSMENT
Mr. Arvizu is a 48M w/ history of cirrhosis, ETOH abuse (drinks a few glasses daily), open reduction of metatarsal fracture 2022 presents w/perforated viscus, s/p repair, now intubated with distributive shock and increased pressor requirements. ARDS.  Neuro: Sedate with propofol and fentanyl gtt for vent synchrony and pain control.Thiamine 500mg q8hrs for 3 days for Wernicke's.   Respiratory: Patient with hypoxic respiratory failure. FiO2 requirements increased overnight. ARDS. PF ratio <200. May be from the aspiration event, also compounded by the IVF. Will diurese aggressively. . . Steroids BID.   paralysis held. peep down to 12. plateau 27 fi02   Will continue to monitor and adjust vent with serial ABGs.  Cardiovascular: Continues to require vasopressin and levophed. Will diurese.    GI: S/p Aden patch and abdominal washout for perforated duodenum. D/t hx cirrhosis, blood culture showing fungus. restarted fluconazole. ID following  NGT to suction for now. Had a BMs yesterday. To f/u with surgery regarding feeds. Plan for UGIS post of day 5.   Renal: EMILIA improving. Bumex changed to 2 mg ivp X 1 will rebolus and patien andriy good uop after bumex   Endocrine: FS q 6hrs, ISS.  ID: Continue with zosyn for septic shock. Follow up all culture data. Fever curve improving. Bandemia improving.  Procalcitonin. added floconazole for candida  Hematology: H&H stable. Heparin subq for DVT ppx.    Discussed with wife and daughter at bedside at bedside    Patient is critically ill, requiring critical care services.

## 2024-02-18 NOTE — PROGRESS NOTE ADULT - SUBJECTIVE AND OBJECTIVE BOX
SURGERY PROGRESS HPI:  POD#5  Pt seen and examined at bedside intubated in the ICU. +BMs.     Vital Signs Last 24 Hrs  T(C): 37.8 (18 Feb 2024 03:00), Max: 37.8 (17 Feb 2024 20:00)  T(F): 100 (18 Feb 2024 03:00), Max: 100 (17 Feb 2024 20:00)  HR: 65 (18 Feb 2024 04:10) (65 - 83)  BP: 93/55 (18 Feb 2024 02:00) (91/57 - 108/63)  BP(mean): 67 (18 Feb 2024 02:00) (67 - 82)  RR: 28 (18 Feb 2024 03:00) (23 - 28)  SpO2: 96% (18 Feb 2024 04:10) (95% - 100%)    Parameters below as of 18 Feb 2024 03:00  Patient On (Oxygen Delivery Method): ventilator    O2 Concentration (%): 80    PHYSICAL EXAM:  CONSTITUTIONAL: NAD  HEENT: NGT in place  RESPIRATORY: Intubated. Clear to ausculation, bilaterally   CARDIOVASCULAR: RRR S1S2  GASTROINTESTINAL: Midline incision with staples intact. FOX with serous output. non distended, soft, appropriate incisional tenderness  : Barber indwelling with clear yellow urine  MUSCULOSKELETAL: calf soft, non tender b/l    I&O's Detail    16 Feb 2024 07:01  -  17 Feb 2024 07:00  --------------------------------------------------------  IN:    Bumetanide: 150 mL    Cisatracurium: 180 mL    FentaNYL: 389 mL    IV PiggyBack: 100 mL    IV PiggyBack: 100 mL    IV PiggyBack: 100 mL    Norepinephrine: 406.4 mL    Propofol: 417.6 mL    Vasopressin: 144 mL  Total IN: 1987 mL    OUT:    Drain (mL): 550 mL    Indwelling Catheter - Urethral (mL): 4260 mL    Nasogastric/Oral tube (mL): 50 mL  Total OUT: 4860 mL    Total NET: -2873 mL      17 Feb 2024 07:01  -  18 Feb 2024 05:23  --------------------------------------------------------  IN:    Bumetanide: 30 mL    Cisatracurium: 18 mL    FentaNYL: 550.8 mL    IV PiggyBack: 200 mL    IV PiggyBack: 200 mL    IV PiggyBack: 200 mL    IV PiggyBack: 100 mL    Norepinephrine: 172 mL    Propofol: 304.9 mL    Vasopressin: 18 mL  Total IN: 1793.7 mL    OUT:    Drain (mL): 505 mL    Indwelling Catheter - Urethral (mL): 2845 mL    Nasogastric/Oral tube (mL): 0 mL  Total OUT: 3350 mL    Total NET: -1556.3 mL      LABS:                        9.8    10.95 )-----------( 163      ( 18 Feb 2024 02:49 )             30.7     02-18    141  |  105  |  24<H>  ----------------------------<  153<H>  4.0   |  30  |  1.25    Ca    7.0<L>      18 Feb 2024 02:49  Phos  2.8     02-18  Mg     2.0     02-18    TPro  6.7  /  Alb  3.2<L>  /  TBili  6.6<H>  /  DBili  x   /  AST  64<H>  /  ALT  34  /  AlkPhos  102  02-18    PT/INR - ( 16 Feb 2024 12:45 )   PT: 13.6 sec;   INR: 1.15 ratio      PTT - ( 16 Feb 2024 12:45 )  PTT:44.7 sec    Urinalysis Basic - ( 18 Feb 2024 02:49 )    Color: x / Appearance: x / SG: x / pH: x  Gluc: 153 mg/dL / Ketone: x  / Bili: x / Urobili: x   Blood: x / Protein: x / Nitrite: x   Leuk Esterase: x / RBC: x / WBC x   Sq Epi: x / Non Sq Epi: x / Bacteria: x      Culture Results:   Normal Respiratory Angeles present (02-16 @ 15:16)  Culture Results:   No growth at 24 hours (02-16 @ 12:30)  Culture Results:   No growth at 24 hours (02-16 @ 12:00)      Assessment: 48M with duodenal perforation s/p ex lap, kyrie patch POD#5. Intubated. On Levophed.    Plan:  -NPO, NGT, IV hydration  -UGIS planning tomorrow   -continue local wound care, FOX care  -antibiotics per ID  -Barber, monitor urine output  -f/u labs  -ICU management

## 2024-02-18 NOTE — PROVIDER CONTACT NOTE (CHANGE IN STATUS NOTIFICATION) - RECOMMENDATIONS
JEROD & or other labs
Stop Precedex infusion for possible adverse reaction for second time (fever/hyperthermia) and/or add to allergy list to avoid future events.

## 2024-02-18 NOTE — PROGRESS NOTE ADULT - SUBJECTIVE AND OBJECTIVE BOX
CC: Patient is a 48y old  Male who presents with a chief complaint of Perforated viscus (18 Feb 2024 05:22)      ## HPI:HPI:  47 y/o male PMHx cirrhosis, ETOH, open reduction of metatarsal fracture 2022 presents c/o epigastric pain for a few hours. Vomited when he came to the ER. Last BM in the afternoon. Last flatus before the BM. Patient denies fever, chills, constipation, diarrhea, melena, hematochezia, dysuria, hematuria, chest pain, shortness of breath, dizziness, cough.   (13 Feb 2024 03:01)      O/N: no acute events    ## ROS:  unable to assess ros due to mental status   ## EXAM  ICU Vital Signs Last 24 Hrs  T(C): 37.8 (18 Feb 2024 13:00), Max: 37.9 (18 Feb 2024 04:00)  T(F): 100 (18 Feb 2024 13:00), Max: 100.2 (18 Feb 2024 04:00)  HR: 64 (18 Feb 2024 13:00) (62 - 84)  BP: 98/62 (18 Feb 2024 13:00) (87/60 - 108/63)  BP(mean): 74 (18 Feb 2024 13:00) (67 - 79)  ABP: 122/71 (18 Feb 2024 13:00) (97/55 - 125/73)  ABP(mean): 86 (18 Feb 2024 13:00) (68 - 89)  RR: 28 (18 Feb 2024 13:00) (23 - 28)  SpO2: 94% (18 Feb 2024 13:00) (93% - 100%)    O2 Parameters below as of 18 Feb 2024 11:27  Patient On (Oxygen Delivery Method): ventilator, 80%          CON : NAD  EENT : EOMI, MMM  NECK : Full ROM  RESP : CTAB no increased WOB  CARD : rrr no m/r/g  ABD : S NT ND NABS no rebound  EXT : No edema  NEURO : sedated  ## Labs:  Lab Results:  CBC  CBC Full  -  ( 18 Feb 2024 02:49 )  WBC Count : 10.95 K/uL  RBC Count : 3.04 M/uL  Hemoglobin : 9.8 g/dL  Hematocrit : 30.7 %  Platelet Count - Automated : 163 K/uL  Mean Cell Volume : 101.0 fl  Mean Cell Hemoglobin : 32.2 pg  Mean Cell Hemoglobin Concentration : 31.9 g/dL  Auto Neutrophil # : 9.41 K/uL  Auto Lymphocyte # : 0.48 K/uL  Auto Monocyte # : 0.87 K/uL  Auto Eosinophil # : 0.00 K/uL  Auto Basophil # : 0.03 K/uL  Auto Neutrophil % : 85.9 %  Auto Lymphocyte % : 4.4 %  Auto Monocyte % : 7.9 %  Auto Eosinophil % : 0.0 %  Auto Basophil % : 0.3 %    .		Differential:	[] Automated		[] Manual  Chemistry                        9.8    10.95 )-----------( 163      ( 18 Feb 2024 02:49 )             30.7     02-18    141  |  105  |  24<H>  ----------------------------<  153<H>  4.0   |  30  |  1.25    Ca    7.0<L>      18 Feb 2024 02:49  Phos  2.8     02-18  Mg     2.0     02-18    TPro  6.7  /  Alb  3.2<L>  /  TBili  6.6<H>  /  DBili  x   /  AST  64<H>  /  ALT  34  /  AlkPhos  102  02-18    LIVER FUNCTIONS - ( 18 Feb 2024 02:49 )  Alb: 3.2 g/dL / Pro: 6.7 gm/dL / ALK PHOS: 102 U/L / ALT: 34 U/L / AST: 64 U/L / GGT: x             Urinalysis Basic - ( 18 Feb 2024 02:49 )    Color: x / Appearance: x / SG: x / pH: x  Gluc: 153 mg/dL / Ketone: x  / Bili: x / Urobili: x   Blood: x / Protein: x / Nitrite: x   Leuk Esterase: x / RBC: x / WBC x   Sq Epi: x / Non Sq Epi: x / Bacteria: x        ABG - ( 18 Feb 2024 01:50 )  pH, Arterial: 7.39  pH, Blood: x     /  pCO2: 52    /  pO2: 85    / HCO3: 32    / Base Excess: 4.8   /  SaO2: 97.9        MICROBIOLOGY/CULTURES:  Culture Results:   Normal Respiratory Angeles present (02-16 @ 15:16)  Culture Results:   No growth at 24 hours (02-16 @ 12:30)  Culture Results:   No growth at 24 hours (02-16 @ 12:00)  Culture Results:   No growth at 5 days (02-13 @ 07:30)  Culture Results:   No growth at 5 days (02-13 @ 07:05)  Culture Results:   Rare Streptococcus salivarius/vestibularis group  Rare Streptococcus mitis/oralis group  Rare Candida albicans (02-13 @ 05:10)  Culture Results:   No growth (02-13 @ 03:24)          ## Medications:  MEDICATIONS  (STANDING):  albumin human 25% IVPB 100 milliLiter(s) IV Intermittent every 8 hours  albuterol/ipratropium for Nebulization 3 milliLiter(s) Nebulizer every 6 hours  artificial  tears Solution 1 Drop(s) Both EYES every 6 hours  chlorhexidine 0.12% Liquid 15 milliLiter(s) Oral Mucosa every 12 hours  chlorhexidine 2% Cloths 1 Application(s) Topical <User Schedule>  dexMEDEtomidine Infusion 0.2 MICROgram(s)/kG/Hr (4.25 mL/Hr) IV Continuous <Continuous>  enoxaparin Injectable 40 milliGRAM(s) SubCutaneous every 24 hours  fentaNYL   Infusion. 0.5 MICROgram(s)/kG/Hr (3.74 mL/Hr) IV Continuous <Continuous>  fluconAZOLE IVPB 400 milliGRAM(s) IV Intermittent every 24 hours  fluconAZOLE IVPB      folic acid Injectable 1 milliGRAM(s) IV Push daily  influenza   Vaccine 0.5 milliLiter(s) IntraMuscular once  methylPREDNISolone sodium succinate Injectable 40 milliGRAM(s) IV Push two times a day  norepinephrine Infusion 0.05 MICROgram(s)/kG/Min (7.01 mL/Hr) IV Continuous <Continuous>  pantoprazole  Injectable 40 milliGRAM(s) IV Push daily  PHENobarbital Injectable 130 milliGRAM(s) IV Push every 6 hours  piperacillin/tazobactam IVPB.. 3.375 Gram(s) IV Intermittent every 8 hours  propofol Infusion 20 MICROgram(s)/kG/Min (8.98 mL/Hr) IV Continuous <Continuous>    ## O/E:I&O's Summary    17 Feb 2024 07:01  -  18 Feb 2024 07:00  --------------------------------------------------------  IN: 2767.2 mL / OUT: 3730 mL / NET: -962.8 mL    18 Feb 2024 07:01  -  18 Feb 2024 14:20  --------------------------------------------------------  IN: 347.3 mL / OUT: 635 mL / NET: -287.7 mL        POCUS :   DVT PPX lovenox  ## Code status:  Goals of care discussion: [X] yes [ ] no - updated wife and daughter at bedside. patient remains full code  [x] full code  [ ] DNR  [ ] DNI  [ ] JOSSELINE

## 2024-02-18 NOTE — PROVIDER CONTACT NOTE (CHANGE IN STATUS NOTIFICATION) - ACTION/TREATMENT ORDERED:
Writer's concerns were noted. No interventions ordered. Writer's concerns were noted. No interventions originally ordered.  2102: Verbal order by BRENDEN Mcintyre to stop precedex & monitor fever curve. L-AC PIV to be removed as well (not in use)

## 2024-02-18 NOTE — PROVIDER CONTACT NOTE (CHANGE IN STATUS NOTIFICATION) - ASSESSMENT
Pt remains guppy breathing. Previous ABG WDL w except of base excess. Levo titrated down to 0.2mcg/kg/min. LOW UOP continues,   Rectal probe placed - 40.7c/105.3f; R Axillary -> 102.7f/39.3c.
Guppy breathing con't. hypoxic - high 80s. levo minimally titrated down. low UOP continues.
Guppy breathing on vent - 60% FiO2, Peep 5. Levo needs increasing, gautam w minimal UOP.
Current rectal temp 38.5c which began increasing 2hrs s/p initiation of dex gtt (1430). Pts temp steadily increased throughout afternoon. Rectal temp 38.3c at 1700.   Pt currently febrile on low dose precedex infusion (0.2 vs previous max infusion rate 2/13-2/14) w rectal temp 38.5c at 2000.

## 2024-02-18 NOTE — PROVIDER CONTACT NOTE (CHANGE IN STATUS NOTIFICATION) - SITUATION
Pt restarted on precedex gtt during day shift today at ~12pm per documentation. Pt previously holding rectal temps between 37.7c-37.9c.
pt remains febrile despite IV tylenol.
Notified by PCA of temp 101.9f/38.8c taken at groin.
New breathing pattern, low UOP (<15mL/hr).

## 2024-02-19 LAB
ALBUMIN SERPL ELPH-MCNC: 3.6 G/DL — SIGNIFICANT CHANGE UP (ref 3.3–5)
ALP SERPL-CCNC: 119 U/L — SIGNIFICANT CHANGE UP (ref 40–120)
ALT FLD-CCNC: 43 U/L — SIGNIFICANT CHANGE UP (ref 12–78)
ANION GAP SERPL CALC-SCNC: 6 MMOL/L — SIGNIFICANT CHANGE UP (ref 5–17)
AST SERPL-CCNC: 65 U/L — HIGH (ref 15–37)
BASE EXCESS BLDA CALC-SCNC: 1.7 MMOL/L — SIGNIFICANT CHANGE UP (ref -2–3)
BASE EXCESS BLDA CALC-SCNC: 3.9 MMOL/L — HIGH (ref -2–3)
BASOPHILS # BLD AUTO: 0.05 K/UL — SIGNIFICANT CHANGE UP (ref 0–0.2)
BASOPHILS NFR BLD AUTO: 0.4 % — SIGNIFICANT CHANGE UP (ref 0–2)
BILIRUB SERPL-MCNC: 5.5 MG/DL — HIGH (ref 0.2–1.2)
BLOOD GAS COMMENTS ARTERIAL: SIGNIFICANT CHANGE UP
BLOOD GAS COMMENTS ARTERIAL: SIGNIFICANT CHANGE UP
BUN SERPL-MCNC: 47 MG/DL — HIGH (ref 7–23)
CA-I BLD-SCNC: 3.8 MG/DL — LOW (ref 4.5–5.6)
CALCIUM SERPL-MCNC: 7.2 MG/DL — LOW (ref 8.5–10.1)
CHLORIDE SERPL-SCNC: 108 MMOL/L — SIGNIFICANT CHANGE UP (ref 96–108)
CO2 BLDA-SCNC: 28 MMOL/L — HIGH (ref 19–24)
CO2 BLDA-SCNC: 30 MMOL/L — HIGH (ref 19–24)
CO2 SERPL-SCNC: 29 MMOL/L — SIGNIFICANT CHANGE UP (ref 22–31)
CREAT SERPL-MCNC: 1.63 MG/DL — HIGH (ref 0.5–1.3)
CULTURE RESULTS: SIGNIFICANT CHANGE UP
EGFR: 51 ML/MIN/1.73M2 — LOW
EOSINOPHIL # BLD AUTO: 0 K/UL — SIGNIFICANT CHANGE UP (ref 0–0.5)
EOSINOPHIL NFR BLD AUTO: 0 % — SIGNIFICANT CHANGE UP (ref 0–6)
GAS PNL BLDA: SIGNIFICANT CHANGE UP
GLUCOSE BLDC GLUCOMTR-MCNC: 137 MG/DL — HIGH (ref 70–99)
GLUCOSE BLDC GLUCOMTR-MCNC: 141 MG/DL — HIGH (ref 70–99)
GLUCOSE BLDC GLUCOMTR-MCNC: 155 MG/DL — HIGH (ref 70–99)
GLUCOSE BLDC GLUCOMTR-MCNC: 180 MG/DL — HIGH (ref 70–99)
GLUCOSE SERPL-MCNC: 146 MG/DL — HIGH (ref 70–99)
GRAM STN FLD: SIGNIFICANT CHANGE UP
HCO3 BLDA-SCNC: 27 MMOL/L — SIGNIFICANT CHANGE UP (ref 21–28)
HCO3 BLDA-SCNC: 28 MMOL/L — SIGNIFICANT CHANGE UP (ref 21–28)
HCT VFR BLD CALC: 30.5 % — LOW (ref 39–50)
HGB BLD-MCNC: 9.7 G/DL — LOW (ref 13–17)
HOROWITZ INDEX BLDA+IHG-RTO: 0.4 — SIGNIFICANT CHANGE UP
HOROWITZ INDEX BLDA+IHG-RTO: 70 — SIGNIFICANT CHANGE UP
IMM GRANULOCYTES NFR BLD AUTO: 5.2 % — HIGH (ref 0–0.9)
LIDOCAIN IGE QN: 11 U/L — LOW (ref 13–75)
LYMPHOCYTES # BLD AUTO: 0.94 K/UL — LOW (ref 1–3.3)
LYMPHOCYTES # BLD AUTO: 7.6 % — LOW (ref 13–44)
MAGNESIUM SERPL-MCNC: 2.5 MG/DL — SIGNIFICANT CHANGE UP (ref 1.6–2.6)
MCHC RBC-ENTMCNC: 31.7 PG — SIGNIFICANT CHANGE UP (ref 27–34)
MCHC RBC-ENTMCNC: 31.8 G/DL — LOW (ref 32–36)
MCV RBC AUTO: 99.7 FL — SIGNIFICANT CHANGE UP (ref 80–100)
MONOCYTES # BLD AUTO: 1.13 K/UL — HIGH (ref 0–0.9)
MONOCYTES NFR BLD AUTO: 9.2 % — SIGNIFICANT CHANGE UP (ref 2–14)
NEUTROPHILS # BLD AUTO: 9.53 K/UL — HIGH (ref 1.8–7.4)
NEUTROPHILS NFR BLD AUTO: 77.6 % — HIGH (ref 43–77)
NRBC # BLD: 0 /100 WBCS — SIGNIFICANT CHANGE UP (ref 0–0)
PCO2 BLDA: 42 MMHG — SIGNIFICANT CHANGE UP (ref 32–46)
PCO2 BLDA: 42 MMHG — SIGNIFICANT CHANGE UP (ref 32–46)
PH BLDA: 7.41 — SIGNIFICANT CHANGE UP (ref 7.35–7.45)
PH BLDA: 7.44 — SIGNIFICANT CHANGE UP (ref 7.35–7.45)
PHOSPHATE SERPL-MCNC: 3.4 MG/DL — SIGNIFICANT CHANGE UP (ref 2.5–4.5)
PLATELET # BLD AUTO: 189 K/UL — SIGNIFICANT CHANGE UP (ref 150–400)
PO2 BLDA: 76 MMHG — LOW (ref 83–108)
PO2 BLDA: 94 MMHG — SIGNIFICANT CHANGE UP (ref 83–108)
POTASSIUM SERPL-MCNC: 4 MMOL/L — SIGNIFICANT CHANGE UP (ref 3.5–5.3)
POTASSIUM SERPL-SCNC: 4 MMOL/L — SIGNIFICANT CHANGE UP (ref 3.5–5.3)
PROT SERPL-MCNC: 7.1 GM/DL — SIGNIFICANT CHANGE UP (ref 6–8.3)
RBC # BLD: 3.06 M/UL — LOW (ref 4.2–5.8)
RBC # FLD: 16 % — HIGH (ref 10.3–14.5)
SAO2 % BLDA: 96.8 % — SIGNIFICANT CHANGE UP (ref 94–98)
SAO2 % BLDA: 98.8 % — HIGH (ref 94–98)
SODIUM SERPL-SCNC: 143 MMOL/L — SIGNIFICANT CHANGE UP (ref 135–145)
SPECIMEN SOURCE: SIGNIFICANT CHANGE UP
WBC # BLD: 12.29 K/UL — HIGH (ref 3.8–10.5)
WBC # FLD AUTO: 12.29 K/UL — HIGH (ref 3.8–10.5)

## 2024-02-19 PROCEDURE — 93970 EXTREMITY STUDY: CPT | Mod: 26

## 2024-02-19 PROCEDURE — 93308 TTE F-UP OR LMTD: CPT | Mod: 26

## 2024-02-19 PROCEDURE — 99291 CRITICAL CARE FIRST HOUR: CPT | Mod: 25

## 2024-02-19 PROCEDURE — 76604 US EXAM CHEST: CPT | Mod: 26

## 2024-02-19 RX ORDER — THIAMINE MONONITRATE (VIT B1) 100 MG
100 TABLET ORAL DAILY
Refills: 0 | Status: DISCONTINUED | OUTPATIENT
Start: 2024-02-19 | End: 2024-02-29

## 2024-02-19 RX ORDER — ACETAMINOPHEN 500 MG
1000 TABLET ORAL ONCE
Refills: 0 | Status: COMPLETED | OUTPATIENT
Start: 2024-02-19 | End: 2024-02-19

## 2024-02-19 RX ORDER — DEXAMETHASONE 0.5 MG/5ML
20 ELIXIR ORAL EVERY 24 HOURS
Refills: 0 | Status: COMPLETED | OUTPATIENT
Start: 2024-02-19 | End: 2024-02-23

## 2024-02-19 RX ADMIN — Medication 7.01 MICROGRAM(S)/KG/MIN: at 16:45

## 2024-02-19 RX ADMIN — PIPERACILLIN AND TAZOBACTAM 25 GRAM(S): 4; .5 INJECTION, POWDER, LYOPHILIZED, FOR SOLUTION INTRAVENOUS at 21:38

## 2024-02-19 RX ADMIN — PIPERACILLIN AND TAZOBACTAM 25 GRAM(S): 4; .5 INJECTION, POWDER, LYOPHILIZED, FOR SOLUTION INTRAVENOUS at 14:22

## 2024-02-19 RX ADMIN — Medication 40 MILLIGRAM(S): at 05:01

## 2024-02-19 RX ADMIN — PROPOFOL 8.98 MICROGRAM(S)/KG/MIN: 10 INJECTION, EMULSION INTRAVENOUS at 19:25

## 2024-02-19 RX ADMIN — CHLORHEXIDINE GLUCONATE 15 MILLILITER(S): 213 SOLUTION TOPICAL at 05:01

## 2024-02-19 RX ADMIN — Medication 3 MILLILITER(S): at 17:10

## 2024-02-19 RX ADMIN — PROPOFOL 8.98 MICROGRAM(S)/KG/MIN: 10 INJECTION, EMULSION INTRAVENOUS at 16:46

## 2024-02-19 RX ADMIN — FLUCONAZOLE 100 MILLIGRAM(S): 150 TABLET ORAL at 12:05

## 2024-02-19 RX ADMIN — CHLORHEXIDINE GLUCONATE 1 APPLICATION(S): 213 SOLUTION TOPICAL at 05:01

## 2024-02-19 RX ADMIN — Medication 130 MILLIGRAM(S): at 05:27

## 2024-02-19 RX ADMIN — Medication 50 MILLILITER(S): at 05:00

## 2024-02-19 RX ADMIN — PANTOPRAZOLE SODIUM 40 MILLIGRAM(S): 20 TABLET, DELAYED RELEASE ORAL at 11:04

## 2024-02-19 RX ADMIN — CHLORHEXIDINE GLUCONATE 15 MILLILITER(S): 213 SOLUTION TOPICAL at 18:22

## 2024-02-19 RX ADMIN — Medication 1000 MILLIGRAM(S): at 15:20

## 2024-02-19 RX ADMIN — Medication 1 MILLIGRAM(S): at 12:03

## 2024-02-19 RX ADMIN — PIPERACILLIN AND TAZOBACTAM 25 GRAM(S): 4; .5 INJECTION, POWDER, LYOPHILIZED, FOR SOLUTION INTRAVENOUS at 05:01

## 2024-02-19 RX ADMIN — Medication 1 APPLICATION(S): at 18:23

## 2024-02-19 RX ADMIN — FENTANYL CITRATE 3.74 MICROGRAM(S)/KG/HR: 50 INJECTION INTRAVENOUS at 21:39

## 2024-02-19 RX ADMIN — Medication 100 MILLIGRAM(S): at 12:04

## 2024-02-19 RX ADMIN — Medication 110 MILLIGRAM(S): at 12:05

## 2024-02-19 RX ADMIN — Medication 3 MILLILITER(S): at 11:53

## 2024-02-19 RX ADMIN — Medication 1 APPLICATION(S): at 05:01

## 2024-02-19 RX ADMIN — PROPOFOL 8.98 MICROGRAM(S)/KG/MIN: 10 INJECTION, EMULSION INTRAVENOUS at 10:29

## 2024-02-19 RX ADMIN — FENTANYL CITRATE 3.74 MICROGRAM(S)/KG/HR: 50 INJECTION INTRAVENOUS at 19:24

## 2024-02-19 RX ADMIN — Medication 3 MILLILITER(S): at 05:15

## 2024-02-19 RX ADMIN — FENTANYL CITRATE 3.74 MICROGRAM(S)/KG/HR: 50 INJECTION INTRAVENOUS at 11:08

## 2024-02-19 RX ADMIN — Medication 400 MILLIGRAM(S): at 14:23

## 2024-02-19 RX ADMIN — ENOXAPARIN SODIUM 40 MILLIGRAM(S): 100 INJECTION SUBCUTANEOUS at 11:04

## 2024-02-19 NOTE — PROGRESS NOTE ADULT - ASSESSMENT
48M w/ cirrhosis, EtOH use disorder, open reduction metatarsal fracture. PResents w/ epigastric pain and vomiting. Found to have perforated duodenal ulcer w/ kyrie patch. Transferred to ICU post-op. Course complicated by ARDS, septic shock, and EMILIA.     #Neuro - sedation w/ propofol and fentanyl, no SAT yet given that pt still has relatively high O2 requirements; off paralytics; d/c phenobarb as doubt pt is withdrawing   #CV - shock state improved, now on low dose norepi likely due to sedation, titrate to MAP >/65-70  #Pulm - ARDS likely in setting of aspiration, slowly improving, FiO2 requirements improved; titrate vent to ABG; once FiO2 at 50%, start to decrease PEEP; continue duonebs; steroids for ARDS - decadron 20 mg x5 days followed by decadron 10 mg x5 days  #ID- continue w/ zosyn and diflucan for strept mitis and salivaris, as well as candida in peritoneal fluid culture; pt having low grade fevers, stable WBC count; check LE dopplers; monitor for now   #Renal/metabolic - EMILIA likely ATN, non-oliguring; has received both fluids and diuretics, monitor today without either; d/c albumin; monitor I/Os, electrolytes  #GI- s/p kyrie patch for perforated duodenum; pt remains NPO, NGT to ILWS, having BM; f/u w/ surgery regarding enteral vs parenteral feeds; PPI  #Heme - noted drop in hgb, no active bleeding; monitor for now  #Endo - FS q6 while on decadron; MVI, folate, thiamine  #Skin - surgical site care per surgery  #PPx - lovenox q24  #Dispo- remains in ICU in critical condition ventilator dependent; prognosis very guarded; full code    Plan of care discussed w/ pt's wife at bedside and sister on phone- all questions answered

## 2024-02-19 NOTE — CHART NOTE - NSCHARTNOTEFT_GEN_A_CORE
:  DENYS [address]erre[address] Dr    Indication:  SHOCK    PROCEDURE:  [x ] LIMITED ECHO  [x ] LIMITED CHEST  [ ] LIMITED RETROPERITONEAL  [ ] LIMITED ABDOMINAL  [ ] LIMITED DVT  [ ] NEEDLE GUIDANCE VASCULAR  [ ] NEEDLE GUIDANCE THORACENTESIS  [ ] NEEDLE GUIDANCE PARACENTESIS  [ ] NEEDLE GUIDANCE PERICARDIOCENTESIS  [ ] OTHER    FINDINGS:  Chest: diffuse B lines bilat with trace R sided effusion    ECHO: LV>RV with some RV enlargement with grossly normal LV systolic function with no wall motion abnormalities, nor septal bowing/flattening  No pericardial effusion  IVC: >2cm and plethoric      INTERPRETATION:  lung exam with diffuse B lines bilat  grossly nl LV function with some RV dilation  volume intolerant      images uploaded to [employer]

## 2024-02-19 NOTE — PROGRESS NOTE ADULT - SUBJECTIVE AND OBJECTIVE BOX
CHIEF COMPLAINT:    Interval Events:    REVIEW OF SYSTEMS:  Constitutional: [ ] fevers [ ] chills [ ] weight loss [ ] weight gain  HEENT: [ ] dry eyes [ ] eye irritation [ ] postnasal drip [ ] nasal congestion  CV: [ ] chest pain [ ] orthopnea [ ] palpitations [ ] murmur  Resp: [ ] cough [ ] shortness of breath [ ] dyspnea [ ] wheezing [ ] sputum [ ] hemoptysis  GI: [ ] nausea [ ] vomiting [ ] diarrhea [ ] constipation [ ] abd pain [ ] dysphagia   : [ ] dysuria [ ] nocturia [ ] hematuria [ ] increased urinary frequency  Musculoskeletal: [ ] back pain [ ] myalgias [ ] arthralgias [ ] fracture  Skin: [ ] rash [ ] itch  Neurological: [ ] headache [ ] dizziness [ ] syncope [ ] weakness [ ] numbness  Hematologic/Lymphatic: [ ] anemia [ ] bleeding problem  Allergic/Immunologic: [ ] itchy eyes [ ] nasal discharge [ ] hives [ ] angioedema  [ ] All other systems negative  [ ] Unable to assess ROS because ________    OBJECTIVE:  ICU Vital Signs Last 24 Hrs  T(C): 37.7 (19 Feb 2024 07:00), Max: 38.5 (18 Feb 2024 20:30)  T(F): 99.9 (19 Feb 2024 07:00), Max: 101.3 (18 Feb 2024 20:30)  HR: 56 (19 Feb 2024 07:00) (55 - 80)  BP: 87/55 (19 Feb 2024 04:00) (81/52 - 108/75)  BP(mean): 65 (19 Feb 2024 04:00) (62 - 85)  ABP: 100/55 (19 Feb 2024 07:00) (94/52 - 138/78)  ABP(mean): 69 (19 Feb 2024 07:00) (65 - 97)  RR: 28 (19 Feb 2024 07:00) (18 - 28)  SpO2: 96% (19 Feb 2024 07:00) (89% - 99%)    O2 Parameters below as of 19 Feb 2024 07:00  Patient On (Oxygen Delivery Method): ventilator    O2 Concentration (%): 80      Mode: AC/ CMV (Assist Control/ Continuous Mandatory Ventilation), RR (machine): 28, TV (machine): 400, FiO2: 70, PEEP: 12, ITime: 0.7, MAP: 16, PIP: 31    02-18 @ 07:01  -  02-19 @ 07:00  --------------------------------------------------------  IN: 2924.1 mL / OUT: 2805 mL / NET: 119.1 mL      CAPILLARY BLOOD GLUCOSE      POCT Blood Glucose.: 137 mg/dL (19 Feb 2024 05:12)      PHYSICAL EXAM:  General:   HEENT:   Neck:   Respiratory:   Cardiovascular:   Abdomen:   Extremities:   Skin:   Neurological:  Psychiatry:    LINES:    HOSPITAL MEDICATIONS:  MEDICATIONS  (STANDING):  albumin human 25% IVPB 100 milliLiter(s) IV Intermittent every 8 hours  albuterol/ipratropium for Nebulization 3 milliLiter(s) Nebulizer every 6 hours  chlorhexidine 0.12% Liquid 15 milliLiter(s) Oral Mucosa every 12 hours  chlorhexidine 2% Cloths 1 Application(s) Topical <User Schedule>  enoxaparin Injectable 40 milliGRAM(s) SubCutaneous every 24 hours  fentaNYL   Infusion. 0.5 MICROgram(s)/kG/Hr (3.74 mL/Hr) IV Continuous <Continuous>  fluconAZOLE IVPB 400 milliGRAM(s) IV Intermittent every 24 hours  fluconAZOLE IVPB      folic acid Injectable 1 milliGRAM(s) IV Push daily  influenza   Vaccine 0.5 milliLiter(s) IntraMuscular once  methylPREDNISolone sodium succinate Injectable 40 milliGRAM(s) IV Push two times a day  norepinephrine Infusion 0.05 MICROgram(s)/kG/Min (7.01 mL/Hr) IV Continuous <Continuous>  pantoprazole  Injectable 40 milliGRAM(s) IV Push daily  petrolatum Ophthalmic Ointment 1 Application(s) Both EYES every 12 hours  PHENobarbital Injectable 130 milliGRAM(s) IV Push every 6 hours  piperacillin/tazobactam IVPB.. 3.375 Gram(s) IV Intermittent every 8 hours  propofol Infusion 20 MICROgram(s)/kG/Min (8.98 mL/Hr) IV Continuous <Continuous>    MEDICATIONS  (PRN):  ondansetron Injectable 4 milliGRAM(s) IV Push every 6 hours PRN Nausea  sodium chloride 0.9% lock flush 10 milliLiter(s) IV Push every 1 hour PRN Pre/post blood products, medications, blood draw, and to maintain line patency      LABS:                        9.7    12.29 )-----------( 189      ( 19 Feb 2024 02:41 )             30.5     Hgb Trend: 9.7<--, 9.8<--, 11.0<--, 11.0<--, 11.0<--  02-19    143  |  108  |  47<H>  ----------------------------<  146<H>  4.0   |  29  |  1.63<H>    Ca    7.2<L>      19 Feb 2024 02:41  Phos  3.4     02-19  Mg     2.5     02-19    TPro  7.1  /  Alb  3.6  /  TBili  5.5<H>  /  DBili  x   /  AST  65<H>  /  ALT  43  /  AlkPhos  119  02-19      Urinalysis Basic - ( 19 Feb 2024 02:41 )    Color: x / Appearance: x / SG: x / pH: x  Gluc: 146 mg/dL / Ketone: x  / Bili: x / Urobili: x   Blood: x / Protein: x / Nitrite: x   Leuk Esterase: x / RBC: x / WBC x   Sq Epi: x / Non Sq Epi: x / Bacteria: x      Arterial Blood Gas:  02-19 @ 02:10  7.40/46/109/28/99.7/3.2  ABG lactate: --  Arterial Blood Gas:  02-18 @ 01:50  7.39/52/85/32/97.9/4.8  ABG lactate: --  Arterial Blood Gas:  02-17 @ 08:50  7.36/48/164/27/99.4/1.2  ABG lactate: --        MICROBIOLOGY:     RADIOLOGY:  [ ] Reviewed and interpreted by me CHIEF COMPLAINT:    Interval Events:  FiO2 decreased to 70%   Tmax 100.6  remains on low dose norepi    REVIEW OF SYSTEMS:  [ x] Unable to assess ROS because intubated/sedated    OBJECTIVE:  ICU Vital Signs Last 24 Hrs  T(C): 37.7 (19 Feb 2024 07:00), Max: 38.5 (18 Feb 2024 20:30)  T(F): 99.9 (19 Feb 2024 07:00), Max: 101.3 (18 Feb 2024 20:30)  HR: 56 (19 Feb 2024 07:00) (55 - 80)  BP: 87/55 (19 Feb 2024 04:00) (81/52 - 108/75)  BP(mean): 65 (19 Feb 2024 04:00) (62 - 85)  ABP: 100/55 (19 Feb 2024 07:00) (94/52 - 138/78)  ABP(mean): 69 (19 Feb 2024 07:00) (65 - 97)  RR: 28 (19 Feb 2024 07:00) (18 - 28)  SpO2: 96% (19 Feb 2024 07:00) (89% - 99%)    O2 Parameters below as of 19 Feb 2024 07:00  Patient On (Oxygen Delivery Method): ventilator    O2 Concentration (%): 80      Mode: AC/ CMV (Assist Control/ Continuous Mandatory Ventilation), RR (machine): 28, TV (machine): 400, FiO2: 70, PEEP: 12, ITime: 0.7, MAP: 16, PIP: 31    02-18 @ 07:01  -  02-19 @ 07:00  --------------------------------------------------------  IN: 2924.1 mL / OUT: 2805 mL / NET: 119.1 mL      CAPILLARY BLOOD GLUCOSE      POCT Blood Glucose.: 137 mg/dL (19 Feb 2024 05:12)      PHYSICAL EXAM:  General: NAD, non toxic appearing  HEENT: ETT and NGT in place  Neck: supple  Respiratory: mechanical BS  Cardiovascular: s1s2 RRR  Abdomen: soft, non tender, non distended  Extremities: warm, no edema or clubbing  Skin: midline sutures  Neurological: unable to assess  Psychiatry: unable to assess    LINES:  [facility]    HOSPITAL MEDICATIONS:  MEDICATIONS  (STANDING):  albumin human 25% IVPB 100 milliLiter(s) IV Intermittent every 8 hours  albuterol/ipratropium for Nebulization 3 milliLiter(s) Nebulizer every 6 hours  chlorhexidine 0.12% Liquid 15 milliLiter(s) Oral Mucosa every 12 hours  chlorhexidine 2% Cloths 1 Application(s) Topical <User Schedule>  enoxaparin Injectable 40 milliGRAM(s) SubCutaneous every 24 hours  fentaNYL   Infusion. 0.5 MICROgram(s)/kG/Hr (3.74 mL/Hr) IV Continuous <Continuous>  fluconAZOLE IVPB 400 milliGRAM(s) IV Intermittent every 24 hours  fluconAZOLE IVPB      folic acid Injectable 1 milliGRAM(s) IV Push daily  influenza   Vaccine 0.5 milliLiter(s) IntraMuscular once  methylPREDNISolone sodium succinate Injectable 40 milliGRAM(s) IV Push two times a day  norepinephrine Infusion 0.05 MICROgram(s)/kG/Min (7.01 mL/Hr) IV Continuous <Continuous>  pantoprazole  Injectable 40 milliGRAM(s) IV Push daily  petrolatum Ophthalmic Ointment 1 Application(s) Both EYES every 12 hours  PHENobarbital Injectable 130 milliGRAM(s) IV Push every 6 hours  piperacillin/tazobactam IVPB.. 3.375 Gram(s) IV Intermittent every 8 hours  propofol Infusion 20 MICROgram(s)/kG/Min (8.98 mL/Hr) IV Continuous <Continuous>    MEDICATIONS  (PRN):  ondansetron Injectable 4 milliGRAM(s) IV Push every 6 hours PRN Nausea  sodium chloride 0.9% lock flush 10 milliLiter(s) IV Push every 1 hour PRN Pre/post blood products, medications, blood draw, and to maintain line patency      LABS:                        9.7    12.29 )-----------( 189      ( 19 Feb 2024 02:41 )             30.5     Hgb Trend: 9.7<--, 9.8<--, 11.0<--, 11.0<--, 11.0<--  02-19    143  |  108  |  47<H>  ----------------------------<  146<H>  4.0   |  29  |  1.63<H>    Ca    7.2<L>      19 Feb 2024 02:41  Phos  3.4     02-19  Mg     2.5     02-19    TPro  7.1  /  Alb  3.6  /  TBili  5.5<H>  /  DBili  x   /  AST  65<H>  /  ALT  43  /  AlkPhos  119  02-19      Urinalysis Basic - ( 19 Feb 2024 02:41 )    Color: x / Appearance: x / SG: x / pH: x  Gluc: 146 mg/dL / Ketone: x  / Bili: x / Urobili: x   Blood: x / Protein: x / Nitrite: x   Leuk Esterase: x / RBC: x / WBC x   Sq Epi: x / Non Sq Epi: x / Bacteria: x      Arterial Blood Gas:  02-19 @ 02:10  7.40/46/109/28/99.7/3.2  ABG lactate: --  Arterial Blood Gas:  02-18 @ 01:50  7.39/52/85/32/97.9/4.8  ABG lactate: --  Arterial Blood Gas:  02-17 @ 08:50  7.36/48/164/27/99.4/1.2  ABG lactate: --        MICROBIOLOGY:     Culture - Body Fluid with Gram Stain (02.13.24 @ 05:10)    -  Fluconazole: S 0.5 Fluconazole: Susceptibility depends on achieving the maximum possible blood level. Doses higher than the standard dosing amount (6mg/kg/day) may be needed in adults with normal renal function and body habitus.    This test was developed and itsperformance characteristics determined by [facility] - [facility], [place], [place]  It has not been cleared or approved by the U.[place]. Food and Drug Administration. S - Susceptible; SDD - Susceptible Dose Dependent; I - Intermediate; R - Resistant; N/I - No Interpretation Available   Gram Stain:   Few polymorphonuclear leukocytes seen per low power field  No organisms seen per oil power field   -  Ceftriaxone: S <=0.25   -  Ceftriaxone: S <=0.25   -  Penicillin: S <=0.03   -  Penicillin: I 0.25   -  Vancomycin: S 0.5   -  Vancomycin: S 1   Specimen Source: Peritoneal peritoneal fluida c/s   Culture Results:   Rare Streptococcus salivarius/vestibularis group  Rare Streptococcus mitis/oralis group  Rare Candida albicans   Organism Identification: Streptococcus mitis/oralis group  Streptococcus salivarius/vestibularis group  Candida albicans   Organism: Streptococcus mitis/oralis group   Organism: Streptococcus salivarius/vestibularis group   Organism: Candida albicans   Method Type: YSTMIC   Method Type: JOSY   Method Type: JOSY        RADIOLOGY:  [ ] Reviewed and interpreted by me

## 2024-02-19 NOTE — PROGRESS NOTE ADULT - SUBJECTIVE AND OBJECTIVE BOX
SURGERY PROGRESS HPI:  POD#6  Pt seen and examined at bedside intubated in the ICU. +BM yesterday.     Vital Signs Last 24 Hrs  T(C): 38.1 (19 Feb 2024 04:00), Max: 38.5 (18 Feb 2024 20:30)  T(F): 100.6 (19 Feb 2024 04:00), Max: 101.3 (18 Feb 2024 20:30)  HR: 59 (19 Feb 2024 04:22) (59 - 68)  BP: 87/55 (19 Feb 2024 04:00) (81/52 - 108/75)  BP(mean): 65 (19 Feb 2024 04:00) (62 - 85)  RR: 28 (19 Feb 2024 04:00) (25 - 28)  SpO2: 95% (19 Feb 2024 04:22) (93% - 99%)    Parameters below as of 19 Feb 2024 04:00  Patient On (Oxygen Delivery Method): ventilator    O2 Concentration (%): 80    PHYSICAL EXAM:  CONSTITUTIONAL: NAD  HEENT: NGT in place  RESPIRATORY: Intubated. Clear to ausculation, bilaterally   CARDIOVASCULAR: RRR S1S2  GASTROINTESTINAL: Midline incision with staples intact. FOX with serous output. non distended, soft, appropriate incisional tenderness  : Barber indwelling with clear yellow urine  MUSCULOSKELETAL: calf soft, non tender b/l    I&O's Detail    17 Feb 2024 07:01  -  18 Feb 2024 07:00  --------------------------------------------------------  IN:    Bumetanide: 30 mL    Cisatracurium: 18 mL    FentaNYL: 703.3 mL    IV PiggyBack: 200 mL    IV PiggyBack: 300 mL    IV PiggyBack: 300 mL    IV PiggyBack: 600 mL    Norepinephrine: 203.5 mL    Propofol: 394.4 mL    Vasopressin: 18 mL  Total IN: 2767.2 mL    OUT:    Drain (mL): 675 mL    Indwelling Catheter - Urethral (mL): 3005 mL    Nasogastric/Oral tube (mL): 50 mL  Total OUT: 3730 mL    Total NET: -962.8 mL      18 Feb 2024 07:01  -  19 Feb 2024 05:45  --------------------------------------------------------  IN:    Dexmedetomidine: 36.4 mL    FentaNYL: 576.9 mL    IV PiggyBack: 200 mL    IV PiggyBack: 200 mL    IV PiggyBack: 225 mL    Lactated Ringers Bolus: 1000 mL    Norepinephrine: 99.3 mL    Propofol: 277 mL  Total IN: 2614.6 mL    OUT:    Drain (mL): 665 mL    Indwelling Catheter - Urethral (mL): 1540 mL    Nasogastric/Oral tube (mL): 250 mL  Total OUT: 2455 mL    Total NET: 159.6 mL    LABS:                        9.7    12.29 )-----------( 189      ( 19 Feb 2024 02:41 )             30.5     02-19    143  |  108  |  47<H>  ----------------------------<  146<H>  4.0   |  29  |  1.63<H>    Ca    7.2<L>      19 Feb 2024 02:41  Phos  3.4     02-19  Mg     2.5     02-19    TPro  7.1  /  Alb  3.6  /  TBili  5.5<H>  /  DBili  x   /  AST  65<H>  /  ALT  43  /  AlkPhos  119  02-19      Urinalysis Basic - ( 19 Feb 2024 02:41 )    Color: x / Appearance: x / SG: x / pH: x  Gluc: 146 mg/dL / Ketone: x  / Bili: x / Urobili: x   Blood: x / Protein: x / Nitrite: x   Leuk Esterase: x / RBC: x / WBC x   Sq Epi: x / Non Sq Epi: x / Bacteria: x      Culture Results:   Normal Respiratory [place] present (02-16 @ 15:16)  Culture Results:   No growth at 48 Hours (02-16 @ 12:30)  Culture Results:   No growth at 48 Hours (02-16 @ 12:00)      Assessment: 48M with duodenal perforation s/p ex lap, kyrie patch POD#6. Intubated. On Levophed.   OR cx: rare streptococcus salivarius/vestibularis group,rare streptococcus mitis oralis group rare candida albicans. EMILIA.     Plan:  -UGIS planning today  -NPO, NGT, IV hydration  -continue local wound care, FOX care  -antibiotics per ID  -Barber, monitor urine output  -f/u labs  -ICU management

## 2024-02-20 LAB
ALBUMIN SERPL ELPH-MCNC: 3.2 G/DL — LOW (ref 3.3–5)
ALP SERPL-CCNC: 120 U/L — SIGNIFICANT CHANGE UP (ref 40–120)
ALT FLD-CCNC: 44 U/L — SIGNIFICANT CHANGE UP (ref 12–78)
ANION GAP SERPL CALC-SCNC: 5 MMOL/L — SIGNIFICANT CHANGE UP (ref 5–17)
AST SERPL-CCNC: 50 U/L — HIGH (ref 15–37)
BASE EXCESS BLDA CALC-SCNC: 2.2 MMOL/L — SIGNIFICANT CHANGE UP (ref -2–3)
BILIRUB SERPL-MCNC: 4.4 MG/DL — HIGH (ref 0.2–1.2)
BLOOD GAS COMMENTS ARTERIAL: SIGNIFICANT CHANGE UP
BUN SERPL-MCNC: 56 MG/DL — HIGH (ref 7–23)
CALCIUM SERPL-MCNC: 7.7 MG/DL — LOW (ref 8.5–10.1)
CHLORIDE SERPL-SCNC: 114 MMOL/L — HIGH (ref 96–108)
CO2 BLDA-SCNC: 29 MMOL/L — HIGH (ref 19–24)
CO2 SERPL-SCNC: 29 MMOL/L — SIGNIFICANT CHANGE UP (ref 22–31)
CREAT SERPL-MCNC: 1.3 MG/DL — SIGNIFICANT CHANGE UP (ref 0.5–1.3)
EGFR: 67 ML/MIN/1.73M2 — SIGNIFICANT CHANGE UP
GAS PNL BLDA: SIGNIFICANT CHANGE UP
GLUCOSE BLDC GLUCOMTR-MCNC: 134 MG/DL — HIGH (ref 70–99)
GLUCOSE BLDC GLUCOMTR-MCNC: 136 MG/DL — HIGH (ref 70–99)
GLUCOSE BLDC GLUCOMTR-MCNC: 148 MG/DL — HIGH (ref 70–99)
GLUCOSE SERPL-MCNC: 158 MG/DL — HIGH (ref 70–99)
HCO3 BLDA-SCNC: 27 MMOL/L — SIGNIFICANT CHANGE UP (ref 21–28)
HCT VFR BLD CALC: 33 % — LOW (ref 39–50)
HGB BLD-MCNC: 10.7 G/DL — LOW (ref 13–17)
HOROWITZ INDEX BLDA+IHG-RTO: 0.5 — SIGNIFICANT CHANGE UP
MAGNESIUM SERPL-MCNC: 2.8 MG/DL — HIGH (ref 1.6–2.6)
MCHC RBC-ENTMCNC: 32.4 G/DL — SIGNIFICANT CHANGE UP (ref 32–36)
MCHC RBC-ENTMCNC: 32.5 PG — SIGNIFICANT CHANGE UP (ref 27–34)
MCV RBC AUTO: 100.3 FL — HIGH (ref 80–100)
NRBC # BLD: 0 /100 WBCS — SIGNIFICANT CHANGE UP (ref 0–0)
PCO2 BLDA: 43 MMHG — SIGNIFICANT CHANGE UP (ref 32–46)
PH BLDA: 7.41 — SIGNIFICANT CHANGE UP (ref 7.35–7.45)
PHOSPHATE SERPL-MCNC: 2.7 MG/DL — SIGNIFICANT CHANGE UP (ref 2.5–4.5)
PLATELET # BLD AUTO: 199 K/UL — SIGNIFICANT CHANGE UP (ref 150–400)
PO2 BLDA: 81 MMHG — LOW (ref 83–108)
POTASSIUM SERPL-MCNC: 3.4 MMOL/L — LOW (ref 3.5–5.3)
POTASSIUM SERPL-SCNC: 3.4 MMOL/L — LOW (ref 3.5–5.3)
PROT SERPL-MCNC: 6.8 GM/DL — SIGNIFICANT CHANGE UP (ref 6–8.3)
RBC # BLD: 3.29 M/UL — LOW (ref 4.2–5.8)
RBC # FLD: 15.9 % — HIGH (ref 10.3–14.5)
SAO2 % BLDA: 96.8 % — SIGNIFICANT CHANGE UP (ref 94–98)
SODIUM SERPL-SCNC: 148 MMOL/L — HIGH (ref 135–145)
WBC # BLD: 8.94 K/UL — SIGNIFICANT CHANGE UP (ref 3.8–10.5)
WBC # FLD AUTO: 8.94 K/UL — SIGNIFICANT CHANGE UP (ref 3.8–10.5)

## 2024-02-20 PROCEDURE — 99233 SBSQ HOSP IP/OBS HIGH 50: CPT

## 2024-02-20 PROCEDURE — 93931 UPPER EXTREMITY STUDY: CPT | Mod: 26

## 2024-02-20 PROCEDURE — 99291 CRITICAL CARE FIRST HOUR: CPT

## 2024-02-20 RX ORDER — POTASSIUM CHLORIDE 20 MEQ
10 PACKET (EA) ORAL
Refills: 0 | Status: COMPLETED | OUTPATIENT
Start: 2024-02-20 | End: 2024-02-20

## 2024-02-20 RX ORDER — CEFTRIAXONE 500 MG/1
1000 INJECTION, POWDER, FOR SOLUTION INTRAMUSCULAR; INTRAVENOUS EVERY 24 HOURS
Refills: 0 | Status: COMPLETED | OUTPATIENT
Start: 2024-02-20 | End: 2024-02-22

## 2024-02-20 RX ORDER — CALCIUM GLUCONATE 100 MG/ML
1 VIAL (ML) INTRAVENOUS ONCE
Refills: 0 | Status: COMPLETED | OUTPATIENT
Start: 2024-02-20 | End: 2024-02-20

## 2024-02-20 RX ORDER — SODIUM CHLORIDE 9 MG/ML
1000 INJECTION, SOLUTION INTRAVENOUS
Refills: 0 | Status: DISCONTINUED | OUTPATIENT
Start: 2024-02-20 | End: 2024-02-23

## 2024-02-20 RX ADMIN — CEFTRIAXONE 100 MILLIGRAM(S): 500 INJECTION, POWDER, FOR SOLUTION INTRAMUSCULAR; INTRAVENOUS at 14:19

## 2024-02-20 RX ADMIN — Medication 100 GRAM(S): at 06:37

## 2024-02-20 RX ADMIN — FENTANYL CITRATE 3.74 MICROGRAM(S)/KG/HR: 50 INJECTION INTRAVENOUS at 20:22

## 2024-02-20 RX ADMIN — PANTOPRAZOLE SODIUM 40 MILLIGRAM(S): 20 TABLET, DELAYED RELEASE ORAL at 11:12

## 2024-02-20 RX ADMIN — Medication 1 APPLICATION(S): at 05:30

## 2024-02-20 RX ADMIN — SODIUM CHLORIDE 40 MILLILITER(S): 9 INJECTION, SOLUTION INTRAVENOUS at 09:57

## 2024-02-20 RX ADMIN — PIPERACILLIN AND TAZOBACTAM 25 GRAM(S): 4; .5 INJECTION, POWDER, LYOPHILIZED, FOR SOLUTION INTRAVENOUS at 05:30

## 2024-02-20 RX ADMIN — Medication 110 MILLIGRAM(S): at 11:12

## 2024-02-20 RX ADMIN — CHLORHEXIDINE GLUCONATE 15 MILLILITER(S): 213 SOLUTION TOPICAL at 18:16

## 2024-02-20 RX ADMIN — Medication 3 MILLILITER(S): at 11:40

## 2024-02-20 RX ADMIN — CHLORHEXIDINE GLUCONATE 15 MILLILITER(S): 213 SOLUTION TOPICAL at 05:30

## 2024-02-20 RX ADMIN — FLUCONAZOLE 100 MILLIGRAM(S): 150 TABLET ORAL at 11:13

## 2024-02-20 RX ADMIN — Medication 3 MILLILITER(S): at 23:07

## 2024-02-20 RX ADMIN — Medication 3 MILLILITER(S): at 17:20

## 2024-02-20 RX ADMIN — Medication 1 MILLIGRAM(S): at 12:55

## 2024-02-20 RX ADMIN — Medication 1 APPLICATION(S): at 18:16

## 2024-02-20 RX ADMIN — Medication 100 MILLIEQUIVALENT(S): at 06:28

## 2024-02-20 RX ADMIN — Medication 100 MILLIGRAM(S): at 11:12

## 2024-02-20 RX ADMIN — Medication 3 MILLILITER(S): at 00:45

## 2024-02-20 RX ADMIN — Medication 100 MILLIEQUIVALENT(S): at 07:36

## 2024-02-20 RX ADMIN — Medication 3 MILLILITER(S): at 05:41

## 2024-02-20 RX ADMIN — ENOXAPARIN SODIUM 40 MILLIGRAM(S): 100 INJECTION SUBCUTANEOUS at 11:12

## 2024-02-20 RX ADMIN — FENTANYL CITRATE 3.74 MICROGRAM(S)/KG/HR: 50 INJECTION INTRAVENOUS at 08:20

## 2024-02-20 RX ADMIN — CHLORHEXIDINE GLUCONATE 1 APPLICATION(S): 213 SOLUTION TOPICAL at 05:29

## 2024-02-20 RX ADMIN — Medication 100 MILLIEQUIVALENT(S): at 05:30

## 2024-02-20 NOTE — PROGRESS NOTE ADULT - SUBJECTIVE AND OBJECTIVE BOX
[facility]  INFECTIOUS DISEASES   [address] [place] [address]  Tel: [phone]     Fax: [phone]  ==============================================================================  DO Jony Baron MD Alexandra Gutman, NP   ==============================================================================      PRIYANKA PITTMAN  MRN-[id number]  48y ([birth year])      Interval History: remains intubated , afebrile, UGIS pending which will likely happen tomorrow, finished 7 days of zosyn, have started ceftriaxone for 3 more day and continuing fluconazole    ROS:    [x ] Unobtainable because:intubated and sedated   [ ] All other systems negative except as noted    Constitutional: no fever, no chills  Head: no trauma  Eyes: no vision changes, no eye pain  ENT:  no sore throat, no rhinorrhea  Cardiovascular:  no chest pain, no palpitation  Respiratory:  no SOB, no cough  GI:  no abd pain, no vomiting, no diarrhea  urinary: no dysuria, no hematuria, no flank pain  musculoskeletal:  no joint pain, no joint swelling  skin:  no rash  neurology:  no headache, no seizure, no change in mental status  psych: no anxiety, no depression         Allergies  No Known Allergies      ANTIMICROBIALS  cefTRIAXone   IVPB 1000 every 24 hours  fluconAZOLE IVPB    fluconAZOLE IVPB 400 every 24 hours  influenza   Vaccine 0.5 once      MEDICATIONS  (STANDING):  albuterol/ipratropium for Nebulization 3 every 6 hours  dexAMETHasone  IVPB 20 every 24 hours  enoxaparin Injectable 40 every 24 hours  fentaNYL   Infusion. 0.5 <Continuous>  influenza   Vaccine 0.5 once  pantoprazole  Injectable 40 daily  propofol Infusion 20 <Continuous>      PRN      Physical Exam:  Vital Signs Last 24 Hrs  T(F): 98.4 (02-20-24 @ 23:12), Max: 99.7 (02-20-24 @ 18:00)  HR: 75 (02-21-24 @ 00:01)  BP: 111/67 (02-20-24 @ 20:00)  RR: 20 (02-20-24 @ 22:00)  SpO2: 98% (02-21-24 @ 00:01) (92% - 100%)  Wt(kg): --    General:    NAD,  non toxic, remains intubated   Head: atraumatic, normocephalic  Eye: normal sclera and conjunctiva  ENT:    neck supple, +ETT, NGT with green contents   Cardio:     regular S1, S2,  no murmur  Respiratory:    coarse BS b/l,    no wheezing  abd:     soft,   hypoactive BS ,   no tenderness, surgical site intact, FOX drain with serous fluid   :   +gautam  Musculoskeletal:   no joint swelling,   +edema  vascular: central line has been removed, +PIV    Skin:    no rash,   Neurologic:    sedated   psych: sedated       WBC Count: 8.94 K/uL (02-20 @ 04:00)  WBC Count: 12.29 K/uL (02-19 @ 02:41)  WBC Count: 10.95 K/uL (02-18 @ 02:49)  WBC Count: 13.16 K/uL (02-17 @ 03:03)  WBC Count: 17.34 K/uL (02-16 @ 12:45)  WBC Count: 17.68 K/uL (02-16 @ 02:20)  WBC Count: 20.70 K/uL (02-15 @ 03:20)                            10.7   8.94  )-----------( 199      ( 20 Feb 2024 04:00 )             33.0       02-20    148<H>  |  114<H>  |  56<H>  ----------------------------<  158<H>  3.4<L>   |  29  |  1.30    Ca    7.7<L>      20 Feb 2024 04:00  Phos  2.7     02-20  Mg     2.8     02-20    TPro  6.8  /  Alb  3.2<L>  /  TBili  4.4<H>  /  DBili  x   /  AST  50<H>  /  ALT  44  /  AlkPhos  120  02-20      Creatinine Trend: 1.30<--, 1.63<--, 1.25<--, 1.17<--, 0.81<--, 0.72<--        Blood Gas Arterial, Lactate: 1.20 mmol/L (02-20-24 @ 13:16)  Blood Gas Arterial, Lactate: 1.40 mmol/L (02-20-24 @ 09:12)  Blood Gas Arterial, Lactate: 1.30 mmol/L (02-19-24 @ 14:04)  Blood Gas Arterial, Lactate: 1.30 mmol/L (02-19-24 @ 02:10)    MICROBIOLOGY:  Culture - Body Fluid with Gram Stain (02.13.24 @ 05:10)    Gram Stain:   Few polymorphonuclear leukocytes seen per low power field  No organisms seen per oil power field   Specimen Source: Peritoneal peritoneal fluida c/s   Culture Results:   Rare Streptococcus salivarius/vestibularis group  Rare Streptococcus mitis/oralis group        .Blood Blood  02-13-24   No growth at 24 hours  --  --      .Blood Blood  02-13-24   No growth at 24 hours  --  --      Peritoneal peritoneal fluida c/s  02-13-24 --  --    Few polymorphonuclear leukocytes seen per low power field  No organisms seen per oil power field      Clean Catch Clean Catch (Midstream)  02-13-24   No growth  --  --        RADIOLOGY:    < from: Xray Chest 1 View- PORTABLE-Urgent (Xray Chest 1 View- PORTABLE-Urgent .) (02.15.24 @ 12:42) >  IMPRESSION: Endotracheal tube position much improved. Increasing advanced   bilateral infiltrates.    < end of copied text >

## 2024-02-20 NOTE — PROGRESS NOTE ADULT - ASSESSMENT
48M w/ cirrhosis, EtOH use disorder, open reduction metatarsal fracture. PResents w/ epigastric pain and vomiting. Found to have perforated duodenal ulcer w/ kyrie patch. Transferred to ICU post-op. Course complicated by ARDS, septic shock, and EMILIA.     #Neuro - sedation w/ propofol and fentanyl, SAT today since FiO2 requirements decreased  #CV - shock state resolved, off norepi, maintain MAP >/65-70  #Pulm - ARDS likely in setting of aspiration, slowly improving, FiO2 requirements improved; titrate vent to ABG; continue duonebs; steroids for ARDS - decadron 20 mg x5 days followed by decadron 10 mg x5 days  #ID- completed 7 days of zosyn for strept mitis and salavaris, diflucan for candida in peritoneal fluid culture; discuss w/ ID regarding further abx and duration of fluconazole; fevers improved; LE dopplers negative  #Renal/metabolic - EMILIA likely ATN, non-oliguring, improving; monitor I/Os, electrolytes; hypoK repleted; start D5 for hyperNa  #GI- s/p kyrie patch for perforated duodenum; pt remains NPO, NGT to ILWS, having BM; upper GI series today; PPI  #Heme - hgb stable; monitor for now  #Endo - FS q6 while on decadron; MVI, folate, thiamine  #Skin - surgical site care per surgery  #PPx - lovenox q24  #Dispo- remains in ICU in critical condition ventilator dependent; prognosis very guarded; full code    Plan of care discussed w/ pt's wife at bedside and sister on phone- all questions answered

## 2024-02-20 NOTE — PROGRESS NOTE ADULT - ASSESSMENT
48M with duodenal perforation s/p ex lap, kyrie patch POD#7. Intubated. Off Levophed.       Plan:  -UGIS today  -NPO, NGT, IV hydration  -continue local wound care, FOX care  -antibiotics per ID  -Barber, monitor urine output  -f/u labs  -ICU management

## 2024-02-20 NOTE — PROGRESS NOTE ADULT - SUBJECTIVE AND OBJECTIVE BOX
CHIEF COMPLAINT:    Interval Events:    REVIEW OF SYSTEMS:  Constitutional: [ ] fevers [ ] chills [ ] weight loss [ ] weight gain  HEENT: [ ] dry eyes [ ] eye irritation [ ] postnasal drip [ ] nasal congestion  CV: [ ] chest pain [ ] orthopnea [ ] palpitations [ ] murmur  Resp: [ ] cough [ ] shortness of breath [ ] dyspnea [ ] wheezing [ ] sputum [ ] hemoptysis  GI: [ ] nausea [ ] vomiting [ ] diarrhea [ ] constipation [ ] abd pain [ ] dysphagia   : [ ] dysuria [ ] nocturia [ ] hematuria [ ] increased urinary frequency  Musculoskeletal: [ ] back pain [ ] myalgias [ ] arthralgias [ ] fracture  Skin: [ ] rash [ ] itch  Neurological: [ ] headache [ ] dizziness [ ] syncope [ ] weakness [ ] numbness  Hematologic/Lymphatic: [ ] anemia [ ] bleeding problem  Allergic/Immunologic: [ ] itchy eyes [ ] nasal discharge [ ] hives [ ] angioedema  [ ] All other systems negative  [ ] Unable to assess ROS because ________    OBJECTIVE:  ICU Vital Signs Last 24 Hrs  T(C): 36.9 (20 Feb 2024 04:00), Max: 38.6 (19 Feb 2024 16:30)  T(F): 98.4 (20 Feb 2024 04:00), Max: 101.5 (19 Feb 2024 16:30)  HR: 54 (20 Feb 2024 07:00) (45 - 79)  BP: 123/68 (20 Feb 2024 04:00) (89/55 - 123/68)  BP(mean): 84 (20 Feb 2024 04:00) (66 - 84)  ABP: 123/60 (20 Feb 2024 07:00) (100/53 - 137/72)  ABP(mean): 80 (20 Feb 2024 07:00) (66 - 96)  RR: 28 (20 Feb 2024 07:00) (24 - 28)  SpO2: 96% (20 Feb 2024 07:00) (90% - 100%)    O2 Parameters below as of 19 Feb 2024 19:02  Patient On (Oxygen Delivery Method): ventilator          Mode: AC/ CMV (Assist Control/ Continuous Mandatory Ventilation), RR (machine): 28, TV (machine): 400, FiO2: 50, PEEP: 10, ITime: 0.7, MAP: 17, PIP: 32    02-19 @ 07:01  -  02-20 @ 07:00  --------------------------------------------------------  IN: 1158.3 mL / OUT: 3275 mL / NET: -2116.7 mL      CAPILLARY BLOOD GLUCOSE      POCT Blood Glucose.: 134 mg/dL (20 Feb 2024 06:19)      PHYSICAL EXAM:  General:   HEENT:   Neck:   Respiratory:   Cardiovascular:   Abdomen:   Extremities:   Skin:   Neurological:  Psychiatry:    LINES:    HOSPITAL MEDICATIONS:  MEDICATIONS  (STANDING):  albuterol/ipratropium for Nebulization 3 milliLiter(s) Nebulizer every 6 hours  chlorhexidine 0.12% Liquid 15 milliLiter(s) Oral Mucosa every 12 hours  chlorhexidine 2% Cloths 1 Application(s) Topical <User Schedule>  dexAMETHasone  IVPB 20 milliGRAM(s) IV Intermittent every 24 hours  enoxaparin Injectable 40 milliGRAM(s) SubCutaneous every 24 hours  fentaNYL   Infusion. 0.5 MICROgram(s)/kG/Hr (3.74 mL/Hr) IV Continuous <Continuous>  fluconAZOLE IVPB      fluconAZOLE IVPB 400 milliGRAM(s) IV Intermittent every 24 hours  folic acid Injectable 1 milliGRAM(s) IV Push daily  influenza   Vaccine 0.5 milliLiter(s) IntraMuscular once  pantoprazole  Injectable 40 milliGRAM(s) IV Push daily  petrolatum Ophthalmic Ointment 1 Application(s) Both EYES every 12 hours  propofol Infusion 20 MICROgram(s)/kG/Min (8.98 mL/Hr) IV Continuous <Continuous>  thiamine Injectable 100 milliGRAM(s) IV Push daily    MEDICATIONS  (PRN):      LABS:                        10.7   8.94  )-----------( 199      ( 20 Feb 2024 04:00 )             33.0     Hgb Trend: 10.7<--, 9.7<--, 9.8<--, 11.0<--, 11.0<--  02-20    148<H>  |  114<H>  |  56<H>  ----------------------------<  158<H>  3.4<L>   |  29  |  1.30    Ca    7.7<L>      20 Feb 2024 04:00  Phos  2.7     02-20  Mg     2.8     02-20    TPro  6.8  /  Alb  3.2<L>  /  TBili  4.4<H>  /  DBili  x   /  AST  50<H>  /  ALT  44  /  AlkPhos  120  02-20      Urinalysis Basic - ( 20 Feb 2024 04:00 )    Color: x / Appearance: x / SG: x / pH: x  Gluc: 158 mg/dL / Ketone: x  / Bili: x / Urobili: x   Blood: x / Protein: x / Nitrite: x   Leuk Esterase: x / RBC: x / WBC x   Sq Epi: x / Non Sq Epi: x / Bacteria: x      Arterial Blood Gas:  02-20 @ 03:16  7.41/43/81/27/96.8/2.2  ABG lactate: --  Arterial Blood Gas:  02-19 @ 22:07  7.44/42/76/28/96.8/3.9  ABG lactate: --  Arterial Blood Gas:  02-19 @ 14:04  7.45/39/90/27/98.3/2.9  ABG lactate: --  Arterial Blood Gas:  02-19 @ 11:34  7.41/42/94/27/98.8/1.7  ABG lactate: --  Arterial Blood Gas:  02-19 @ 02:10  7.40/46/109/28/99.7/3.2  ABG lactate: --        MICROBIOLOGY:     RADIOLOGY:  [ ] Reviewed and interpreted by me CHIEF COMPLAINT:    Interval Events:  central line removed  FiO2 decreased to 40% and PEEP decreased 10, but desaturated to 80s so FiO2 increased to 50%  had some episodes of bradycardia to 40s    REVIEW OF SYSTEMS:  [ x] Unable to assess ROS because intubated/sedated    OBJECTIVE:  ICU Vital Signs Last 24 Hrs  T(C): 36.9 (20 Feb 2024 04:00), Max: 38.6 (19 Feb 2024 16:30)  T(F): 98.4 (20 Feb 2024 04:00), Max: 101.5 (19 Feb 2024 16:30)  HR: 54 (20 Feb 2024 07:00) (45 - 79)  BP: 123/68 (20 Feb 2024 04:00) (89/55 - 123/68)  BP(mean): 84 (20 Feb 2024 04:00) (66 - 84)  ABP: 123/60 (20 Feb 2024 07:00) (100/53 - 137/72)  ABP(mean): 80 (20 Feb 2024 07:00) (66 - 96)  RR: 28 (20 Feb 2024 07:00) (24 - 28)  SpO2: 96% (20 Feb 2024 07:00) (90% - 100%)    O2 Parameters below as of 19 Feb 2024 19:02  Patient On (Oxygen Delivery Method): ventilator          Mode: AC/ CMV (Assist Control/ Continuous Mandatory Ventilation), RR (machine): 28, TV (machine): 400, FiO2: 50, PEEP: 10, ITime: 0.7, MAP: 17, PIP: 32    02-19 @ 07:01  -  02-20 @ 07:00  --------------------------------------------------------  IN: 1158.3 mL / OUT: 3275 mL / NET: -2116.7 mL      CAPILLARY BLOOD GLUCOSE      POCT Blood Glucose.: 134 mg/dL (20 Feb 2024 06:19)      PHYSICAL EXAM:  General: NAD, non toxic appearing  HEENT: ETT and NGT in place  Neck: supple  Respiratory: mechanical BS  Cardiovascular: s1s2 RRR  Abdomen: soft, non tender, non distended  Extremities: warm, no edema or clubbing  Skin: midline sutures  Neurological: unable to assess  Psychiatry: unable to assess    LINES:  [facility] MEDICATIONS:  MEDICATIONS  (STANDING):  albuterol/ipratropium for Nebulization 3 milliLiter(s) Nebulizer every 6 hours  chlorhexidine 0.12% Liquid 15 milliLiter(s) Oral Mucosa every 12 hours  chlorhexidine 2% Cloths 1 Application(s) Topical <User Schedule>  dexAMETHasone  IVPB 20 milliGRAM(s) IV Intermittent every 24 hours  enoxaparin Injectable 40 milliGRAM(s) SubCutaneous every 24 hours  fentaNYL   Infusion. 0.5 MICROgram(s)/kG/Hr (3.74 mL/Hr) IV Continuous <Continuous>  fluconAZOLE IVPB      fluconAZOLE IVPB 400 milliGRAM(s) IV Intermittent every 24 hours  folic acid Injectable 1 milliGRAM(s) IV Push daily  influenza   Vaccine 0.5 milliLiter(s) IntraMuscular once  pantoprazole  Injectable 40 milliGRAM(s) IV Push daily  petrolatum Ophthalmic Ointment 1 Application(s) Both EYES every 12 hours  propofol Infusion 20 MICROgram(s)/kG/Min (8.98 mL/Hr) IV Continuous <Continuous>  thiamine Injectable 100 milliGRAM(s) IV Push daily    MEDICATIONS  (PRN):      LABS:                        10.7   8.94  )-----------( 199      ( 20 Feb 2024 04:00 )             33.0     Hgb Trend: 10.7<--, 9.7<--, 9.8<--, 11.0<--, 11.0<--  02-20    148<H>  |  114<H>  |  56<H>  ----------------------------<  158<H>  3.4<L>   |  29  |  1.30    Ca    7.7<L>      20 Feb 2024 04:00  Phos  2.7     02-20  Mg     2.8     02-20    TPro  6.8  /  Alb  3.2<L>  /  TBili  4.4<H>  /  DBili  x   /  AST  50<H>  /  ALT  44  /  AlkPhos  120  02-20      Urinalysis Basic - ( 20 Feb 2024 04:00 )    Color: x / Appearance: x / SG: x / pH: x  Gluc: 158 mg/dL / Ketone: x  / Bili: x / Urobili: x   Blood: x / Protein: x / Nitrite: x   Leuk Esterase: x / RBC: x / WBC x   Sq Epi: x / Non Sq Epi: x / Bacteria: x      Arterial Blood Gas:  02-20 @ 03:16  7.41/43/81/27/96.8/2.2  ABG lactate: --  Arterial Blood Gas:  02-19 @ 22:07  7.44/42/76/28/96.8/3.9  ABG lactate: --  Arterial Blood Gas:  02-19 @ 14:04  7.45/39/90/27/98.3/2.9  ABG lactate: --  Arterial Blood Gas:  02-19 @ 11:34  7.41/42/94/27/98.8/1.7  ABG lactate: --  Arterial Blood Gas:  02-19 @ 02:10  7.40/46/109/28/99.7/3.2  ABG lactate: --        MICROBIOLOGY:     RADIOLOGY:  [ ] Reviewed and interpreted by me    < from: US Duplex Venous Lower Ext Complete, Bilateral (02.19.24 @ 13:00) >  INTERPRETATION:  CLINICAL INFORMATION: Fevers. Prolonged ICU stay.   Cirrhosis.    COMPARISON: None available.    TECHNIQUE:Duplex sonography of the BILATERAL LOWER extremity veins with   color and spectral Doppler, with and without compression.    FINDINGS:    RIGHT:  Normal compressibility of the RIGHT common femoral, femoral and popliteal   veins.  Doppler examination shows normal spontaneous and phasic flow.  No RIGHT calf vein thrombosis is detected.    LEFT:  Normal compressibility of the LEFT common femoral, femoral and popliteal   veins.  Doppler examination shows normal spontaneous and phasic flow.  No LEFT calf vein thrombosis is detected.    IMPRESSION:  No evidence of deep venous thrombosis in either lower extremity.      < end of copied text >

## 2024-02-20 NOTE — PROGRESS NOTE ADULT - SUBJECTIVE AND OBJECTIVE BOX
SURGERY PROGRESS HPI:  Pt seen and examined at bedside. Intubated and sedated.  No longer on pressor support.        Vital Signs Last 24 Hrs  T(C): 36.9 (20 Feb 2024 04:00), Max: 38.6 (19 Feb 2024 16:30)  T(F): 98.4 (20 Feb 2024 04:00), Max: 101.5 (19 Feb 2024 16:30)  HR: 52 (20 Feb 2024 05:50) (45 - 79)  BP: 123/68 (20 Feb 2024 04:00) (83/59 - 123/68)  BP(mean): 84 (20 Feb 2024 04:00) (66 - 84)  RR: 28 (20 Feb 2024 05:00) (24 - 28)  SpO2: 95% (20 Feb 2024 05:50) (90% - 100%)    Parameters below as of 19 Feb 2024 19:02  Patient On (Oxygen Delivery Method): ventilator          PHYSICAL EXAM:    GENERAL: sedated, NAD  HEAD:  Atraumatic, Normocephalic, NGT  CHEST/LUNG: On vent support   HEART: RRR   ABDOMEN: non distended, soft, no guarding, midline incision healing well, staples intact, no drainage or sign of infection, Drain with serous output  : gautam with clear, luba op    I&O's Detail    18 Feb 2024 07:01  -  19 Feb 2024 07:00  --------------------------------------------------------  IN:    Dexmedetomidine: 36.4 mL    FentaNYL: 649.8 mL    IV PiggyBack: 200 mL    IV PiggyBack: 300 mL    IV PiggyBack: 325 mL    Lactated Ringers Bolus: 1000 mL    Norepinephrine: 102.3 mL    Propofol: 310.6 mL  Total IN: 2924.1 mL    OUT:    Drain (mL): 735 mL    Indwelling Catheter - Urethral (mL): 1795 mL    Nasogastric/Oral tube (mL): 325 mL  Total OUT: 2855 mL    Total NET: 69.1 mL      19 Feb 2024 07:01  -  20 Feb 2024 06:03  --------------------------------------------------------  IN:    FentaNYL: 458.5 mL    IV PiggyBack: 200 mL    IV PiggyBack: 100 mL    IV PiggyBack: 200 mL    Norepinephrine: 9 mL    Propofol: 160.9 mL  Total IN: 1128.3 mL    OUT:    Drain (mL): 1240 mL    Indwelling Catheter - Urethral (mL): 1500 mL  Total OUT: 2740 mL    Total NET: -1611.7 mL

## 2024-02-20 NOTE — CHART NOTE - NSCHARTNOTEFT_GEN_A_CORE
Nurse called radiology department this morning to find out an estimated time for when pt would be taken down for the upper GI series that was ordered since yesterday and was not given a specific time for today. I attempted to call radiology department again and was notified that there were no x-ray techs available. I was directed to call ED radiology, who explained that upper GI series only get done in the morning and that the techs who preform UGI series are gone for the day and that he will most likely have to wait until tomorrow. I was told to call tomorrow morning again. ICU attending and surgery ACP made aware.

## 2024-02-20 NOTE — PROGRESS NOTE ADULT - ASSESSMENT
47 y/o male PMHx cirrhosis, ETOH, open reduction of metatarsal fracture 2022 presents c/o epigastric pain for a few hours. Vomited when he came to the ER. Last BM in the afternoon. Last flatus before the BM. Patient denies fever, chills, constipation, diarrhea, melena, hematochezia, dysuria, hematuria, chest pain, shortness of breath, dizziness, cough.  CT (I personally reviewed) Scattered foci of free intraperitoneal air, consistent with perforated viscus.  imaging also shows cirrhosis presumably from chronic alcohol use   was started on Zosyn and fluconazole   would start workup on cirrhosis as well   will follow cultures and target out therapy if able     2/15: cultures positive for several Streptococcus species, will continue antibiotics, still febrile and on pressors, weaning vent as tolerated. patient remains critically ill, 3rd spacing and getting albumin    216: remains intubated , requiring  sedation, on pressors, Streptococcus in cultures, remains on antibiotics, very quiet bowel sounds, no BMs on day shift thus far, little output on NGT  2/20: remains on vent, BP better, fever curve better, completed zosyn but will give 3 more days of ceftriaxone and fluconazole. if Qtc is prolonged can stop fluconazole, please keep the Qtc on monitor,     Plan:  continue ceftriaxone   restarted fluconazole; monitor qtc continuously and if becomes prolonged please stop the fluconazole   blood cultures negative   MELD score indicated high mortality rate  HIV nonreactive  Hepatitis A Igm negative, HbsAg negative, HbsAb, HbcAb IgM negative, HCV Ab negative   surgical site per surgery   FOX with fluid similar to ascites, consider abdominal US   wean off ventilator as tolerated   patient remains very sick, critically ill, and high risk of mortality     Discussed with [facility]     Carrington Gilliam, DO  Infectious Disease Attending  Reachable via Microsoft Teams or ID office: [phone]  After 5pm/weekends please call [phone] for all inquiries and new consults

## 2024-02-21 LAB
ALBUMIN SERPL ELPH-MCNC: 3.1 G/DL — LOW (ref 3.3–5)
ALP SERPL-CCNC: 131 U/L — HIGH (ref 40–120)
ALT FLD-CCNC: 52 U/L — SIGNIFICANT CHANGE UP (ref 12–78)
ANION GAP SERPL CALC-SCNC: 5 MMOL/L — SIGNIFICANT CHANGE UP (ref 5–17)
AST SERPL-CCNC: 64 U/L — HIGH (ref 15–37)
BASOPHILS # BLD AUTO: 0.1 K/UL — SIGNIFICANT CHANGE UP (ref 0–0.2)
BASOPHILS NFR BLD AUTO: 0.8 % — SIGNIFICANT CHANGE UP (ref 0–2)
BILIRUB SERPL-MCNC: 4.1 MG/DL — HIGH (ref 0.2–1.2)
BUN SERPL-MCNC: 47 MG/DL — HIGH (ref 7–23)
CALCIUM SERPL-MCNC: 8.6 MG/DL — SIGNIFICANT CHANGE UP (ref 8.5–10.1)
CHLORIDE SERPL-SCNC: 117 MMOL/L — HIGH (ref 96–108)
CO2 SERPL-SCNC: 29 MMOL/L — SIGNIFICANT CHANGE UP (ref 22–31)
CREAT SERPL-MCNC: 0.88 MG/DL — SIGNIFICANT CHANGE UP (ref 0.5–1.3)
CULTURE RESULTS: SIGNIFICANT CHANGE UP
CULTURE RESULTS: SIGNIFICANT CHANGE UP
EGFR: 105 ML/MIN/1.73M2 — SIGNIFICANT CHANGE UP
EOSINOPHIL # BLD AUTO: 0 K/UL — SIGNIFICANT CHANGE UP (ref 0–0.5)
EOSINOPHIL NFR BLD AUTO: 0 % — SIGNIFICANT CHANGE UP (ref 0–6)
GLUCOSE BLDC GLUCOMTR-MCNC: 123 MG/DL — HIGH (ref 70–99)
GLUCOSE BLDC GLUCOMTR-MCNC: 131 MG/DL — HIGH (ref 70–99)
GLUCOSE BLDC GLUCOMTR-MCNC: 144 MG/DL — HIGH (ref 70–99)
GLUCOSE SERPL-MCNC: 148 MG/DL — HIGH (ref 70–99)
HCT VFR BLD CALC: 36.5 % — LOW (ref 39–50)
HGB BLD-MCNC: 11.6 G/DL — LOW (ref 13–17)
IMM GRANULOCYTES NFR BLD AUTO: 5.3 % — HIGH (ref 0–0.9)
LYMPHOCYTES # BLD AUTO: 0.91 K/UL — LOW (ref 1–3.3)
LYMPHOCYTES # BLD AUTO: 6.9 % — LOW (ref 13–44)
MAGNESIUM SERPL-MCNC: 3.2 MG/DL — HIGH (ref 1.6–2.6)
MCHC RBC-ENTMCNC: 31.8 G/DL — LOW (ref 32–36)
MCHC RBC-ENTMCNC: 32.4 PG — SIGNIFICANT CHANGE UP (ref 27–34)
MCV RBC AUTO: 102 FL — HIGH (ref 80–100)
MONOCYTES # BLD AUTO: 1.21 K/UL — HIGH (ref 0–0.9)
MONOCYTES NFR BLD AUTO: 9.2 % — SIGNIFICANT CHANGE UP (ref 2–14)
NEUTROPHILS # BLD AUTO: 10.25 K/UL — HIGH (ref 1.8–7.4)
NEUTROPHILS NFR BLD AUTO: 77.8 % — HIGH (ref 43–77)
NRBC # BLD: 0 /100 WBCS — SIGNIFICANT CHANGE UP (ref 0–0)
PHOSPHATE SERPL-MCNC: 1.9 MG/DL — LOW (ref 2.5–4.5)
PLATELET # BLD AUTO: 214 K/UL — SIGNIFICANT CHANGE UP (ref 150–400)
POTASSIUM SERPL-MCNC: 3.6 MMOL/L — SIGNIFICANT CHANGE UP (ref 3.5–5.3)
POTASSIUM SERPL-SCNC: 3.6 MMOL/L — SIGNIFICANT CHANGE UP (ref 3.5–5.3)
PROT SERPL-MCNC: 6.7 GM/DL — SIGNIFICANT CHANGE UP (ref 6–8.3)
RBC # BLD: 3.58 M/UL — LOW (ref 4.2–5.8)
RBC # FLD: 15.9 % — HIGH (ref 10.3–14.5)
SODIUM SERPL-SCNC: 151 MMOL/L — HIGH (ref 135–145)
SPECIMEN SOURCE: SIGNIFICANT CHANGE UP
SPECIMEN SOURCE: SIGNIFICANT CHANGE UP
WBC # BLD: 13.17 K/UL — HIGH (ref 3.8–10.5)
WBC # FLD AUTO: 13.17 K/UL — HIGH (ref 3.8–10.5)

## 2024-02-21 PROCEDURE — 99233 SBSQ HOSP IP/OBS HIGH 50: CPT

## 2024-02-21 PROCEDURE — 99291 CRITICAL CARE FIRST HOUR: CPT

## 2024-02-21 PROCEDURE — 74177 CT ABD & PELVIS W/CONTRAST: CPT | Mod: 26

## 2024-02-21 RX ORDER — IOHEXOL 300 MG/ML
30 INJECTION, SOLUTION INTRAVENOUS ONCE
Refills: 0 | Status: COMPLETED | OUTPATIENT
Start: 2024-02-21 | End: 2024-02-21

## 2024-02-21 RX ORDER — POTASSIUM PHOSPHATE, MONOBASIC POTASSIUM PHOSPHATE, DIBASIC 236; 224 MG/ML; MG/ML
15 INJECTION, SOLUTION INTRAVENOUS ONCE
Refills: 0 | Status: COMPLETED | OUTPATIENT
Start: 2024-02-21 | End: 2024-02-21

## 2024-02-21 RX ADMIN — Medication 100 MILLIGRAM(S): at 11:30

## 2024-02-21 RX ADMIN — IOHEXOL 30 MILLILITER(S): 300 INJECTION, SOLUTION INTRAVENOUS at 13:52

## 2024-02-21 RX ADMIN — Medication 3 MILLILITER(S): at 11:16

## 2024-02-21 RX ADMIN — Medication 3 MILLILITER(S): at 05:10

## 2024-02-21 RX ADMIN — CEFTRIAXONE 100 MILLIGRAM(S): 500 INJECTION, POWDER, FOR SOLUTION INTRAMUSCULAR; INTRAVENOUS at 14:05

## 2024-02-21 RX ADMIN — FENTANYL CITRATE 3.74 MICROGRAM(S)/KG/HR: 50 INJECTION INTRAVENOUS at 08:57

## 2024-02-21 RX ADMIN — Medication 110 MILLIGRAM(S): at 11:29

## 2024-02-21 RX ADMIN — Medication 1 MILLIGRAM(S): at 11:30

## 2024-02-21 RX ADMIN — PANTOPRAZOLE SODIUM 40 MILLIGRAM(S): 20 TABLET, DELAYED RELEASE ORAL at 11:29

## 2024-02-21 RX ADMIN — Medication 3 MILLILITER(S): at 17:04

## 2024-02-21 RX ADMIN — CHLORHEXIDINE GLUCONATE 1 APPLICATION(S): 213 SOLUTION TOPICAL at 05:17

## 2024-02-21 RX ADMIN — FLUCONAZOLE 100 MILLIGRAM(S): 150 TABLET ORAL at 11:30

## 2024-02-21 RX ADMIN — PROPOFOL 8.98 MICROGRAM(S)/KG/MIN: 10 INJECTION, EMULSION INTRAVENOUS at 17:03

## 2024-02-21 RX ADMIN — PROPOFOL 8.98 MICROGRAM(S)/KG/MIN: 10 INJECTION, EMULSION INTRAVENOUS at 05:17

## 2024-02-21 RX ADMIN — ENOXAPARIN SODIUM 40 MILLIGRAM(S): 100 INJECTION SUBCUTANEOUS at 11:29

## 2024-02-21 RX ADMIN — SODIUM CHLORIDE 40 MILLILITER(S): 9 INJECTION, SOLUTION INTRAVENOUS at 06:38

## 2024-02-21 RX ADMIN — POTASSIUM PHOSPHATE, MONOBASIC POTASSIUM PHOSPHATE, DIBASIC 62.5 MILLIMOLE(S): 236; 224 INJECTION, SOLUTION INTRAVENOUS at 06:33

## 2024-02-21 RX ADMIN — CHLORHEXIDINE GLUCONATE 15 MILLILITER(S): 213 SOLUTION TOPICAL at 05:16

## 2024-02-21 NOTE — PROGRESS NOTE ADULT - ASSESSMENT
47 y/o male PMHx cirrhosis, ETOH, open reduction of metatarsal fracture 2022 presents c/o epigastric pain for a few hours. Vomited when he came to the ER. Last BM in the afternoon. Last flatus before the BM. Patient denies fever, chills, constipation, diarrhea, melena, hematochezia, dysuria, hematuria, chest pain, shortness of breath, dizziness, cough.  CT (I personally reviewed) Scattered foci of free intraperitoneal air, consistent with perforated viscus.  imaging also shows cirrhosis presumably from chronic alcohol use   was started on Zosyn and fluconazole   would start workup on cirrhosis as well   will follow cultures and target out therapy if able     2/15: cultures positive for several Streptococcus species, will continue antibiotics, still febrile and on pressors, weaning vent as tolerated. patient remains critically ill, 3rd spacing and getting albumin    216: remains intubated , requiring  sedation, on pressors, Streptococcus in cultures, remains on antibiotics, very quiet bowel sounds, no BMs on day shift thus far, little output on NGT  2/20: remains on vent, BP better, fever curve better, completed zosyn but will give 3 more days of ceftriaxone and fluconazole. if Qtc is prolonged can stop fluconazole, please keep the Qtc on monitor,   2/21: day 8 of intubation,  afebrile, UGIS cancelled, discussed with ICU the need for a CT scan which was done and did not show extravasation of contrast, no obvious leak, possibly starting tube feeds. nearly complete with antibiotics, would try to avoid a central line or TPN if able to tolerate feeds        Plan:  continue ceftriaxone 2 more days   restarted fluconazole; monitor qtc continuously and if becomes prolonged please stop the fluconazole   blood cultures negative   MELD score indicated high mortality rate  HIV nonreactive  Hepatitis A Igm negative, HbsAg negative, please send HBsAb in order to start vaccination if not immune, HbcAb IgM negative, HCV Ab negative   surgical site per surgery   FOX with fluid similar to ascites  CT Multifocal pneumonia (has been treated between zosyn and ceftriaxone) . Small amount of pneumoperitoneum possibly related to indwelling drain. No associated collection. Small right subphrenic ascites.  Hepatosplenomegaly with evidence portal hypertension (known which will need to be addressed chronically as he recovers from this acute event)   wean off ventilator as tolerated     patient remains in serious condition    Discussed with [facility]     Carrington Gilliam, DO  Infectious Disease Attending  Reachable via Microsoft Teams or ID office: [phone]  After 5pm/weekends please call [phone] for all inquiries and new consults

## 2024-02-21 NOTE — PROGRESS NOTE ADULT - ASSESSMENT
49 yo m pmhx ETOH abuse, cirrhosis presented with abd pain found with perforated viscus taken to OR for exlap on 2/3 where patient found with perforated duodenal ulcer s/p Aden patch with course c/b aspiration pneumonitis, hypoxic respiratory failure, ARDS, shock, EMILIA found with strep and candida in abscess cx.      NEURO: fent/prop for sedation  CV: Intermittent bradycardia likely sedation related, weaning sedation as tolerated.    RESP: Hypoxic resp fx, ac/vc 4-6 cc/kg tv lung protective strategies, actively titrating settings for spo2 >92%, weaning settings as tolerated.  chest pt/suctioning/lavaging as needed. inh bronchodilators prn  RENAL: EMILIA resolved.  monitor urine output, bun/cr and electrolytes. replace lytes as needed.    GI: NPO, planned for upper GI study this am  ENDO: D5 for hypernatremia  ID: Ceftriaxone and diflucan, ID following   HEME: Lovenox for vte ppx   DISPO: full code.     Critical Care time: 35 mins assessing presenting problems of acute illness that poses high probability of life threatening deterioration or end organ damage/dysfunction.  Medical decision making inculding Initiating plan of care, reviewing data, reviewing radiology, discussing with multidisciplinary team, non inclusive of procedures, discussing goals of care with patient/family    DATE OF DOCUMENTATION EQUIVALENT TO DATE OF SERVICES RENDERED

## 2024-02-21 NOTE — CHART NOTE - NSCHARTNOTEFT_GEN_A_CORE
Assessment:  Pt noted to be NPO x 8 days, seen in ICU, on vent, plan for TPN reported by RN.  Pt adm c diagnosis of intra abdominal free air of unknown etiology, c perforated duodenal ulcer c kyrie patch on 02/14, c ARDS, septic shock, EMILIA.  PMH include cirrhosis, alcohol use disorder.    Factors impacting intake: [ ] none [ ] nausea  [ ] vomiting [ ] diarrhea [ ] constipation  [ ]chewing problems [ ] swallowing issues  [x ] other: acute illness, alteration in GI tract function     Diet Prescription: Diet, NPO (02-13-24 @ 06:49)    Intake: <50% nutrition needs > 5 days    Current Weight: 02/21, 92 kg, 02/13, 84.9 kg, c wt. gain of 7.1 kg  % Weight Change: +8.4%  02/21, 1+(trace) & 2+ (mild) generalized, dependent edema noted.  I/O: 1761/3065(-1304)    Pt is not appropriate for nutrition focus physical due to vent status c limited view of pt.    Pertinent Medications: MEDICATIONS  (STANDING):  albuterol/ipratropium for Nebulization 3 milliLiter(s) Nebulizer every 6 hours  cefTRIAXone   IVPB 1000 milliGRAM(s) IV Intermittent every 24 hours  chlorhexidine 0.12% Liquid 15 milliLiter(s) Oral Mucosa every 12 hours  chlorhexidine 2% Cloths 1 Application(s) Topical <User Schedule>  dexAMETHasone  IVPB 20 milliGRAM(s) IV Intermittent every 24 hours  dextrose 5%. 1000 milliLiter(s) (60 mL/Hr) IV Continuous <Continuous>  enoxaparin Injectable 40 milliGRAM(s) SubCutaneous every 24 hours  fentaNYL   Infusion. 0.5 MICROgram(s)/kG/Hr (3.74 mL/Hr) IV Continuous <Continuous>  fluconAZOLE IVPB      fluconAZOLE IVPB 400 milliGRAM(s) IV Intermittent every 24 hours  folic acid Injectable 1 milliGRAM(s) IV Push daily  influenza   Vaccine 0.5 milliLiter(s) IntraMuscular once  pantoprazole  Injectable 40 milliGRAM(s) IV Push daily  propofol Infusion 20 MICROgram(s)/kG/Min (8.98 mL/Hr) IV Continuous <Continuous>=120 ml(132 calories) on 02/20  thiamine Injectable 100 milliGRAM(s) IV Push daily    MEDICATIONS  (PRN):    Pertinent Labs: 02-21 Na151 mmol/L<H> Glu 148 mg/dL<H> K+ 3.6 mmol/L Cr  0.88 mg/dL BUN 47 mg/dL<H> 02-21 Phos 1.9 mg/dL<L> 02-21 Alb 3.1 g/dL<L>02-21 ALT 52 U/L AST 64 U/L<H> Alkaline Phosphatase 131 U/L<H>   CAPILLARY BLOOD GLUCOSE      POCT Blood Glucose.: 123 mg/dL (21 Feb 2024 11:35)  POCT Blood Glucose.: 131 mg/dL (21 Feb 2024 05:43)  POCT Blood Glucose.: 144 mg/dL (21 Feb 2024 00:04)  POCT Blood Glucose.: 148 mg/dL (20 Feb 2024 18:33)    Skin:   02/21, no pressure ulcer noted    Estimated Needs:   [x ] no change since previous assessment (02/15)  [ ] recalculated:     Previous Nutrition Diagnosis: (02/15)  Inadequate Energy Intake  Etiology: inability to consume sufficient energy  Signs/Symptoms: NPO x 2 days  addendum: NPO x 8 days   Goal/Expected Outcome: Once diet advanced pt to meet >75% needs via tolerated route; not applicable  New Goal: pt to meet >75% protein-energy needs via tolerated route  Nutrition Diagnosis is [x ] ongoing  [ ] resolved [ ] not applicable       New Nutrition Diagnosis: [x ] not applicable     Interventions:   Recommend  [ ] Change Diet To:  [ ] Nutrition Supplement  [x ] Nutrition Support: TPN; recommend initiate c dextrose 250 grams, 42. 5 grams AA, 1008 ml @ 42 ml/hr and gradually increase to dextrose 375 grams, 64 grams AA, 1512 ml @ 63 ml/hr-> dextrose 500, 85 grams AA, 2016 ml @ 84 ml/hr = total of 2040 calories  + additional fat calories from propofol as per infused volume is being provided at present.  [ ] Other:     Monitoring and Evaluation:   [ ] PO intake [ x ] Tolerance to diet prescription [ x ] weights [ x ] labs; Glucose, A1C%, labs as per TPN protocol[ x ] follow up per protocol  [ ] other: Assessment:  Pt noted to be NPO x 8 days, seen in ICU, on vent, plan for TPN reported by RN.  Pt adm c diagnosis of intra abdominal free air of unknown etiology, c perforated duodenal ulcer c kyrie patch on 02/14, c ARDS, septic shock, EMILIA.  PMH include cirrhosis, alcohol use disorder.    Factors impacting intake: [ ] none [ ] nausea  [ ] vomiting [ ] diarrhea [ ] constipation  [ ]chewing problems [ ] swallowing issues  [x ] other: acute illness, alteration in GI tract function     Diet Prescription: Diet, NPO (02-13-24 @ 06:49)    Intake: <50% nutrition needs > 5 days    Current Weight: 02/21, 92 kg, 02/13, 84.9 kg, c wt. gain of 7.1 kg  % Weight Change: +8.4%  02/21, 1+(trace) & 2+ (mild) generalized, dependent edema noted.  I/O: 1761/3065(-1304)    Pt is not appropriate for nutrition focus physical due to vent status c limited view of pt.    Pertinent Medications: MEDICATIONS  (STANDING):  albuterol/ipratropium for Nebulization 3 milliLiter(s) Nebulizer every 6 hours  cefTRIAXone   IVPB 1000 milliGRAM(s) IV Intermittent every 24 hours  chlorhexidine 0.12% Liquid 15 milliLiter(s) Oral Mucosa every 12 hours  chlorhexidine 2% Cloths 1 Application(s) Topical <User Schedule>  dexAMETHasone  IVPB 20 milliGRAM(s) IV Intermittent every 24 hours  dextrose 5%. 1000 milliLiter(s) (60 mL/Hr) IV Continuous <Continuous>  enoxaparin Injectable 40 milliGRAM(s) SubCutaneous every 24 hours  fentaNYL   Infusion. 0.5 MICROgram(s)/kG/Hr (3.74 mL/Hr) IV Continuous <Continuous>  fluconAZOLE IVPB      fluconAZOLE IVPB 400 milliGRAM(s) IV Intermittent every 24 hours  folic acid Injectable 1 milliGRAM(s) IV Push daily  influenza   Vaccine 0.5 milliLiter(s) IntraMuscular once  pantoprazole  Injectable 40 milliGRAM(s) IV Push daily  propofol Infusion 20 MICROgram(s)/kG/Min (8.98 mL/Hr) IV Continuous <Continuous>=120 ml(132 calories) on 02/20  thiamine Injectable 100 milliGRAM(s) IV Push daily    MEDICATIONS  (PRN):    Pertinent Labs: 02-21 Na151 mmol/L<H> Glu 148 mg/dL<H> K+ 3.6 mmol/L Cr  0.88 mg/dL BUN 47 mg/dL<H> 02-21 Phos 1.9 mg/dL<L> 02-21 Alb 3.1 g/dL<L>02-21 ALT 52 U/L AST 64 U/L<H> Alkaline Phosphatase 131 U/L<H>   CAPILLARY BLOOD GLUCOSE      POCT Blood Glucose.: 123 mg/dL (21 Feb 2024 11:35)  POCT Blood Glucose.: 131 mg/dL (21 Feb 2024 05:43)  POCT Blood Glucose.: 144 mg/dL (21 Feb 2024 00:04)  POCT Blood Glucose.: 148 mg/dL (20 Feb 2024 18:33)    Skin:   02/21, no pressure ulcer noted    Estimated Needs:   [x ] no change since previous assessment (02/15)  [ ] recalculated:     Previous Nutrition Diagnosis: (02/15)  Inadequate Energy Intake  Etiology: inability to consume sufficient energy  Signs/Symptoms: NPO x 2 days  addendum: NPO x 8 days   Goal/Expected Outcome: Once diet advanced pt to meet >75% needs via tolerated route; not applicable  New Goal: pt to meet >75% protein-energy needs via tolerated route  Nutrition Diagnosis is [x ] ongoing  [ ] resolved [ ] not applicable       New Nutrition Diagnosis: [x ] not applicable     Interventions:   Recommend  [ ] Change Diet To:  [ ] Nutrition Supplement  [x ] Nutrition Support: TPN; recommend initiate c dextrose 250 grams, 42. 5 grams AA, 1008 ml @ 42 ml/hr and gradually increase to dextrose 375 grams, 64 grams AA, 1512 ml @ 63 ml/hr-> dextrose 500, 85 grams AA, 2016 ml @ 84 ml/hr = total of 2040 calories  + additional fat calories from propofol as per infused volume is being provided at present.  [ ] Other:     Monitoring and Evaluation:   [ ] PO intake [ x ] Tolerance to diet prescription [ x ] weights [ x ] labs; Glucose, A1C%, labs as per TPN protocol, TG[ x ] follow up per protocol  [ ] other:

## 2024-02-21 NOTE — CONSULT NOTE ADULT - SUBJECTIVE AND OBJECTIVE BOX
Full consult dictated    Plan:  49 yo M with duodenal perforation s/p ex lap, kyrie patch POD#8. Intubated. Cultures + for rare streptococcus salivarius/vestibularis group, rare streptococcus mitis oralis group rare candida albicans. Pt remains intubated. + sepsis; on antibx; GI requested for nutritional purposes and TPN management.  Pt awaiting central line placement.  Once central line placed will order TPN as per discussion with ICU team.   Full consult dictated    Plan:  49 yo M with duodenal perforation s/p ex lap, kyrie patch POD#8. Intubated. Cultures + for rare streptococcus salivarius/vestibularis group, rare streptococcus mitis oralis group rare candida albicans. Pt remains intubated. + sepsis; on antibx; GI requested for nutritional purposes and TPN management.  Pt awaiting central line placement.  Once central line placed will order TPN as per discussion with ICU team.     ADDENDUM:  CT abd shows no extravasation of contrast --> will discuss further in AM regarding tube feedings and/or TPN

## 2024-02-21 NOTE — CHART NOTE - NSCHARTNOTEFT_GEN_A_CORE
Patient seen and examined at bedside in the ICU. Intubated on light sedation.     Spoke with radiologist Dr. Vito Hartman regarding UGIS. Dr. Hartman expressed concerns with continuing with UGIS due to patient still being intubated. Discussed with ICU/ID about proceeding with CTAP with IV and PO contrast to assess for gastric leak and follow up with persistent leukocytosis. Will F/U with CTAP to assess for GI leak before proceeding with TPN vs. Tube feeds for nutritional support.     Discussed with Dr. Blanco

## 2024-02-21 NOTE — PROGRESS NOTE ADULT - SUBJECTIVE AND OBJECTIVE BOX
[facility]  INFECTIOUS DISEASES   [address] [place] [address]  Tel: [phone]     Fax: [phone]  ==============================================================================  DO Jony Baron MD Alexandra Gutman, NP   ==============================================================================      PRIYANKA PITTMAN  [id number]  48y ([birth year])      Interval History: remains intubated , [id number], UGIS cancelled, discussed with ICU the need for a CT scan which was done and did not show extravasation of contrast, no obvious leak, possibly starting tube feeds     ROS:    [x ] Unobtainable because:intubated and sedated   [ ] All other systems negative except as noted    Constitutional: no fever, no chills  Head: no trauma  Eyes: no vision changes, no eye pain  ENT:  no sore throat, no rhinorrhea  Cardiovascular:  no chest pain, no palpitation  Respiratory:  no SOB, no cough  GI:  no abd pain, no vomiting, no diarrhea  urinary: no dysuria, no hematuria, no flank pain  musculoskeletal:  no joint pain, no joint swelling  skin:  no rash  neurology:  no headache, no seizure, no change in mental status  psych: no anxiety, no depression         Allergies  No Known Allergies      ANTIMICROBIALS  cefTRIAXone   IVPB 1000 every 24 hours  fluconAZOLE IVPB    fluconAZOLE IVPB 400 every 24 hours  influenza   Vaccine 0.5 once      MEDICATIONS  (STANDING):  albuterol/ipratropium for Nebulization 3 every 6 hours  dexAMETHasone  IVPB 20 every 24 hours  enoxaparin Injectable 40 every 24 hours  fentaNYL   Infusion. 0.5 <Continuous>  influenza   Vaccine 0.5 once  pantoprazole  Injectable 40 daily  propofol Infusion 20 <Continuous>      PRN      Physical Exam:  Vital Signs Last 24 Hrs  T(F): 99.7 (02-21-24 @ 20:00), Max: 99.7 (02-21-24 @ 20:00)  HR: 51 (02-21-24 @ 21:40)  BP: 112/63 (02-21-24 @ 12:00)  RR: 22 (02-21-24 @ 20:00)  SpO2: 96% (02-21-24 @ 21:40) (94% - 100%)  Wt(kg): --      General:    NAD,  non toxic, remains intubated   Head: atraumatic, normocephalic  Eye: normal sclera, erythematous conjunctiva  ENT:    neck supple, +ETT, NGT at time of my exam  Cardio:     regular S1, S2,  no murmur  Respiratory:    coarse BS b/l,    no wheezing  abd:     soft,   normoactive BS ,   no tenderness, surgical site intact, FOX drain with serous fluid   :   +gautam  Musculoskeletal:   no joint swelling,   +edema  vascular: central line has been removed, +PIV    Skin:    no rash  Neurologic:    sedated   psych: sedated       WBC Count: 13.17 K/uL (02-21 @ 03:20)  WBC Count: 8.94 K/uL (02-20 @ 04:00)  WBC Count: 12.29 K/uL (02-19 @ 02:41)  WBC Count: 10.95 K/uL (02-18 @ 02:49)  WBC Count: 13.16 K/uL (02-17 @ 03:03)  WBC Count: 17.34 K/uL (02-16 @ 12:45)  WBC Count: 17.68 K/uL (02-16 @ 02:20)                            11.6   13.17 )-----------( 214      ( 21 Feb 2024 03:20 )             36.5       02-21    151<H>  |  117<H>  |  47<H>  ----------------------------<  148<H>  3.6   |  29  |  0.88    Ca    8.6      21 Feb 2024 03:20  Phos  1.9     02-21  Mg     3.2     02-21    TPro  6.7  /  Alb  3.1<L>  /  TBili  4.1<H>  /  DBili  x   /  AST  64<H>  /  ALT  52  /  AlkPhos  131<H>  02-21      Creatinine Trend: 0.88<--, 1.30<--, 1.63<--, 1.25<--, 1.17<--, 0.81<--        Blood Gas Arterial, Lactate: 1.20 mmol/L (02-20-24 @ 13:16)  Blood Gas Arterial, Lactate: 1.40 mmol/L (02-20-24 @ 09:12)      MICROBIOLOGY:  Culture - Body Fluid with Gram Stain (02.13.24 @ 05:10)    Gram Stain:   Few polymorphonuclear leukocytes seen per low power field  No organisms seen per oil power field   Specimen Source: Peritoneal peritoneal fluida c/s   Culture Results:   Rare Streptococcus salivarius/vestibularis group  Rare Streptococcus mitis/oralis group        .Blood Blood  02-13-24   No growth at 24 hours  --  --      .Blood Blood  02-13-24   No growth at 24 hours  --  --      Peritoneal peritoneal fluida c/s  02-13-24 --  --    Few polymorphonuclear leukocytes seen per low power field  No organisms seen per oil power field      Clean Catch Clean Catch (Midstream)  02-13-24   No growth  --  --        RADIOLOGY:  Xray Chest 1 View- PORTABLE-Urgent (Xray Chest 1 View- PORTABLE-Urgent .) (02.15.24 @ 12:42) >  IMPRESSION: Endotracheal tube position much improved. Increasing advanced   bilateral infiltrates.    < from: CT Abdomen and Pelvis w/ Oral Cont and w/ IV Cont (02.21.24 @ 16:16) >  IMPRESSION:  Multifocal pneumonia.  Small amount of pneumoperitoneum possibly related to indwelling drain. No   associated collection.  Small right subphrenic ascites.  Hepatosplenomegaly with evidence portal hypertension    < end of copied text >

## 2024-02-21 NOTE — PROGRESS NOTE ADULT - SUBJECTIVE AND OBJECTIVE BOX
CHIEF COMPLAINT:    Interval Events:    REVIEW OF SYSTEMS:  Constitutional: [ ] fevers [ ] chills [ ] weight loss [ ] weight gain  HEENT: [ ] dry eyes [ ] eye irritation [ ] postnasal drip [ ] nasal congestion  CV: [ ] chest pain [ ] orthopnea [ ] palpitations [ ] murmur  Resp: [ ] cough [ ] shortness of breath [ ] dyspnea [ ] wheezing [ ] sputum [ ] hemoptysis  GI: [ ] nausea [ ] vomiting [ ] diarrhea [ ] constipation [ ] abd pain [ ] dysphagia   : [ ] dysuria [ ] nocturia [ ] hematuria [ ] increased urinary frequency  Musculoskeletal: [ ] back pain [ ] myalgias [ ] arthralgias [ ] fracture  Skin: [ ] rash [ ] itch  Neurological: [ ] headache [ ] dizziness [ ] syncope [ ] weakness [ ] numbness  Hematologic/Lymphatic: [ ] anemia [ ] bleeding problem  Allergic/Immunologic: [ ] itchy eyes [ ] nasal discharge [ ] hives [ ] angioedema  [ ] All other systems negative  [ ] Unable to assess ROS because ________    OBJECTIVE:  ICU Vital Signs Last 24 Hrs  T(C): 37.1 (21 Feb 2024 03:00), Max: 37.6 (20 Feb 2024 18:00)  T(F): 98.7 (21 Feb 2024 03:00), Max: 99.7 (20 Feb 2024 18:00)  HR: 54 (21 Feb 2024 07:46) (47 - 78)  BP: 124/71 (21 Feb 2024 04:00) (111/67 - 124/71)  BP(mean): 88 (21 Feb 2024 04:00) (78 - 88)  ABP: 128/60 (21 Feb 2024 07:00) (105/51 - 146/68)  ABP(mean): 82 (21 Feb 2024 07:00) (68 - 96)  RR: 24 (21 Feb 2024 07:00) (18 - 28)  SpO2: 96% (21 Feb 2024 07:46) (92% - 100%)    O2 Parameters below as of 21 Feb 2024 05:10  Patient On (Oxygen Delivery Method): ventilator          Mode: AC/ CMV (Assist Control/ Continuous Mandatory Ventilation), RR (machine): 24, TV (machine): 400, FiO2: 40, PEEP: 8, ITime: 0.7, MAP: 13, PIP: 27    02-20 @ 07:01  - 02-21 @ 07:00  --------------------------------------------------------  IN: 1761.3 mL / OUT: 3065 mL / NET: -1303.7 mL    02-21 @ 07:01 - 02-21 @ 08:00  --------------------------------------------------------  IN: 62 mL / OUT: 0 mL / NET: 62 mL      CAPILLARY BLOOD GLUCOSE      POCT Blood Glucose.: 131 mg/dL (21 Feb 2024 05:43)      PHYSICAL EXAM:  General:   HEENT:   Neck:   Respiratory:   Cardiovascular:   Abdomen:   Extremities:   Skin:   Neurological:  Psychiatry:    LINES:    HOSPITAL MEDICATIONS:  MEDICATIONS  (STANDING):  albuterol/ipratropium for Nebulization 3 milliLiter(s) Nebulizer every 6 hours  cefTRIAXone   IVPB 1000 milliGRAM(s) IV Intermittent every 24 hours  chlorhexidine 0.12% Liquid 15 milliLiter(s) Oral Mucosa every 12 hours  chlorhexidine 2% Cloths 1 Application(s) Topical <User Schedule>  dexAMETHasone  IVPB 20 milliGRAM(s) IV Intermittent every 24 hours  dextrose 5%. 1000 milliLiter(s) (40 mL/Hr) IV Continuous <Continuous>  enoxaparin Injectable 40 milliGRAM(s) SubCutaneous every 24 hours  fentaNYL   Infusion. 0.5 MICROgram(s)/kG/Hr (3.74 mL/Hr) IV Continuous <Continuous>  fluconAZOLE IVPB 400 milliGRAM(s) IV Intermittent every 24 hours  fluconAZOLE IVPB      folic acid Injectable 1 milliGRAM(s) IV Push daily  influenza   Vaccine 0.5 milliLiter(s) IntraMuscular once  pantoprazole  Injectable 40 milliGRAM(s) IV Push daily  propofol Infusion 20 MICROgram(s)/kG/Min (8.98 mL/Hr) IV Continuous <Continuous>  thiamine Injectable 100 milliGRAM(s) IV Push daily    MEDICATIONS  (PRN):      LABS:                        11.6   13.17 )-----------( 214      ( 21 Feb 2024 03:20 )             36.5     Hgb Trend: 11.6<--, 10.7<--, 9.7<--, 9.8<--, 11.0<--  02-21    151<H>  |  117<H>  |  47<H>  ----------------------------<  148<H>  3.6   |  29  |  0.88    Ca    8.6      21 Feb 2024 03:20  Phos  1.9     02-21  Mg     3.2     02-21    TPro  6.7  /  Alb  3.1<L>  /  TBili  4.1<H>  /  DBili  x   /  AST  64<H>  /  ALT  52  /  AlkPhos  131<H>  02-21      Urinalysis Basic - ( 21 Feb 2024 03:20 )    Color: x / Appearance: x / SG: x / pH: x  Gluc: 148 mg/dL / Ketone: x  / Bili: x / Urobili: x   Blood: x / Protein: x / Nitrite: x   Leuk Esterase: x / RBC: x / WBC x   Sq Epi: x / Non Sq Epi: x / Bacteria: x      Arterial Blood Gas:  02-20 @ 13:16  7.41/47/66/30/93.1/4.4  ABG lactate: --  Arterial Blood Gas:  02-20 @ 09:12  7.48/40/84/30/98.4/5.8  ABG lactate: --  Arterial Blood Gas:  02-20 @ 03:16  7.41/43/81/27/96.8/2.2  ABG lactate: --  Arterial Blood Gas:  02-19 @ 22:07  7.44/42/76/28/96.8/3.9  ABG lactate: --  Arterial Blood Gas:  02-19 @ 14:04  7.45/39/90/27/98.3/2.9  ABG lactate: --  Arterial Blood Gas:  02-19 @ 11:34  7.41/42/94/27/98.8/1.7  ABG lactate: --        MICROBIOLOGY:     RADIOLOGY:  [ ] Reviewed and interpreted by me CHIEF COMPLAINT:    Interval Events:  decreased FiO2 to 40%, PEEP 8  afebrile overnight      REVIEW OF SYSTEMS:  [x ] Unable to assess ROS because intubated/sedated    OBJECTIVE:  ICU Vital Signs Last 24 Hrs  T(C): 37.1 (21 Feb 2024 03:00), Max: 37.6 (20 Feb 2024 18:00)  T(F): 98.7 (21 Feb 2024 03:00), Max: 99.7 (20 Feb 2024 18:00)  HR: 54 (21 Feb 2024 07:46) (47 - 78)  BP: 124/71 (21 Feb 2024 04:00) (111/67 - 124/71)  BP(mean): 88 (21 Feb 2024 04:00) (78 - 88)  ABP: 128/60 (21 Feb 2024 07:00) (105/51 - 146/68)  ABP(mean): 82 (21 Feb 2024 07:00) (68 - 96)  RR: 24 (21 Feb 2024 07:00) (18 - 28)  SpO2: 96% (21 Feb 2024 07:46) (92% - 100%)    O2 Parameters below as of 21 Feb 2024 05:10  Patient On (Oxygen Delivery Method): ventilator          Mode: AC/ CMV (Assist Control/ Continuous Mandatory Ventilation), RR (machine): 24, TV (machine): 400, FiO2: 40, PEEP: 8, ITime: 0.7, MAP: 13, PIP: 27    02-20 @ 07:01  -  02-21 @ 07:00  --------------------------------------------------------  IN: 1761.3 mL / OUT: 3065 mL / NET: -1303.7 mL    02-21 @ 07:01  -  02-21 @ 08:00  --------------------------------------------------------  IN: 62 mL / OUT: 0 mL / NET: 62 mL      CAPILLARY BLOOD GLUCOSE      POCT Blood Glucose.: 131 mg/dL (21 Feb 2024 05:43)      PHYSICAL EXAM:  General: NAD, non toxic appearing  HEENT: ETT and NGT in place  Neck: supple  Respiratory: mechanical BS  Cardiovascular: s1s2 RRR  Abdomen: soft, non tender, non distended  Extremities: warm, no edema or clubbing  Skin: midline sutures  Neurological: unable to assess  Psychiatry: unable to assess    LINES:  [facility]    HOSPITAL MEDICATIONS:  MEDICATIONS  (STANDING):  albuterol/ipratropium for Nebulization 3 milliLiter(s) Nebulizer every 6 hours  cefTRIAXone   IVPB 1000 milliGRAM(s) IV Intermittent every 24 hours  chlorhexidine 0.12% Liquid 15 milliLiter(s) Oral Mucosa every 12 hours  chlorhexidine 2% Cloths 1 Application(s) Topical <User Schedule>  dexAMETHasone  IVPB 20 milliGRAM(s) IV Intermittent every 24 hours  dextrose 5%. 1000 milliLiter(s) (40 mL/Hr) IV Continuous <Continuous>  enoxaparin Injectable 40 milliGRAM(s) SubCutaneous every 24 hours  fentaNYL   Infusion. 0.5 MICROgram(s)/kG/Hr (3.74 mL/Hr) IV Continuous <Continuous>  fluconAZOLE IVPB 400 milliGRAM(s) IV Intermittent every 24 hours  fluconAZOLE IVPB      folic acid Injectable 1 milliGRAM(s) IV Push daily  influenza   Vaccine 0.5 milliLiter(s) IntraMuscular once  pantoprazole  Injectable 40 milliGRAM(s) IV Push daily  propofol Infusion 20 MICROgram(s)/kG/Min (8.98 mL/Hr) IV Continuous <Continuous>  thiamine Injectable 100 milliGRAM(s) IV Push daily    MEDICATIONS  (PRN):      LABS:                        11.6   13.17 )-----------( 214      ( 21 Feb 2024 03:20 )             36.5     Hgb Trend: 11.6<--, 10.7<--, 9.7<--, 9.8<--, 11.0<--  02-21    151<H>  |  117<H>  |  47<H>  ----------------------------<  148<H>  3.6   |  29  |  0.88    Ca    8.6      21 Feb 2024 03:20  Phos  1.9     02-21  Mg     3.2     02-21    TPro  6.7  /  Alb  3.1<L>  /  TBili  4.1<H>  /  DBili  x   /  AST  64<H>  /  ALT  52  /  AlkPhos  131<H>  02-21      Urinalysis Basic - ( 21 Feb 2024 03:20 )    Color: x / Appearance: x / SG: x / pH: x  Gluc: 148 mg/dL / Ketone: x  / Bili: x / Urobili: x   Blood: x / Protein: x / Nitrite: x   Leuk Esterase: x / RBC: x / WBC x   Sq Epi: x / Non Sq Epi: x / Bacteria: x      Arterial Blood Gas:  02-20 @ 13:16  7.41/47/66/30/93.1/4.4  ABG lactate: --  Arterial Blood Gas:  02-20 @ 09:12  7.48/40/84/30/98.4/5.8  ABG lactate: --  Arterial Blood Gas:  02-20 @ 03:16  7.41/43/81/27/96.8/2.2  ABG lactate: --  Arterial Blood Gas:  02-19 @ 22:07  7.44/42/76/28/96.8/3.9  ABG lactate: --  Arterial Blood Gas:  02-19 @ 14:04  7.45/39/90/27/98.3/2.9  ABG lactate: --  Arterial Blood Gas:  02-19 @ 11:34  7.41/42/94/27/98.8/1.7  ABG lactate: --        MICROBIOLOGY:     RADIOLOGY:  [ ] Reviewed and interpreted by me

## 2024-02-21 NOTE — CHART NOTE - NSCHARTNOTEFT_GEN_A_CORE
CTAP with IV and PO contrast reviewed and reveals contrast in the small bowel with no extravasation. Patient is clear to have NGT removed and placed on tube feeds.     Discussed with Dr. Blanco

## 2024-02-21 NOTE — PROGRESS NOTE ADULT - ASSESSMENT
48M w/ cirrhosis, EtOH use disorder, open reduction metatarsal fracture. PResents w/ epigastric pain and vomiting. Found to have perforated duodenal ulcer w/ kyrie patch. Transferred to ICU post-op. Course complicated by ARDS, septic shock, and EMILIA.     #Neuro - sedation w/ propofol and fentanyl, decrease sedation and monitor mental status  #CV - HD stable, remains off pressor; check QTc  #Pulm - ARDS likely in setting of aspiration, improving FiO2 requirement; titrate vent to ABG; continue duonebs; steroids for ARDS - decadron 20 mg x5 days followed by decadron 10 mg x5 days; SBT today  #ID- completed 7 days of zosyn for strept mitis and salavaris, diflucan for candida in peritoneal fluid culture; discuss w/ ID regarding further abx and duration of fluconazole; fevers improved  #Renal/metabolic - EMILIA likely ATN, now resolved; monitor I/Os, electrolytes; hypophos repleted; increase D5 for hyperNa  #GI- s/p kyrie patch for perforated duodenum; pt remains NPO, NGT to ILWS, having BM; discuss w/ surgery PN vs TPN; PPI  #Heme - hgb stable; monitor for now  #Endo - FS q6 while on decadron; MVI, folate, thiamine  #Skin - surgical site care per surgery  #PPx - lovenox q24  #Dispo- remains in ICU in critical condition ventilator dependent; prognosis very guarded; full code    Plan of care discussed w/ daughter wife at bedside- all questions answered 48M w/ cirrhosis, EtOH use disorder, open reduction metatarsal fracture. PResents w/ epigastric pain and vomiting. Found to have perforated duodenal ulcer w/ kyrie patch. Transferred to ICU post-op. Course complicated by ARDS, septic shock, and EMILIA.     #Neuro - sedation w/ propofol and fentanyl, decrease sedation and monitor mental status  #CV - HD stable, remains off pressor; check QTc  #Pulm - ARDS likely in setting of aspiration, improving FiO2 requirement; titrate vent to ABG; continue duonebs; steroids for ARDS - decadron 20 mg x5 days followed by decadron 10 mg x5 days; SBT today  #ID- completed 7 days of zosyn for strept mitis and salavaris, diflucan for candida in peritoneal fluid culture; plan for ceftriaxone for 3 more days, continue diflucan; fevers improved today; f/u ID  #Renal/metabolic - EMILIA likely ATN, now resolved; monitor I/Os, electrolytes; hypophos repleted; increase D5 for hyperNa  #GI- s/p kyrie patch for perforated duodenum; pt remains NPO, NGT to ILWS, having BM; discuss w/ surgery EN vs TPN; PPI  #Heme - hgb stable; monitor for now  #Endo - FS q6 while on decadron; MVI, folate, thiamine  #Skin - surgical site care per surgery  #PPx - lovenox q24  #Dispo- remains in ICU in critical condition ventilator dependent; prognosis very guarded; full code    Plan of care discussed w/ daughter wife at bedside- all questions answered

## 2024-02-21 NOTE — PROGRESS NOTE ADULT - SUBJECTIVE AND OBJECTIVE BOX
SURGERY PROGRESS HPI:  POD#8  Pt seen and examined at bedside intubated in the ICU. +BMs.     Vital Signs Last 24 Hrs  T(C): 37.1 (21 Feb 2024 03:00), Max: 37.6 (20 Feb 2024 18:00)  T(F): 98.7 (21 Feb 2024 03:00), Max: 99.7 (20 Feb 2024 18:00)  HR: 48 (21 Feb 2024 05:00) (47 - 78)  BP: 124/71 (21 Feb 2024 04:00) (111/67 - 124/71)  BP(mean): 88 (21 Feb 2024 04:00) (78 - 88)  RR: 20 (21 Feb 2024 05:00) (18 - 28)  SpO2: 97% (21 Feb 2024 05:00) (92% - 100%)    Parameters below as of 21 Feb 2024 03:00  Patient On (Oxygen Delivery Method): ventilator      PHYSICAL EXAM:  CONSTITUTIONAL: NAD  HEENT: NGT in place  RESPIRATORY: Intubated. Clear to ausculation, bilaterally   CARDIOVASCULAR: RRR S1S2  GASTROINTESTINAL: Midline incision with staples intact. FOX with serous output. non distended, soft, appropriate incisional tenderness  : Barber indwelling with clear yellow urine  MUSCULOSKELETAL: calf soft, non tender b/l    I&O's Detail    19 Feb 2024 07:01  -  20 Feb 2024 07:00  --------------------------------------------------------  IN:    FentaNYL: 483.9 mL    IV PiggyBack: 200 mL    IV PiggyBack: 100 mL    IV PiggyBack: 200 mL    Norepinephrine: 9 mL    Propofol: 190.9 mL  Total IN: 1183.8 mL    OUT:    Drain (mL): 1350 mL    Indwelling Catheter - Urethral (mL): 1990 mL  Total OUT: 3340 mL    Total NET: -2156.2 mL      20 Feb 2024 07:01  -  21 Feb 2024 05:49  --------------------------------------------------------  IN:    dextrose 5%: 800 mL    FentaNYL: 420.3 mL    IV PiggyBack: 50 mL    IV PiggyBack: 200 mL    Propofol: 105 mL  Total IN: 1575.3 mL    OUT:    Drain (mL): 800 mL    Indwelling Catheter - Urethral (mL): 1850 mL    Nasogastric/Oral tube (mL): 20 mL  Total OUT: 2670 mL    Total NET: -1094.7 mL    LABS:                        11.6   13.17 )-----------( 214      ( 21 Feb 2024 03:20 )             36.5     02-21    151<H>  |  117<H>  |  47<H>  ----------------------------<  148<H>  3.6   |  29  |  0.88    Ca    8.6      21 Feb 2024 03:20  Phos  1.9     02-21  Mg     3.2     02-21    TPro  6.7  /  Alb  3.1<L>  /  TBili  4.1<H>  /  DBili  x   /  AST  64<H>  /  ALT  52  /  AlkPhos  131<H>  02-21    Urinalysis Basic - ( 21 Feb 2024 03:20 )    Color: x / Appearance: x / SG: x / pH: x  Gluc: 148 mg/dL / Ketone: x  / Bili: x / Urobili: x   Blood: x / Protein: x / Nitrite: x   Leuk Esterase: x / RBC: x / WBC x   Sq Epi: x / Non Sq Epi: x / Bacteria: x    Culture Results:   Normal Respiratory Angeles present (02-18 @ 10:00)        Assessment: 48M with duodenal perforation s/p ex lap, kyrie patch POD#8. Intubated.   OR cx: rare streptococcus salivarius/vestibularis group,rare streptococcus mitis oralis group rare candida albicans. EMILIA.     Plan:  -UGIS planning today  -NPO, NGT, IV hydration  -continue local wound care, FOX care  -antibiotics per ID  -Barber, monitor urine output  -Trend labs  -ICU management

## 2024-02-21 NOTE — PROGRESS NOTE ADULT - SUBJECTIVE AND OBJECTIVE BOX
Patient is a 48y old  Male who presents with a chief complaint of Perforated viscus (20 Feb 2024 23:58)      BRIEF HOSPITAL COURSE:   47 yo m pmhx ETOH abuse, cirrhosis presented with abd pain found with perforated viscus taken to OR for exlap on 2/3 where patient found with perforated duodenal ulcer s/p Aden patch with course c/b aspiration pneumonitis, hypoxic respiratory failure, ARDS, shock, EMILIA found with strep and candida in abscess cx.      Events last 24 hours:   Patient remains intubated, received on 50%/Peep 10.  Fio2 weaned to 40%, Peep weaned to 8 this am.  Patient tolerating well, minimal secretions via ett suctioning.  Making good urine overnight, joseph drain with serous drainage.       PAST MEDICAL & SURGICAL HISTORY:  H/O cirrhosis      S/P foot surgery      Allergies  No Known Allergies      FAMILY HISTORY:  FH: diabetes mellitus      Social History:   from home      Review of Systems:  unable to obtain 2/2 intubated/sedated      Physical Examination:    General: No acute distress.      HEENT: Pupils equal, reactive to light.  Symmetric.    PULM: Clear to auscultation bilaterally, no significant sputum production    CVS: Regular rate and rhythm, no murmurs, rubs, or gallops    ABD: Soft, nondistended, nontender, normoactive bowel sounds, no masses    EXT: No edema, nontender    SKIN: Warm and well perfused, no rashes noted.    NEURO: Alert, oriented, interactive, nonfocal      Medications:  cefTRIAXone   IVPB 1000 milliGRAM(s) IV Intermittent every 24 hours  fluconAZOLE IVPB 400 milliGRAM(s) IV Intermittent every 24 hours  fluconAZOLE IVPB        albuterol/ipratropium for Nebulization 3 milliLiter(s) Nebulizer every 6 hours    fentaNYL   Infusion. 0.5 MICROgram(s)/kG/Hr IV Continuous <Continuous>  propofol Infusion 20 MICROgram(s)/kG/Min IV Continuous <Continuous>    enoxaparin Injectable 40 milliGRAM(s) SubCutaneous every 24 hours    pantoprazole  Injectable 40 milliGRAM(s) IV Push daily    dexAMETHasone  IVPB 20 milliGRAM(s) IV Intermittent every 24 hours    dextrose 5%. 1000 milliLiter(s) IV Continuous <Continuous>  folic acid Injectable 1 milliGRAM(s) IV Push daily  thiamine Injectable 100 milliGRAM(s) IV Push daily    influenza   Vaccine 0.5 milliLiter(s) IntraMuscular once    chlorhexidine 0.12% Liquid 15 milliLiter(s) Oral Mucosa every 12 hours  chlorhexidine 2% Cloths 1 Application(s) Topical <User Schedule>      Mode: AC/ CMV (Assist Control/ Continuous Mandatory Ventilation)  RR (machine): 24  TV (machine): 400  FiO2: 40  PEEP: 10  ITime: 1  MAP: 16  PIP: 30      ICU Vital Signs Last 24 Hrs  T(C): 37.1 (21 Feb 2024 03:00), Max: 37.6 (20 Feb 2024 18:00)  T(F): 98.7 (21 Feb 2024 03:00), Max: 99.7 (20 Feb 2024 18:00)  HR: 50 (21 Feb 2024 03:00) (47 - 78)  BP: 113/64 (21 Feb 2024 00:00) (111/67 - 116/73)  BP(mean): 79 (21 Feb 2024 00:00) (78 - 87)  ABP: 123/57 (21 Feb 2024 03:00) (105/51 - 146/68)  ABP(mean): 77 (21 Feb 2024 03:00) (68 - 96)  RR: 21 (21 Feb 2024 03:00) (18 - 28)  SpO2: 98% (21 Feb 2024 03:00) (92% - 100%)    O2 Parameters below as of 21 Feb 2024 03:00  Patient On (Oxygen Delivery Method): ventilator      Vital Signs Last 24 Hrs  T(C): 37.1 (21 Feb 2024 03:00), Max: 37.6 (20 Feb 2024 18:00)  T(F): 98.7 (21 Feb 2024 03:00), Max: 99.7 (20 Feb 2024 18:00)  HR: 50 (21 Feb 2024 03:00) (47 - 78)  BP: 113/64 (21 Feb 2024 00:00) (111/67 - 116/73)  BP(mean): 79 (21 Feb 2024 00:00) (78 - 87)  RR: 21 (21 Feb 2024 03:00) (18 - 28)  SpO2: 98% (21 Feb 2024 03:00) (92% - 100%)    Parameters below as of 21 Feb 2024 03:00  Patient On (Oxygen Delivery Method): ventilator        ABG - ( 20 Feb 2024 13:16 )  pH, Arterial: 7.41  pH, Blood: x     /  pCO2: 47    /  pO2: 66    / HCO3: 30    / Base Excess: 4.4   /  SaO2: 93.1          I&O's Detail    19 Feb 2024 07:01  -  20 Feb 2024 07:00  --------------------------------------------------------  IN:    FentaNYL: 483.9 mL    IV PiggyBack: 200 mL    IV PiggyBack: 100 mL    IV PiggyBack: 200 mL    Norepinephrine: 9 mL    Propofol: 190.9 mL  Total IN: 1183.8 mL    OUT:    Drain (mL): 1350 mL    Indwelling Catheter - Urethral (mL): 1990 mL  Total OUT: 3340 mL  Total NET: -2156.2 mL      20 Feb 2024 07:01  -  21 Feb 2024 04:30  --------------------------------------------------------  IN:    dextrose 5%: 800 mL    FentaNYL: 420.3 mL    IV PiggyBack: 50 mL    IV PiggyBack: 200 mL    Propofol: 105 mL  Total IN: 1575.3 mL    OUT:    Drain (mL): 800 mL    Indwelling Catheter - Urethral (mL): 1850 mL    Nasogastric/Oral tube (mL): 20 mL  Total OUT: 2670 mL  Total NET: -1094.7 mL        LABS:                        10.7   8.94  )-----------( 199      ( 20 Feb 2024 04:00 )             33.0     02-20    148<H>  |  114<H>  |  56<H>  ----------------------------<  158<H>  3.4<L>   |  29  |  1.30    Ca    7.7<L>      20 Feb 2024 04:00  Phos  2.7     02-20  Mg     2.8     02-20    TPro  6.8  /  Alb  3.2<L>  /  TBili  4.4<H>  /  DBili  x   /  AST  50<H>  /  ALT  44  /  AlkPhos  120  02-20        CAPILLARY BLOOD GLUCOSE  POCT Blood Glucose.: 144 mg/dL (21 Feb 2024 00:04)      Urinalysis Basic - ( 20 Feb 2024 04:00 )  Color: x / Appearance: x / SG: x / pH: x  Gluc: 158 mg/dL / Ketone: x  / Bili: x / Urobili: x   Blood: x / Protein: x / Nitrite: x   Leuk Esterase: x / RBC: x / WBC x   Sq Epi: x / Non Sq Epi: x / Bacteria: x      CULTURES:  Culture Results:   Normal Respiratory Angeles present (02-18 @ 10:00)  Culture Results:   Normal Respiratory Angeles present (02-16 @ 15:16)  Culture Results:   No growth at 4 days (02-16 @ 12:30)  Culture Results:   No growth at 4 days (02-16 @ 12:00)        RADIOLOGY:   < from: US Duplex Venous Upper Ext Complete, Bilateral (02.20.24 @ 05:49) >    ACC: [id number] EXAM:  US DPLX UPR EXT VEINS COMPL BI   ORDERED BY: ONDINA HAYNES     PROCEDURE DATE:  02/20/2024          INTERPRETATION:  CLINICAL INFORMATION: Fever. Upper extremity swelling.    COMPARISON: None available.    TECHNIQUE: Duplex sonography of the BILATERAL upper extremity veins with   color and spectral Doppler, with and without compression.    FINDINGS:    RIGHT:  The right internal jugular, subclavian, axillary and brachial veins are   patent and compressible where applicable.  The basilic vein (superficial   vein) is patent and without thrombus.  The cephalic vein (superficial   vein) is not visualized.    LEFT:  The left internal jugular, subclavian, axillary and brachial veins are   patent and compressible where applicable.  The basilic vein (superficial   vein) is patent and without thrombus.  The cephalic vein (superficial   vein) is not visualized.    Doppler examination shows normal spontaneous and phasic flow.    IMPRESSION:  No evidence of deep venous thrombosis in either upper extremity.    --- End of Report --      ANTONIA ALVAREZ MD; Attending Radiologist  This document has been electronically signed. Feb 20 2024  6:56AM    < end of copied text >    < from: US Duplex Venous Lower Ext Complete, Bilateral (02.19.24 @ 13:00) >    ACC: [id number] EXAM:  US DPLX LWR EXT VEINS COMPL BI   ORDERED BY: ONDINA   ABRAHAMLORENEO     PROCEDURE DATE:  02/19/2024          INTERPRETATION:  CLINICAL INFORMATION: Fevers. Prolonged ICU stay.   Cirrhosis.    COMPARISON: None available.    TECHNIQUE:Duplex sonography of the BILATERAL LOWER extremity veins with   color and spectral Doppler, with and without compression.    FINDINGS:    RIGHT:  Normal compressibility of the RIGHT common femoral, femoral and popliteal   veins.  Doppler examination shows normal spontaneous and phasic flow.  No RIGHT calf vein thrombosis is detected.    LEFT:  Normal compressibility of the LEFT common femoral, femoral and popliteal   veins.  Doppler examination shows normal spontaneous and phasic flow.  No LEFT calf vein thrombosis is detected.    IMPRESSION:  No evidence of deep venous thrombosis in either lower extremity.    --- End of Report ---    MANI MONROE MD; Attending Radiologist  This document has been electronically signed. Feb 19 2024  1:16PM    < end of copied text >      < from: Xray Chest 1 View- PORTABLE-Urgent (Xray Chest 1 View- PORTABLE-Urgent .) (02.18.24 @ 11:02) >    ACC: [id number] EXAM:  XR CHEST PORTABLE URGENT 1V   ORDERED BY: ONDINA   ABRAHAMLORENEO     PROCEDURE DATE:  02/18/2024        INTERPRETATION:  AP chest on February 18, 2024 at 10:10 AM. Patient   requires intubation.    Heart magnified by technique.    Endotracheal tube, nasogastric tube, and right jugular line remain.    There are persistent diffuse advanced infiltrates.    There is slightly better aeration in the left lung at this time.    IMPRESSION: Diffuse advanced infiltrates again noted. There is slightly   better aeration in the left lung at this time.    --- End of Report ---      GITA PRIETO MD; Attending Radiologist  This document has been electronically signed. Feb 18 2024  2:36PM    < end of copied text >

## 2024-02-22 LAB
ALBUMIN SERPL ELPH-MCNC: 2.6 G/DL — LOW (ref 3.3–5)
ALP SERPL-CCNC: 175 U/L — HIGH (ref 40–120)
ALT FLD-CCNC: 83 U/L — HIGH (ref 12–78)
ANION GAP SERPL CALC-SCNC: 2 MMOL/L — LOW (ref 5–17)
ANION GAP SERPL CALC-SCNC: 4 MMOL/L — LOW (ref 5–17)
ANION GAP SERPL CALC-SCNC: 5 MMOL/L — SIGNIFICANT CHANGE UP (ref 5–17)
AST SERPL-CCNC: 122 U/L — HIGH (ref 15–37)
BILIRUB SERPL-MCNC: 3.5 MG/DL — HIGH (ref 0.2–1.2)
BUN SERPL-MCNC: 30 MG/DL — HIGH (ref 7–23)
BUN SERPL-MCNC: 32 MG/DL — HIGH (ref 7–23)
BUN SERPL-MCNC: 36 MG/DL — HIGH (ref 7–23)
CALCIUM SERPL-MCNC: 8 MG/DL — LOW (ref 8.5–10.1)
CALCIUM SERPL-MCNC: 8.1 MG/DL — LOW (ref 8.5–10.1)
CALCIUM SERPL-MCNC: 8.1 MG/DL — LOW (ref 8.5–10.1)
CHLORIDE SERPL-SCNC: 116 MMOL/L — HIGH (ref 96–108)
CHLORIDE SERPL-SCNC: 116 MMOL/L — HIGH (ref 96–108)
CHLORIDE SERPL-SCNC: 117 MMOL/L — HIGH (ref 96–108)
CO2 SERPL-SCNC: 26 MMOL/L — SIGNIFICANT CHANGE UP (ref 22–31)
CO2 SERPL-SCNC: 28 MMOL/L — SIGNIFICANT CHANGE UP (ref 22–31)
CO2 SERPL-SCNC: 29 MMOL/L — SIGNIFICANT CHANGE UP (ref 22–31)
CREAT SERPL-MCNC: 0.7 MG/DL — SIGNIFICANT CHANGE UP (ref 0.5–1.3)
CREAT SERPL-MCNC: 0.75 MG/DL — SIGNIFICANT CHANGE UP (ref 0.5–1.3)
CREAT SERPL-MCNC: 0.75 MG/DL — SIGNIFICANT CHANGE UP (ref 0.5–1.3)
EGFR: 111 ML/MIN/1.73M2 — SIGNIFICANT CHANGE UP
EGFR: 111 ML/MIN/1.73M2 — SIGNIFICANT CHANGE UP
EGFR: 113 ML/MIN/1.73M2 — SIGNIFICANT CHANGE UP
GAS PNL BLDA: SIGNIFICANT CHANGE UP
GLUCOSE BLDC GLUCOMTR-MCNC: 118 MG/DL — HIGH (ref 70–99)
GLUCOSE BLDC GLUCOMTR-MCNC: 129 MG/DL — HIGH (ref 70–99)
GLUCOSE BLDC GLUCOMTR-MCNC: 141 MG/DL — HIGH (ref 70–99)
GLUCOSE BLDC GLUCOMTR-MCNC: 148 MG/DL — HIGH (ref 70–99)
GLUCOSE SERPL-MCNC: 139 MG/DL — HIGH (ref 70–99)
GLUCOSE SERPL-MCNC: 147 MG/DL — HIGH (ref 70–99)
GLUCOSE SERPL-MCNC: 185 MG/DL — HIGH (ref 70–99)
HCT VFR BLD CALC: 35.9 % — LOW (ref 39–50)
HGB BLD-MCNC: 11.5 G/DL — LOW (ref 13–17)
MAGNESIUM SERPL-MCNC: 2.9 MG/DL — HIGH (ref 1.6–2.6)
MAGNESIUM SERPL-MCNC: 2.9 MG/DL — HIGH (ref 1.6–2.6)
MAGNESIUM SERPL-MCNC: 3.1 MG/DL — HIGH (ref 1.6–2.6)
MCHC RBC-ENTMCNC: 32 G/DL — SIGNIFICANT CHANGE UP (ref 32–36)
MCHC RBC-ENTMCNC: 32.3 PG — SIGNIFICANT CHANGE UP (ref 27–34)
MCV RBC AUTO: 100.8 FL — HIGH (ref 80–100)
NRBC # BLD: 0 /100 WBCS — SIGNIFICANT CHANGE UP (ref 0–0)
PHOSPHATE SERPL-MCNC: 1.8 MG/DL — LOW (ref 2.5–4.5)
PHOSPHATE SERPL-MCNC: 2.5 MG/DL — SIGNIFICANT CHANGE UP (ref 2.5–4.5)
PHOSPHATE SERPL-MCNC: 2.8 MG/DL — SIGNIFICANT CHANGE UP (ref 2.5–4.5)
PLATELET # BLD AUTO: 204 K/UL — SIGNIFICANT CHANGE UP (ref 150–400)
POTASSIUM SERPL-MCNC: 4 MMOL/L — SIGNIFICANT CHANGE UP (ref 3.5–5.3)
POTASSIUM SERPL-MCNC: 4.3 MMOL/L — SIGNIFICANT CHANGE UP (ref 3.5–5.3)
POTASSIUM SERPL-MCNC: 4.3 MMOL/L — SIGNIFICANT CHANGE UP (ref 3.5–5.3)
POTASSIUM SERPL-SCNC: 4 MMOL/L — SIGNIFICANT CHANGE UP (ref 3.5–5.3)
POTASSIUM SERPL-SCNC: 4.3 MMOL/L — SIGNIFICANT CHANGE UP (ref 3.5–5.3)
POTASSIUM SERPL-SCNC: 4.3 MMOL/L — SIGNIFICANT CHANGE UP (ref 3.5–5.3)
PROT SERPL-MCNC: 6.1 GM/DL — SIGNIFICANT CHANGE UP (ref 6–8.3)
RBC # BLD: 3.56 M/UL — LOW (ref 4.2–5.8)
RBC # FLD: 15.6 % — HIGH (ref 10.3–14.5)
SODIUM SERPL-SCNC: 146 MMOL/L — HIGH (ref 135–145)
SODIUM SERPL-SCNC: 148 MMOL/L — HIGH (ref 135–145)
SODIUM SERPL-SCNC: 149 MMOL/L — HIGH (ref 135–145)
WBC # BLD: 14.91 K/UL — HIGH (ref 3.8–10.5)
WBC # FLD AUTO: 14.91 K/UL — HIGH (ref 3.8–10.5)

## 2024-02-22 PROCEDURE — 99291 CRITICAL CARE FIRST HOUR: CPT

## 2024-02-22 RX ORDER — POTASSIUM PHOSPHATE, MONOBASIC POTASSIUM PHOSPHATE, DIBASIC 236; 224 MG/ML; MG/ML
30 INJECTION, SOLUTION INTRAVENOUS ONCE
Refills: 0 | Status: COMPLETED | OUTPATIENT
Start: 2024-02-22 | End: 2024-02-22

## 2024-02-22 RX ORDER — FENTANYL CITRATE 50 UG/ML
0.44 INJECTION INTRAVENOUS
Qty: 2500 | Refills: 0 | Status: DISCONTINUED | OUTPATIENT
Start: 2024-02-22 | End: 2024-02-24

## 2024-02-22 RX ADMIN — SODIUM CHLORIDE 60 MILLILITER(S): 9 INJECTION, SOLUTION INTRAVENOUS at 22:38

## 2024-02-22 RX ADMIN — Medication 110 MILLIGRAM(S): at 11:54

## 2024-02-22 RX ADMIN — PROPOFOL 8.98 MICROGRAM(S)/KG/MIN: 10 INJECTION, EMULSION INTRAVENOUS at 14:07

## 2024-02-22 RX ADMIN — Medication 3 MILLILITER(S): at 11:07

## 2024-02-22 RX ADMIN — ENOXAPARIN SODIUM 40 MILLIGRAM(S): 100 INJECTION SUBCUTANEOUS at 11:32

## 2024-02-22 RX ADMIN — Medication 3 MILLILITER(S): at 17:09

## 2024-02-22 RX ADMIN — Medication 3 MILLILITER(S): at 00:46

## 2024-02-22 RX ADMIN — FENTANYL CITRATE 3.74 MICROGRAM(S)/KG/HR: 50 INJECTION INTRAVENOUS at 18:22

## 2024-02-22 RX ADMIN — Medication 100 MILLIGRAM(S): at 11:32

## 2024-02-22 RX ADMIN — FENTANYL CITRATE 3.74 MICROGRAM(S)/KG/HR: 50 INJECTION INTRAVENOUS at 01:41

## 2024-02-22 RX ADMIN — CHLORHEXIDINE GLUCONATE 15 MILLILITER(S): 213 SOLUTION TOPICAL at 17:18

## 2024-02-22 RX ADMIN — FLUCONAZOLE 100 MILLIGRAM(S): 150 TABLET ORAL at 11:31

## 2024-02-22 RX ADMIN — Medication 1 MILLIGRAM(S): at 11:54

## 2024-02-22 RX ADMIN — PANTOPRAZOLE SODIUM 40 MILLIGRAM(S): 20 TABLET, DELAYED RELEASE ORAL at 11:32

## 2024-02-22 RX ADMIN — Medication 3 MILLILITER(S): at 23:25

## 2024-02-22 RX ADMIN — POTASSIUM PHOSPHATE, MONOBASIC POTASSIUM PHOSPHATE, DIBASIC 83.33 MILLIMOLE(S): 236; 224 INJECTION, SOLUTION INTRAVENOUS at 11:25

## 2024-02-22 RX ADMIN — Medication 3 MILLILITER(S): at 05:19

## 2024-02-22 RX ADMIN — CEFTRIAXONE 100 MILLIGRAM(S): 500 INJECTION, POWDER, FOR SOLUTION INTRAMUSCULAR; INTRAVENOUS at 14:07

## 2024-02-22 NOTE — PROGRESS NOTE ADULT - SUBJECTIVE AND OBJECTIVE BOX
Pt seen in ICU  no significant changes  Based on CT findings, after discussion with ICU team --> pt to be given a trial of NGT feedings      MEDICATIONS  (STANDING):  albuterol/ipratropium for Nebulization 3 milliLiter(s) Nebulizer every 6 hours  chlorhexidine 0.12% Liquid 15 milliLiter(s) Oral Mucosa every 12 hours  chlorhexidine 2% Cloths 1 Application(s) Topical <User Schedule>  dexAMETHasone  IVPB 20 milliGRAM(s) IV Intermittent every 24 hours  dextrose 5%. 1000 milliLiter(s) (60 mL/Hr) IV Continuous <Continuous>  enoxaparin Injectable 40 milliGRAM(s) SubCutaneous every 24 hours  fentaNYL   Infusion. 0.441 MICROgram(s)/kG/Hr (3.74 mL/Hr) IV Continuous <Continuous>  fluconAZOLE IVPB 400 milliGRAM(s) IV Intermittent every 24 hours  fluconAZOLE IVPB      folic acid Injectable 1 milliGRAM(s) IV Push daily  influenza   Vaccine 0.5 milliLiter(s) IntraMuscular once  pantoprazole  Injectable 40 milliGRAM(s) IV Push daily  propofol Infusion 20 MICROgram(s)/kG/Min (8.98 mL/Hr) IV Continuous <Continuous>  thiamine Injectable 100 milliGRAM(s) IV Push daily    MEDICATIONS  (PRN):      Allergies    No Known Allergies    Intolerances        Vital Signs Last 24 Hrs  T(C): 37.6 (22 Feb 2024 14:00), Max: 37.8 (21 Feb 2024 22:00)  T(F): 99.7 (22 Feb 2024 14:00), Max: 100 (21 Feb 2024 22:00)  HR: 60 (22 Feb 2024 14:00) (45 - 60)  BP: --  BP(mean): --  RR: 15 (22 Feb 2024 14:00) (15 - 22)  SpO2: 96% (22 Feb 2024 14:00) (94% - 99%)    Parameters below as of 22 Feb 2024 14:00  Patient On (Oxygen Delivery Method): ventilator        PHYSICAL EXAM:  General: NAD.  CVS: S1, S2  Chest: air entry bilaterally present  Abd: BS present, soft, non-tender      LABS:                        11.5   14.91 )-----------( 204      ( 22 Feb 2024 03:15 )             35.9     02-22    149<H>  |  116<H>  |  36<H>  ----------------------------<  147<H>  4.0   |  28  |  0.75    Ca    8.1<L>      22 Feb 2024 03:15  Phos  1.8     02-22  Mg     3.1     02-22    TPro  6.1  /  Alb  2.6<L>  /  TBili  3.5<H>  /  DBili  x   /  AST  122<H>  /  ALT  83<H>  /  AlkPhos  175<H>  02-22        will hold off on TPN  Pt starting NGT feedings

## 2024-02-22 NOTE — PROGRESS NOTE ADULT - ASSESSMENT
48M w/ cirrhosis, EtOH use disorder, open reduction metatarsal fracture. PResents w/ epigastric pain and vomiting. Found to have perforated duodenal ulcer w/ kyrie patch. Transferred to ICU post-op. Course complicated by ARDS, septic shock, and EMILIA.     #Neuro - sedation w/ propofol and fentanyl, decrease sedation and monitor mental status  #CV - HD stable, remains off pressors  #Pulm - ARDS likely in setting of aspiration, improving FiO2 requirement; titrate vent to ABG; continue duonebs; steroids for ARDS - decadron 20 mg x5 days followed by decadron 10 mg x5 days; SBT today  #ID- completed 7 days of zosyn for strept mitis and salavaris, diflucan for candida in peritoneal fluid culture; plan for ceftriaxone for 3 more days, continue diflucan; remains afebrile, CT A/P shows no fluid collections, discuss w/ ID abx/anti-fungal coverage  #Renal/metabolic - EMILIA likely ATN, now resolved; monitor I/Os, electrolytes; hypophos repleted; continue D5 for hyperNa; once start feeds, BMP q4 for refeeding syndrome  #GI- s/p kyrie patch for perforated duodenum; per discussion w/ surgery, ok to start TF,  but will need to start at 10 cc/hr and watch for refeeding syndrome; continue PPI  #Heme - hgb stable; monitor for now  #Endo - FS q6 while on decadron; MVI, folate, thiamine  #Skin - surgical site care per surgery  #PPx - lovenox q24  #Dispo- remains in ICU in critical condition ventilator dependent; prognosis very guarded; full code    Plan of care discussed w/ daughter wife at bedside- all questions answered

## 2024-02-22 NOTE — PROGRESS NOTE ADULT - SUBJECTIVE AND OBJECTIVE BOX
CHIEF COMPLAINT:    Interval Events:    REVIEW OF SYSTEMS:  Constitutional: [ ] fevers [ ] chills [ ] weight loss [ ] weight gain  HEENT: [ ] dry eyes [ ] eye irritation [ ] postnasal drip [ ] nasal congestion  CV: [ ] chest pain [ ] orthopnea [ ] palpitations [ ] murmur  Resp: [ ] cough [ ] shortness of breath [ ] dyspnea [ ] wheezing [ ] sputum [ ] hemoptysis  GI: [ ] nausea [ ] vomiting [ ] diarrhea [ ] constipation [ ] abd pain [ ] dysphagia   : [ ] dysuria [ ] nocturia [ ] hematuria [ ] increased urinary frequency  Musculoskeletal: [ ] back pain [ ] myalgias [ ] arthralgias [ ] fracture  Skin: [ ] rash [ ] itch  Neurological: [ ] headache [ ] dizziness [ ] syncope [ ] weakness [ ] numbness  Hematologic/Lymphatic: [ ] anemia [ ] bleeding problem  Allergic/Immunologic: [ ] itchy eyes [ ] nasal discharge [ ] hives [ ] angioedema  [ ] All other systems negative  [ ] Unable to assess ROS because ________    OBJECTIVE:  ICU Vital Signs Last 24 Hrs  T(C): 37.4 (22 Feb 2024 07:00), Max: 37.8 (21 Feb 2024 22:00)  T(F): 99.3 (22 Feb 2024 07:00), Max: 100 (21 Feb 2024 22:00)  HR: 53 (22 Feb 2024 07:00) (45 - 90)  BP: 112/63 (21 Feb 2024 12:00) (112/63 - 128/71)  BP(mean): 78 (21 Feb 2024 12:00) (78 - 88)  ABP: 139/63 (22 Feb 2024 07:00) (111/50 - 152/79)  ABP(mean): 88 (22 Feb 2024 07:00) (70 - 110)  RR: 22 (22 Feb 2024 07:00) (18 - 24)  SpO2: 97% (22 Feb 2024 07:00) (94% - 99%)    O2 Parameters below as of 22 Feb 2024 07:00  Patient On (Oxygen Delivery Method): ventilator          Mode: AC/ CMV (Assist Control/ Continuous Mandatory Ventilation), RR (machine): 22, TV (machine): 430, FiO2: 40, PEEP: 8, ITime: 0.8, MAP: 13, PIP: 26    02-21 @ 07:01  -  02-22 @ 07:00  --------------------------------------------------------  IN: 1438 mL / OUT: 3175 mL / NET: -1737 mL      CAPILLARY BLOOD GLUCOSE      POCT Blood Glucose.: 129 mg/dL (22 Feb 2024 05:12)      PHYSICAL EXAM:  General:   HEENT:   Neck:   Respiratory:   Cardiovascular:   Abdomen:   Extremities:   Skin:   Neurological:  Psychiatry:    LINES:    HOSPITAL MEDICATIONS:  MEDICATIONS  (STANDING):  albuterol/ipratropium for Nebulization 3 milliLiter(s) Nebulizer every 6 hours  cefTRIAXone   IVPB 1000 milliGRAM(s) IV Intermittent every 24 hours  chlorhexidine 0.12% Liquid 15 milliLiter(s) Oral Mucosa every 12 hours  chlorhexidine 2% Cloths 1 Application(s) Topical <User Schedule>  dexAMETHasone  IVPB 20 milliGRAM(s) IV Intermittent every 24 hours  dextrose 5%. 1000 milliLiter(s) (60 mL/Hr) IV Continuous <Continuous>  enoxaparin Injectable 40 milliGRAM(s) SubCutaneous every 24 hours  fentaNYL   Infusion. 0.441 MICROgram(s)/kG/Hr (3.74 mL/Hr) IV Continuous <Continuous>  fluconAZOLE IVPB      fluconAZOLE IVPB 400 milliGRAM(s) IV Intermittent every 24 hours  folic acid Injectable 1 milliGRAM(s) IV Push daily  influenza   Vaccine 0.5 milliLiter(s) IntraMuscular once  pantoprazole  Injectable 40 milliGRAM(s) IV Push daily  potassium phosphate IVPB 30 milliMole(s) IV Intermittent once  propofol Infusion 20 MICROgram(s)/kG/Min (8.98 mL/Hr) IV Continuous <Continuous>  thiamine Injectable 100 milliGRAM(s) IV Push daily    MEDICATIONS  (PRN):      LABS:                        11.5   14.91 )-----------( 204      ( 22 Feb 2024 03:15 )             35.9     Hgb Trend: 11.5<--, 11.6<--, 10.7<--, 9.7<--, 9.8<--  02-22    149<H>  |  116<H>  |  36<H>  ----------------------------<  147<H>  4.0   |  28  |  0.75    Ca    8.1<L>      22 Feb 2024 03:15  Phos  1.8     02-22  Mg     3.1     02-22    TPro  6.1  /  Alb  2.6<L>  /  TBili  3.5<H>  /  DBili  x   /  AST  122<H>  /  ALT  83<H>  /  AlkPhos  175<H>  02-22      Urinalysis Basic - ( 22 Feb 2024 03:15 )    Color: x / Appearance: x / SG: x / pH: x  Gluc: 147 mg/dL / Ketone: x  / Bili: x / Urobili: x   Blood: x / Protein: x / Nitrite: x   Leuk Esterase: x / RBC: x / WBC x   Sq Epi: x / Non Sq Epi: x / Bacteria: x      Arterial Blood Gas:  02-20 @ 13:16  7.41/47/66/30/93.1/4.4  ABG lactate: --  Arterial Blood Gas:  02-20 @ 09:12  7.48/40/84/30/98.4/5.8  ABG lactate: --        MICROBIOLOGY:     RADIOLOGY:  [ ] Reviewed and interpreted by me CHIEF COMPLAINT:    Interval Events:  no o/n events    REVIEW OF SYSTEMS:  [x ] Unable to assess ROS because intubated/sedated    OBJECTIVE:  ICU Vital Signs Last 24 Hrs  T(C): 37.4 (22 Feb 2024 07:00), Max: 37.8 (21 Feb 2024 22:00)  T(F): 99.3 (22 Feb 2024 07:00), Max: 100 (21 Feb 2024 22:00)  HR: 53 (22 Feb 2024 07:00) (45 - 90)  BP: 112/63 (21 Feb 2024 12:00) (112/63 - 128/71)  BP(mean): 78 (21 Feb 2024 12:00) (78 - 88)  ABP: 139/63 (22 Feb 2024 07:00) (111/50 - 152/79)  ABP(mean): 88 (22 Feb 2024 07:00) (70 - 110)  RR: 22 (22 Feb 2024 07:00) (18 - 24)  SpO2: 97% (22 Feb 2024 07:00) (94% - 99%)    O2 Parameters below as of 22 Feb 2024 07:00  Patient On (Oxygen Delivery Method): ventilator          Mode: AC/ CMV (Assist Control/ Continuous Mandatory Ventilation), RR (machine): 22, TV (machine): 430, FiO2: 40, PEEP: 8, ITime: 0.8, MAP: 13, PIP: 26    02-21 @ 07:01  -  02-22 @ 07:00  --------------------------------------------------------  IN: 1438 mL / OUT: 3175 mL / NET: -1737 mL      CAPILLARY BLOOD GLUCOSE      POCT Blood Glucose.: 129 mg/dL (22 Feb 2024 05:12)      PHYSICAL EXAM:  General: NAD, non toxic appearing  HEENT: ETT and NGT in place  Neck: supple  Respiratory: mechanical BS  Cardiovascular: s1s2 RRR  Abdomen: soft, non tender, non distended  Extremities: warm, no edema or clubbing  Skin: midline sutures  Neurological: unable to assess  Psychiatry: unable to assess    LINES:  PIV    HOSPITAL MEDICATIONS:  MEDICATIONS  (STANDING):  albuterol/ipratropium for Nebulization 3 milliLiter(s) Nebulizer every 6 hours  cefTRIAXone   IVPB 1000 milliGRAM(s) IV Intermittent every 24 hours  chlorhexidine 0.12% Liquid 15 milliLiter(s) Oral Mucosa every 12 hours  chlorhexidine 2% Cloths 1 Application(s) Topical <User Schedule>  dexAMETHasone  IVPB 20 milliGRAM(s) IV Intermittent every 24 hours  dextrose 5%. 1000 milliLiter(s) (60 mL/Hr) IV Continuous <Continuous>  enoxaparin Injectable 40 milliGRAM(s) SubCutaneous every 24 hours  fentaNYL   Infusion. 0.441 MICROgram(s)/kG/Hr (3.74 mL/Hr) IV Continuous <Continuous>  fluconAZOLE IVPB      fluconAZOLE IVPB 400 milliGRAM(s) IV Intermittent every 24 hours  folic acid Injectable 1 milliGRAM(s) IV Push daily  influenza   Vaccine 0.5 milliLiter(s) IntraMuscular once  pantoprazole  Injectable 40 milliGRAM(s) IV Push daily  potassium phosphate IVPB 30 milliMole(s) IV Intermittent once  propofol Infusion 20 MICROgram(s)/kG/Min (8.98 mL/Hr) IV Continuous <Continuous>  thiamine Injectable 100 milliGRAM(s) IV Push daily    MEDICATIONS  (PRN):      LABS:                        11.5   14.91 )-----------( 204      ( 22 Feb 2024 03:15 )             35.9     Hgb Trend: 11.5<--, 11.6<--, 10.7<--, 9.7<--, 9.8<--  02-22    149<H>  |  116<H>  |  36<H>  ----------------------------<  147<H>  4.0   |  28  |  0.75    Ca    8.1<L>      22 Feb 2024 03:15  Phos  1.8     02-22  Mg     3.1     02-22    TPro  6.1  /  Alb  2.6<L>  /  TBili  3.5<H>  /  DBili  x   /  AST  122<H>  /  ALT  83<H>  /  AlkPhos  175<H>  02-22      Urinalysis Basic - ( 22 Feb 2024 03:15 )    Color: x / Appearance: x / SG: x / pH: x  Gluc: 147 mg/dL / Ketone: x  / Bili: x / Urobili: x   Blood: x / Protein: x / Nitrite: x   Leuk Esterase: x / RBC: x / WBC x   Sq Epi: x / Non Sq Epi: x / Bacteria: x      Arterial Blood Gas:  02-20 @ 13:16  7.41/47/66/30/93.1/4.4  ABG lactate: --  Arterial Blood Gas:  02-20 @ 09:12  7.48/40/84/30/98.4/5.8  ABG lactate: --        MICROBIOLOGY:     RADIOLOGY:  [ ] Reviewed and interpreted by me    < from: CT Abdomen and Pelvis w/ Oral Cont and w/ IV Cont (02.21.24 @ 16:16) >  FINDINGS:  LOWER CHEST: Patchy consolidation left upper and lower lobes and   extensive groundglass consolidation right middle lobe, likely pneumonia.   Bibasilar consolidation consistent with atelectasis and/or pneumonia.   Small bilateral pleural effusions. LIVER: Hepatomegaly.  BILE DUCTS: Normal caliber.  GALLBLADDER: Within normal limits.  SPLEEN: Splenomegaly with splenic span 14.4 cm  PANCREAS: Within normal limits.  ADRENALS: Within normal limits.  KIDNEYS/URETERS: Within normal limits.    BLADDER: Indwelling catheter.  REPRODUCTIVE ORGANS: Normal-sized prostate    BOWEL: No bowel obstruction. Duodenal edema. Esophagogastric tube ending   in the stomach. Edema ascending colon possible portal colopathy. Appendix   not visualized. No evidence appendicitis.  PERITONEUM: Small right subphrenic ascites. No intra-abdominal collection   otherwise visualized. Small amount free air possibly related to drain   ending in left anterior upper quadrant and inserted via right flank  VESSELS: Paraesophageal varices.  RETROPERITONEUM/LYMPH NODES: No lymphadenopathy.  ABDOMINAL WALL: Within normal limits.  BONES: Degenerative change.    IMPRESSION:  Multifocal pneumonia.  Small amount of pneumoperitoneum possibly related to indwelling drain. No   associated collection.  Small right subphrenic ascites.  Hepatosplenomegaly with evidence portal hypertension    < end of copied text >

## 2024-02-22 NOTE — PROGRESS NOTE ADULT - SUBJECTIVE AND OBJECTIVE BOX
SURGERY PROGRESS HPI:  POD#9  Pt seen and examined at bedside intubated in the ICU.    Vital Signs Last 24 Hrs  T(C): 37.4 (22 Feb 2024 02:00), Max: 37.8 (21 Feb 2024 22:00)  T(F): 99.3 (22 Feb 2024 02:00), Max: 100 (21 Feb 2024 22:00)  HR: 53 (22 Feb 2024 02:00) (48 - 90)  BP: 112/63 (21 Feb 2024 12:00) (112/63 - 128/71)  BP(mean): 78 (21 Feb 2024 12:00) (78 - 88)  RR: 22 (22 Feb 2024 02:00) (18 - 24)  SpO2: 97% (22 Feb 2024 02:00) (94% - 99%)    Parameters below as of 22 Feb 2024 02:00  Patient On (Oxygen Delivery Method): ventilator    PHYSICAL EXAM:  CONSTITUTIONAL: NAD  HEENT: NGT in place  RESPIRATORY: Intubated. Clear to ausculation, bilaterally   CARDIOVASCULAR: RRR S1S2  GASTROINTESTINAL: Midline incision with staples intact. FOX with serous output. non distended, soft, appropriate incisional tenderness  : Barber indwelling with clear yellow urine  MUSCULOSKELETAL: calf soft, non tender b/l    I&O's Detail    20 Feb 2024 07:01  -  21 Feb 2024 07:00  --------------------------------------------------------  IN:    dextrose 5%: 920 mL    FentaNYL: 471.3 mL    IV PiggyBack: 50 mL    IV PiggyBack: 200 mL    Propofol: 120 mL  Total IN: 1761.3 mL    OUT:    Drain (mL): 910 mL    Indwelling Catheter - Urethral (mL): 2075 mL    Nasogastric/Oral tube (mL): 80 mL  Total OUT: 3065 mL    Total NET: -1303.7 mL      21 Feb 2024 07:01  -  22 Feb 2024 05:24  --------------------------------------------------------  IN:    dextrose 5%: 800 mL    FentaNYL: 238 mL    Propofol: 55 mL  Total IN: 1093 mL    OUT:    Drain (mL): 620 mL    Indwelling Catheter - Urethral (mL): 1615 mL    Nasogastric/Oral tube (mL): 700 mL  Total OUT: 2935 mL    Total NET: -1842 mL          LABS:                        11.5   14.91 )-----------( 204      ( 22 Feb 2024 03:15 )             35.9     02-22    149<H>  |  116<H>  |  36<H>  ----------------------------<  147<H>  4.0   |  28  |  0.75    Ca    8.1<L>      22 Feb 2024 03:15  Phos  1.8     02-22  Mg     3.1     02-22    TPro  6.1  /  Alb  2.6<L>  /  TBili  3.5<H>  /  DBili  x   /  AST  122<H>  /  ALT  83<H>  /  AlkPhos  175<H>  02-22      Urinalysis Basic - ( 22 Feb 2024 03:15 )    Color: x / Appearance: x / SG: x / pH: x  Gluc: 147 mg/dL / Ketone: x  / Bili: x / Urobili: x   Blood: x / Protein: x / Nitrite: x   Leuk Esterase: x / RBC: x / WBC x   Sq Epi: x / Non Sq Epi: x / Bacteria: x    < from: CT Abdomen and Pelvis w/ Oral Cont and w/ IV Cont (02.21.24 @ 16:16) >  FINDINGS:  LOWER CHEST: Patchy consolidation left upper and lower lobes and   extensive groundglass consolidation right middle lobe, likely pneumonia.   Bibasilar consolidation consistent with atelectasis and/or pneumonia.   Small bilateral pleural effusions. LIVER: Hepatomegaly.  BILE DUCTS: Normal caliber.  GALLBLADDER: Within normal limits.  SPLEEN: Splenomegaly with splenic span 14.4 cm  PANCREAS: Within normal limits.  ADRENALS: Within normal limits.  KIDNEYS/URETERS: Within normal limits.    BLADDER: Indwelling catheter.  REPRODUCTIVE ORGANS: Normal-sized prostate    BOWEL: No bowel obstruction. Duodenal edema. Esophagogastric tube ending   in the stomach. Edema ascending colon possible portal colopathy. Appendix   not visualized. No evidence appendicitis.  PERITONEUM: Small right subphrenic ascites. No intra-abdominal collection   otherwise visualized. Small amount free air possibly related to drain   ending in left anterior upper quadrant and inserted via right flank  VESSELS: Paraesophageal varices.  RETROPERITONEUM/LYMPH NODES: No lymphadenopathy.  ABDOMINAL WALL: Within normal limits.  BONES: Degenerative change.    IMPRESSION:  Multifocal pneumonia.  Small amount of pneumoperitoneum possibly related to indwelling drain. No   associated collection.  Small right subphrenic ascites.  Hepatosplenomegaly with evidence portal hypertension    < end of copied text >        Assessment: 48M with duodenal perforation s/p ex lap, kyrie patch POD#9. Intubated.   OR cx: rare streptococcus salivarius/vestibularis group,rare streptococcus mitis oralis group rare candida albicans. EMILIA.     Plan:  -NGT, IV hydration  -continue local wound care, FOX care  -antibiotics per ID  -Barber, monitor urine output  -Trend labs  -ICU management

## 2024-02-23 LAB
ALBUMIN SERPL ELPH-MCNC: 2.5 G/DL — LOW (ref 3.3–5)
ALP SERPL-CCNC: 208 U/L — HIGH (ref 40–120)
ALT FLD-CCNC: 131 U/L — HIGH (ref 12–78)
ANION GAP SERPL CALC-SCNC: 3 MMOL/L — LOW (ref 5–17)
ANION GAP SERPL CALC-SCNC: 4 MMOL/L — LOW (ref 5–17)
ANISOCYTOSIS BLD QL: SLIGHT — SIGNIFICANT CHANGE UP
AST SERPL-CCNC: 163 U/L — HIGH (ref 15–37)
BASOPHILS # BLD AUTO: 0 K/UL — SIGNIFICANT CHANGE UP (ref 0–0.2)
BASOPHILS NFR BLD AUTO: 0 % — SIGNIFICANT CHANGE UP (ref 0–2)
BILIRUB SERPL-MCNC: 3.2 MG/DL — HIGH (ref 0.2–1.2)
BUN SERPL-MCNC: 23 MG/DL — SIGNIFICANT CHANGE UP (ref 7–23)
BUN SERPL-MCNC: 23 MG/DL — SIGNIFICANT CHANGE UP (ref 7–23)
BUN SERPL-MCNC: 25 MG/DL — HIGH (ref 7–23)
BUN SERPL-MCNC: 27 MG/DL — HIGH (ref 7–23)
CALCIUM SERPL-MCNC: 8 MG/DL — LOW (ref 8.5–10.1)
CALCIUM SERPL-MCNC: 8 MG/DL — LOW (ref 8.5–10.1)
CALCIUM SERPL-MCNC: 8.1 MG/DL — LOW (ref 8.5–10.1)
CALCIUM SERPL-MCNC: 8.2 MG/DL — LOW (ref 8.5–10.1)
CHLORIDE SERPL-SCNC: 113 MMOL/L — HIGH (ref 96–108)
CHLORIDE SERPL-SCNC: 113 MMOL/L — HIGH (ref 96–108)
CHLORIDE SERPL-SCNC: 114 MMOL/L — HIGH (ref 96–108)
CHLORIDE SERPL-SCNC: 117 MMOL/L — HIGH (ref 96–108)
CO2 SERPL-SCNC: 25 MMOL/L — SIGNIFICANT CHANGE UP (ref 22–31)
CO2 SERPL-SCNC: 25 MMOL/L — SIGNIFICANT CHANGE UP (ref 22–31)
CO2 SERPL-SCNC: 26 MMOL/L — SIGNIFICANT CHANGE UP (ref 22–31)
CO2 SERPL-SCNC: 27 MMOL/L — SIGNIFICANT CHANGE UP (ref 22–31)
CREAT SERPL-MCNC: 0.42 MG/DL — LOW (ref 0.5–1.3)
CREAT SERPL-MCNC: 0.59 MG/DL — SIGNIFICANT CHANGE UP (ref 0.5–1.3)
CREAT SERPL-MCNC: 0.62 MG/DL — SIGNIFICANT CHANGE UP (ref 0.5–1.3)
CREAT SERPL-MCNC: 0.63 MG/DL — SIGNIFICANT CHANGE UP (ref 0.5–1.3)
EGFR: 117 ML/MIN/1.73M2 — SIGNIFICANT CHANGE UP
EGFR: 117 ML/MIN/1.73M2 — SIGNIFICANT CHANGE UP
EGFR: 119 ML/MIN/1.73M2 — SIGNIFICANT CHANGE UP
EGFR: 132 ML/MIN/1.73M2 — SIGNIFICANT CHANGE UP
EOSINOPHIL # BLD AUTO: 0 K/UL — SIGNIFICANT CHANGE UP (ref 0–0.5)
EOSINOPHIL NFR BLD AUTO: 0 % — SIGNIFICANT CHANGE UP (ref 0–6)
GLUCOSE BLDC GLUCOMTR-MCNC: 141 MG/DL — HIGH (ref 70–99)
GLUCOSE BLDC GLUCOMTR-MCNC: 147 MG/DL — HIGH (ref 70–99)
GLUCOSE BLDC GLUCOMTR-MCNC: 152 MG/DL — HIGH (ref 70–99)
GLUCOSE SERPL-MCNC: 136 MG/DL — HIGH (ref 70–99)
GLUCOSE SERPL-MCNC: 153 MG/DL — HIGH (ref 70–99)
GLUCOSE SERPL-MCNC: 158 MG/DL — HIGH (ref 70–99)
GLUCOSE SERPL-MCNC: 171 MG/DL — HIGH (ref 70–99)
HCT VFR BLD CALC: 37.1 % — LOW (ref 39–50)
HGB BLD-MCNC: 11.7 G/DL — LOW (ref 13–17)
LYMPHOCYTES # BLD AUTO: 0.71 K/UL — LOW (ref 1–3.3)
LYMPHOCYTES # BLD AUTO: 4 % — LOW (ref 13–44)
MACROCYTES BLD QL: SLIGHT — SIGNIFICANT CHANGE UP
MAGNESIUM SERPL-MCNC: 2.6 MG/DL — SIGNIFICANT CHANGE UP (ref 1.6–2.6)
MAGNESIUM SERPL-MCNC: 2.7 MG/DL — HIGH (ref 1.6–2.6)
MAGNESIUM SERPL-MCNC: 2.7 MG/DL — HIGH (ref 1.6–2.6)
MAGNESIUM SERPL-MCNC: 2.9 MG/DL — HIGH (ref 1.6–2.6)
MANUAL SMEAR VERIFICATION: SIGNIFICANT CHANGE UP
MCHC RBC-ENTMCNC: 31.5 G/DL — LOW (ref 32–36)
MCHC RBC-ENTMCNC: 31.5 PG — SIGNIFICANT CHANGE UP (ref 27–34)
MCV RBC AUTO: 100 FL — SIGNIFICANT CHANGE UP (ref 80–100)
MICROCYTES BLD QL: SLIGHT — SIGNIFICANT CHANGE UP
MONOCYTES # BLD AUTO: 0.89 K/UL — SIGNIFICANT CHANGE UP (ref 0–0.9)
MONOCYTES NFR BLD AUTO: 5 % — SIGNIFICANT CHANGE UP (ref 2–14)
NEUTROPHILS # BLD AUTO: 16.23 K/UL — HIGH (ref 1.8–7.4)
NEUTROPHILS NFR BLD AUTO: 90 % — HIGH (ref 43–77)
NEUTS BAND # BLD: 1 % — SIGNIFICANT CHANGE UP (ref 0–8)
NRBC # BLD: 0 /100 WBCS — SIGNIFICANT CHANGE UP (ref 0–0)
NRBC # BLD: SIGNIFICANT CHANGE UP /100 WBCS (ref 0–0)
PHOSPHATE SERPL-MCNC: 1.9 MG/DL — LOW (ref 2.5–4.5)
PHOSPHATE SERPL-MCNC: 1.9 MG/DL — LOW (ref 2.5–4.5)
PHOSPHATE SERPL-MCNC: 2.2 MG/DL — LOW (ref 2.5–4.5)
PHOSPHATE SERPL-MCNC: 2.3 MG/DL — LOW (ref 2.5–4.5)
PLAT MORPH BLD: NORMAL — SIGNIFICANT CHANGE UP
PLATELET # BLD AUTO: 179 K/UL — SIGNIFICANT CHANGE UP (ref 150–400)
POTASSIUM SERPL-MCNC: 4 MMOL/L — SIGNIFICANT CHANGE UP (ref 3.5–5.3)
POTASSIUM SERPL-MCNC: 4 MMOL/L — SIGNIFICANT CHANGE UP (ref 3.5–5.3)
POTASSIUM SERPL-MCNC: 4.3 MMOL/L — SIGNIFICANT CHANGE UP (ref 3.5–5.3)
POTASSIUM SERPL-MCNC: 4.4 MMOL/L — SIGNIFICANT CHANGE UP (ref 3.5–5.3)
POTASSIUM SERPL-SCNC: 4 MMOL/L — SIGNIFICANT CHANGE UP (ref 3.5–5.3)
POTASSIUM SERPL-SCNC: 4 MMOL/L — SIGNIFICANT CHANGE UP (ref 3.5–5.3)
POTASSIUM SERPL-SCNC: 4.3 MMOL/L — SIGNIFICANT CHANGE UP (ref 3.5–5.3)
POTASSIUM SERPL-SCNC: 4.4 MMOL/L — SIGNIFICANT CHANGE UP (ref 3.5–5.3)
PROT SERPL-MCNC: 6 GM/DL — SIGNIFICANT CHANGE UP (ref 6–8.3)
RBC # BLD: 3.71 M/UL — LOW (ref 4.2–5.8)
RBC # FLD: 15.2 % — HIGH (ref 10.3–14.5)
RBC BLD AUTO: ABNORMAL
SODIUM SERPL-SCNC: 143 MMOL/L — SIGNIFICANT CHANGE UP (ref 135–145)
SODIUM SERPL-SCNC: 146 MMOL/L — HIGH (ref 135–145)
STOMATOCYTES BLD QL SMEAR: SLIGHT — SIGNIFICANT CHANGE UP
WBC # BLD: 17.84 K/UL — HIGH (ref 3.8–10.5)
WBC # FLD AUTO: 17.84 K/UL — HIGH (ref 3.8–10.5)

## 2024-02-23 PROCEDURE — 99291 CRITICAL CARE FIRST HOUR: CPT

## 2024-02-23 PROCEDURE — 99232 SBSQ HOSP IP/OBS MODERATE 35: CPT

## 2024-02-23 RX ORDER — SODIUM,POTASSIUM PHOSPHATES 278-250MG
1 POWDER IN PACKET (EA) ORAL
Refills: 0 | Status: COMPLETED | OUTPATIENT
Start: 2024-02-23 | End: 2024-02-23

## 2024-02-23 RX ORDER — SENNA PLUS 8.6 MG/1
2 TABLET ORAL AT BEDTIME
Refills: 0 | Status: DISCONTINUED | OUTPATIENT
Start: 2024-02-23 | End: 2024-02-28

## 2024-02-23 RX ORDER — FOLIC ACID 0.8 MG
1 TABLET ORAL DAILY
Refills: 0 | Status: DISCONTINUED | OUTPATIENT
Start: 2024-02-23 | End: 2024-03-07

## 2024-02-23 RX ORDER — PANTOPRAZOLE SODIUM 20 MG/1
40 TABLET, DELAYED RELEASE ORAL
Refills: 0 | Status: DISCONTINUED | OUTPATIENT
Start: 2024-02-23 | End: 2024-03-07

## 2024-02-23 RX ORDER — POLYETHYLENE GLYCOL 3350 17 G/17G
17 POWDER, FOR SOLUTION ORAL DAILY
Refills: 0 | Status: DISCONTINUED | OUTPATIENT
Start: 2024-02-23 | End: 2024-02-28

## 2024-02-23 RX ORDER — QUETIAPINE FUMARATE 200 MG/1
25 TABLET, FILM COATED ORAL AT BEDTIME
Refills: 0 | Status: DISCONTINUED | OUTPATIENT
Start: 2024-02-23 | End: 2024-02-24

## 2024-02-23 RX ORDER — FENTANYL CITRATE 50 UG/ML
50 INJECTION INTRAVENOUS
Refills: 0 | Status: DISCONTINUED | OUTPATIENT
Start: 2024-02-23 | End: 2024-02-24

## 2024-02-23 RX ORDER — SODIUM,POTASSIUM PHOSPHATES 278-250MG
1 POWDER IN PACKET (EA) ORAL ONCE
Refills: 0 | Status: COMPLETED | OUTPATIENT
Start: 2024-02-23 | End: 2024-02-23

## 2024-02-23 RX ADMIN — CHLORHEXIDINE GLUCONATE 15 MILLILITER(S): 213 SOLUTION TOPICAL at 06:00

## 2024-02-23 RX ADMIN — ENOXAPARIN SODIUM 40 MILLIGRAM(S): 100 INJECTION SUBCUTANEOUS at 11:52

## 2024-02-23 RX ADMIN — Medication 3 MILLILITER(S): at 05:19

## 2024-02-23 RX ADMIN — Medication 110 MILLIGRAM(S): at 11:51

## 2024-02-23 RX ADMIN — Medication 3 MILLILITER(S): at 23:00

## 2024-02-23 RX ADMIN — CHLORHEXIDINE GLUCONATE 15 MILLILITER(S): 213 SOLUTION TOPICAL at 17:59

## 2024-02-23 RX ADMIN — POLYETHYLENE GLYCOL 3350 17 GRAM(S): 17 POWDER, FOR SOLUTION ORAL at 11:51

## 2024-02-23 RX ADMIN — CHLORHEXIDINE GLUCONATE 15 MILLILITER(S): 213 SOLUTION TOPICAL at 05:44

## 2024-02-23 RX ADMIN — Medication 1 PACKET(S): at 05:43

## 2024-02-23 RX ADMIN — Medication 3 MILLILITER(S): at 12:02

## 2024-02-23 RX ADMIN — Medication 1 MILLIGRAM(S): at 11:51

## 2024-02-23 RX ADMIN — SENNA PLUS 2 TABLET(S): 8.6 TABLET ORAL at 22:30

## 2024-02-23 RX ADMIN — Medication 3 MILLILITER(S): at 17:28

## 2024-02-23 RX ADMIN — FENTANYL CITRATE 3.74 MICROGRAM(S)/KG/HR: 50 INJECTION INTRAVENOUS at 13:49

## 2024-02-23 RX ADMIN — Medication 100 MILLIGRAM(S): at 11:52

## 2024-02-23 RX ADMIN — QUETIAPINE FUMARATE 25 MILLIGRAM(S): 200 TABLET, FILM COATED ORAL at 22:30

## 2024-02-23 RX ADMIN — Medication 1 PACKET(S): at 20:20

## 2024-02-23 RX ADMIN — Medication 1 PACKET(S): at 17:59

## 2024-02-23 RX ADMIN — Medication 1 MILLIGRAM(S): at 02:48

## 2024-02-23 RX ADMIN — CHLORHEXIDINE GLUCONATE 1 APPLICATION(S): 213 SOLUTION TOPICAL at 06:00

## 2024-02-23 RX ADMIN — CHLORHEXIDINE GLUCONATE 1 APPLICATION(S): 213 SOLUTION TOPICAL at 05:44

## 2024-02-23 NOTE — PROGRESS NOTE ADULT - SUBJECTIVE AND OBJECTIVE BOX
Progress Note    PRIYANKA PITTMAN 48y (1975) Male [id number]  02-13-24 (10d)      Chief Complaint: Perforated viscus    Subjective:  No acute events overnight. Patient seen and examined at bedside. No bowel movements, but no emesis either. Required prns overnight for biting on tube.     Review of Systems: Unable to obtain.    PAST MEDICAL & SURGICAL HISTORY:  No pertinent past medical history [411062760]    H/O cirrhosis [Z87.19]    S/P foot surgery [Z98.890]      albuterol/ipratropium for Nebulization 3 milliLiter(s) Nebulizer every 6 hours  chlorhexidine 0.12% Liquid 15 milliLiter(s) Oral Mucosa every 12 hours  chlorhexidine 2% Cloths 1 Application(s) Topical <User Schedule>  enoxaparin Injectable 40 milliGRAM(s) SubCutaneous every 24 hours  fentaNYL    Injectable 50 MICROGram(s) IV Push every 3 hours PRN  fentaNYL   Infusion. 0.441 MICROgram(s)/kG/Hr IV Continuous <Continuous>  folic acid 1 milliGRAM(s) Oral daily  influenza   Vaccine 0.5 milliLiter(s) IntraMuscular once  pantoprazole    Tablet 40 milliGRAM(s) Oral before breakfast  polyethylene glycol 3350 17 Gram(s) Oral daily  propofol Infusion 20 MICROgram(s)/kG/Min IV Continuous <Continuous>  QUEtiapine 25 milliGRAM(s) Oral at bedtime  senna 2 Tablet(s) Oral at bedtime  thiamine Injectable 100 milliGRAM(s) IV Push daily    Objective:  T(C): 37 (02-23-24 @ 12:00), Max: 37.9 (02-22-24 @ 18:00)  HR: 58 (02-23-24 @ 13:15) (42 - 895)  BP: --  RR: 18 (02-23-24 @ 12:00) (14 - 22)  SpO2: 98% (02-23-24 @ 13:15) (94% - 100%)    Physical exam:  GENERAL: NAD  HEAD:  Atraumatic, Normocephalic  EYES: Pupils small, reactive to light. Conjunctiva and sclera clear  NECK: Supple, No JVD  CHEST/LUNG: Clear to auscultation bilaterally; No wheeze  HEART: Regular rate and rhythm; No murmurs, rubs, or gallops  ABDOMEN: Soft, much less distended than my previous exam last week. Bowel sounds present  EXTREMITIES:  2+ Peripheral Pulses, No clubbing, cyanosis, or edema  NEUROLOGY: non-focal  SKIN: No rashes or lesions      02-22-24 @ 07:01  -  02-23-24 @ 07:00  --------------------------------------------------------  IN: 1828.9 mL / OUT: 2780 mL / NET: -951.1 mL    02-23-24 @ 07:01  -  02-23-24 @ 13:21  --------------------------------------------------------  IN: 387.1 mL / OUT: 500 mL / NET: -112.9 mL        CAPILLARY BLOOD GLUCOSE      (02-23 @ 02:45)                      11.7  17.84 )-----------( 179                 37.1    Neutrophils = 16.23 (90.0%)  Lymphocytes = 0.71 (4.0%)  Eosinophils = 0.00 (0.0%)  Basophils = 0.00 (0.0%)  Monocytes = 0.89 (5.0%)  Bands = 1.0%    02-23    146<H>  |  117<H>  |  25<H>  ----------------------------<  136<H>  4.0   |  25  |  0.42<L>    Ca    8.0<L>      23 Feb 2024 12:00  Phos  1.9     02-23  Mg     2.7     02-23    TPro  6.0  /  Alb  2.5<L>  /  TBili  3.2<H>  /  DBili  x   /  AST  163<H>  /  ALT  131<H>  /  AlkPhos  208<H>  02-23      Urinalysis Basic - ( 23 Feb 2024 12:00 )    Color: x / Appearance: x / SG: x / pH: x  Gluc: 136 mg/dL / Ketone: x  / Bili: x / Urobili: x   Blood: x / Protein: x / Nitrite: x   Leuk Esterase: x / RBC: x / WBC x   Sq Epi: x / Non Sq Epi: x / Bacteria: x        WBC Trend: 17.84<--, 14.91<--, 13.17<--    Hb Trend: 11.7<--, 11.5<--, 11.6<--, 10.7<--, 9.7<--

## 2024-02-23 NOTE — PROGRESS NOTE ADULT - SUBJECTIVE AND OBJECTIVE BOX
POD # 10  Tolerating NGT feedings  case discussed with ICU team --> will hold off on TPN      MEDICATIONS  (STANDING):  albuterol/ipratropium for Nebulization 3 milliLiter(s) Nebulizer every 6 hours  chlorhexidine 0.12% Liquid 15 milliLiter(s) Oral Mucosa every 12 hours  chlorhexidine 2% Cloths 1 Application(s) Topical <User Schedule>  dexAMETHasone  IVPB 20 milliGRAM(s) IV Intermittent every 24 hours  dextrose 5%. 1000 milliLiter(s) (60 mL/Hr) IV Continuous <Continuous>  enoxaparin Injectable 40 milliGRAM(s) SubCutaneous every 24 hours  fentaNYL   Infusion. 0.441 MICROgram(s)/kG/Hr (3.74 mL/Hr) IV Continuous <Continuous>  folic acid Injectable 1 milliGRAM(s) IV Push daily  influenza   Vaccine 0.5 milliLiter(s) IntraMuscular once  pantoprazole  Injectable 40 milliGRAM(s) IV Push daily  propofol Infusion 20 MICROgram(s)/kG/Min (8.98 mL/Hr) IV Continuous <Continuous>  thiamine Injectable 100 milliGRAM(s) IV Push daily    MEDICATIONS  (PRN):      Allergies    No Known Allergies    Intolerances        Vital Signs Last 24 Hrs  T(C): 36.6 (23 Feb 2024 09:00), Max: 37.9 (22 Feb 2024 18:00)  T(F): 97.9 (23 Feb 2024 09:00), Max: 100.2 (22 Feb 2024 18:00)  HR: 56 (23 Feb 2024 09:12) (42 - 74)  BP: --  BP(mean): --  RR: 18 (23 Feb 2024 09:00) (14 - 22)  SpO2: 97% (23 Feb 2024 09:12) (94% - 100%)    Parameters below as of 23 Feb 2024 09:00  Patient On (Oxygen Delivery Method): ventilator        PHYSICAL EXAM:  General: NAD.  CVS: S1, S2  Chest: air entry bilaterally present  Abd: BS present, soft, non-tender      LABS:                        11.7   17.84 )-----------( 179      ( 23 Feb 2024 02:45 )             37.1     02-23    143  |  114<H>  |  27<H>  ----------------------------<  171<H>  4.0   |  25  |  0.59    Ca    8.1<L>      23 Feb 2024 02:45  Phos  2.2     02-23  Mg     2.9     02-23    TPro  6.0  /  Alb  2.5<L>  /  TBili  3.2<H>  /  DBili  x   /  AST  163<H>  /  ALT  131<H>  /  AlkPhos  208<H>  02-23      advance NGT feedings as tolerated  Please re-consult GI if needed

## 2024-02-23 NOTE — PROGRESS NOTE ADULT - ASSESSMENT
48M w/ cirrhosis, EtOH use disorder, open reduction metatarsal fracture. PResents w/ epigastric pain and vomiting. Found to have perforated duodenal ulcer w/ kyrie patch. Transferred to ICU post-op. Course complicated by ARDS, septic shock, and EMILIA. Now improving.     #Neuro - sedation w/ propofol and fentanyl, decrease sedation and monitor mental status. Fentanyl prns ordered for vent synchrony. Trying to wean off sedative drips because patient will take a long time to metabolize due to cirrhosis. Seroquel ordered (QTc 467)  #CV - HD stable, remains off pressors  #Pulm - ARDS likely in setting of aspiration, improving FiO2 requirement; titrate vent to ABG; continue duonebs; steroids for ARDS - decadron 20 mg x5 days followed by decadron 10 mg x5 days; SBT today.   #ID- completed 7 days of zosyn for strept mitis and salavaris, diflucan for candida in peritoneal fluid culture; plan for ceftriaxone for 3 more days, continue diflucan; remains afebrile, CT A/P shows no fluid collections. Monitoring off antibiotics.   #Renal/metabolic - EMILIA likely ATN, now resolved; monitor I/Os, electrolytes; hypophos repleted; continue D5 for hyperNa; once start feeds, BMP q4 for refeeding syndrome.   #GI- s/p kyrie patch for perforated duodenum; per discussion w/ surgery, ok to start TF,  but will need to start at 10 cc/hr and watch for refeeding syndrome; continue PPI. Awaiting bowel movement. Bowel regimen ordered today.   #Heme - hgb stable; monitor for now  #Endo - FS q6 while on decadron; MVI, folate, thiamine  #Skin - surgical site care per surgery  #PPx - lovenox q24  #Dispo- remains in ICU in critical condition ventilator dependent; prognosis very guarded; full code    Plan of care discussed w/ wife and sister in law (who was over the phone)

## 2024-02-23 NOTE — CHART NOTE - NSCHARTNOTEFT_GEN_A_CORE
Pt is  currently on enteral feeding via NGT c Vital HP @ 10 ml/rr=752 ml, 240 calories, 21 grams protein, 200 ml free water.  Pt s/p kyrie patch for perforated duodenum, okay to start trickle feeding, observe for re-feeding syndrome & BMs  noted.   02-23 Na146 mmol/L<H> Glu 136 mg/dL<H> K+ 4.0 mmol/L Cr  0.42 mg/dL<L> BUN 25 mg/dL<H> 02-23 Phos 1.9 mg/dL<L> 02-23 Alb 2.5 g/dL<L>02-23  U/L<H>  U/L<H> Alkaline Phosphatase 208 U/L<H>.  Recommend advance enteral feeding as per GI tolerance to Vital HP @ 20 ml/hr and increase by 5 ml q 12 hours to goal of Vital HP @ 60 ml/xg=0146 ml, 1440 calories, 126 grams protein, 1203 ml free water  & 96% RDIs.

## 2024-02-23 NOTE — PROGRESS NOTE ADULT - SUBJECTIVE AND OBJECTIVE BOX
[facility]  INFECTIOUS DISEASES   [address] [place] [address]  Tel: [phone]     Fax: [phone]  ==============================================================================  DO Jony Baron MD Alexandra Gutman, NP   ==============================================================================      PRIYANKA PITTMAN  [id number]  48y ([birth year])      Interval History: remains intubated , afebrile,tube feeds started more awake  ROS:    [x ] Unobtainable because:intubated and sedated   [ ] All other systems negative except as noted    Constitutional: no fever, no chills  Head: no trauma  Eyes: no vision changes, no eye pain  ENT:  no sore throat, no rhinorrhea  Cardiovascular:  no chest pain, no palpitation  Respiratory:  no SOB, no cough  GI:  no abd pain, no vomiting, no diarrhea  urinary: no dysuria, no hematuria, no flank pain  musculoskeletal:  no joint pain, no joint swelling  skin:  no rash  neurology:  no headache, no seizure, no change in mental status  psych: no anxiety, no depression         Allergies  No Known Allergies      ANTIMICROBIALS    influenza   Vaccine 0.5 once      MEDICATIONS  (STANDING):  albuterol/ipratropium for Nebulization 3 every 6 hours  enoxaparin Injectable 40 every 24 hours  fentaNYL   Infusion. 0.441 <Continuous>  influenza   Vaccine 0.5 once  pantoprazole    Tablet 40 before breakfast  polyethylene glycol 3350 17 daily  propofol Infusion 20 <Continuous>  QUEtiapine 25 at bedtime  senna 2 at bedtime      PRN  fentaNYL    Injectable 50 MICROGram(s) IV Push every 3 hours PRN      Physical Exam:  Vital Signs Last 24 Hrs  T(F): 100 (02-23-24 @ 17:00), Max: 100.2 (02-22-24 @ 18:00)  HR: 63 (02-23-24 @ 17:13)  BP: --  RR: 15 (02-23-24 @ 17:00)  SpO2: 100% (02-23-24 @ 17:13) (95% - 100%)  Wt(kg): --    General:    NAD,  non toxic, remains intubated   Head: atraumatic, normocephalic  Eye: normal sclera, erythematous conjunctiva  ENT:    neck supple, +ETT, NGT at time of my exam  Cardio:     regular S1, S2,  no murmur  Respiratory:    less coarse BS b/l,    no wheezing  abd:     soft,   normoactive BS ,   no tenderness, surgical site intact, FOX drain with serous fluid   :   +gautam  Musculoskeletal:   no joint swelling,   +edema  vascular: central line has been removed, +PIV    Skin:    no rash  Neurologic:    sedated but more awake   psych: sedated     WBC Count: 17.84 K/uL (02-23 @ 02:45)  WBC Count: 14.91 K/uL (02-22 @ 03:15)  WBC Count: 13.17 K/uL (02-21 @ 03:20)  WBC Count: 8.94 K/uL (02-20 @ 04:00)  WBC Count: 12.29 K/uL (02-19 @ 02:41)  WBC Count: 10.95 K/uL (02-18 @ 02:49)  WBC Count: 13.16 K/uL (02-17 @ 03:03)                            11.7   17.84 )-----------( 179      ( 23 Feb 2024 02:45 )             37.1       02-23    143  |  113<H>  |  23  ----------------------------<  158<H>  4.4   |  27  |  0.62    Ca    8.0<L>      23 Feb 2024 16:14  Phos  1.9     02-23  Mg     2.6     02-23    TPro  6.0  /  Alb  2.5<L>  /  TBili  3.2<H>  /  DBili  x   /  AST  163<H>  /  ALT  131<H>  /  AlkPhos  208<H>  02-23      Creatinine Trend: 0.62<--, 0.42<--, 0.59<--, 0.75<--, 0.70<--, 0.75<--        Blood Gas Arterial, Lactate: 1.30 mmol/L (02-22-24 @ 11:50)      MICROBIOLOGY:  Culture - Body Fluid with Gram Stain (02.13.24 @ 05:10)    Gram Stain:   Few polymorphonuclear leukocytes seen per low power field  No organisms seen per oil power field   Specimen Source: Peritoneal peritoneal fluida c/s   Culture Results:   Rare Streptococcus salivarius/vestibularis group  Rare Streptococcus mitis/oralis group        .Blood Blood  02-13-24   No growth at 24 hours  --  --      .Blood Blood  02-13-24   No growth at 24 hours  --  --      Peritoneal peritoneal fluida c/s  02-13-24 --  --    Few polymorphonuclear leukocytes seen per low power field  No organisms seen per oil power field      Clean Catch Clean Catch (Midstream)  02-13-24   No growth  --  --        RADIOLOGY:  Xray Chest 1 View- PORTABLE-Urgent (Xray Chest 1 View- PORTABLE-Urgent .) (02.15.24 @ 12:42) >  IMPRESSION: Endotracheal tube position much improved. Increasing advanced   bilateral infiltrates.    < from: CT Abdomen and Pelvis w/ Oral Cont and w/ IV Cont (02.21.24 @ 16:16) >  IMPRESSION:  Multifocal pneumonia.  Small amount of pneumoperitoneum possibly related to indwelling drain. No   associated collection.  Small right subphrenic ascites.  Hepatosplenomegaly with evidence portal hypertension    < end of copied text >

## 2024-02-23 NOTE — PROGRESS NOTE ADULT - SUBJECTIVE AND OBJECTIVE BOX
SURGERY PROGRESS HPI:  POD#10  Pt seen and examined at bedside intubated in the ICU.    Vital Signs Last 24 Hrs  T(C): 36 (23 Feb 2024 02:00), Max: 37.9 (22 Feb 2024 18:00)  T(F): 96.8 (23 Feb 2024 02:00), Max: 100.2 (22 Feb 2024 18:00)  HR: 43 (23 Feb 2024 05:23) (43 - 74)  BP: --  BP(mean): --  RR: 17 (23 Feb 2024 02:00) (14 - 22)  SpO2: 100% (23 Feb 2024 05:23) (94% - 100%)    Parameters below as of 23 Feb 2024 02:00  Patient On (Oxygen Delivery Method): ventilator        PHYSICAL EXAM:  CONSTITUTIONAL: NAD  HEENT: NGT in place  RESPIRATORY: Intubated. Clear to ausculation, bilaterally   CARDIOVASCULAR: RRR S1S2  GASTROINTESTINAL: Midline incision with staples intact. FOX with serous output. non distended, soft, appropriate incisional tenderness  : Barber indwelling with clear yellow urine  MUSCULOSKELETAL: calf soft, non tender b/l    I&O's Detail    21 Feb 2024 07:01  -  22 Feb 2024 07:00  --------------------------------------------------------  IN:    dextrose 5%: 1100 mL    FentaNYL: 238 mL    FentaNYL: 17 mL    Propofol: 100 mL  Total IN: 1455 mL    OUT:    Drain (mL): 710 mL    Indwelling Catheter - Urethral (mL): 1765 mL    Nasogastric/Oral tube (mL): 700 mL  Total OUT: 3175 mL    Total NET: -1720 mL      22 Feb 2024 07:01  -  23 Feb 2024 05:26  --------------------------------------------------------  IN:    dextrose 5%: 1080 mL    FentaNYL: 250.1 mL    Propofol: 52.8 mL  Total IN: 1382.9 mL    OUT:    Drain (mL): 660 mL    Indwelling Catheter - Urethral (mL): 1500 mL  Total OUT: 2160 mL    Total NET: -777.1 mL    LABS:                        11.7   17.84 )-----------( 179      ( 23 Feb 2024 02:45 )             37.1     02-23    143  |  114<H>  |  27<H>  ----------------------------<  171<H>  4.0   |  25  |  0.59    Ca    8.1<L>      23 Feb 2024 02:45  Phos  2.2     02-23  Mg     2.9     02-23    TPro  6.0  /  Alb  2.5<L>  /  TBili  3.2<H>  /  DBili  x   /  AST  163<H>  /  ALT  131<H>  /  AlkPhos  208<H>  02-23      Urinalysis Basic - ( 23 Feb 2024 02:45 )    Color: x / Appearance: x / SG: x / pH: x  Gluc: 171 mg/dL / Ketone: x  / Bili: x / Urobili: x   Blood: x / Protein: x / Nitrite: x   Leuk Esterase: x / RBC: x / WBC x   Sq Epi: x / Non Sq Epi: x / Bacteria: x        Assessment: 48M with duodenal perforation s/p ex lap, kyrie patch POD#10. Intubated.   OR cx: rare streptococcus salivarius/vestibularis group,rare streptococcus mitis oralis group rare candida albicans. EMILIA.     Plan:  -Trickle feeds, IV hydration  -continue local wound care, FOX care  -antimicrobials per RADHA Leal, monitor urine output  -Trend labs  -ICU management SURGERY PROGRESS HPI:  POD#10  Pt seen and examined at bedside intubated in the ICU.    Vital Signs Last 24 Hrs  T(C): 36 (23 Feb 2024 02:00), Max: 37.9 (22 Feb 2024 18:00)  T(F): 96.8 (23 Feb 2024 02:00), Max: 100.2 (22 Feb 2024 18:00)  HR: 43 (23 Feb 2024 05:23) (43 - 74)  BP: --  BP(mean): --  RR: 17 (23 Feb 2024 02:00) (14 - 22)  SpO2: 100% (23 Feb 2024 05:23) (94% - 100%)    Parameters below as of 23 Feb 2024 02:00  Patient On (Oxygen Delivery Method): ventilator        PHYSICAL EXAM:  CONSTITUTIONAL: NAD  HEENT: NGT in place  RESPIRATORY: Intubated. Clear to ausculation, bilaterally   CARDIOVASCULAR: RRR S1S2  GASTROINTESTINAL: Midline incision with staples intact. FOX with serous output. non distended, soft, appropriate incisional tenderness  : Barber indwelling with clear yellow urine  MUSCULOSKELETAL: calf soft, non tender b/l    I&O's Detail    21 Feb 2024 07:01  -  22 Feb 2024 07:00  --------------------------------------------------------  IN:    dextrose 5%: 1100 mL    FentaNYL: 238 mL    FentaNYL: 17 mL    Propofol: 100 mL  Total IN: 1455 mL    OUT:    Drain (mL): 710 mL    Indwelling Catheter - Urethral (mL): 1765 mL    Nasogastric/Oral tube (mL): 700 mL  Total OUT: 3175 mL    Total NET: -1720 mL      22 Feb 2024 07:01  -  23 Feb 2024 05:26  --------------------------------------------------------  IN:    dextrose 5%: 1080 mL    FentaNYL: 250.1 mL    Propofol: 52.8 mL  Total IN: 1382.9 mL    OUT:    Drain (mL): 660 mL    Indwelling Catheter - Urethral (mL): 1500 mL  Total OUT: 2160 mL    Total NET: -777.1 mL    LABS:                        11.7   17.84 )-----------( 179      ( 23 Feb 2024 02:45 )             37.1     02-23    143  |  114<H>  |  27<H>  ----------------------------<  171<H>  4.0   |  25  |  0.59    Ca    8.1<L>      23 Feb 2024 02:45  Phos  2.2     02-23  Mg     2.9     02-23    TPro  6.0  /  Alb  2.5<L>  /  TBili  3.2<H>  /  DBili  x   /  AST  163<H>  /  ALT  131<H>  /  AlkPhos  208<H>  02-23      Urinalysis Basic - ( 23 Feb 2024 02:45 )    Color: x / Appearance: x / SG: x / pH: x  Gluc: 171 mg/dL / Ketone: x  / Bili: x / Urobili: x   Blood: x / Protein: x / Nitrite: x   Leuk Esterase: x / RBC: x / WBC x   Sq Epi: x / Non Sq Epi: x / Bacteria: x        Assessment: 48M with duodenal perforation s/p ex lap, kyrie patch POD#10. Intubated.   OR cx: rare streptococcus salivarius/vestibularis group,rare streptococcus mitis oralis group rare candida albicans.      Plan:  -Trickle feeds, IV hydration  -continue local wound care, FOX care  -antimicrobials per RADHA Leal, monitor urine output  -Trend labs  -ICU management

## 2024-02-23 NOTE — PROGRESS NOTE ADULT - ASSESSMENT
49 y/o male PMHx cirrhosis, ETOH, open reduction of metatarsal fracture 2022 presents c/o epigastric pain for a few hours. Vomited when he came to the ER. Last BM in the afternoon. Last flatus before the BM. Patient denies fever, chills, constipation, diarrhea, melena, hematochezia, dysuria, hematuria, chest pain, shortness of breath, dizziness, cough.  CT (I personally reviewed) Scattered foci of free intraperitoneal air, consistent with perforated viscus.  imaging also shows cirrhosis presumably from chronic alcohol use   was started on Zosyn and fluconazole   would start workup on cirrhosis as well   will follow cultures and target out therapy if able     2/15: cultures positive for several Streptococcus species, will continue antibiotics, still febrile and on pressors, weaning vent as tolerated. patient remains critically ill, 3rd spacing and getting albumin    216: remains intubated , requiring  sedation, on pressors, Streptococcus in cultures, remains on antibiotics, very quiet bowel sounds, no BMs on day shift thus far, little output on NGT  2/20: remains on vent, BP better, fever curve better, completed zosyn but will give 3 more days of ceftriaxone and fluconazole. if Qtc is prolonged can stop fluconazole, please keep the Qtc on monitor,   2/21: day 8 of intubation,  afebrile, UGIS cancelled, discussed with ICU the need for a CT scan which was done and did not show extravasation of contrast, no obvious leak, possibly starting tube feeds. nearly complete with antibiotics, would try to avoid a central line or TPN if able to tolerate feeds    2/23: completed antibiotics, discuss with surgery about drain and possible removal(could it be ascites that is draining)    Plan:  monitor off antibiotics   completed fluconazole  MELD score indicated high mortality rate  HIV nonreactive  Hepatitis A Igm negative, HbsAg negative, please send HBsAb in order to start vaccination if not immune, HbcAb IgM negative, HCV Ab negative   surgical site per surgery   FOX with fluid similar to ascites  CT Multifocal pneumonia (has been treated between zosyn and ceftriaxone) . Small amount of pneumoperitoneum possibly related to indwelling drain. No associated collection. Small right subphrenic ascites.  Hepatosplenomegaly with evidence portal hypertension (known which will need to be addressed chronically as he recovers from this acute event)   wean off ventilator as tolerated         Discussed with [facility]     Carrington Gilliam, DO  Infectious Disease Attending  Reachable via Microsoft Teams or ID office: [phone]  After 5pm/weekends please call [phone] for all inquiries and new consults

## 2024-02-24 LAB
ALBUMIN SERPL ELPH-MCNC: 2.4 G/DL — LOW (ref 3.3–5)
ALP SERPL-CCNC: 241 U/L — HIGH (ref 40–120)
ALT FLD-CCNC: 155 U/L — HIGH (ref 12–78)
ANION GAP SERPL CALC-SCNC: 4 MMOL/L — LOW (ref 5–17)
ANION GAP SERPL CALC-SCNC: 5 MMOL/L — SIGNIFICANT CHANGE UP (ref 5–17)
AST SERPL-CCNC: 142 U/L — HIGH (ref 15–37)
BASOPHILS # BLD AUTO: 0.06 K/UL — SIGNIFICANT CHANGE UP (ref 0–0.2)
BASOPHILS NFR BLD AUTO: 0.3 % — SIGNIFICANT CHANGE UP (ref 0–2)
BILIRUB SERPL-MCNC: 2.8 MG/DL — HIGH (ref 0.2–1.2)
BUN SERPL-MCNC: 23 MG/DL — SIGNIFICANT CHANGE UP (ref 7–23)
BUN SERPL-MCNC: 24 MG/DL — HIGH (ref 7–23)
CALCIUM SERPL-MCNC: 8.1 MG/DL — LOW (ref 8.5–10.1)
CALCIUM SERPL-MCNC: 8.2 MG/DL — LOW (ref 8.5–10.1)
CHLORIDE SERPL-SCNC: 114 MMOL/L — HIGH (ref 96–108)
CHLORIDE SERPL-SCNC: 116 MMOL/L — HIGH (ref 96–108)
CO2 SERPL-SCNC: 24 MMOL/L — SIGNIFICANT CHANGE UP (ref 22–31)
CO2 SERPL-SCNC: 26 MMOL/L — SIGNIFICANT CHANGE UP (ref 22–31)
CREAT SERPL-MCNC: 0.35 MG/DL — LOW (ref 0.5–1.3)
CREAT SERPL-MCNC: 0.53 MG/DL — SIGNIFICANT CHANGE UP (ref 0.5–1.3)
EGFR: 123 ML/MIN/1.73M2 — SIGNIFICANT CHANGE UP
EGFR: 139 ML/MIN/1.73M2 — SIGNIFICANT CHANGE UP
EOSINOPHIL # BLD AUTO: 0 K/UL — SIGNIFICANT CHANGE UP (ref 0–0.5)
EOSINOPHIL NFR BLD AUTO: 0 % — SIGNIFICANT CHANGE UP (ref 0–6)
GLUCOSE BLDC GLUCOMTR-MCNC: 101 MG/DL — HIGH (ref 70–99)
GLUCOSE BLDC GLUCOMTR-MCNC: 113 MG/DL — HIGH (ref 70–99)
GLUCOSE SERPL-MCNC: 119 MG/DL — HIGH (ref 70–99)
GLUCOSE SERPL-MCNC: 143 MG/DL — HIGH (ref 70–99)
HCT VFR BLD CALC: 37 % — LOW (ref 39–50)
HGB BLD-MCNC: 12.2 G/DL — LOW (ref 13–17)
IMM GRANULOCYTES NFR BLD AUTO: 1.7 % — HIGH (ref 0–0.9)
LYMPHOCYTES # BLD AUTO: 0.96 K/UL — LOW (ref 1–3.3)
LYMPHOCYTES # BLD AUTO: 4.5 % — LOW (ref 13–44)
MAGNESIUM SERPL-MCNC: 2.5 MG/DL — SIGNIFICANT CHANGE UP (ref 1.6–2.6)
MAGNESIUM SERPL-MCNC: 2.6 MG/DL — SIGNIFICANT CHANGE UP (ref 1.6–2.6)
MCHC RBC-ENTMCNC: 32.6 PG — SIGNIFICANT CHANGE UP (ref 27–34)
MCHC RBC-ENTMCNC: 33 G/DL — SIGNIFICANT CHANGE UP (ref 32–36)
MCV RBC AUTO: 98.9 FL — SIGNIFICANT CHANGE UP (ref 80–100)
MONOCYTES # BLD AUTO: 0.79 K/UL — SIGNIFICANT CHANGE UP (ref 0–0.9)
MONOCYTES NFR BLD AUTO: 3.7 % — SIGNIFICANT CHANGE UP (ref 2–14)
NEUTROPHILS # BLD AUTO: 19.39 K/UL — HIGH (ref 1.8–7.4)
NEUTROPHILS NFR BLD AUTO: 89.8 % — HIGH (ref 43–77)
NRBC # BLD: 0 /100 WBCS — SIGNIFICANT CHANGE UP (ref 0–0)
PHOSPHATE SERPL-MCNC: 2.4 MG/DL — LOW (ref 2.5–4.5)
PHOSPHATE SERPL-MCNC: 2.5 MG/DL — SIGNIFICANT CHANGE UP (ref 2.5–4.5)
PLATELET # BLD AUTO: 166 K/UL — SIGNIFICANT CHANGE UP (ref 150–400)
POTASSIUM SERPL-MCNC: 3.4 MMOL/L — LOW (ref 3.5–5.3)
POTASSIUM SERPL-MCNC: 4 MMOL/L — SIGNIFICANT CHANGE UP (ref 3.5–5.3)
POTASSIUM SERPL-SCNC: 3.4 MMOL/L — LOW (ref 3.5–5.3)
POTASSIUM SERPL-SCNC: 4 MMOL/L — SIGNIFICANT CHANGE UP (ref 3.5–5.3)
PROCALCITONIN SERPL-MCNC: 0.35 NG/ML — HIGH (ref 0.02–0.1)
PROT SERPL-MCNC: 5.6 GM/DL — LOW (ref 6–8.3)
RBC # BLD: 3.74 M/UL — LOW (ref 4.2–5.8)
RBC # FLD: 14.9 % — HIGH (ref 10.3–14.5)
SODIUM SERPL-SCNC: 144 MMOL/L — SIGNIFICANT CHANGE UP (ref 135–145)
SODIUM SERPL-SCNC: 145 MMOL/L — SIGNIFICANT CHANGE UP (ref 135–145)
WBC # BLD: 21.57 K/UL — HIGH (ref 3.8–10.5)
WBC # FLD AUTO: 21.57 K/UL — HIGH (ref 3.8–10.5)

## 2024-02-24 PROCEDURE — 99291 CRITICAL CARE FIRST HOUR: CPT

## 2024-02-24 RX ORDER — ACETAMINOPHEN 500 MG
650 TABLET ORAL EVERY 6 HOURS
Refills: 0 | Status: DISCONTINUED | OUTPATIENT
Start: 2024-02-24 | End: 2024-03-07

## 2024-02-24 RX ORDER — SODIUM,POTASSIUM PHOSPHATES 278-250MG
1 POWDER IN PACKET (EA) ORAL ONCE
Refills: 0 | Status: COMPLETED | OUTPATIENT
Start: 2024-02-24 | End: 2024-02-24

## 2024-02-24 RX ORDER — SODIUM CHLORIDE 9 MG/ML
4 INJECTION INTRAMUSCULAR; INTRAVENOUS; SUBCUTANEOUS EVERY 6 HOURS
Refills: 0 | Status: DISCONTINUED | OUTPATIENT
Start: 2024-02-24 | End: 2024-03-01

## 2024-02-24 RX ADMIN — Medication 1 PACKET(S): at 12:10

## 2024-02-24 RX ADMIN — ENOXAPARIN SODIUM 40 MILLIGRAM(S): 100 INJECTION SUBCUTANEOUS at 10:38

## 2024-02-24 RX ADMIN — Medication 3 MILLILITER(S): at 17:16

## 2024-02-24 RX ADMIN — Medication 650 MILLIGRAM(S): at 22:57

## 2024-02-24 RX ADMIN — Medication 100 MILLIGRAM(S): at 11:28

## 2024-02-24 RX ADMIN — SODIUM CHLORIDE 4 MILLILITER(S): 9 INJECTION INTRAMUSCULAR; INTRAVENOUS; SUBCUTANEOUS at 17:16

## 2024-02-24 RX ADMIN — PANTOPRAZOLE SODIUM 40 MILLIGRAM(S): 20 TABLET, DELAYED RELEASE ORAL at 06:11

## 2024-02-24 RX ADMIN — POLYETHYLENE GLYCOL 3350 17 GRAM(S): 17 POWDER, FOR SOLUTION ORAL at 11:29

## 2024-02-24 RX ADMIN — Medication 3 MILLILITER(S): at 05:01

## 2024-02-24 RX ADMIN — CHLORHEXIDINE GLUCONATE 1 APPLICATION(S): 213 SOLUTION TOPICAL at 10:38

## 2024-02-24 RX ADMIN — Medication 1 MILLIGRAM(S): at 11:28

## 2024-02-24 RX ADMIN — Medication 3 MILLILITER(S): at 11:21

## 2024-02-24 RX ADMIN — CHLORHEXIDINE GLUCONATE 15 MILLILITER(S): 213 SOLUTION TOPICAL at 06:11

## 2024-02-24 RX ADMIN — Medication 650 MILLIGRAM(S): at 21:57

## 2024-02-24 RX ADMIN — Medication 1 PACKET(S): at 01:00

## 2024-02-24 NOTE — PROGRESS NOTE ADULT - SUBJECTIVE AND OBJECTIVE BOX
Patient seen and examined at bedside in the ICU.   Tolerating trickle feeds.  Denies pain, nausea/ vomiting, chills, SOB, chest pain, calf tenderness.          Vital Signs Last 24 Hrs  T(F): 98.1 (02-24-24 @ 05:00), Max: 100.4 (02-23-24 @ 18:00)  HR: 49 (02-24-24 @ 05:00)  BP: --  RR: 16 (02-24-24 @ 05:00)  SpO2: 100% (02-24-24 @ 05:00)  Wt(kg): --   CAPILLARY BLOOD GLUCOSE      POCT Blood Glucose.: 113 mg/dL (24 Feb 2024 05:11)      GENERAL: Alert, NAD, intubated  CHEST/LUNG: Respirations non labored, good inspiratory effort, breathing synchronously with ventilator   HEART: S1S2  HEENT: NCAT, EOMI, conjunctiva clear   ABDOMEN: Nondistended, + bowel sounds, minimal TTP around midline, midline incision C/D/I, FOX drain with asctites fluid output with ball of tissue within FOX bulb  EXTREMITIES:  No calf tenderness, no edema    I&O's Detail    22 Feb 2024 07:01  -  23 Feb 2024 07:00  --------------------------------------------------------  IN:    dextrose 5%: 1380 mL    FentaNYL: 313.6 mL    Propofol: 65.3 mL    Vital High Protein: 70 mL  Total IN: 1828.9 mL    OUT:    Drain (mL): 780 mL    Indwelling Catheter - Urethral (mL): 2000 mL  Total OUT: 2780 mL    Total NET: -951.1 mL      23 Feb 2024 07:01  -  24 Feb 2024 05:25  --------------------------------------------------------  IN:    dextrose 5%: 240 mL    FentaNYL: 191 mL    Propofol: 15.1 mL    Vital High Protein: 200 mL  Total IN: 646.1 mL    OUT:    Drain (mL): 540 mL    Indwelling Catheter - Urethral (mL): 1150 mL  Total OUT: 1690 mL    Total NET: -1043.9 mL          LABS:                        12.2   21.57 )-----------( 166      ( 24 Feb 2024 02:31 )             37.0     02-24    144  |  114<H>  |  23  ----------------------------<  143<H>  4.0   |  26  |  0.53    Ca    8.1<L>      24 Feb 2024 02:31  Phos  2.5     02-24  Mg     2.6     02-24    TPro  5.6<L>  /  Alb  2.4<L>  /  TBili  2.8<H>  /  DBili  x   /  AST  142<H>  /  ALT  155<H>  /  AlkPhos  241<H>  02-24        RADIOLOGY & ADDITIONAL STUDIES:    < from: CT Abdomen and Pelvis w/ Oral Cont and w/ IV Cont (02.21.24 @ 16:16) >    ACC: [id number] EXAM:  CT ABDOMEN AND PELVIS OC IC   ORDERED BY: YAN GOMEZ     PROCEDURE DATE:  02/21/2024          INTERPRETATION:  CLINICAL INFORMATION: 48-year-old man status post repair   perforated duodenal ulcer 02/13/2024 with persistent fever    COMPARISON: CT scan 02/13/2024    CONTRAST/COMPLICATIONS:  IV Contrast: Omnipaque 350  99 cc administered   1 cc discarded  Oral Contrast: Omnipaque 240  Complications: None reported at time of study completion    PROCEDURE:  CT of the Abdomen and Pelvis was performed.  Sagittal and coronal reformats were performed.    FINDINGS:  LOWER CHEST: Patchy consolidation left upper and lower lobes and   extensive groundglass consolidation right middle lobe, likely pneumonia.   Bibasilar consolidation consistent with atelectasis and/or pneumonia.   Small bilateral pleural effusions. LIVER: Hepatomegaly.  BILE DUCTS: Normal caliber.  GALLBLADDER: Within normal limits.  SPLEEN: Splenomegaly with splenic span 14.4 cm  PANCREAS: Within normal limits.  ADRENALS: Within normal limits.  KIDNEYS/URETERS: Within normal limits.    BLADDER: Indwelling catheter.  REPRODUCTIVE ORGANS: Normal-sized prostate    BOWEL: No bowel obstruction. Duodenal edema. Esophagogastric tube ending   in the stomach. Edema ascending colon possible portal colopathy. Appendix   not visualized. No evidence appendicitis.  PERITONEUM: Small right subphrenic ascites. No intra-abdominal collection   otherwise visualized. Small amount free air possibly related to drain   ending in left anterior upper quadrant and inserted via right flank  VESSELS: Paraesophageal varices.  RETROPERITONEUM/LYMPH NODES: No lymphadenopathy.  ABDOMINAL WALL: Within normal limits.  BONES: Degenerative change.    IMPRESSION:  Multifocal pneumonia.  Small amount of pneumoperitoneum possibly related to indwelling drain. No   associated collection.  Small right subphrenic ascites.  Hepatosplenomegaly with evidence portal hypertension        --- End of Report ---            ANDRIA PEREZ MD; Attending Radiologist  This document has been electronically signed. Feb 21 2024  9:40PM    < end of copied text >

## 2024-02-24 NOTE — AIRWAY REMOVAL NOTE  ADULT & PEDS - ARTIFICAL AIRWAY REMOVAL COMMENTS
pt extubated as per order. MD at bedside. pt extubated to 3L nasal cannula tolerating well. nasal tracheal suction done post extubation for secretion removal pt tolerated well. no acute respiratory distress noted. family at bedside.

## 2024-02-24 NOTE — PROGRESS NOTE ADULT - ASSESSMENT
48M w/ cirrhosis, EtOH use disorder, open reduction metatarsal fracture p/w perforated duodenal ulcer w/ kyrie patch. Course complicated by ARDS, septic shock, and EMILIA. Now improving.     #Neuro - Off sedation, following commands.  #CV - HD stable, remains off pressors  #Pulm - ARDS likely in setting of aspiration much improved. SBT trials going well. Continue duonebs; steroids for ARDS - decadron 20 mg x5 days followed by decadron 10 mg x5 days. May extubate today.  #ID- completed 7 days of zosyn for strept mitis and salavaris, diflucan for candida in peritoneal fluid culture; plan for ceftriaxone for 3 more days, continue diflucan; remains afebrile, CT A/P shows no fluid collections. Monitoring off antibiotics. Leukocytosis slightly increased today, but no other markers of new infection.  #Renal/metabolic - EMILIA likely ATN, now resolved; monitor I/Os, electrolytes. Phosphorous WNL this AM. Holding feeds this morning, but will restart at higher rate once extubated.  #GI- s/p kyrie patch for perforated duodenum; per discussion w/ surgery. Uptitrating tube feeds as tolerated. Patient had two BMs, including one large one. Bowel regimen ordered today.   #Heme - hgb stable; monitor for now  #Endo - FS q6 while on decadron; MVI, folate, thiamine  #Skin - surgical site care per surgery  #PPx - lovenox q24  #Dispo- remains in ICU in critical condition ventilator dependent; prognosis very guarded; full code    Plan of care discussed w/ wife.

## 2024-02-24 NOTE — PROGRESS NOTE ADULT - SUBJECTIVE AND OBJECTIVE BOX
Progress Note    PRIYANKA PITTMAN 48y (1975) Male [id number]  02-13-24 (11d)      Chief Complaint: Perforated viscus    Subjective:  Patient is waking up and following commands. Doing well on pressure support.     Review of Systems: Shakes his head no to questions of pain, chest pain, or difficulty breathing, otherwise limited ROS.      PAST MEDICAL & SURGICAL HISTORY:  No pertinent past medical history [841017921]    H/O cirrhosis [Z87.19]    S/P foot surgery [Z98.890]      albuterol/ipratropium for Nebulization 3 milliLiter(s) Nebulizer every 6 hours  chlorhexidine 0.12% Liquid 15 milliLiter(s) Oral Mucosa every 12 hours  chlorhexidine 2% Cloths 1 Application(s) Topical <User Schedule>  enoxaparin Injectable 40 milliGRAM(s) SubCutaneous every 24 hours  fentaNYL    Injectable 50 MICROGram(s) IV Push every 3 hours PRN  fentaNYL   Infusion. 0.441 MICROgram(s)/kG/Hr IV Continuous <Continuous>  folic acid 1 milliGRAM(s) Oral daily  influenza   Vaccine 0.5 milliLiter(s) IntraMuscular once  pantoprazole    Tablet 40 milliGRAM(s) Oral before breakfast  polyethylene glycol 3350 17 Gram(s) Oral daily  QUEtiapine 25 milliGRAM(s) Oral at bedtime  senna 2 Tablet(s) Oral at bedtime  thiamine Injectable 100 milliGRAM(s) IV Push daily    Objective:  T(C): 36.8 (02-24-24 @ 09:00), Max: 38 (02-23-24 @ 18:00)  HR: 57 (02-24-24 @ 09:00) (46 - 73)  BP: --  RR: 16 (02-24-24 @ 09:00) (13 - 26)  SpO2: 100% (02-24-24 @ 09:00) (98% - 100%)    Physical exam:  GENERAL: NAD  HEAD:  Atraumatic, Normocephalic  EYES: Pupils reactive to light. Conjunctiva and sclera clear  NECK: Supple, No JVD  CHEST/LUNG: Clear to auscultation bilaterally; No wheeze  HEART: Regular rate and rhythm; No murmurs, rubs, or gallops  ABDOMEN: Soft, much less distended than my previous exam last week. Bowel sounds present. Surgical site looks clean, dry, and intact.  EXTREMITIES:  2+ Peripheral Pulses, No clubbing, cyanosis, or edema  NEUROLOGY: non-focal. Moving all 4 extremities, following commands.      02-23-24 @ 07:01  -  02-24-24 @ 07:00  --------------------------------------------------------  IN: 707.2 mL / OUT: 2390 mL / NET: -1682.8 mL    02-24-24 @ 07:01  -  02-24-24 @ 09:44  --------------------------------------------------------  IN: 28.4 mL / OUT: 40 mL / NET: -11.6 mL        CAPILLARY BLOOD GLUCOSE      (02-24 @ 02:31)                      12.2  21.57 )-----------( 166                 37.0    Neutrophils = 19.39 (89.8%)  Lymphocytes = 0.96 (4.5%)  Eosinophils = 0.00 (0.0%)  Basophils = 0.06 (0.3%)  Monocytes = 0.79 (3.7%)  Bands = --%    02-24    144  |  114<H>  |  23  ----------------------------<  143<H>  4.0   |  26  |  0.53    Ca    8.1<L>      24 Feb 2024 02:31  Phos  2.5     02-24  Mg     2.6     02-24    TPro  5.6<L>  /  Alb  2.4<L>  /  TBili  2.8<H>  /  DBili  x   /  AST  142<H>  /  ALT  155<H>  /  AlkPhos  241<H>  02-24          RVP:          Tox:         Urinalysis Basic - ( 24 Feb 2024 02:31 )    Color: x / Appearance: x / SG: x / pH: x  Gluc: 143 mg/dL / Ketone: x  / Bili: x / Urobili: x   Blood: x / Protein: x / Nitrite: x   Leuk Esterase: x / RBC: x / WBC x   Sq Epi: x / Non Sq Epi: x / Bacteria: x        WBC Trend: 21.57<--, 17.84<--, 14.91<--    Hb Trend: 12.2<--, 11.7<--, 11.5<--, 11.6<--, 10.7<--        New imaging in last 24 hours:  Consult notes reviewed:

## 2024-02-24 NOTE — PROGRESS NOTE ADULT - ASSESSMENT
47 Y/O male with duodenal perforation s/p ex lap, kyrie patch POD 11. Intubated.   OR cx: rare streptococcus salivarius/vestibularis group, rare streptococcus mitis oralis group rare candida albicans.    Afebrile at present. Leukocytosis worsening 17 -> 21.         PLAN:    - Trickle feeds, IV hydration  - Continue local wound care, FOX care  - ABx per ID  - Barber, monitor urine output  - Trend labs  - Continue ICU management

## 2024-02-25 LAB
ALBUMIN SERPL ELPH-MCNC: 2.1 G/DL — LOW (ref 3.3–5)
ALBUMIN SERPL ELPH-MCNC: 2.3 G/DL — LOW (ref 3.3–5)
ALBUMIN SERPL ELPH-MCNC: 2.5 G/DL — LOW (ref 3.3–5)
ALP SERPL-CCNC: 299 U/L — HIGH (ref 40–120)
ALP SERPL-CCNC: 307 U/L — HIGH (ref 40–120)
ALP SERPL-CCNC: 315 U/L — HIGH (ref 40–120)
ALT FLD-CCNC: 145 U/L — HIGH (ref 12–78)
ALT FLD-CCNC: 147 U/L — HIGH (ref 12–78)
ALT FLD-CCNC: 149 U/L — HIGH (ref 12–78)
ANION GAP SERPL CALC-SCNC: 11 MMOL/L — SIGNIFICANT CHANGE UP (ref 5–17)
ANION GAP SERPL CALC-SCNC: 6 MMOL/L — SIGNIFICANT CHANGE UP (ref 5–17)
ANION GAP SERPL CALC-SCNC: 7 MMOL/L — SIGNIFICANT CHANGE UP (ref 5–17)
APPEARANCE UR: ABNORMAL
AST SERPL-CCNC: 106 U/L — HIGH (ref 15–37)
AST SERPL-CCNC: 108 U/L — HIGH (ref 15–37)
AST SERPL-CCNC: 134 U/L — HIGH (ref 15–37)
BACTERIA # UR AUTO: ABNORMAL /HPF
BASOPHILS # BLD AUTO: 0.03 K/UL — SIGNIFICANT CHANGE UP (ref 0–0.2)
BASOPHILS NFR BLD AUTO: 0.1 % — SIGNIFICANT CHANGE UP (ref 0–2)
BILIRUB SERPL-MCNC: 3.6 MG/DL — HIGH (ref 0.2–1.2)
BILIRUB SERPL-MCNC: 4.2 MG/DL — HIGH (ref 0.2–1.2)
BILIRUB SERPL-MCNC: 4.8 MG/DL — HIGH (ref 0.2–1.2)
BILIRUB UR-MCNC: ABNORMAL
BUN SERPL-MCNC: 23 MG/DL — SIGNIFICANT CHANGE UP (ref 7–23)
BUN SERPL-MCNC: 26 MG/DL — HIGH (ref 7–23)
BUN SERPL-MCNC: 28 MG/DL — HIGH (ref 7–23)
CALCIUM SERPL-MCNC: 8 MG/DL — LOW (ref 8.5–10.1)
CALCIUM SERPL-MCNC: 8 MG/DL — LOW (ref 8.5–10.1)
CALCIUM SERPL-MCNC: 8.3 MG/DL — LOW (ref 8.5–10.1)
CHLORIDE SERPL-SCNC: 115 MMOL/L — HIGH (ref 96–108)
CHLORIDE SERPL-SCNC: 117 MMOL/L — HIGH (ref 96–108)
CHLORIDE SERPL-SCNC: 118 MMOL/L — HIGH (ref 96–108)
CO2 SERPL-SCNC: 19 MMOL/L — LOW (ref 22–31)
CO2 SERPL-SCNC: 20 MMOL/L — LOW (ref 22–31)
CO2 SERPL-SCNC: 22 MMOL/L — SIGNIFICANT CHANGE UP (ref 22–31)
COLOR SPEC: ABNORMAL
CREAT SERPL-MCNC: 0.62 MG/DL — SIGNIFICANT CHANGE UP (ref 0.5–1.3)
CREAT SERPL-MCNC: 0.66 MG/DL — SIGNIFICANT CHANGE UP (ref 0.5–1.3)
CREAT SERPL-MCNC: 0.69 MG/DL — SIGNIFICANT CHANGE UP (ref 0.5–1.3)
DIFF PNL FLD: ABNORMAL
EGFR: 113 ML/MIN/1.73M2 — SIGNIFICANT CHANGE UP
EGFR: 115 ML/MIN/1.73M2 — SIGNIFICANT CHANGE UP
EGFR: 117 ML/MIN/1.73M2 — SIGNIFICANT CHANGE UP
EOSINOPHIL # BLD AUTO: 0.19 K/UL — SIGNIFICANT CHANGE UP (ref 0–0.5)
EOSINOPHIL NFR BLD AUTO: 0.4 % — SIGNIFICANT CHANGE UP (ref 0–6)
EPI CELLS # UR: SIGNIFICANT CHANGE UP
GLUCOSE BLDC GLUCOMTR-MCNC: 92 MG/DL — SIGNIFICANT CHANGE UP (ref 70–99)
GLUCOSE SERPL-MCNC: 120 MG/DL — HIGH (ref 70–99)
GLUCOSE SERPL-MCNC: 129 MG/DL — HIGH (ref 70–99)
GLUCOSE SERPL-MCNC: 137 MG/DL — HIGH (ref 70–99)
GLUCOSE UR QL: NEGATIVE MG/DL — SIGNIFICANT CHANGE UP
HBV SURFACE AB SER-ACNC: SIGNIFICANT CHANGE UP
HBV SURFACE AG SER-ACNC: SIGNIFICANT CHANGE UP
HCT VFR BLD CALC: 37.8 % — LOW (ref 39–50)
HCT VFR BLD CALC: 44.1 % — SIGNIFICANT CHANGE UP (ref 39–50)
HCT VFR BLD CALC: 47.6 % — SIGNIFICANT CHANGE UP (ref 39–50)
HGB BLD-MCNC: 12.5 G/DL — LOW (ref 13–17)
HGB BLD-MCNC: 14.1 G/DL — SIGNIFICANT CHANGE UP (ref 13–17)
HGB BLD-MCNC: 15.7 G/DL — SIGNIFICANT CHANGE UP (ref 13–17)
IMM GRANULOCYTES NFR BLD AUTO: 2.2 % — HIGH (ref 0–0.9)
KETONES UR-MCNC: NEGATIVE MG/DL — SIGNIFICANT CHANGE UP
LACTATE SERPL-SCNC: 1.8 MMOL/L — SIGNIFICANT CHANGE UP (ref 0.7–2)
LEUKOCYTE ESTERASE UR-ACNC: ABNORMAL
LYMPHOCYTES # BLD AUTO: 1.99 K/UL — SIGNIFICANT CHANGE UP (ref 1–3.3)
LYMPHOCYTES # BLD AUTO: 3.9 % — LOW (ref 13–44)
MAGNESIUM SERPL-MCNC: 1.8 MG/DL — SIGNIFICANT CHANGE UP (ref 1.6–2.6)
MAGNESIUM SERPL-MCNC: 2.1 MG/DL — SIGNIFICANT CHANGE UP (ref 1.6–2.6)
MAGNESIUM SERPL-MCNC: 2.3 MG/DL — SIGNIFICANT CHANGE UP (ref 1.6–2.6)
MCHC RBC-ENTMCNC: 31.7 PG — SIGNIFICANT CHANGE UP (ref 27–34)
MCHC RBC-ENTMCNC: 32 G/DL — SIGNIFICANT CHANGE UP (ref 32–36)
MCHC RBC-ENTMCNC: 32.2 PG — SIGNIFICANT CHANGE UP (ref 27–34)
MCHC RBC-ENTMCNC: 32.6 PG — SIGNIFICANT CHANGE UP (ref 27–34)
MCHC RBC-ENTMCNC: 33 G/DL — SIGNIFICANT CHANGE UP (ref 32–36)
MCHC RBC-ENTMCNC: 33.1 G/DL — SIGNIFICANT CHANGE UP (ref 32–36)
MCV RBC AUTO: 97.7 FL — SIGNIFICANT CHANGE UP (ref 80–100)
MCV RBC AUTO: 98.7 FL — SIGNIFICANT CHANGE UP (ref 80–100)
MCV RBC AUTO: 99.1 FL — SIGNIFICANT CHANGE UP (ref 80–100)
MONOCYTES # BLD AUTO: 1.4 K/UL — HIGH (ref 0–0.9)
MONOCYTES NFR BLD AUTO: 2.7 % — SIGNIFICANT CHANGE UP (ref 2–14)
MRSA PCR RESULT.: SIGNIFICANT CHANGE UP
NEUTROPHILS # BLD AUTO: 46.9 K/UL — HIGH (ref 1.8–7.4)
NEUTROPHILS NFR BLD AUTO: 90.7 % — HIGH (ref 43–77)
NITRITE UR-MCNC: POSITIVE
NRBC # BLD: 0 /100 WBCS — SIGNIFICANT CHANGE UP (ref 0–0)
PH UR: 5 — SIGNIFICANT CHANGE UP (ref 5–8)
PHOSPHATE SERPL-MCNC: 3.2 MG/DL — SIGNIFICANT CHANGE UP (ref 2.5–4.5)
PHOSPHATE SERPL-MCNC: 3.4 MG/DL — SIGNIFICANT CHANGE UP (ref 2.5–4.5)
PHOSPHATE SERPL-MCNC: 3.5 MG/DL — SIGNIFICANT CHANGE UP (ref 2.5–4.5)
PLATELET # BLD AUTO: 157 K/UL — SIGNIFICANT CHANGE UP (ref 150–400)
PLATELET # BLD AUTO: 172 K/UL — SIGNIFICANT CHANGE UP (ref 150–400)
PLATELET # BLD AUTO: 202 K/UL — SIGNIFICANT CHANGE UP (ref 150–400)
POTASSIUM SERPL-MCNC: 3.3 MMOL/L — LOW (ref 3.5–5.3)
POTASSIUM SERPL-MCNC: 3.7 MMOL/L — SIGNIFICANT CHANGE UP (ref 3.5–5.3)
POTASSIUM SERPL-MCNC: 3.7 MMOL/L — SIGNIFICANT CHANGE UP (ref 3.5–5.3)
POTASSIUM SERPL-SCNC: 3.3 MMOL/L — LOW (ref 3.5–5.3)
POTASSIUM SERPL-SCNC: 3.7 MMOL/L — SIGNIFICANT CHANGE UP (ref 3.5–5.3)
POTASSIUM SERPL-SCNC: 3.7 MMOL/L — SIGNIFICANT CHANGE UP (ref 3.5–5.3)
PROCALCITONIN SERPL-MCNC: 1.62 NG/ML — HIGH (ref 0.02–0.1)
PROT SERPL-MCNC: 5.1 GM/DL — LOW (ref 6–8.3)
PROT SERPL-MCNC: 5.4 GM/DL — LOW (ref 6–8.3)
PROT SERPL-MCNC: 6.2 GM/DL — SIGNIFICANT CHANGE UP (ref 6–8.3)
PROT UR-MCNC: 30 MG/DL
RAPID RVP RESULT: SIGNIFICANT CHANGE UP
RBC # BLD: 3.83 M/UL — LOW (ref 4.2–5.8)
RBC # BLD: 4.45 M/UL — SIGNIFICANT CHANGE UP (ref 4.2–5.8)
RBC # BLD: 4.87 M/UL — SIGNIFICANT CHANGE UP (ref 4.2–5.8)
RBC # FLD: 15.4 % — HIGH (ref 10.3–14.5)
RBC # FLD: 15.5 % — HIGH (ref 10.3–14.5)
RBC # FLD: 15.5 % — HIGH (ref 10.3–14.5)
RBC CASTS # UR COMP ASSIST: 1 /HPF — SIGNIFICANT CHANGE UP (ref 0–4)
S AUREUS DNA NOSE QL NAA+PROBE: SIGNIFICANT CHANGE UP
SARS-COV-2 RNA SPEC QL NAA+PROBE: SIGNIFICANT CHANGE UP
SODIUM SERPL-SCNC: 144 MMOL/L — SIGNIFICANT CHANGE UP (ref 135–145)
SODIUM SERPL-SCNC: 144 MMOL/L — SIGNIFICANT CHANGE UP (ref 135–145)
SODIUM SERPL-SCNC: 147 MMOL/L — HIGH (ref 135–145)
SP GR SPEC: 1.02 — SIGNIFICANT CHANGE UP (ref 1–1.03)
UROBILINOGEN FLD QL: 1 MG/DL — SIGNIFICANT CHANGE UP (ref 0.2–1)
WBC # BLD: 51.67 K/UL — CRITICAL HIGH (ref 3.8–10.5)
WBC # BLD: 52.66 K/UL — CRITICAL HIGH (ref 3.8–10.5)
WBC # BLD: 56.67 K/UL — CRITICAL HIGH (ref 3.8–10.5)
WBC # FLD AUTO: 51.67 K/UL — CRITICAL HIGH (ref 3.8–10.5)
WBC # FLD AUTO: 52.66 K/UL — CRITICAL HIGH (ref 3.8–10.5)
WBC # FLD AUTO: 56.67 K/UL — CRITICAL HIGH (ref 3.8–10.5)
WBC UR QL: 19 /HPF — HIGH (ref 0–5)

## 2024-02-25 PROCEDURE — 76705 ECHO EXAM OF ABDOMEN: CPT | Mod: 26

## 2024-02-25 PROCEDURE — 93308 TTE F-UP OR LMTD: CPT | Mod: 26

## 2024-02-25 PROCEDURE — 76604 US EXAM CHEST: CPT | Mod: 26

## 2024-02-25 PROCEDURE — 99291 CRITICAL CARE FIRST HOUR: CPT

## 2024-02-25 PROCEDURE — 71045 X-RAY EXAM CHEST 1 VIEW: CPT | Mod: 26

## 2024-02-25 RX ORDER — MAGNESIUM SULFATE 500 MG/ML
2 VIAL (ML) INJECTION ONCE
Refills: 0 | Status: COMPLETED | OUTPATIENT
Start: 2024-02-25 | End: 2024-02-25

## 2024-02-25 RX ORDER — PIPERACILLIN AND TAZOBACTAM 4; .5 G/20ML; G/20ML
3.38 INJECTION, POWDER, LYOPHILIZED, FOR SOLUTION INTRAVENOUS ONCE
Refills: 0 | Status: COMPLETED | OUTPATIENT
Start: 2024-02-25 | End: 2024-02-25

## 2024-02-25 RX ORDER — VANCOMYCIN HCL 1 G
1000 VIAL (EA) INTRAVENOUS ONCE
Refills: 0 | Status: COMPLETED | OUTPATIENT
Start: 2024-02-25 | End: 2024-02-25

## 2024-02-25 RX ORDER — PIPERACILLIN AND TAZOBACTAM 4; .5 G/20ML; G/20ML
3.38 INJECTION, POWDER, LYOPHILIZED, FOR SOLUTION INTRAVENOUS EVERY 8 HOURS
Refills: 0 | Status: DISCONTINUED | OUTPATIENT
Start: 2024-02-25 | End: 2024-02-25

## 2024-02-25 RX ORDER — POTASSIUM CHLORIDE 20 MEQ
10 PACKET (EA) ORAL
Refills: 0 | Status: COMPLETED | OUTPATIENT
Start: 2024-02-25 | End: 2024-02-25

## 2024-02-25 RX ORDER — SODIUM CHLORIDE 9 MG/ML
1000 INJECTION, SOLUTION INTRAVENOUS ONCE
Refills: 0 | Status: COMPLETED | OUTPATIENT
Start: 2024-02-25 | End: 2024-02-25

## 2024-02-25 RX ORDER — NOREPINEPHRINE BITARTRATE/D5W 8 MG/250ML
0.05 PLASTIC BAG, INJECTION (ML) INTRAVENOUS
Qty: 8 | Refills: 0 | Status: DISCONTINUED | OUTPATIENT
Start: 2024-02-25 | End: 2024-02-27

## 2024-02-25 RX ORDER — SODIUM CHLORIDE 9 MG/ML
1000 INJECTION, SOLUTION INTRAVENOUS
Refills: 0 | Status: COMPLETED | OUTPATIENT
Start: 2024-02-25 | End: 2024-02-25

## 2024-02-25 RX ORDER — ACETAMINOPHEN 500 MG
1000 TABLET ORAL ONCE
Refills: 0 | Status: COMPLETED | OUTPATIENT
Start: 2024-02-25 | End: 2024-02-25

## 2024-02-25 RX ORDER — MEROPENEM 1 G/30ML
1000 INJECTION INTRAVENOUS EVERY 8 HOURS
Refills: 0 | Status: COMPLETED | OUTPATIENT
Start: 2024-02-25 | End: 2024-03-03

## 2024-02-25 RX ADMIN — SODIUM CHLORIDE 4 MILLILITER(S): 9 INJECTION INTRAMUSCULAR; INTRAVENOUS; SUBCUTANEOUS at 17:11

## 2024-02-25 RX ADMIN — Medication 650 MILLIGRAM(S): at 17:11

## 2024-02-25 RX ADMIN — Medication 1000 MILLIGRAM(S): at 08:30

## 2024-02-25 RX ADMIN — Medication 400 MILLIGRAM(S): at 07:35

## 2024-02-25 RX ADMIN — SODIUM CHLORIDE 4 MILLILITER(S): 9 INJECTION INTRAMUSCULAR; INTRAVENOUS; SUBCUTANEOUS at 23:21

## 2024-02-25 RX ADMIN — SODIUM CHLORIDE 1000 MILLILITER(S): 9 INJECTION, SOLUTION INTRAVENOUS at 16:21

## 2024-02-25 RX ADMIN — Medication 100 MILLIEQUIVALENT(S): at 17:53

## 2024-02-25 RX ADMIN — Medication 3 MILLILITER(S): at 06:16

## 2024-02-25 RX ADMIN — POLYETHYLENE GLYCOL 3350 17 GRAM(S): 17 POWDER, FOR SOLUTION ORAL at 11:26

## 2024-02-25 RX ADMIN — Medication 100 MILLIEQUIVALENT(S): at 11:26

## 2024-02-25 RX ADMIN — Medication 650 MILLIGRAM(S): at 15:13

## 2024-02-25 RX ADMIN — MEROPENEM 100 MILLIGRAM(S): 1 INJECTION INTRAVENOUS at 23:21

## 2024-02-25 RX ADMIN — Medication 25 GRAM(S): at 17:48

## 2024-02-25 RX ADMIN — SODIUM CHLORIDE 4 MILLILITER(S): 9 INJECTION INTRAMUSCULAR; INTRAVENOUS; SUBCUTANEOUS at 11:19

## 2024-02-25 RX ADMIN — Medication 7.96 MICROGRAM(S)/KG/MIN: at 17:18

## 2024-02-25 RX ADMIN — Medication 1 MILLIGRAM(S): at 11:26

## 2024-02-25 RX ADMIN — Medication 3 MILLILITER(S): at 00:24

## 2024-02-25 RX ADMIN — SODIUM CHLORIDE 100 MILLILITER(S): 9 INJECTION, SOLUTION INTRAVENOUS at 10:19

## 2024-02-25 RX ADMIN — PIPERACILLIN AND TAZOBACTAM 25 GRAM(S): 4; .5 INJECTION, POWDER, LYOPHILIZED, FOR SOLUTION INTRAVENOUS at 07:35

## 2024-02-25 RX ADMIN — PANTOPRAZOLE SODIUM 40 MILLIGRAM(S): 20 TABLET, DELAYED RELEASE ORAL at 06:16

## 2024-02-25 RX ADMIN — SODIUM CHLORIDE 1000 MILLILITER(S): 9 INJECTION, SOLUTION INTRAVENOUS at 09:40

## 2024-02-25 RX ADMIN — Medication 100 MILLIGRAM(S): at 11:26

## 2024-02-25 RX ADMIN — SODIUM CHLORIDE 4 MILLILITER(S): 9 INJECTION INTRAMUSCULAR; INTRAVENOUS; SUBCUTANEOUS at 00:30

## 2024-02-25 RX ADMIN — CHLORHEXIDINE GLUCONATE 1 APPLICATION(S): 213 SOLUTION TOPICAL at 06:16

## 2024-02-25 RX ADMIN — Medication 100 MILLIEQUIVALENT(S): at 17:17

## 2024-02-25 RX ADMIN — SODIUM CHLORIDE 1000 MILLILITER(S): 9 INJECTION, SOLUTION INTRAVENOUS at 07:15

## 2024-02-25 RX ADMIN — Medication 3 MILLILITER(S): at 23:21

## 2024-02-25 RX ADMIN — Medication 100 MILLIEQUIVALENT(S): at 09:40

## 2024-02-25 RX ADMIN — SODIUM CHLORIDE 100 MILLILITER(S): 9 INJECTION, SOLUTION INTRAVENOUS at 23:00

## 2024-02-25 RX ADMIN — ENOXAPARIN SODIUM 40 MILLIGRAM(S): 100 INJECTION SUBCUTANEOUS at 10:40

## 2024-02-25 RX ADMIN — SODIUM CHLORIDE 4 MILLILITER(S): 9 INJECTION INTRAMUSCULAR; INTRAVENOUS; SUBCUTANEOUS at 06:17

## 2024-02-25 RX ADMIN — PIPERACILLIN AND TAZOBACTAM 200 GRAM(S): 4; .5 INJECTION, POWDER, LYOPHILIZED, FOR SOLUTION INTRAVENOUS at 04:16

## 2024-02-25 RX ADMIN — Medication 3 MILLILITER(S): at 11:19

## 2024-02-25 RX ADMIN — Medication 100 MILLIEQUIVALENT(S): at 10:39

## 2024-02-25 RX ADMIN — Medication 250 MILLIGRAM(S): at 07:35

## 2024-02-25 RX ADMIN — MEROPENEM 100 MILLIGRAM(S): 1 INJECTION INTRAVENOUS at 14:42

## 2024-02-25 RX ADMIN — Medication 3 MILLILITER(S): at 17:13

## 2024-02-25 NOTE — PROGRESS NOTE ADULT - ASSESSMENT
49 Y/O male with duodenal perforation s/p ex lap, kyrie patch POD 12. Extubated.   OR cx: rare streptococcus salivarius/vestibularis group, rare streptococcus mitis oralis group rare candida albicans.    Afebrile at present. Leukocytosis.         PLAN:    - Trickle feeds, IV hydration  - Continue local wound care, FOX care  - ABx per ID  - Barber, monitor urine output  - Trend labs  - Continue ICU management

## 2024-02-25 NOTE — PROGRESS NOTE ADULT - SUBJECTIVE AND OBJECTIVE BOX
Patient seen and examined at bedside with no complaints. Extubated.   Denies pain, nausea/ vomiting, chills, SOB, chest pain, calf tenderness.          Vital Signs Last 24 Hrs  T(F): 100.7 (02-24-24 @ 22:00), Max: 100.9 (02-24-24 @ 21:00)  HR: 114 (02-25-24 @ 01:03)  BP: --  RR: 30 (02-24-24 @ 23:00)  SpO2: 98% (02-25-24 @ 01:03)  Wt(kg): --   CAPILLARY BLOOD GLUCOSE      POCT Blood Glucose.: 101 mg/dL (24 Feb 2024 23:30)      GENERAL: Alert, NAD  CHEST/LUNG: Respirations non labored, good inspiratory effort  HEART: S1S2  HEENT: NCAT, EOMI, conjunctiva clear   ABDOMEN: Nondistended, + bowel sounds, midline incision C/D/I, FOX drain draining ascites with small mass of tissue within FOX bulb   EXTREMITIES:  No calf tenderness, no edema    I&O's Detail    23 Feb 2024 07:01  -  24 Feb 2024 07:00  --------------------------------------------------------  IN:    dextrose 5%: 240 mL    FentaNYL: 212.1 mL    Propofol: 15.1 mL    Vital High Protein: 240 mL  Total IN: 707.2 mL    OUT:    Drain (mL): 740 mL    Indwelling Catheter - Urethral (mL): 1720 mL  Total OUT: 2460 mL    Total NET: -1752.8 mL      24 Feb 2024 07:01  -  25 Feb 2024 03:26  --------------------------------------------------------  IN:    FentaNYL: 8.4 mL    Vital High Protein: 200 mL  Total IN: 208.4 mL    OUT:    Drain (mL): 780 mL    Indwelling Catheter - Urethral (mL): 1010 mL  Total OUT: 1790 mL    Total NET: -1581.6 mL          LABS:                        12.2   21.57 )-----------( 166      ( 24 Feb 2024 02:31 )             37.0     02-24    145  |  116<H>  |  24<H>  ----------------------------<  119<H>  3.4<L>   |  24  |  0.35<L>    Ca    8.2<L>      24 Feb 2024 11:20  Phos  2.4     02-24  Mg     2.5     02-24    TPro  5.6<L>  /  Alb  2.4<L>  /  TBili  2.8<H>  /  DBili  x   /  AST  142<H>  /  ALT  155<H>  /  AlkPhos  241<H>  02-24        RADIOLOGY & ADDITIONAL STUDIES:      < from: CT Abdomen and Pelvis w/ Oral Cont and w/ IV Cont (02.21.24 @ 16:16) >    ACC: [id number] EXAM:  CT ABDOMEN AND PELVIS OC IC   ORDERED BY: YAN GOMEZ     PROCEDURE DATE:  02/21/2024          INTERPRETATION:  CLINICAL INFORMATION: 48-year-old man status post repair   perforated duodenal ulcer 02/13/2024 with persistent fever    COMPARISON: CT scan 02/13/2024    CONTRAST/COMPLICATIONS:  IV Contrast: Omnipaque 350  99 cc administered   1 cc discarded  Oral Contrast: Omnipaque 240  Complications: None reported at time of study completion    PROCEDURE:  CT of the Abdomen and Pelvis was performed.  Sagittal and coronal reformats were performed.    FINDINGS:  LOWER CHEST: Patchy consolidation left upper and lower lobes and   extensive groundglass consolidation right middle lobe, likely pneumonia.   Bibasilar consolidation consistent with atelectasis and/or pneumonia.   Small bilateral pleural effusions. LIVER: Hepatomegaly.  BILE DUCTS: Normal caliber.  GALLBLADDER: Within normal limits.  SPLEEN: Splenomegaly with splenic span 14.4 cm  PANCREAS: Within normal limits.  ADRENALS: Within normal limits.  KIDNEYS/URETERS: Within normal limits.    BLADDER: Indwelling catheter.  REPRODUCTIVE ORGANS: Normal-sized prostate    BOWEL: No bowel obstruction. Duodenal edema. Esophagogastric tube ending   in the stomach. Edema ascending colon possible portal colopathy. Appendix   not visualized. No evidence appendicitis.  PERITONEUM: Small right subphrenic ascites. No intra-abdominal collection   otherwise visualized. Small amount free air possibly related to drain   ending in left anterior upper quadrant and inserted via right flank  VESSELS: Paraesophageal varices.  RETROPERITONEUM/LYMPH NODES: No lymphadenopathy.  ABDOMINAL WALL: Within normal limits.  BONES: Degenerative change.    IMPRESSION:  Multifocal pneumonia.  Small amount of pneumoperitoneum possibly related to indwelling drain. No   associated collection.  Small right subphrenic ascites.  Hepatosplenomegaly with evidence portal hypertension        --- End of Report ---            ANDRIA PEREZ MD; Attending Radiologist  This document has been electronically signed. Feb 21 2024  9:40PM    < end of copied text >

## 2024-02-25 NOTE — PROGRESS NOTE ADULT - SUBJECTIVE AND OBJECTIVE BOX
Progress Note    PRIYANKA PITTMAN 48y (1975) Male [id number]  02-13-24 (12d)      Chief Complaint: Perforated viscus    Subjective:  Patient febrile overnight. Leukocytosis. Denies any complaints. Breathing well. No abdominal pain. Had several bowel movements overnight.     Review of Systems:  CONSTITUTIONAL: +Fever  EYES: No eye pain, visual disturbances, or discharge  ENMT:  No difficulty hearing, tinnitus, vertigo; No sinus or throat pain  NECK: No pain or stiffness  RESPIRATORY: No cough, wheezing, chills or hemoptysis; No shortness of breath  CARDIOVASCULAR: No chest pain, palpitations, dizziness, or leg swelling  GASTROINTESTINAL: No abdominal or epigastric pain. No nausea, vomiting, or hematemesis; No diarrhea or constipation. No melena or hematochezia.  GENITOURINARY: +gautam  NEUROLOGICAL: No headaches, memory loss, loss of strength, numbness, or tremors  SKIN: No itching, burning, rashes, or lesions   MUSCULOSKELETAL: No joint pain or swelling; No muscle, back, or extremity pain      PAST MEDICAL & SURGICAL HISTORY:  No pertinent past medical history [520156374]    H/O cirrhosis [Z87.19]    S/P foot surgery [Z98.890]      acetaminophen     Tablet .. 650 milliGRAM(s) Oral every 6 hours PRN  albuterol/ipratropium for Nebulization 3 milliLiter(s) Nebulizer every 6 hours  chlorhexidine 2% Cloths 1 Application(s) Topical <User Schedule>  enoxaparin Injectable 40 milliGRAM(s) SubCutaneous every 24 hours  folic acid 1 milliGRAM(s) Oral daily  influenza   Vaccine 0.5 milliLiter(s) IntraMuscular once  lactated ringers Bolus 1000 milliLiter(s) IV Bolus once  lactated ringers. 1000 milliLiter(s) IV Continuous <Continuous>  meropenem  IVPB 1000 milliGRAM(s) IV Intermittent every 8 hours  pantoprazole    Tablet 40 milliGRAM(s) Oral before breakfast  polyethylene glycol 3350 17 Gram(s) Oral daily  potassium chloride  10 mEq/100 mL IVPB 10 milliEquivalent(s) IV Intermittent every 1 hour  senna 2 Tablet(s) Oral at bedtime  sodium chloride 3%  Inhalation 4 milliLiter(s) Inhalation every 6 hours  thiamine Injectable 100 milliGRAM(s) IV Push daily    Objective:  T(C): 37.1 (02-25-24 @ 08:30), Max: 38.7 (02-25-24 @ 03:30)  HR: 92 (02-25-24 @ 08:30) (52 - 116)  BP: 83/55 (02-25-24 @ 08:30) (83/55 - 115/98)  RR: 27 (02-25-24 @ 08:30) (14 - 37)  SpO2: 97% (02-25-24 @ 08:30) (92% - 100%)    Physical exam:  GENERAL: NAD, well-developed, breathing comfortably  HEAD:  Atraumatic, Normocephalic  EYES: EOMI, PERRLA, conjunctiva and sclera clear  NECK: Supple, No JVD, trachea midline.   CHEST/LUNG: Clear to auscultation bilaterally; No wheeze. No accessory muscle use.   HEART: Regular rate and rhythm; No murmurs, rubs, or gallops  ABDOMEN: Soft, Nontender, Nondistended; Bowel sounds present. Midline incision site with staples. Clean, dry, and intact without any purulent drainage. FOX drain with serous fluid without gross e/o purulence or bleeding.   EXTREMITIES:  2+ Peripheral Pulses, No clubbing, cyanosis, or edema  PSYCH: AAOx3  NEUROLOGY: non-focal  SKIN: No rashes or lesions      02-24-24 @ 07:01  -  02-25-24 @ 07:00  --------------------------------------------------------  IN: 1373.4 mL / OUT: 2400 mL / NET: -1026.6 mL    02-25-24 @ 07:01  -  02-25-24 @ 09:26  --------------------------------------------------------  IN: 340 mL / OUT: 110 mL / NET: 230 mL        CAPILLARY BLOOD GLUCOSE      (02-25 @ 07:23)                      14.1  56.67 )-----------( 172                 44.1    Neutrophils = -- (--%)  Lymphocytes = -- (--%)  Eosinophils = -- (--%)  Basophils = -- (--%)  Monocytes = -- (--%)  Bands = --%    02-25    147<H>  |  117<H>  |  26<H>  ----------------------------<  129<H>  3.3<L>   |  19<L>  |  0.66    Ca    8.0<L>      25 Feb 2024 07:23  Phos  3.5     02-25  Mg     2.1     02-25    TPro  5.4<L>  /  Alb  2.3<L>  /  TBili  4.2<H>  /  DBili  x   /  AST  134<H>  /  ALT  147<H>  /  AlkPhos  315<H>  02-25      Urinalysis Basic - ( 25 Feb 2024 07:23 )    Color: x / Appearance: x / SG: x / pH: x  Gluc: 129 mg/dL / Ketone: x  / Bili: x / Urobili: x   Blood: x / Protein: x / Nitrite: x   Leuk Esterase: x / RBC: x / WBC x   Sq Epi: x / Non Sq Epi: x / Bacteria: x        WBC Trend: 56.67<--, 51.67<--, 21.57<--    Hb Trend: 14.1<--, 15.7<--, 12.2<--, 11.7<--, 11.5<--        New imaging in last 24 hours: cxr  Consult notes reviewed: surgery

## 2024-02-25 NOTE — PROGRESS NOTE ADULT - ASSESSMENT
48M w/ cirrhosis, EtOH use disorder, open reduction metatarsal fracture p/w perforated duodenal ulcer w/ kyrie patch. Course complicated by ARDS, septic shock, and EMILIA. Extubated 2/24. Now septic.     #Neuro - AOx3. No pain.  #CV - Hypotensive this morning. On POCUS, IVC very small. Will give bolus and start on 100mL/hr of LR.   #Pulm - ARDS 2/2 aspiration much improved. On room air. Still bringing up sputum so will continue duonebs and hypertonic to aid with airway clearance-consider discontinuing tomorrow. A line predominant on POCUS with R basilar small pleural effusion which looks simple. Continue steroids for ARDS - decadron 20 mg x5 days followed by decadron 10 mg x5 days.  #ID- completed 7 days of zosyn for strept mitis and salavaris, diflucan for candida in peritoneal fluid culture. Overnight, leukocytosis went to 50s, febrile, with soft BPs. UA positive. UCx pending. Sending off bcx, MRSA, RVP. Will keep on vancomycin and meropenem until MRSA results and cx results.   #Renal/metabolic - EMILIA likely ATN, now resolved; monitor I/Os, electrolytes. Hypernatremic; start on free water. Phos WNL finally.   #GI- s/p kyrie patch for perforated duodenum; per discussion w/ surgery. Uptitrating tube feeds as tolerated to goal today. Having regular BMs now. Abdomen very soft, non-tender. No obvious collections on POCUS around the incision site. Will not scan for infectious source today.  #Heme - hgb stable; monitor for now  #Endo - FS have been stable and sugars on BMPs are WNL. Will dc q6hr finger sticks; MVI, folate, thiamine  #Skin - surgical site care per surgery  #PPx - lovenox q24  #Dispo- remains in ICU in critical condition, BPs in the 70s, may require pressors, hemodynamic instability.     Plan of care discussed w/ wife at bedside and sister in law over the phone. 48M w/ cirrhosis, EtOH use disorder, open reduction metatarsal fracture p/w perforated duodenal ulcer w/ kyrie patch. Course complicated by ARDS, septic shock, and EMILIA. Extubated 2/24. Now septic.     #Neuro - AOx3. No pain.  #CV - Hypotensive this morning. On POCUS, IVC very small. Will give bolus and start on 100mL/hr of LR.   #Pulm - ARDS 2/2 aspiration much improved. On room air. Still bringing up sputum so will continue duonebs and hypertonic to aid with airway clearance-consider discontinuing tomorrow. A line predominant on POCUS with R basilar small pleural effusion which looks simple. Continue steroids for ARDS - decadron 20 mg x5 days followed by decadron 10 mg x5 days.  #ID- completed 7 days of zosyn for strept mitis and salavaris, diflucan for candida in peritoneal fluid culture. Overnight, leukocytosis went to 50s, febrile, with soft BPs. UA positive. UCx pending. Sending off bcx, MRSA, RVP. Will keep on vancomycin and meropenem until MRSA results and cx results.   #Renal/metabolic - EMILIA likely ATN, now resolved; monitor I/Os, electrolytes. Hypernatremic; start on free water. Phos WNL finally.   #GI- s/p kyrie patch for perforated duodenum; per discussion w/ surgery. Uptitrating tube feeds as tolerated to goal today. Having regular BMs now. Abdomen very soft, non-tender. No obvious collections on POCUS around the incision site. Will not scan for infectious source today. Bili is elevated, likely related to hypovolemia. POCUS with sludge in GB but no signs of cholecystitis. Do not suspect choledocholithiasis or cholangitis at this time.   #Heme - hgb stable; monitor for now  #Endo - FS have been stable and sugars on BMPs are WNL. Will dc q6hr finger sticks; MVI, folate, thiamine  #Skin - surgical site care per surgery  #PPx - lovenox q24  #Dispo- remains in ICU in critical condition, BPs in the 70s, may require pressors, hemodynamic instability.     Plan of care discussed w/ wife at bedside and sister in law over the phone.

## 2024-02-25 NOTE — CHART NOTE - NSCHARTNOTEFT_GEN_A_CORE
: David    INDICATION: hypotension    PROCEDURE:  [X ] LIMITED ECHO  [X ] LIMITED CHEST  [ ] LIMITED RETROPERITONEAL  [X ] LIMITED ABDOMINAL  [ ] LIMITED DVT  [ ] NEEDLE GUIDANCE VASCULAR  [ ] NEEDLE GUIDANCE THORACENTESIS  [ ] NEEDLE GUIDANCE PARACENTESIS  [ ] NEEDLE GUIDANCE PERICARDIOCENTESIS  [ ] OTHER    FINDINGS:  A line predominant. R small pleural effusion, simple appearing.  LV function appears intact. RV smaller than LV. No pericardial effusion. No gross WMA. IVC very small.  GB with sludge, but no obvious wall edema. No pericholecystic fluid. Negative Ren's.  No obvious fluid collections around the incision site.    INTERPRETATION:  No obvious source of sepsis. Pt would likely be fluid responsive.    All images uploaded to qpath.

## 2024-02-26 LAB
ALBUMIN SERPL ELPH-MCNC: 2 G/DL — LOW (ref 3.3–5)
ALP SERPL-CCNC: 311 U/L — HIGH (ref 40–120)
ALT FLD-CCNC: 123 U/L — HIGH (ref 12–78)
ANION GAP SERPL CALC-SCNC: 5 MMOL/L — SIGNIFICANT CHANGE UP (ref 5–17)
ANISOCYTOSIS BLD QL: SLIGHT — SIGNIFICANT CHANGE UP
AST SERPL-CCNC: 66 U/L — HIGH (ref 15–37)
BASOPHILS # BLD AUTO: 0 K/UL — SIGNIFICANT CHANGE UP (ref 0–0.2)
BASOPHILS # BLD AUTO: 0.15 K/UL — SIGNIFICANT CHANGE UP (ref 0–0.2)
BASOPHILS # BLD AUTO: 0.19 K/UL — SIGNIFICANT CHANGE UP (ref 0–0.2)
BASOPHILS NFR BLD AUTO: 0 % — SIGNIFICANT CHANGE UP (ref 0–2)
BASOPHILS NFR BLD AUTO: 0.3 % — SIGNIFICANT CHANGE UP (ref 0–2)
BASOPHILS NFR BLD AUTO: 0.4 % — SIGNIFICANT CHANGE UP (ref 0–2)
BILIRUB SERPL-MCNC: 4.4 MG/DL — HIGH (ref 0.2–1.2)
BUN SERPL-MCNC: 18 MG/DL — SIGNIFICANT CHANGE UP (ref 7–23)
CALCIUM SERPL-MCNC: 8.4 MG/DL — LOW (ref 8.5–10.1)
CHLORIDE SERPL-SCNC: 114 MMOL/L — HIGH (ref 96–108)
CO2 SERPL-SCNC: 23 MMOL/L — SIGNIFICANT CHANGE UP (ref 22–31)
CREAT SERPL-MCNC: 0.62 MG/DL — SIGNIFICANT CHANGE UP (ref 0.5–1.3)
DACRYOCYTES BLD QL SMEAR: SLIGHT — SIGNIFICANT CHANGE UP
EGFR: 117 ML/MIN/1.73M2 — SIGNIFICANT CHANGE UP
EOSINOPHIL # BLD AUTO: 0 K/UL — SIGNIFICANT CHANGE UP (ref 0–0.5)
EOSINOPHIL # BLD AUTO: 0.5 K/UL — SIGNIFICANT CHANGE UP (ref 0–0.5)
EOSINOPHIL # BLD AUTO: 0.64 K/UL — HIGH (ref 0–0.5)
EOSINOPHIL NFR BLD AUTO: 0 % — SIGNIFICANT CHANGE UP (ref 0–6)
EOSINOPHIL NFR BLD AUTO: 0.9 % — SIGNIFICANT CHANGE UP (ref 0–6)
EOSINOPHIL NFR BLD AUTO: 1.5 % — SIGNIFICANT CHANGE UP (ref 0–6)
GLUCOSE SERPL-MCNC: 121 MG/DL — HIGH (ref 70–99)
HCT VFR BLD CALC: 36.1 % — LOW (ref 39–50)
HGB BLD-MCNC: 11.7 G/DL — LOW (ref 13–17)
IMM GRANULOCYTES NFR BLD AUTO: 2 % — HIGH (ref 0–0.9)
IMM GRANULOCYTES NFR BLD AUTO: 2.4 % — HIGH (ref 0–0.9)
LYMPHOCYTES # BLD AUTO: 1.29 K/UL — SIGNIFICANT CHANGE UP (ref 1–3.3)
LYMPHOCYTES # BLD AUTO: 1.78 K/UL — SIGNIFICANT CHANGE UP (ref 1–3.3)
LYMPHOCYTES # BLD AUTO: 2.4 % — LOW (ref 13–44)
LYMPHOCYTES # BLD AUTO: 2.83 K/UL — SIGNIFICANT CHANGE UP (ref 1–3.3)
LYMPHOCYTES # BLD AUTO: 4.2 % — LOW (ref 13–44)
LYMPHOCYTES # BLD AUTO: 5 % — LOW (ref 13–44)
MACROCYTES BLD QL: SLIGHT — SIGNIFICANT CHANGE UP
MAGNESIUM SERPL-MCNC: 2.5 MG/DL — SIGNIFICANT CHANGE UP (ref 1.6–2.6)
MANUAL SMEAR VERIFICATION: SIGNIFICANT CHANGE UP
MCHC RBC-ENTMCNC: 31.8 PG — SIGNIFICANT CHANGE UP (ref 27–34)
MCHC RBC-ENTMCNC: 32.4 G/DL — SIGNIFICANT CHANGE UP (ref 32–36)
MCV RBC AUTO: 98.1 FL — SIGNIFICANT CHANGE UP (ref 80–100)
MICROCYTES BLD QL: SLIGHT — SIGNIFICANT CHANGE UP
MONOCYTES # BLD AUTO: 1.57 K/UL — HIGH (ref 0–0.9)
MONOCYTES # BLD AUTO: 1.72 K/UL — HIGH (ref 0–0.9)
MONOCYTES # BLD AUTO: 2.27 K/UL — HIGH (ref 0–0.9)
MONOCYTES NFR BLD AUTO: 3.3 % — SIGNIFICANT CHANGE UP (ref 2–14)
MONOCYTES NFR BLD AUTO: 3.7 % — SIGNIFICANT CHANGE UP (ref 2–14)
MONOCYTES NFR BLD AUTO: 4 % — SIGNIFICANT CHANGE UP (ref 2–14)
NEUTROPHILS # BLD AUTO: 37.84 K/UL — HIGH (ref 1.8–7.4)
NEUTROPHILS # BLD AUTO: 47.72 K/UL — HIGH (ref 1.8–7.4)
NEUTROPHILS # BLD AUTO: 49.3 K/UL — HIGH (ref 1.8–7.4)
NEUTROPHILS NFR BLD AUTO: 87 % — HIGH (ref 43–77)
NEUTROPHILS NFR BLD AUTO: 88.2 % — HIGH (ref 43–77)
NEUTROPHILS NFR BLD AUTO: 90.7 % — HIGH (ref 43–77)
NRBC # BLD: 0 /100 WBCS — SIGNIFICANT CHANGE UP (ref 0–0)
NRBC # BLD: SIGNIFICANT CHANGE UP /100 WBCS (ref 0–0)
PHOSPHATE SERPL-MCNC: 2.5 MG/DL — SIGNIFICANT CHANGE UP (ref 2.5–4.5)
PLAT MORPH BLD: NORMAL — SIGNIFICANT CHANGE UP
PLATELET # BLD AUTO: 139 K/UL — LOW (ref 150–400)
PLATELET CLUMP BLD QL SMEAR: ABNORMAL
POIKILOCYTOSIS BLD QL AUTO: SLIGHT — SIGNIFICANT CHANGE UP
POTASSIUM SERPL-MCNC: 3.7 MMOL/L — SIGNIFICANT CHANGE UP (ref 3.5–5.3)
POTASSIUM SERPL-SCNC: 3.7 MMOL/L — SIGNIFICANT CHANGE UP (ref 3.5–5.3)
PROT SERPL-MCNC: 5.3 GM/DL — LOW (ref 6–8.3)
RBC # BLD: 3.68 M/UL — LOW (ref 4.2–5.8)
RBC # FLD: 15.7 % — HIGH (ref 10.3–14.5)
RBC BLD AUTO: ABNORMAL
SODIUM SERPL-SCNC: 142 MMOL/L — SIGNIFICANT CHANGE UP (ref 135–145)
STOMATOCYTES BLD QL SMEAR: SLIGHT — SIGNIFICANT CHANGE UP
VARIANT LYMPHS # BLD: 4 % — SIGNIFICANT CHANGE UP (ref 0–6)
WBC # BLD: 42.88 K/UL — CRITICAL HIGH (ref 3.8–10.5)
WBC # FLD AUTO: 42.88 K/UL — CRITICAL HIGH (ref 3.8–10.5)

## 2024-02-26 PROCEDURE — 99233 SBSQ HOSP IP/OBS HIGH 50: CPT

## 2024-02-26 PROCEDURE — 76705 ECHO EXAM OF ABDOMEN: CPT | Mod: 26

## 2024-02-26 PROCEDURE — 99291 CRITICAL CARE FIRST HOUR: CPT

## 2024-02-26 RX ORDER — ONDANSETRON 8 MG/1
4 TABLET, FILM COATED ORAL ONCE
Refills: 0 | Status: COMPLETED | OUTPATIENT
Start: 2024-02-26 | End: 2024-02-26

## 2024-02-26 RX ORDER — ACETAMINOPHEN 500 MG
1000 TABLET ORAL ONCE
Refills: 0 | Status: COMPLETED | OUTPATIENT
Start: 2024-02-26 | End: 2024-02-26

## 2024-02-26 RX ADMIN — POLYETHYLENE GLYCOL 3350 17 GRAM(S): 17 POWDER, FOR SOLUTION ORAL at 12:25

## 2024-02-26 RX ADMIN — MEROPENEM 100 MILLIGRAM(S): 1 INJECTION INTRAVENOUS at 21:23

## 2024-02-26 RX ADMIN — Medication 650 MILLIGRAM(S): at 02:30

## 2024-02-26 RX ADMIN — Medication 650 MILLIGRAM(S): at 19:07

## 2024-02-26 RX ADMIN — SODIUM CHLORIDE 4 MILLILITER(S): 9 INJECTION INTRAMUSCULAR; INTRAVENOUS; SUBCUTANEOUS at 05:04

## 2024-02-26 RX ADMIN — Medication 400 MILLIGRAM(S): at 08:38

## 2024-02-26 RX ADMIN — SODIUM CHLORIDE 4 MILLILITER(S): 9 INJECTION INTRAMUSCULAR; INTRAVENOUS; SUBCUTANEOUS at 11:35

## 2024-02-26 RX ADMIN — Medication 7.96 MICROGRAM(S)/KG/MIN: at 19:03

## 2024-02-26 RX ADMIN — Medication 1 MILLIGRAM(S): at 12:25

## 2024-02-26 RX ADMIN — ENOXAPARIN SODIUM 40 MILLIGRAM(S): 100 INJECTION SUBCUTANEOUS at 12:25

## 2024-02-26 RX ADMIN — MEROPENEM 100 MILLIGRAM(S): 1 INJECTION INTRAVENOUS at 15:15

## 2024-02-26 RX ADMIN — Medication 3 MILLILITER(S): at 17:08

## 2024-02-26 RX ADMIN — PANTOPRAZOLE SODIUM 40 MILLIGRAM(S): 20 TABLET, DELAYED RELEASE ORAL at 08:38

## 2024-02-26 RX ADMIN — Medication 650 MILLIGRAM(S): at 17:21

## 2024-02-26 RX ADMIN — SODIUM CHLORIDE 4 MILLILITER(S): 9 INJECTION INTRAMUSCULAR; INTRAVENOUS; SUBCUTANEOUS at 17:08

## 2024-02-26 RX ADMIN — Medication 3 MILLILITER(S): at 23:07

## 2024-02-26 RX ADMIN — Medication 650 MILLIGRAM(S): at 01:30

## 2024-02-26 RX ADMIN — SODIUM CHLORIDE 4 MILLILITER(S): 9 INJECTION INTRAMUSCULAR; INTRAVENOUS; SUBCUTANEOUS at 23:07

## 2024-02-26 RX ADMIN — MEROPENEM 100 MILLIGRAM(S): 1 INJECTION INTRAVENOUS at 06:26

## 2024-02-26 RX ADMIN — Medication 3 MILLILITER(S): at 11:35

## 2024-02-26 RX ADMIN — Medication 1000 MILLIGRAM(S): at 09:26

## 2024-02-26 RX ADMIN — Medication 3 MILLILITER(S): at 05:04

## 2024-02-26 RX ADMIN — Medication 100 MILLIGRAM(S): at 12:00

## 2024-02-26 RX ADMIN — CHLORHEXIDINE GLUCONATE 1 APPLICATION(S): 213 SOLUTION TOPICAL at 06:26

## 2024-02-26 RX ADMIN — ONDANSETRON 4 MILLIGRAM(S): 8 TABLET, FILM COATED ORAL at 08:52

## 2024-02-26 NOTE — PHYSICAL THERAPY INITIAL EVALUATION ADULT - ADDITIONAL COMMENTS
History provided by wife. Pt lives with family (wife, mother, children) in a house with 3 steps L HR to enter. Pt resides on main floor with all amenities and no other stairs to negotiate. Pt bathroom has a tub shower and a standard toilet.  Indep with ambulation/ADLS without AD prior to admission.

## 2024-02-26 NOTE — PROGRESS NOTE ADULT - SUBJECTIVE AND OBJECTIVE BOX
Pt seen and examined at bedside, febrile overnight, tmax 101.1    T(F): 100.6 (02-26-24 @ 17:00), Max: 101.1 (02-26-24 @ 01:00)  HR: 92 (02-26-24 @ 17:00) (84 - 112)  BP: 128/78 (02-26-24 @ 08:00) (99/61 - 132/68)  RR: 31 (02-26-24 @ 17:00) (22 - 37)  SpO2: 98% (02-26-24 @ 17:00) (91% - 99%)  Wt(kg): --  CAPILLARY BLOOD GLUCOSE          PHYSICAL EXAM:  GENERAL: NAD  EYES: EOMI, PERRLA, conjunctiva and sclera clear  ENMT: No tonsillar erythema, exudates, or enlargement; Moist mucous membranes, Good dentition, No lesions  CHEST/LUNG: Clear to auscultation bilaterally; No rales, rhonchi, wheezing, or rubs  HEART: Regular rate and rhythm; No murmurs, rubs, or gallops  ABDOMEN: Soft, Nontender, Nondistended; Bowel sounds present. staples in place, incision without signs of infection. FOX drain with serous output  : Barber indwelling with clear urine output  VASCULAR:  2+ Peripheral Pulses, No clubbing, cyanosis, or edema  LYMPH: No lymphadenopathy noted  SKIN: No rashes or lesions  NERVOUS SYSTEM:  Alert & Oriented X3, Good concentration    LABS:                        11.7   42.88 )-----------( 139      ( 26 Feb 2024 02:40 )             36.1     02-26    142  |  114<H>  |  18  ----------------------------<  121<H>  3.7   |  23  |  0.62    Ca    8.4<L>      26 Feb 2024 02:40  Phos  2.5     02-26  Mg     2.5     02-26    TPro  5.3<L>  /  Alb  2.0<L>  /  TBili  4.4<H>  /  DBili  x   /  AST  66<H>  /  ALT  123<H>  /  AlkPhos  311<H>  02-26      I&O's Detail    25 Feb 2024 07:01  -  26 Feb 2024 07:00  --------------------------------------------------------  IN:    Free Water: 490 mL    IV PiggyBack: 250 mL    IV PiggyBack: 125 mL    IV PiggyBack: 750 mL    IV PiggyBack: 100 mL    Lactated Ringers: 1500 mL    Lactated Ringers Bolus: 2000 mL    Norepinephrine: 120 mL    Vital High Protein: 330 mL  Total IN: 5665 mL    OUT:    Drain (mL): 1360 mL    Indwelling Catheter - Urethral (mL): 250 mL    Voided (mL): 475 mL  Total OUT: 2085 mL    Total NET: 3580 mL      26 Feb 2024 07:01  -  26 Feb 2024 17:49  --------------------------------------------------------  IN:    Free Water: 200 mL    IV PiggyBack: 100 mL    Norepinephrine: 68.8 mL    Vital High Protein: 70 mL  Total IN: 438.8 mL    OUT:    Drain (mL): 500 mL    Emesis (mL): 300 mL    Voided (mL): 600 mL  Total OUT: 1400 mL    Total NET: -961.2 mL      Culture Results:   No growth at 24 hours (02-25 @ 05:40)  Culture Results:   No growth at 24 hours (02-25 @ 05:40)      47 Y/O male with duodenal perforation s/p ex lap, kyrie patch POD 13, now on tube feeds@10cc/hr. Pt also has +UTI   persistent Leukocytosis and fevers.     PLAN:  - Trickle feeds, recommend increasing rate to goal or advancing diet after swallow eval  - dvt/GI ppx  - Continue local wound care, will hold off on removal of stapes as pt has ascites. cont  OFX care  - ABx per ID  - Elisabeth, monitor urine output  - Trend labs  - Continue ICU management  - discussed with Dr. Blanco

## 2024-02-26 NOTE — PROVIDER CONTACT NOTE (CRITICAL VALUE NOTIFICATION) - TEST AND RESULT REPORTED:
WBC 51.6
0179
WBC 56.67
body fluid 2/13 rare streptococcus slivarius bestibularis group and rare streptococcus mitis oralis
body fluid culture 2/13/2024 source peritoneal fluid- final result rare streptococcus salivarius/vestibularis group   rare streptococcus mitis oralis group   rare candida albicans
42.88 wbc

## 2024-02-26 NOTE — OCCUPATIONAL THERAPY INITIAL EVALUATION ADULT - GENERAL OBSERVATIONS, REHAB EVAL
Pt encountered in semi supine in bed, NAD, all lines intact, pt daughter at the bedside to assist with translation. Pt able to follow 1 step directions with with tactile cues.

## 2024-02-26 NOTE — OCCUPATIONAL THERAPY INITIAL EVALUATION ADULT - ADDITIONAL COMMENTS
History provided by wife. Pt lives with family (wife, mother, children) in a house with 3 steps L HR to enter. Pt resides on main floor with all amenities and no other stairs to negotiate. Pt bathroom has a tub shower and a standard toilet.  Indep with ambulation/ADLS without AD prior to admission. Pt was also driving and working full time.

## 2024-02-26 NOTE — PHYSICAL THERAPY INITIAL EVALUATION ADULT - PERTINENT HX OF CURRENT PROBLEM, REHAB EVAL
48M w/ cirrhosis, EtOH use disorder, open reduction metatarsal fracture p/w perforated duodenal ulcer w/ kyrie patch. Course complicated by ARDS, septic shock, and EMILIA. Extubated 2/24. Now septic.

## 2024-02-26 NOTE — PROGRESS NOTE ADULT - SUBJECTIVE AND OBJECTIVE BOX
CHIEF COMPLAINT: ARDS    Interval Events: Febrile overnight, started on Barb     REVIEW OF SYSTEMS:  CONSTITUTIONAL: No fever, chills, night sweats, or fatigue  EYES: No eye pain, visual disturbances, or discharge  ENMT:  No difficulty hearing, tinnitus, vertigo; No sinus or throat pain  RESPIRATORY: No cough, wheezing, or hemoptysis; No shortness of breath  CARDIOVASCULAR: No chest pain, palpitations, dizziness, or leg swelling  GASTROINTESTINAL: No abdominal or epigastric pain. No nausea, vomiting, or hematemesis; No diarrhea or constipation. No melena or hematochezia.  GENITOURINARY: No dysuria, frequency, hematuria, or incontinence  NEUROLOGICAL: No headaches, memory loss, loss of strength, numbness, or tremors  SKIN: No itching, burning, rashes, or lesions   LYMPH NODES: No enlarged glands  ENDOCRINE: No heat or cold intolerance; No hair loss  MUSCULOSKELETAL: No joint pain or swelling; No muscle, back, or extremity pain  PSYCHIATRIC: No depression, anxiety, mood swings, or difficulty sleeping  HEME/LYMPH: No easy bruising, or bleeding gums  ALLERY AND IMMUNOLOGIC: No hives or eczema          OBJECTIVE:  ICU Vital Signs Last 24 Hrs  T(C): 38.1 (26 Feb 2024 11:00), Max: 38.4 (26 Feb 2024 01:00)  T(F): 100.6 (26 Feb 2024 11:00), Max: 101.1 (26 Feb 2024 01:00)  HR: 85 (26 Feb 2024 11:58) (82 - 115)  BP: 128/78 (26 Feb 2024 08:00) (66/55 - 150/127)  BP(mean): 94 (26 Feb 2024 08:00) (55 - 136)  ABP: 115/54 (26 Feb 2024 11:00) (59/49 - 126/71)  ABP(mean): 74 (26 Feb 2024 11:00) (55 - 89)  RR: 26 (26 Feb 2024 11:00) (20 - 37)  SpO2: 98% (26 Feb 2024 11:00) (91% - 100%)    O2 Parameters below as of 26 Feb 2024 07:00  Patient On (Oxygen Delivery Method): nasal cannula  O2 Flow (L/min): 2            02-25 @ 07:01 - 02-26 @ 07:00  --------------------------------------------------------  IN: 5665 mL / OUT: 2085 mL / NET: 3580 mL    02-26 @ 07:01 - 02-26 @ 12:31  --------------------------------------------------------  IN: 358.8 mL / OUT: 845 mL / NET: -486.2 mL      CAPILLARY BLOOD GLUCOSE      POCT Blood Glucose.: 92 mg/dL (25 Feb 2024 06:26)      PHYSICAL EXAM:  GENERAL: NAD  EYES: EOMI, PERRLA, conjunctiva and sclera clear  ENMT: No tonsillar erythema, exudates, or enlargement; Moist mucous membranes, Good dentition, No lesions  CHEST/LUNG: Clear to auscultation bilaterally; No rales, rhonchi, wheezing, or rubs  HEART: Regular rate and rhythm; No murmurs, rubs, or gallops  ABDOMEN: Soft, Nontender, Nondistended; Bowel sounds present. Surgical staples in place. FOX drain continues to drain  VASCULAR:  2+ Peripheral Pulses, No clubbing, cyanosis, or edema  LYMPH: No lymphadenopathy noted  SKIN: No rashes or lesions  NERVOUS SYSTEM:  Alert & Oriented X3, Good concentration      HOSPITAL MEDICATIONS:  enoxaparin Injectable 40 milliGRAM(s) SubCutaneous every 24 hours    meropenem  IVPB 1000 milliGRAM(s) IV Intermittent every 8 hours    norepinephrine Infusion 0.05 MICROgram(s)/kG/Min IV Continuous <Continuous>      albuterol/ipratropium for Nebulization 3 milliLiter(s) Nebulizer every 6 hours  sodium chloride 3%  Inhalation 4 milliLiter(s) Inhalation every 6 hours    acetaminophen     Tablet .. 650 milliGRAM(s) Oral every 6 hours PRN    pantoprazole    Tablet 40 milliGRAM(s) Oral before breakfast  polyethylene glycol 3350 17 Gram(s) Oral daily  senna 2 Tablet(s) Oral at bedtime        folic acid 1 milliGRAM(s) Oral daily  thiamine Injectable 100 milliGRAM(s) IV Push daily    influenza   Vaccine 0.5 milliLiter(s) IntraMuscular once    chlorhexidine 2% Cloths 1 Application(s) Topical <User Schedule>        LABS:                        11.7   42.88 )-----------( 139      ( 26 Feb 2024 02:40 )             36.1     Hgb Trend: 11.7<--, 12.5<--, 14.1<--, 15.7<--, 12.2<--  02-26    142  |  114<H>  |  18  ----------------------------<  121<H>  3.7   |  23  |  0.62    Ca    8.4<L>      26 Feb 2024 02:40  Phos  2.5     02-26  Mg     2.5     02-26    TPro  5.3<L>  /  Alb  2.0<L>  /  TBili  4.4<H>  /  DBili  x   /  AST  66<H>  /  ALT  123<H>  /  AlkPhos  311<H>  02-26    Creatinine Trend: 0.62<--, 0.69<--, 0.66<--, 0.62<--, 0.35<--, 0.53<--    Urinalysis Basic - ( 26 Feb 2024 02:40 )    Color: x / Appearance: x / SG: x / pH: x  Gluc: 121 mg/dL / Ketone: x  / Bili: x / Urobili: x   Blood: x / Protein: x / Nitrite: x   Leuk Esterase: x / RBC: x / WBC x   Sq Epi: x / Non Sq Epi: x / Bacteria: x            MICROBIOLOGY:     RADIOLOGY:  [ ] Reviewed and interpreted by me    EKG:

## 2024-02-26 NOTE — PROGRESS NOTE ADULT - ASSESSMENT
47 y/o male PMHx cirrhosis, ETOH, open reduction of metatarsal fracture 2022 presents c/o epigastric pain for a few hours. Vomited when he came to the ER. Last BM in the afternoon. Last flatus before the BM. Patient denies fever, chills, constipation, diarrhea, melena, hematochezia, dysuria, hematuria, chest pain, shortness of breath, dizziness, cough.  CT (I personally reviewed) Scattered foci of free intraperitoneal air, consistent with perforated viscus.  imaging also shows cirrhosis presumably from chronic alcohol use   was started on Zosyn and fluconazole   would start workup on cirrhosis as well   will follow cultures and target out therapy if able     2/15: cultures positive for several Streptococcus species, will continue antibiotics, still febrile and on pressors, weaning vent as tolerated. patient remains critically ill, 3rd spacing and getting albumin    216: remains intubated , requiring  sedation, on pressors, Streptococcus in cultures, remains on antibiotics, very quiet bowel sounds, no BMs on day shift thus far, little output on NGT  2/20: remains on vent, BP better, fever curve better, completed zosyn but will give 3 more days of ceftriaxone and fluconazole. if Qtc is prolonged can stop fluconazole, please keep the Qtc on monitor,   2/21: day 8 of intubation,  afebrile, UGIS cancelled, discussed with ICU the need for a CT scan which was done and did not show extravasation of contrast, no obvious leak, possibly starting tube feeds. nearly complete with antibiotics, would try to avoid a central line or TPN if able to tolerate feeds    2/23: completed antibiotics, discuss with surgery about drain and possible removal(could it be ascites that is draining)  2/26: persistently febrile, leukocytosis jumped quite high, blood cultures negative, UA not very impressive with mild pyuria, trend bili and wbc count     Plan:  continue meropenem    completed fluconazole  MELD score indicated high mortality rate  HIV nonreactive  Hepatitis A Igm negative, HbsAg negative, HbsAb nonreactive HbcAb IgM negative, HCV Ab negative   will offer vaccination for HBV once better closer to discharge  surgical site per surgery   FOX with fluid similar to ascites  CT Multifocal pneumonia (has been treated between zosyn and ceftriaxone) . Small amount of pneumoperitoneum possibly related to indwelling drain. No associated collection. Small right subphrenic ascites.  Hepatosplenomegaly with evidence portal hypertension (known which will need to be addressed chronically as he recovers from this acute event)   wean off ventilator as tolerated         Discussed with CCM     Carrington Gilliam, DO  Infectious Disease Attending  Reachable via Microsoft Teams or [place] office: [phone]  After 5pm/weekends please call [phone] for all inquiries and new consults

## 2024-02-26 NOTE — PROGRESS NOTE ADULT - ASSESSMENT
48 y.o. male with cirrhosis, EtOH use disorder, open reduction metatarsal fracture admitted for perforated duodenal ulcer w/ kyrei patch and course complicated by ARDS now recovered. Currently in septic shock due to UTI.     Neuro   - AAOx3     Cardiovascular  Distributive shock   - due to UTI  - Levophed 0.05  - continue to wean off vasopressors  - Maintain MAP>65  - TTE shows normal LV and RV function    Pulmonary  - Currently on NC 2L     GI  - Start trickle feeds at 10ml/hr  - monitor BMs  - FOX drain in place with continued large output   - Appreciate surgery input  Cirrhosis  - MELD score of 13  - 6% of 90 day mortality     Renal   - Cr WNL  - Strict I/Os   - Monitor lytes     ID  - UA positive   - UCx pending   - c/w Meropenem    Endo  - Check A1C and TSH in the AM    Heme   - Lovenox for VTE PPX    Ethics   - Full code    Dispo  - PT/OT consult   - OOB to chair       Prognosis guarded

## 2024-02-26 NOTE — PHYSICAL THERAPY INITIAL EVALUATION ADULT - GENERAL OBSERVATIONS, REHAB EVAL
Pt found supine with HOB elevated 2 L O2 via NC, cardiac monitors, +NG tube +2 IV, +condom cath, +B pneumatic TEDS. Pt presents with a low audible voice and gestured wife(Foster at bedside) to translate from English to Rocio. Delays in motor processing and sequencing noted. Pt found supine with HOB elevated  RN Jocelyn assisted for safe pt handling, +2 L O2 via NC, cardiac monitors, +NG tube +2 IV, +condom cath, +B pneumatic TEDS. Pt presents with a low audible voice and gestured wife(Foster at bedside) to translate from English to Rocio. Delays in motor processing and sequencing noted.

## 2024-02-26 NOTE — PROVIDER CONTACT NOTE (CRITICAL VALUE NOTIFICATION) - NAME OF MD/NP/PA/DO NOTIFIED:
Allyson VEGA
Ralph WILCOX
BRENEDN Arciniega
SILVIA Nakul
Basim WILCOX
Dr. Hernandez [facility]

## 2024-02-26 NOTE — PHYSICAL THERAPY INITIAL EVALUATION ADULT - DID THE PATIENT HAVE SURGERY?
Exploratory laparotomy Closure of perforated duodenum using omental patc/yes Exploratory laparotomy Closure of perforated duodenum using omental patch/yes

## 2024-02-26 NOTE — PHYSICAL THERAPY INITIAL EVALUATION ADULT - LEVEL OF INDEPENDENCE, REHAB EVAL
Unable to continue, pt soiled and RN attending to pt./maximum assist (25% patients effort)/dependent (less than 25% patients effort)

## 2024-02-26 NOTE — PROGRESS NOTE ADULT - SUBJECTIVE AND OBJECTIVE BOX
[facility]  INFECTIOUS DISEASES   [address] [place] [address]  Tel: [phone]     Fax: [phone]  ==============================================================================  DO Jony Baron MD Alexandra Gutman, NP   ==============================================================================      PRIYANKA PITTMAN  MRN-[id number]  48y ([birth year])      Interval History: remains intubated , afebrile, tube feeds continue on trick feeds, was extubated,   ROS:    [x ] Unobtainable because: hard to talk   [ ] All other systems negative except as noted    Constitutional: no fever, no chills  Head: no trauma  Eyes: no vision changes, no eye pain  ENT:  no sore throat, no rhinorrhea  Cardiovascular:  no chest pain, no palpitation  Respiratory:  no SOB, no cough  GI:  no abd pain, no vomiting, no diarrhea  urinary: no dysuria, no hematuria, no flank pain  musculoskeletal:  no joint pain, no joint swelling  skin:  no rash  neurology:  no headache, no seizure, no change in mental status  psych: no anxiety, no depression         Allergies  No Known Allergies      ANTIMICROBIALS  influenza   Vaccine 0.5 once  meropenem  IVPB 1000 every 8 hours      MEDICATIONS  (STANDING):  albuterol/ipratropium for Nebulization 3 every 6 hours  enoxaparin Injectable 40 every 24 hours  influenza   Vaccine 0.5 once  norepinephrine Infusion 0.05 <Continuous>  pantoprazole    Tablet 40 before breakfast  polyethylene glycol 3350 17 daily  senna 2 at bedtime  sodium chloride 3%  Inhalation 4 every 6 hours      PRN  acetaminophen     Tablet .. 650 milliGRAM(s) Oral every 6 hours PRN      Physical Exam:  Vital Signs Last 24 Hrs  T(F): 99.5 (02-26-24 @ 22:30), Max: 101.1 (02-26-24 @ 01:00)  HR: 79 (02-26-24 @ 22:30)  BP: 128/78 (02-26-24 @ 08:00)  RR: 28 (02-26-24 @ 22:30)  SpO2: 99% (02-26-24 @ 22:30) (93% - 99%)  Wt(kg): --      General:    NAD,  non toxic, extubated to nasal cannula  Head: atraumatic, normocephalic  Eye: scleral icterus, erythematous conjunctiva  ENT:    neck supple, +NGT at time of my exam  Cardio:     regular S1, S2,  no murmur  Respiratory:    less coarse BS b/l,    no wheezing  abd:     soft,   normoactive BS ,   no tenderness, surgical site intact, FOX drain with serous fluid   :   +gautam  Musculoskeletal:   no joint swelling,   +edema  vascular: central line has been removed, +PIV    Skin:    no rash, jaundiced  Neurologic:    awake followed simple commands   psych: sedated     WBC Count: 42.88 K/uL (02-26 @ 02:40)  WBC Count: 52.66 K/uL (02-25 @ 16:00)  WBC Count: 56.67 K/uL (02-25 @ 07:23)  WBC Count: 51.67 K/uL (02-25 @ 03:00)  WBC Count: 21.57 K/uL (02-24 @ 02:31)  WBC Count: 17.84 K/uL (02-23 @ 02:45)  WBC Count: 14.91 K/uL (02-22 @ 03:15)                            11.7   42.88 )-----------( 139      ( 26 Feb 2024 02:40 )             36.1       02-26    142  |  114<H>  |  18  ----------------------------<  121<H>  3.7   |  23  |  0.62    Ca    8.4<L>      26 Feb 2024 02:40  Phos  2.5     02-26  Mg     2.5     02-26    TPro  5.3<L>  /  Alb  2.0<L>  /  TBili  4.4<H>  /  DBili  x   /  AST  66<H>  /  ALT  123<H>  /  AlkPhos  311<H>  02-26      Creatinine Trend: 0.62<--, 0.69<--, 0.66<--, 0.62<--, 0.35<--, 0.53<--        Lactate, Blood: 1.8 mmol/L (02-25-24 @ 07:23)      MICROBIOLOGY:  Culture - Blood (02.25.24 @ 05:40)    Specimen Source: .Blood Blood   Culture Results:   No growth at 24 hours    Culture - Blood (02.25.24 @ 05:40)    Specimen Source: .Blood Blood   Culture Results:   No growth at 24 hours        Culture - Body Fluid with Gram Stain (02.13.24 @ 05:10)    Gram Stain:   Few polymorphonuclear leukocytes seen per low power field  No organisms seen per oil power field   Specimen Source: Peritoneal peritoneal fluida c/s   Culture Results:   Rare Streptococcus salivarius/vestibularis group  Rare Streptococcus mitis/oralis group        .Blood Blood  02-13-24   No growth at 24 hours  --  --      .Blood Blood  02-13-24   No growth at 24 hours  --  --      Peritoneal peritoneal fluida c/s  02-13-24 --  --    Few polymorphonuclear leukocytes seen per low power field  No organisms seen per oil power field      Clean Catch Clean Catch (Midstream)  02-13-24   No growth  --  --        RADIOLOGY:  Xray Chest 1 View- PORTABLE-Urgent (Xray Chest 1 View- PORTABLE-Urgent .) (02.15.24 @ 12:42) >  IMPRESSION: Endotracheal tube position much improved. Increasing advanced   bilateral infiltrates.    < from: CT Abdomen and Pelvis w/ Oral Cont and w/ IV Cont (02.21.24 @ 16:16) >  IMPRESSION:  Multifocal pneumonia.  Small amount of pneumoperitoneum possibly related to indwelling drain. No   associated collection.  Small right subphrenic ascites.  Hepatosplenomegaly with evidence portal hypertension    < end of copied text >

## 2024-02-27 LAB
ALBUMIN SERPL ELPH-MCNC: 1.9 G/DL — LOW (ref 3.3–5)
ALP SERPL-CCNC: 398 U/L — HIGH (ref 40–120)
ALT FLD-CCNC: 86 U/L — HIGH (ref 12–78)
ANION GAP SERPL CALC-SCNC: 6 MMOL/L — SIGNIFICANT CHANGE UP (ref 5–17)
AST SERPL-CCNC: 40 U/L — HIGH (ref 15–37)
BILIRUB SERPL-MCNC: 3.7 MG/DL — HIGH (ref 0.2–1.2)
BUN SERPL-MCNC: 14 MG/DL — SIGNIFICANT CHANGE UP (ref 7–23)
CALCIUM SERPL-MCNC: 8.3 MG/DL — LOW (ref 8.5–10.1)
CHLORIDE SERPL-SCNC: 113 MMOL/L — HIGH (ref 96–108)
CO2 SERPL-SCNC: 25 MMOL/L — SIGNIFICANT CHANGE UP (ref 22–31)
CREAT SERPL-MCNC: 0.47 MG/DL — LOW (ref 0.5–1.3)
EGFR: 127 ML/MIN/1.73M2 — SIGNIFICANT CHANGE UP
GLUCOSE SERPL-MCNC: 103 MG/DL — HIGH (ref 70–99)
HCT VFR BLD CALC: 34.6 % — LOW (ref 39–50)
HGB BLD-MCNC: 11.3 G/DL — LOW (ref 13–17)
MAGNESIUM SERPL-MCNC: 2.2 MG/DL — SIGNIFICANT CHANGE UP (ref 1.6–2.6)
MCHC RBC-ENTMCNC: 32.4 PG — SIGNIFICANT CHANGE UP (ref 27–34)
MCHC RBC-ENTMCNC: 32.7 G/DL — SIGNIFICANT CHANGE UP (ref 32–36)
MCV RBC AUTO: 99.1 FL — SIGNIFICANT CHANGE UP (ref 80–100)
NRBC # BLD: 0 /100 WBCS — SIGNIFICANT CHANGE UP (ref 0–0)
PHOSPHATE SERPL-MCNC: 2.4 MG/DL — LOW (ref 2.5–4.5)
PLATELET # BLD AUTO: 155 K/UL — SIGNIFICANT CHANGE UP (ref 150–400)
POTASSIUM SERPL-MCNC: 3.4 MMOL/L — LOW (ref 3.5–5.3)
POTASSIUM SERPL-SCNC: 3.4 MMOL/L — LOW (ref 3.5–5.3)
PROT SERPL-MCNC: 5.5 GM/DL — LOW (ref 6–8.3)
RBC # BLD: 3.49 M/UL — LOW (ref 4.2–5.8)
RBC # FLD: 15.8 % — HIGH (ref 10.3–14.5)
SODIUM SERPL-SCNC: 144 MMOL/L — SIGNIFICANT CHANGE UP (ref 135–145)
WBC # BLD: 31.96 K/UL — HIGH (ref 3.8–10.5)
WBC # FLD AUTO: 31.96 K/UL — HIGH (ref 3.8–10.5)

## 2024-02-27 PROCEDURE — 99232 SBSQ HOSP IP/OBS MODERATE 35: CPT

## 2024-02-27 PROCEDURE — 99291 CRITICAL CARE FIRST HOUR: CPT

## 2024-02-27 RX ORDER — POTASSIUM CHLORIDE 20 MEQ
10 PACKET (EA) ORAL
Refills: 0 | Status: DISCONTINUED | OUTPATIENT
Start: 2024-02-27 | End: 2024-02-27

## 2024-02-27 RX ORDER — POTASSIUM CHLORIDE 20 MEQ
40 PACKET (EA) ORAL ONCE
Refills: 0 | Status: COMPLETED | OUTPATIENT
Start: 2024-02-27 | End: 2024-02-27

## 2024-02-27 RX ORDER — TRAZODONE HCL 50 MG
25 TABLET ORAL AT BEDTIME
Refills: 0 | Status: DISCONTINUED | OUTPATIENT
Start: 2024-02-27 | End: 2024-03-07

## 2024-02-27 RX ADMIN — CHLORHEXIDINE GLUCONATE 1 APPLICATION(S): 213 SOLUTION TOPICAL at 05:21

## 2024-02-27 RX ADMIN — Medication 100 MILLIGRAM(S): at 11:37

## 2024-02-27 RX ADMIN — Medication 40 MILLIEQUIVALENT(S): at 06:02

## 2024-02-27 RX ADMIN — Medication 3 MILLILITER(S): at 05:52

## 2024-02-27 RX ADMIN — Medication 25 MILLIGRAM(S): at 23:46

## 2024-02-27 RX ADMIN — PANTOPRAZOLE SODIUM 40 MILLIGRAM(S): 20 TABLET, DELAYED RELEASE ORAL at 06:01

## 2024-02-27 RX ADMIN — Medication 1 MILLIGRAM(S): at 11:37

## 2024-02-27 RX ADMIN — Medication 3 MILLILITER(S): at 11:32

## 2024-02-27 RX ADMIN — POLYETHYLENE GLYCOL 3350 17 GRAM(S): 17 POWDER, FOR SOLUTION ORAL at 11:37

## 2024-02-27 RX ADMIN — SODIUM CHLORIDE 4 MILLILITER(S): 9 INJECTION INTRAMUSCULAR; INTRAVENOUS; SUBCUTANEOUS at 05:52

## 2024-02-27 RX ADMIN — SODIUM CHLORIDE 4 MILLILITER(S): 9 INJECTION INTRAMUSCULAR; INTRAVENOUS; SUBCUTANEOUS at 17:13

## 2024-02-27 RX ADMIN — MEROPENEM 100 MILLIGRAM(S): 1 INJECTION INTRAVENOUS at 22:51

## 2024-02-27 RX ADMIN — SODIUM CHLORIDE 4 MILLILITER(S): 9 INJECTION INTRAMUSCULAR; INTRAVENOUS; SUBCUTANEOUS at 11:32

## 2024-02-27 RX ADMIN — MEROPENEM 100 MILLIGRAM(S): 1 INJECTION INTRAVENOUS at 05:21

## 2024-02-27 RX ADMIN — Medication 3 MILLILITER(S): at 17:12

## 2024-02-27 RX ADMIN — MEROPENEM 100 MILLIGRAM(S): 1 INJECTION INTRAVENOUS at 14:58

## 2024-02-27 RX ADMIN — ENOXAPARIN SODIUM 40 MILLIGRAM(S): 100 INJECTION SUBCUTANEOUS at 10:47

## 2024-02-27 NOTE — PROGRESS NOTE ADULT - SUBJECTIVE AND OBJECTIVE BOX
CHIEF COMPLAINT: Cirrhosis, Perforated duodenal ulcer     Interval Events: Patient off levophed from 9am today. Complained of difficult sleeping.     REVIEW OF SYSTEMS:  CONSTITUTIONAL: No fever, chills, night sweats, or fatigue  EYES: No eye pain, visual disturbances, or discharge  ENMT:  No difficulty hearing, tinnitus, vertigo; No sinus or throat pain  RESPIRATORY: No cough, wheezing, or hemoptysis; No shortness of breath  CARDIOVASCULAR: No chest pain, palpitations, dizziness, or leg swelling  GASTROINTESTINAL: No abdominal or epigastric pain. No nausea, vomiting, or hematemesis; No diarrhea or constipation. No melena or hematochezia.  GENITOURINARY: No dysuria, frequency, hematuria, or incontinence  NEUROLOGICAL: No headaches, memory loss, loss of strength, numbness, or tremors  SKIN: No itching, burning, rashes, or lesions   LYMPH NODES: No enlarged glands  ENDOCRINE: No heat or cold intolerance; No hair loss  MUSCULOSKELETAL: No joint pain or swelling; No muscle, back, or extremity pain  PSYCHIATRIC: No depression, anxiety, mood swings, or difficulty sleeping  HEME/LYMPH: No easy bruising, or bleeding gums  ALLERY AND IMMUNOLOGIC: No hives or eczema    [ ] Unable to perform ROS due to ____      OBJECTIVE:  ICU Vital Signs Last 24 Hrs  T(C): 37.6 (27 Feb 2024 13:00), Max: 38.1 (26 Feb 2024 15:00)  T(F): 99.7 (27 Feb 2024 13:00), Max: 100.6 (26 Feb 2024 15:00)  HR: 86 (27 Feb 2024 13:00) (75 - 103)  BP: 121/72 (27 Feb 2024 13:00) (116/73 - 131/76)  BP(mean): 87 (27 Feb 2024 13:00) (87 - 93)  ABP: 108/64 (27 Feb 2024 11:00) (103/48 - 140/60)  ABP(mean): 77 (27 Feb 2024 11:00) (66 - 86)  RR: 18 (27 Feb 2024 13:00) (14 - 35)  SpO2: 99% (27 Feb 2024 13:00) (96% - 100%)    O2 Parameters below as of 27 Feb 2024 11:32  Patient On (Oxygen Delivery Method): nasal cannula, 2L              02-26 @ 07:01 - 02-27 @ 07:00  --------------------------------------------------------  IN: 650.2 mL / OUT: 2600 mL / NET: -1949.8 mL    02-27 @ 07:01 - 02-27 @ 13:17  --------------------------------------------------------  IN: 243.3 mL / OUT: 250 mL / NET: -6.7 mL      CAPILLARY BLOOD GLUCOSE          PHYSICAL EXAM:  GENERAL: NAD, well-groomed, well-developed  EYES: EOMI, PERRLA, conjunctiva and sclera clear  ENMT: No tonsillar erythema, exudates, or enlargement; Moist mucous membranes, Good dentition, No lesions  CHEST/LUNG: Clear to auscultation bilaterally; No rales, rhonchi, wheezing, or rubs  HEART: Regular rate and rhythm; No murmurs, rubs, or gallops  ABDOMEN: Soft, Nontender, Nondistended; Bowel sounds present. Surgical staples in place. FOX drain in place   VASCULAR:  2+ Peripheral Pulses, No clubbing, cyanosis, or edema  LYMPH: No lymphadenopathy noted  SKIN: No rashes or lesions  NERVOUS SYSTEM:  Alert & Oriented X3, Good concentration  LINES: A-line     HOSPITAL MEDICATIONS:  enoxaparin Injectable 40 milliGRAM(s) SubCutaneous every 24 hours  meropenem  IVPB 1000 milliGRAM(s) IV Intermittent every 8 hours  norepinephrine Infusion 0.05 MICROgram(s)/kG/Min IV Continuous <Continuous>  albuterol/ipratropium for Nebulization 3 milliLiter(s) Nebulizer every 6 hours  sodium chloride 3%  Inhalation 4 milliLiter(s) Inhalation every 6 hours  acetaminophen     Tablet .. 650 milliGRAM(s) Oral every 6 hours PRN  pantoprazole    Tablet 40 milliGRAM(s) Oral before breakfast  polyethylene glycol 3350 17 Gram(s) Oral daily  senna 2 Tablet(s) Oral at bedtime  folic acid 1 milliGRAM(s) Oral daily  thiamine Injectable 100 milliGRAM(s) IV Push daily  influenza   Vaccine 0.5 milliLiter(s) IntraMuscular once  chlorhexidine 2% Cloths 1 Application(s) Topical <User Schedule>        LABS:                        11.3   31.96 )-----------( 155      ( 27 Feb 2024 04:00 )             34.6     Hgb Trend: 11.3<--, 11.7<--, 12.5<--, 14.1<--, 15.7<--  02-27    144  |  113<H>  |  14  ----------------------------<  103<H>  3.4<L>   |  25  |  0.47<L>    Ca    8.3<L>      27 Feb 2024 04:00  Phos  2.4     02-27  Mg     2.2     02-27    TPro  5.5<L>  /  Alb  1.9<L>  /  TBili  3.7<H>  /  DBili  x   /  AST  40<H>  /  ALT  86<H>  /  AlkPhos  398<H>  02-27    Creatinine Trend: 0.47<--, 0.62<--, 0.69<--, 0.66<--, 0.62<--, 0.35<--    Urinalysis Basic - ( 27 Feb 2024 04:00 )    Color: x / Appearance: x / SG: x / pH: x  Gluc: 103 mg/dL / Ketone: x  / Bili: x / Urobili: x   Blood: x / Protein: x / Nitrite: x   Leuk Esterase: x / RBC: x / WBC x   Sq Epi: x / Non Sq Epi: x / Bacteria: x            MICROBIOLOGY:     RADIOLOGY:  [ ] Reviewed and interpreted by me    EKG:

## 2024-02-27 NOTE — SWALLOW BEDSIDE ASSESSMENT ADULT - SLP GENERAL OBSERVATIONS
"December 22, 2020       Hero Rome, [address] [facility],[address]  [facility] [address]  [address]  Via In Basket      Patient: Zee Hunter   YOB: 1967   Date of Visit: 12/22/2020       Dear Dr. Govea Honor: Thank you for referring Zee Hunter to me for evaluation. Below are my notes for this visit with him. If you have questions, please do not hesitate to call me. I look forward to following your patient along with you. Sincerely,        Russel Aguilar MD        CC: No Recipients  Russel Aguilar MD  12/22/2020  2:28 PM  Signed  ELECTROPHYSIOLOGY PROGRESS NOTE       CHIEF COMPLAINT  Atrial arrhythmias s/p ablation f/u   Referring Physician: Hero Rome MD    HISTORY OF PRESENT ILLNESS  Mr. Diane Riley is a very pleasant 46year old male with a longstanding history of atrial arrhythmias who presented for initial evaluation in early January 2018. He reported that he had had palpitations for over twenty years but was ""well-controlled\"" with atenolol. When this medication was switched to carvedilol, however, he began to notice periods where he would feel palpitations more frequently and would have associated lightheadedness. Ambulatory monitoring was performed and periods of these palpitations corresponded to rapid atrial fibrillation. When not in atrial fibrillation, however, his baseline rhythm was an atrial tachycardia or flutter with 2:1 AV conduction. Similarly, his electrocardiogram from an appointment with Dr. Elizabeth Larose in late November 2017 and again in January showed the same atrial arrhythmia. He endorsed easy fatigue with activities that he previously did without limitations. Flecainide was started and when he presented for cardioversion in mid January he was found to be back in sinus rhythm. At follow up, while he no longer had the profound lightheadedness that he did previously, he still had extreme tachycardia at times.  When he went for a routine coronary calcium " scoring test in late January he was rescheduled because his heart rate was 140. Otherwise he noted himself to be around 120 mostly. When seen in February he continued to describe intermittent episodes of lightheadedness, specifically with standing as well as fatigue. Holter monitoring was re-ordered and again showed a majority of the time was spent in atrial tachycardia. He thus presented to discuss ablation in late March and was agreeable. He returned in June with his wife, however, feeling excellent. He noted that he had had absolutely no problems or symptoms in the preceding two months. As such and against my recommendations he cancelled the ablation. He returned for his routine visit in December 2018 and complained of fatigue and rapid heart beats for one week just prior to that appointment. After rediscussion he wished to proceed with an ablation. This was performed in late March 2019 and interesting, left atrial pathology was minimal; pulmonary vein isolation was easily rendered. However, he had four other atrial tachycardias spontaneously result. Focal left atrial, focal right atrial (by the appendage) and typical right atrial flutter were ablated, while a fourth one resulted upon sheath removal and was not hemodynamically tolerated. The right atrium was notable for being enlarged and significantly scarred on voltage mapping. Amiodarone was thus started postprocedurally. It was then discontinued in early 2020. He returns again today continuing to do well without any recurrences. That said earlier this month be began to feel some pleuritic chest discomfort. He checked his pulse frequently during that time and it was always normal. He self-switched metoprolol back to carvedilol and now feels better. MEDICATIONS  Prior to Admission medications    Medication Sig Start Date End Date Taking? Authorizing Provider   carvedilol (COREG) 25 MG tablet Take 25 mg by mouth 2 times daily (with meals).    Yes Outside "Provider   Probiotic Product (PROBIOTIC-10) Cap    Yes Outside Provider   GARLIC PO Take by mouth daily. Yes Outside Provider   Loratadine (CLARITIN) 10 MG Cap   12/22/20  Outside Provider   metoPROLOL succinate (TOPROL-XL) 25 MG 24 hr tablet Take 1 tablet by mouth daily. 7/22/20 12/22/20  Melissa Ram MD     The patient's current medications were reviewed. ALLERGIES  ALLERGIES:  No Known Allergies     HISTORIES  Paroxysmal atrial fibrillation s/p PVI 3/19  Atrial tachycardia/flutter s/p CTI and focal LAT, RAT ablations 3/19  Hypertension  Second degree atrioventricular block Mobitz I    No past surgical history on file. FAMILY HISTORY  No premature CAD/SCD in any of his family members    SOCIAL HISTORY  Social History     Tobacco Use   â¢ Smoking status: Never Smoker   â¢ Smokeless tobacco: Never Used   Substance Use Topics   â¢ Alcohol use: Yes     Comment: SOCIALLY    â¢ Drug use: No        REVIEW OF SYSTEMS    Constitutional: Negative for fatigue. HENT: Negative for congestion and ear discharge. Eyes: Negative for discharge. Respiratory: Negative for shortness of breath. Gastrointestinal: Negative for abdominal distention, nausea or vomiting. Endocrine: Negative for polyphagia. Genitourinary: Negative for hematuria. Musculoskeletal: Negative for arthralgias. Skin: Negative for color change. Allergic/Immunologic: Negative for environmental allergies. Neurological: Negative for seizures. Hematological: Does not bruise/bleed easily. Psychiatric/Behavioral: Negative for behavioral problems. PHYSICAL EXAM  Visit Vitals  /80 (BP Location: RUE - Right upper extremity)   Pulse 74   Temp 97.7 Â°F (36.5 Â°C)   Resp 16   Ht 6' 1"" (1.854 m)   Wt 95.7 kg (210 lb 15.7 oz)   BMI 27.84 kg/mÂ²     Constitutional: Appears well-developed and well-nourished. Head: Normocephalic.    Mouth/Throat: Mucous membranes are normal.   Eyes: Conjunctivae are normal. Pupils are equal, round, and " reactive to light. Neck: Normal range of motion. Neck supple. No JVD present. Carotid bruit is not present. Cardiovascular: RRR nl S1S2, no m/r/g, no LE edema, no JVD  Pulmonary/Chest: Effort normal and breath sounds normal. No respiratory distress. No wheezes, rhonchi, rales, no tenderness. Abdominal: Soft. Normal appearance and bowel sounds are normal. There is no tenderness. Musculoskeletal: Normal range of motion. No joint swelling   Neurological: Grossly normal. Alert and oriented to person, place, and time. Skin: Skin is warm, dry and intact. No rash noted. No erythema. Psychiatric: Happy    CARDIAC TESTING/IMAGING  I have reviewed the pertinent imaging study reports. These are the pertinent findings:  Â· ECG performed and personally reviewed today - 1/22/20: normal sinus rhythm; 7/17/19: normal sinus rhythm; 4/23/19: normal sinus rhythm; 12/26/18: normal sinus rhythm; 5/30/18: normal sinus rhythm; 2/19/18: sinus tachycardia with periods of second degree AV block, Mobitz type 1; 1/17/18: normal sinus rhythm; 1/8/18: atrial tachycardia ( msec; -ve V1 to V3, +ve V4-V6, I, aVL, II, III, aVF) with variable AV block; 11/30/17: atrial tachycardia with variable AV block  Â· Lipids - 11/1/17: total 195, HDL 42, ,    Â· TTE - 12/27/17: normal left ventricular size with an estimated ejection fraction of 50-55%; minimal pericardial effusion suspected  Â· Stress test - exercised for 7 minutes, 50 seconds on a Bakari protocol achieving 9.1 METs. EKG showed normal sinus rhythm. EF 73%. No myocardial ischemia noted on imaging. Â· Holter -   Â· 2/27/18: 48 hour holter during which time the underlying rhythm was mostly atrial tachycardia and/or sinus rhythm with frequent PACs. No atrial fibrillation was seen on this monitor  Â· 11/16/17: 48 hour holter during which time the underlying rhythm was atrial fibrillation. Atrial tachycardia was otherwise noted; I doubt there were any periods of sinus.  Rates very elevated at times during the arrhythmia    ASSESSMENT/PLAN  Paroxysmal atrial fibrillation (CMS/HCC)  Mr. Jaz Gagnon underwent successful pulmonary vein isolation in March 2019. However, the majority of his pathology was not confined to the pulmonary veins (see below). That said, he has had no arrhythmias or symptoms suggestive of it since the ablation. Continue carvedilol. He will return for follow up in six months. - ELECTROCARDIOGRAM 12-LEAD    Atrial tachycardia (CMS/HCC)  Multiple focal and reentrant tachycardias were noted at the time of ablation. It is interesting that he has this given that he has never had cardiac surgery. However, this likely explains why he has been having arrhythmias since his late 25s. His symptoms of chest discomfort this month are not concerning for arrhythmias. I advised him to call me back in one month if symptoms persist; if so, I will order a two week monitor and stress test to further evaluate. Typical atrial flutter (CMS/HCC)  This was one of the many induced arrhythmias and the cavotricuspid isthmus was ablated, however, block could not be proven because of the lack of electrograms in the right atrium. Essential hypertension  Blood pressure is well controlled, no changes necessary. Continue carvedilol. Medications Discontinued During This Encounter   Medication Reason   â¢ metoPROLOL succinate (TOPROL-XL) 25 MG 24 hr tablet Previously Completed   â¢ Loratadine (CLARITIN) 10 MG Cap Previously Completed       Plan of care discussed with the patient. All questions were answered. Patient verbalized understanding and agrees with the plan.     Jesus Buck MD  Cardiac Electrophysiology  [address] [facility] pt seen bedside in MICU sitting upright on NC02, NGtube in place and alert. sp02 trending 99 during po trials pt seen bedside in MICU sitting upright on NC02, NGtube in place and alert. pt responded to simple questions for assessment, he verbalized wants and was able to follow directives with repetition. speech intelligibility fair to poor 2/2 decreased articulation and low volume. sp02 trending 99 during po trials

## 2024-02-27 NOTE — SWALLOW BEDSIDE ASSESSMENT ADULT - SWALLOW EVAL: SECRETION MANAGEMENT
when SLP cued pt to clear secretions, wife suctioned pt oral cavity via yankauer prior to po trials./baseline cough/wet upper airway/breath sounds

## 2024-02-27 NOTE — PROGRESS NOTE ADULT - SUBJECTIVE AND OBJECTIVE BOX
[facility]  INFECTIOUS DISEASES   [address] [place] [address]  Tel: [phone]     Fax: [phone]  ==============================================================================  DO Jony Baron MD Alexandra Gutman, NP   ==============================================================================      PRIYANKA PITTMAN  [id number]  48y ([birth year])      Interval History: patient seen and examined. NGT and on nasal cannula, following commands, weak voice, failed speech and swallow     ROS:    [x ] Unobtainable because: hard to talk   [ ] All other systems negative except as noted    Constitutional: no fever, no chills  Head: no trauma  Eyes: no vision changes, no eye pain  ENT:  no sore throat, no rhinorrhea  Cardiovascular:  no chest pain, no palpitation  Respiratory:  no SOB, no cough  GI:  no abd pain, no vomiting, no diarrhea  urinary: no dysuria, no hematuria, no flank pain  musculoskeletal:  no joint pain, no joint swelling  skin:  no rash  neurology:  no headache, no seizure, no change in mental status  psych: no anxiety, no depression         Allergies  No Known Allergies      ANTIMICROBIALS  influenza   Vaccine 0.5 once  meropenem  IVPB 1000 every 8 hours      MEDICATIONS  (STANDING):  albuterol/ipratropium for Nebulization 3 every 6 hours  enoxaparin Injectable 40 every 24 hours  influenza   Vaccine 0.5 once  norepinephrine Infusion 0.05 <Continuous>  pantoprazole    Tablet 40 before breakfast  polyethylene glycol 3350 17 daily  senna 2 at bedtime  sodium chloride 3%  Inhalation 4 every 6 hours  traZODone 25 at bedtime      PRN  acetaminophen     Tablet .. 650 milliGRAM(s) Oral every 6 hours PRN      Physical Exam:  Vital Signs Last 24 Hrs  T(F): 99.5 (02-27-24 @ 16:00), Max: 100.6 (02-26-24 @ 17:00)  HR: 80 (02-27-24 @ 16:00)  BP: 120/66 (02-27-24 @ 16:00)  RR: 29 (02-27-24 @ 16:00)  SpO2: 100% (02-27-24 @ 16:00) (96% - 100%)  Wt(kg): --    General:    NAD,  non toxic, on nasal cannula   Head: atraumatic, normocephalic  Eye: scleral icterus, erythematous conjunctiva  ENT:    neck supple, +NGT  Cardio:     regular S1, S2,  no murmur  Respiratory:    less coarse BS b/l,    no wheezing  abd:     soft,   normoactive BS ,   no tenderness, surgical site intact, FOX drain with serous fluid   :   +gautam  Musculoskeletal:   no joint swelling,   +edema  vascular: central line has been removed, +PIV    Skin:    no rash, jaundiced  Neurologic:    awake followed simple commands   psych: awake ,and alert    WBC Count: 31.96 K/uL (02-27 @ 04:00)  WBC Count: 42.88 K/uL (02-26 @ 02:40)  WBC Count: 52.66 K/uL (02-25 @ 16:00)  WBC Count: 56.67 K/uL (02-25 @ 07:23)  WBC Count: 51.67 K/uL (02-25 @ 03:00)  WBC Count: 21.57 K/uL (02-24 @ 02:31)  WBC Count: 17.84 K/uL (02-23 @ 02:45)                            11.3   31.96 )-----------( 155      ( 27 Feb 2024 04:00 )             34.6       02-27    144  |  113<H>  |  14  ----------------------------<  103<H>  3.4<L>   |  25  |  0.47<L>    Ca    8.3<L>      27 Feb 2024 04:00  Phos  2.4     02-27  Mg     2.2     02-27    TPro  5.5<L>  /  Alb  1.9<L>  /  TBili  3.7<H>  /  DBili  x   /  AST  40<H>  /  ALT  86<H>  /  AlkPhos  398<H>  02-27      Creatinine Trend: 0.47<--, 0.62<--, 0.69<--, 0.66<--, 0.62<--, 0.35<--              MICROBIOLOGY:  Culture - Blood (02.25.24 @ 05:40)    Specimen Source: .Blood Blood   Culture Results:   No growth at 24 hours    Culture - Blood (02.25.24 @ 05:40)    Specimen Source: .Blood Blood   Culture Results:   No growth at 24 hours        Culture - Body Fluid with Gram Stain (02.13.24 @ 05:10)    Gram Stain:   Few polymorphonuclear leukocytes seen per low power field  No organisms seen per oil power field   Specimen Source: Peritoneal peritoneal fluida c/s   Culture Results:   Rare Streptococcus salivarius/vestibularis group  Rare Streptococcus mitis/oralis group        .Blood Blood  02-13-24   No growth at 24 hours  --  --      .Blood Blood  02-13-24   No growth at 24 hours  --  --      Peritoneal peritoneal fluida c/s  02-13-24 --  --    Few polymorphonuclear leukocytes seen per low power field  No organisms seen per oil power field      Clean Catch Clean Catch (Midstream)  02-13-24   No growth  --  --        RADIOLOGY:  Xray Chest 1 View- PORTABLE-Urgent (Xray Chest 1 View- PORTABLE-Urgent .) (02.15.24 @ 12:42) >  IMPRESSION: Endotracheal tube position much improved. Increasing advanced   bilateral infiltrates.    < from: CT Abdomen and Pelvis w/ Oral Cont and w/ IV Cont (02.21.24 @ 16:16) >  IMPRESSION:  Multifocal pneumonia.  Small amount of pneumoperitoneum possibly related to indwelling drain. No   associated collection.  Small right subphrenic ascites.  Hepatosplenomegaly with evidence portal hypertension    < end of copied text >

## 2024-02-27 NOTE — PROGRESS NOTE ADULT - ASSESSMENT
47 y/o male PMHx cirrhosis, ETOH, open reduction of metatarsal fracture 2022 presents c/o epigastric pain for a few hours. Vomited when he came to the ER. Last BM in the afternoon. Last flatus before the BM. Patient denies fever, chills, constipation, diarrhea, melena, hematochezia, dysuria, hematuria, chest pain, shortness of breath, dizziness, cough.  CT (I personally reviewed) Scattered foci of free intraperitoneal air, consistent with perforated viscus.  imaging also shows cirrhosis presumably from chronic alcohol use   was started on Zosyn and fluconazole   would start workup on cirrhosis as well   will follow cultures and target out therapy if able     2/15: cultures positive for several Streptococcus species, will continue antibiotics, still febrile and on pressors, weaning vent as tolerated. patient remains critically ill, 3rd spacing and getting albumin    216: remains intubated , requiring  sedation, on pressors, Streptococcus in cultures, remains on antibiotics, very quiet bowel sounds, no BMs on day shift thus far, little output on NGT  2/20: remains on vent, BP better, fever curve better, completed zosyn but will give 3 more days of ceftriaxone and fluconazole. if Qtc is prolonged can stop fluconazole, please keep the Qtc on monitor,   2/21: day 8 of intubation,  afebrile, UGIS cancelled, discussed with ICU the need for a CT scan which was done and did not show extravasation of contrast, no obvious leak, possibly starting tube feeds. nearly complete with antibiotics, would try to avoid a central line or TPN if able to tolerate feeds    2/23: completed antibiotics, discuss with surgery about drain and possible removal(could it be ascites that is draining)  2/26: persistently febrile, leukocytosis jumped quite high, blood cultures negative, UA not very impressive with mild pyuria, trend bili and wbc count   2/27: fever curve better, failed speech and swallow, continue to try to meet nutritional needs, please perform PT and OT with patient, follow cultures, continue meropenem. since having loose stool please stop miralax and senna unless there is a concern hepatic encephalopathy in which case lactulose should be used with a goal of 2-3 soft BMs per day.     Plan:  continue meropenem  for now   completed fluconazole  HIV nonreactive  trend fevers and wbc count   Hepatitis A Igm negative, HbsAg negative, HbsAb nonreactive HbcAb IgM negative, HCV Ab negative   will offer vaccination for HBV once better closer to discharge  surgical site per surgery   FOX with fluid similar to ascites  Hepatosplenomegaly with evidence portal hypertension (known which will need to be addressed chronically as he recovers from this acute event)   physical and occupational therapy  incentive spirometry  optimize nutrition status     Discussed with CCM     Carrington Gilliam DO  Infectious Disease Attending  Reachable via Microsoft Teams or ID office: [phone]  After 5pm/weekends please call [phone] for all inquiries and new consults

## 2024-02-27 NOTE — PROGRESS NOTE ADULT - ASSESSMENT
48 y.o. male with cirrhosis, EtOH use disorder, open reduction metatarsal fracture admitted for perforated duodenal ulcer w/ kyrie patch and course complicated by ARDS now recovered. Currently in septic shock due to UTI.     Neuro   - AAOx3   - Trazadone 25mg QHS    Cardiovascular  Distributive shock   - due to UTI  - Now off levophed   - continue to wean off vasopressors  - Maintain MAP>65  - TTE shows normal LV and RV function    Pulmonary  - Currently on NC 2L     GI  - Increase TF to goal   - monitor BMs  - Nutrition consult   - FOX drain in place with continued large output   - Appreciate surgery input  Cirrhosis  - MELD score of 13  - 6% of 90 day mortality     Renal   - Cr WNL  - Strict I/Os   - Monitor lytes     ID  - UA positive   - UCx pending   - c/w Meropenem    Endo  - Check A1C and TSH in the AM    Heme   - Lovenox for VTE PPX    Ethics   - Full code    Dispo  - PT/OT consult   - OOB to chair     Lines: D/c A-line today     Prognosis guarded

## 2024-02-27 NOTE — SWALLOW BEDSIDE ASSESSMENT ADULT - SLP PERTINENT HISTORY OF CURRENT PROBLEM
Perforated viscus s/p ex lap with Closure of perforated duodenum with kyrie patch. Pt intubated and transferred to ICU for post care. pt extubated 2/24. hospital course ARDS- resolved, septic shock due to UTI, Persistent Leukocytosis and fevers. pt admitted for Perforated viscus s/p ex lap with Closure of perforated duodenum with kyrie patch. Pt intubated and transferred to ICU for post care. pt extubated 2/24. hospital course ARDS- resolved, septic shock due to UTI, Persistent Leukocytosis and fevers.

## 2024-02-27 NOTE — PROGRESS NOTE ADULT - SUBJECTIVE AND OBJECTIVE BOX
SURGERY PROGRESS HPI:  POD#14  Pt seen and examined at bedside in the ICU. +BMs.    Vital Signs Last 24 Hrs  T(C): 37.8 (27 Feb 2024 05:30), Max: 38.4 (26 Feb 2024 08:30)  T(F): 100 (27 Feb 2024 05:30), Max: 101.1 (26 Feb 2024 08:30)  HR: 83 (27 Feb 2024 05:30) (75 - 110)  BP: 128/78 (26 Feb 2024 08:00) (123/73 - 128/78)  BP(mean): 94 (26 Feb 2024 08:00) (89 - 94)  RR: 29 (27 Feb 2024 05:30) (22 - 37)  SpO2: 96% (27 Feb 2024 05:30) (95% - 100%)    Parameters below as of 26 Feb 2024 19:00  Patient On (Oxygen Delivery Method): nasal cannula  O2 Flow (L/min): 2      PHYSICAL EXAM:    CONSTITUTIONAL: NAD  HEENT: Trickle tube feeds  RESPIRATORY: Clear to ausculation, bilaterally   CARDIOVASCULAR: RRR S1S2  GASTROINTESTINAL: Staples and incision clean/dry/intact. Drain with serous output. non distended, +BS, soft, non tender, no guarding  MUSCULOSKELETAL: calf soft, non tender b/l    I&O's Detail    25 Feb 2024 07:01  -  26 Feb 2024 07:00  --------------------------------------------------------  IN:    Free Water: 490 mL    IV PiggyBack: 250 mL    IV PiggyBack: 125 mL    IV PiggyBack: 750 mL    IV PiggyBack: 100 mL    Lactated Ringers: 1500 mL    Lactated Ringers Bolus: 2000 mL    Norepinephrine: 120 mL    Vital High Protein: 330 mL  Total IN: 5665 mL    OUT:    Drain (mL): 1360 mL    Indwelling Catheter - Urethral (mL): 250 mL    Voided (mL): 475 mL  Total OUT: 2085 mL    Total NET: 3580 mL      26 Feb 2024 07:01  -  27 Feb 2024 06:00  --------------------------------------------------------  IN:    Free Water: 200 mL    IV PiggyBack: 100 mL    Norepinephrine: 130.6 mL    Vital High Protein: 200 mL  Total IN: 630.6 mL    OUT:    Drain (mL): 850 mL    Emesis (mL): 300 mL    Voided (mL): 1230 mL  Total OUT: 2380 mL    Total NET: -1749.4 mL      LABS:                        11.3   31.96 )-----------( 155      ( 27 Feb 2024 04:00 )             34.6     02-27    144  |  113<H>  |  14  ----------------------------<  103<H>  3.4<L>   |  25  |  0.47<L>    Ca    8.3<L>      27 Feb 2024 04:00  Phos  2.4     02-27  Mg     2.2     02-27    TPro  5.5<L>  /  Alb  1.9<L>  /  TBili  3.7<H>  /  DBili  x   /  AST  40<H>  /  ALT  86<H>  /  AlkPhos  398<H>  02-27      Urinalysis Basic - ( 27 Feb 2024 04:00 )    Color: x / Appearance: x / SG: x / pH: x  Gluc: 103 mg/dL / Ketone: x  / Bili: x / Urobili: x   Blood: x / Protein: x / Nitrite: x   Leuk Esterase: x / RBC: x / WBC x   Sq Epi: x / Non Sq Epi: x / Bacteria: x    Culture Results:   No growth at 24 hours (02-25 @ 05:40)  Culture Results:   No growth at 24 hours (02-25 @ 05:40)        49 Y/O male with duodenal perforation s/p ex lap, kyrie patch POD#14, now on tube feeds@10cc/hr. Pt also has +UTI. Persistent Leukocytosis and fevers.     PLAN:  - Trickle feeds, recommend increasing rate to goal or advancing diet after swallow eval  - dvt/GI ppx  - Continue local wound care, will hold off on removal of stapes as pt has ascites. cont FOX care  - ABx per ID  - Barber, monitor urine output  - Trend labs  - Continue ICU management

## 2024-02-27 NOTE — SWALLOW BEDSIDE ASSESSMENT ADULT - COMMENTS
CXR 2/25/2024 INTERPRETATION:  Clinical history rule out aspiration, new fevers Comparison 2/18/2024 A single frontal view of the chest demonstrates a feeding tube coursing to the stomach and improvement in the bilateral alveolar opacities which may represent edema or pneumonia.  IMPRESSION: Improvement in edema or pneumonia.

## 2024-02-28 LAB
-  AMOXICILLIN/CLAVULANIC ACID: SIGNIFICANT CHANGE UP
-  AMPICILLIN/SULBACTAM: SIGNIFICANT CHANGE UP
-  AMPICILLIN: SIGNIFICANT CHANGE UP
-  AZTREONAM: SIGNIFICANT CHANGE UP
-  CEFAZOLIN: SIGNIFICANT CHANGE UP
-  CEFEPIME: SIGNIFICANT CHANGE UP
-  CEFOXITIN: SIGNIFICANT CHANGE UP
-  CEFTRIAXONE: SIGNIFICANT CHANGE UP
-  CEFUROXIME: SIGNIFICANT CHANGE UP
-  CIPROFLOXACIN: SIGNIFICANT CHANGE UP
-  ERTAPENEM: SIGNIFICANT CHANGE UP
-  GENTAMICIN: SIGNIFICANT CHANGE UP
-  IMIPENEM: SIGNIFICANT CHANGE UP
-  LEVOFLOXACIN: SIGNIFICANT CHANGE UP
-  MEROPENEM: SIGNIFICANT CHANGE UP
-  NITROFURANTOIN: SIGNIFICANT CHANGE UP
-  PIPERACILLIN/TAZOBACTAM: SIGNIFICANT CHANGE UP
-  RESISTANCE GENE NDM: SIGNIFICANT CHANGE UP
-  TOBRAMYCIN: SIGNIFICANT CHANGE UP
-  TRIMETHOPRIM/SULFAMETHOXAZOLE: SIGNIFICANT CHANGE UP
ALBUMIN SERPL ELPH-MCNC: 1.9 G/DL — LOW (ref 3.3–5)
ALP SERPL-CCNC: 793 U/L — HIGH (ref 40–120)
ALT FLD-CCNC: 112 U/L — HIGH (ref 12–78)
ANION GAP SERPL CALC-SCNC: 4 MMOL/L — LOW (ref 5–17)
AST SERPL-CCNC: 134 U/L — HIGH (ref 15–37)
BILIRUB SERPL-MCNC: 3.7 MG/DL — HIGH (ref 0.2–1.2)
BLANDM BLD POS QL PROBE: SIGNIFICANT CHANGE UP
BUN SERPL-MCNC: 15 MG/DL — SIGNIFICANT CHANGE UP (ref 7–23)
CALCIUM SERPL-MCNC: 8.4 MG/DL — LOW (ref 8.5–10.1)
CHLORIDE SERPL-SCNC: 114 MMOL/L — HIGH (ref 96–108)
CO2 SERPL-SCNC: 24 MMOL/L — SIGNIFICANT CHANGE UP (ref 22–31)
CREAT SERPL-MCNC: 0.46 MG/DL — LOW (ref 0.5–1.3)
EGFR: 128 ML/MIN/1.73M2 — SIGNIFICANT CHANGE UP
GLUCOSE BLDC GLUCOMTR-MCNC: 75 MG/DL — SIGNIFICANT CHANGE UP (ref 70–99)
GLUCOSE BLDC GLUCOMTR-MCNC: 90 MG/DL — SIGNIFICANT CHANGE UP (ref 70–99)
GLUCOSE SERPL-MCNC: 88 MG/DL — SIGNIFICANT CHANGE UP (ref 70–99)
HCT VFR BLD CALC: 34.9 % — LOW (ref 39–50)
HGB BLD-MCNC: 11.2 G/DL — LOW (ref 13–17)
MAGNESIUM SERPL-MCNC: 2.3 MG/DL — SIGNIFICANT CHANGE UP (ref 1.6–2.6)
MCHC RBC-ENTMCNC: 31.8 PG — SIGNIFICANT CHANGE UP (ref 27–34)
MCHC RBC-ENTMCNC: 32.1 G/DL — SIGNIFICANT CHANGE UP (ref 32–36)
MCV RBC AUTO: 99.1 FL — SIGNIFICANT CHANGE UP (ref 80–100)
METHOD TYPE: SIGNIFICANT CHANGE UP
METHOD TYPE: SIGNIFICANT CHANGE UP
NRBC # BLD: 0 /100 WBCS — SIGNIFICANT CHANGE UP (ref 0–0)
PHOSPHATE SERPL-MCNC: 2.3 MG/DL — LOW (ref 2.5–4.5)
PLATELET # BLD AUTO: 200 K/UL — SIGNIFICANT CHANGE UP (ref 150–400)
POTASSIUM SERPL-MCNC: 4.2 MMOL/L — SIGNIFICANT CHANGE UP (ref 3.5–5.3)
POTASSIUM SERPL-SCNC: 4.2 MMOL/L — SIGNIFICANT CHANGE UP (ref 3.5–5.3)
PROCALCITONIN SERPL-MCNC: 0.71 NG/ML — HIGH (ref 0.02–0.1)
PROT SERPL-MCNC: 6 GM/DL — SIGNIFICANT CHANGE UP (ref 6–8.3)
RBC # BLD: 3.52 M/UL — LOW (ref 4.2–5.8)
RBC # FLD: 15.9 % — HIGH (ref 10.3–14.5)
SODIUM SERPL-SCNC: 142 MMOL/L — SIGNIFICANT CHANGE UP (ref 135–145)
WBC # BLD: 21.61 K/UL — HIGH (ref 3.8–10.5)
WBC # FLD AUTO: 21.61 K/UL — HIGH (ref 3.8–10.5)

## 2024-02-28 PROCEDURE — 99291 CRITICAL CARE FIRST HOUR: CPT

## 2024-02-28 PROCEDURE — 71045 X-RAY EXAM CHEST 1 VIEW: CPT | Mod: 26

## 2024-02-28 PROCEDURE — 99232 SBSQ HOSP IP/OBS MODERATE 35: CPT

## 2024-02-28 RX ORDER — POTASSIUM PHOSPHATE, MONOBASIC POTASSIUM PHOSPHATE, DIBASIC 236; 224 MG/ML; MG/ML
15 INJECTION, SOLUTION INTRAVENOUS ONCE
Refills: 0 | Status: COMPLETED | OUTPATIENT
Start: 2024-02-28 | End: 2024-02-28

## 2024-02-28 RX ORDER — VANCOMYCIN HCL 1 G
125 VIAL (EA) INTRAVENOUS EVERY 6 HOURS
Refills: 0 | Status: DISCONTINUED | OUTPATIENT
Start: 2024-02-28 | End: 2024-03-07

## 2024-02-28 RX ORDER — VANCOMYCIN HCL 1 G
125 VIAL (EA) INTRAVENOUS EVERY 6 HOURS
Refills: 0 | Status: DISCONTINUED | OUTPATIENT
Start: 2024-02-28 | End: 2024-02-28

## 2024-02-28 RX ADMIN — MEROPENEM 100 MILLIGRAM(S): 1 INJECTION INTRAVENOUS at 14:01

## 2024-02-28 RX ADMIN — Medication 650 MILLIGRAM(S): at 06:01

## 2024-02-28 RX ADMIN — MEROPENEM 100 MILLIGRAM(S): 1 INJECTION INTRAVENOUS at 05:02

## 2024-02-28 RX ADMIN — SODIUM CHLORIDE 4 MILLILITER(S): 9 INJECTION INTRAMUSCULAR; INTRAVENOUS; SUBCUTANEOUS at 00:26

## 2024-02-28 RX ADMIN — CHLORHEXIDINE GLUCONATE 1 APPLICATION(S): 213 SOLUTION TOPICAL at 06:26

## 2024-02-28 RX ADMIN — Medication 650 MILLIGRAM(S): at 18:41

## 2024-02-28 RX ADMIN — Medication 650 MILLIGRAM(S): at 11:40

## 2024-02-28 RX ADMIN — Medication 100 MILLIGRAM(S): at 11:26

## 2024-02-28 RX ADMIN — Medication 650 MILLIGRAM(S): at 06:34

## 2024-02-28 RX ADMIN — MEROPENEM 100 MILLIGRAM(S): 1 INJECTION INTRAVENOUS at 22:49

## 2024-02-28 RX ADMIN — Medication 650 MILLIGRAM(S): at 11:25

## 2024-02-28 RX ADMIN — POLYETHYLENE GLYCOL 3350 17 GRAM(S): 17 POWDER, FOR SOLUTION ORAL at 11:26

## 2024-02-28 RX ADMIN — Medication 650 MILLIGRAM(S): at 18:59

## 2024-02-28 RX ADMIN — SODIUM CHLORIDE 4 MILLILITER(S): 9 INJECTION INTRAMUSCULAR; INTRAVENOUS; SUBCUTANEOUS at 11:39

## 2024-02-28 RX ADMIN — PANTOPRAZOLE SODIUM 40 MILLIGRAM(S): 20 TABLET, DELAYED RELEASE ORAL at 06:02

## 2024-02-28 RX ADMIN — Medication 3 MILLILITER(S): at 11:39

## 2024-02-28 RX ADMIN — Medication 3 MILLILITER(S): at 05:21

## 2024-02-28 RX ADMIN — Medication 3 MILLILITER(S): at 00:26

## 2024-02-28 RX ADMIN — Medication 125 MILLIGRAM(S): at 18:41

## 2024-02-28 RX ADMIN — POTASSIUM PHOSPHATE, MONOBASIC POTASSIUM PHOSPHATE, DIBASIC 62.5 MILLIMOLE(S): 236; 224 INJECTION, SOLUTION INTRAVENOUS at 06:02

## 2024-02-28 RX ADMIN — ENOXAPARIN SODIUM 40 MILLIGRAM(S): 100 INJECTION SUBCUTANEOUS at 11:26

## 2024-02-28 RX ADMIN — Medication 1 MILLIGRAM(S): at 11:26

## 2024-02-28 RX ADMIN — SODIUM CHLORIDE 4 MILLILITER(S): 9 INJECTION INTRAMUSCULAR; INTRAVENOUS; SUBCUTANEOUS at 05:21

## 2024-02-28 RX ADMIN — Medication 25 MILLIGRAM(S): at 22:49

## 2024-02-28 NOTE — CHART NOTE - NSCHARTNOTEFT_GEN_A_CORE
pt pulled out NGT, while getting tube feeds  replaced NGT using Xray guidance, auscultated gastric bubble, Follow up xray read   continue care per ICU team

## 2024-02-28 NOTE — DIETITIAN NUTRITION RISK NOTIFICATION - TREATMENT: THE FOLLOWING DIET HAS BEEN RECOMMENDED
Diet, NPO with Tube Feed:   Tube Feeding Modality: Nasogastric  [place] 1.5  Total Volume for 24 Hours (mL): 1170  Intermittent  Starting Tube Feed Rate {mL per Hour}: 20  Increase Tube Feed Rate by (mL): 5    Every 12 hours  Until Goal Tube Feed Rate (mL per Hour): 65  Tube Feeding Hours ON: 18  Tube Feeding OFF (Hours): 6  Tube Feed Start Time: 17:00 (02-27-24 @ 10:51) [Active]

## 2024-02-28 NOTE — PROGRESS NOTE ADULT - ASSESSMENT
48 y.o. male with cirrhosis, EtOH use disorder, open reduction metatarsal fracture admitted for perforated duodenal ulcer w/ kyrie patch and course complicated by ARDS now recovered. Currently in septic shock due to UTI.     Neuro   - AAOx3   - Trazadone 25mg QHS    Cardiovascular  Distributive shock - Resolved   - due to UTI  - Now off levophed   - Maintain MAP>65  - TTE shows normal LV and RV function    Pulmonary  - Currently on NC 2L     GI  - Increase TF to goal   - monitor BMs  - Appreciate Nutrition input   - FOX drain now clamped by Surgery   - Appreciate surgery input  Cirrhosis  - MELD score of 13  - 6% of 90 day mortality     Renal   - Cr WNL  - Strict I/Os   - Monitor lytes     ID  - UA positive   - UCx  E. Coli   - c/w Meropenem    Endo  - Check A1C and TSH in the AM    Heme   - Lovenox for VTE PPX    Ethics   - Full code    Dispo  - PT/OT consult   - OOB to chair     Prognosis guarded

## 2024-02-28 NOTE — PROGRESS NOTE ADULT - SUBJECTIVE AND OBJECTIVE BOX
[facility]  INFECTIOUS DISEASES   [address] [place] [address]  Tel: [phone]     Fax: [phone]  ==============================================================================  DO Jony Baron MD Alexandra Gutman, NP   ==============================================================================      PRIYANKA PITTMAN  MRN-[id number]  48y ([birth year])      Interval History: patient seen and examined. NGT was in place sitting in chair off oxyegn, following commands, weak voice but stronger, grew NDM ecoli      ROS:    [x ] Unobtainable because: hard to talk   [ ] All other systems negative except as noted    Constitutional: no fever, no chills  Head: no trauma  Eyes: no vision changes, no eye pain  ENT:  no sore throat, no rhinorrhea  Cardiovascular:  no chest pain, no palpitation  Respiratory:  no SOB, no cough  GI:  no abd pain, no vomiting, no diarrhea  urinary: no dysuria, no hematuria, no flank pain  musculoskeletal:  no joint pain, no joint swelling  skin:  no rash  neurology:  no headache, no seizure, no change in mental status  psych: no anxiety, no depression         Allergies  No Known Allergies      ANTIMICROBIALS    General:    NAD,  non toxic, on nasal cannula   Head: atraumatic, normocephalic  Eye: scleral icterus, erythematous conjunctiva  ENT:    neck supple, +NGT  Cardio:     regular S1, S2,  no murmur  Respiratory:    less coarse BS b/l,    no wheezing  abd:     soft,   normoactive BS ,   no tenderness, surgical site intact, FOX drain with serous fluid   :   +gautam  Musculoskeletal:   no joint swelling,   +edema  vascular: central line has been removed, +PIV    Skin:    no rash, jaundiced  Neurologic:    awake followed simple commands   psych: awake ,and alert    WBC Count: 31.96 K/uL (02-27 @ 04:00)  WBC Count: 42.88 K/uL (02-26 @ 02:40)  WBC Count: 52.66 K/uL (02-25 @ 16:00)  WBC Count: 56.67 K/uL (02-25 @ 07:23)  WBC Count: 51.67 K/uL (02-25 @ 03:00)  WBC Count: 21.57 K/uL (02-24 @ 02:31)  WBC Count: 17.84 K/uL (02-23 @ 02:45)                            11.3   31.96 )-----------( 155      ( 27 Feb 2024 04:00 )             34.6       02-27    144  |  113<H>  |  14  ----------------------------<  103<H>  3.4<L>   |  25  |  0.47<L>    Ca    8.3<L>      27 Feb 2024 04:00  Phos  2.4     02-27  Mg     2.2     02-27    TPro  5.5<L>  /  Alb  1.9<L>  /  TBili  3.7<H>  /  DBili  x   /  AST  40<H>  /  ALT  86<H>  /  AlkPhos  398<H>  02-27      Creatinine Trend: 0.47<--, 0.62<--, 0.69<--, 0.66<--, 0.62<--, 0.35<--              MICROBIOLOGY:  Culture - Blood (02.25.24 @ 05:40)    Specimen Source: .Blood Blood   Culture Results:   No growth at 24 hours    Culture - Blood (02.25.24 @ 05:40)    Specimen Source: .Blood Blood   Culture Results:   No growth at 24 hours        Culture - Body Fluid with Gram Stain (02.13.24 @ 05:10)    Gram Stain:   Few polymorphonuclear leukocytes seen per low power field  No organisms seen per oil power field   Specimen Source: Peritoneal peritoneal fluida c/s   Culture Results:   Rare Streptococcus salivarius/vestibularis group  Rare Streptococcus mitis/oralis group        .Blood Blood  02-13-24   No growth at 24 hours  --  --      .Blood Blood  02-13-24   No growth at 24 hours  --  --      Peritoneal peritoneal fluida c/s  02-13-24 --  --    Few polymorphonuclear leukocytes seen per low power field  No organisms seen per oil power field      Clean Catch Clean Catch (Midstream)  02-13-24   No growth  --  --        RADIOLOGY:  Xray Chest 1 View- PORTABLE-Urgent (Xray Chest 1 View- PORTABLE-Urgent .) (02.15.24 @ 12:42) >  IMPRESSION: Endotracheal tube position much improved. Increasing advanced   bilateral infiltrates.    < from: CT Abdomen and Pelvis w/ Oral Cont and w/ IV Cont (02.21.24 @ 16:16) >  IMPRESSION:  Multifocal pneumonia.  Small amount of pneumoperitoneum possibly related to indwelling drain. No   associated collection.  Small right subphrenic ascites.  Hepatosplenomegaly with evidence portal hypertension    < end of copied text >

## 2024-02-28 NOTE — CHART NOTE - NSCHARTNOTEFT_GEN_A_CORE
Assessment:  Pt seen in ICU, on contact isolation, wife was @ bedside.  Pt c NGT in place, requesting to drink/eat something, stated he will get sick if he doesn't have something.   Pt adm c diagnosis of intra abdominal free air of unknown etiology, c perforated duodenal ulcer c kyrie patch on 02/14, c ARDS, septic shock, EMILIA, UTI.  PMH include cirrhosis, alcohol use disorder.      Factors impacting intake: [ ] none [ ] nausea  [ ] vomiting [ ] diarrhea [ ] constipation  [ ]chewing problems [x] swallowing issues; 02/27, swallow evaluation c recommendation for NPO  [ x] other: alteration in GI tract function.    Diet Prescription: Diet, NPO with Tube Feed:   Tube Feeding Modality: Nasogastric  Caitlyn Format Dynamics 1.5  Total Volume for 24 Hours (mL): 1170  Intermittent  Starting Tube Feed Rate {mL per Hour}: 20  Increase Tube Feed Rate by (mL): 5    Every 12 hours  Until Goal Tube Feed Rate (mL per Hour): 65  Tube Feeding Hours ON: 18  Tube Feeding OFF (Hours): 6  Tube Feed Start Time: 17:00 (02-27-24 @ 10:51)    Intake: <50% nutrition needs > 5 days   Caitlyn Farm was @ 20 m/hr, to be re-started @ 5 pm for 18 hour feeding regimen  Caitlyn Format Dynamics 1.5 Peptide 1.5 @ goal of 65 ml/hr x 18 hours=1170 ml, 1755 calories, 87 grams protein, 819 ml free water  + on free water 200 ml q 6 hours=additional 800 ml water daily       Current Weight: 02/28, 75 kg, 02/25, 77 kg, 02/21, 92 kg, 02/13, 84.9 kg, c wt. loss of 9.9 kg  % Weight Change: 11.6%(>5% in <1 month)  02/26, 1+(trace) generalized edema noted    Pertinent Medications: MEDICATIONS  (STANDING):  albuterol/ipratropium for Nebulization 3 milliLiter(s) Nebulizer every 6 hours  chlorhexidine 2% Cloths 1 Application(s) Topical <User Schedule>  enoxaparin Injectable 40 milliGRAM(s) SubCutaneous every 24 hours  folic acid 1 milliGRAM(s) Oral daily  influenza   Vaccine 0.5 milliLiter(s) IntraMuscular once  meropenem  IVPB 1000 milliGRAM(s) IV Intermittent every 8 hours  pantoprazole    Tablet 40 milliGRAM(s) Oral before breakfast  polyethylene glycol 3350 17 Gram(s) Oral daily  senna 2 Tablet(s) Oral at bedtime  sodium chloride 3%  Inhalation 4 milliLiter(s) Inhalation every 6 hours  thiamine Injectable 100 milliGRAM(s) IV Push daily  traZODone 25 milliGRAM(s) Oral at bedtime    MEDICATIONS  (PRN):  acetaminophen     Tablet .. 650 milliGRAM(s) Oral every 6 hours PRN Temp greater or equal to 38C (100.4F)    Pertinent Labs: 02-28 Na142 mmol/L Glu 88 mg/dL K+ 4.2 mmol/L Cr  0.46 mg/dL<L> BUN 15 mg/dL 02-28 Phos 2.3 mg/dL<L> 02-28 Alb 1.9 g/dL<L>02-28  U/L<H>  U/L<H> Alkaline Phosphatase 793 U/L<H>   CAPILLARY BLOOD GLUCOSE      POCT Blood Glucose.: 90 mg/dL (28 Feb 2024 11:23)  POCT Blood Glucose.: 75 mg/dL (28 Feb 2024 08:11)    Skin:   02/28, no pressure ulcer noted    Estimated Needs:   [ x] no change since previous assessment(02/15)  [ ] recalculated:       Previous Nutrition Diagnosis: (02/15)  Inadequate Energy Intake  Etiology: inability to consume sufficient energy  Signs/Symptoms: NPO x 2 days  addendum: NPO x 8 days   Goal/Expected Outcome: Once diet advanced pt to meet >75% needs via tolerated route; not applicable  New Goal: pt to meet >75% protein-energy needs via tolerated route; not met  Nutrition Diagnosis is [x ] ongoing(being replaced by new diagnosis below  [ ] resolved [ ] not applicable       New Nutrition Diagnosis:(02/28)  [x ] Malnutrition; severe malnutrition in context of acute illness   Related to: inadequate protein-energy intake in setting of c perforated duodenal ulcer, s/p kyrie patch, ARDS, septic shock, EMILIA.  As evidenced by: <50% nutrition needs > 5 days, >5% wt. loss in 1 month  Goal: pt to meet >75% protein-energy needs via tolerated route    Interventions:   Recommend  [ ] Change Diet To:  [ ] Nutrition Supplement  [x ] Nutrition Support; consider change feeding to 24 hour regimen, Caitlyn Solis Peptide 1.5 @ 30 ml/hr and increase by 5 ml q 8 hours to goal of 50 ml/kj=8864 ml, 1800 calories, 89 grams protein, 840 ml free water   [ x] Other: continue c free water flush as ordered 200 ml q 6 hours via OQQ=014 ml for a total of 1640 ml free water daily from enteral feeding and water flushes     Monitoring and Evaluation:   [ ] PO intake [ x ] Tolerance to diet prescription [ x ] weights [ x ] labs[ x ] follow up per protocol  [ ] other:

## 2024-02-28 NOTE — PROGRESS NOTE ADULT - ASSESSMENT
47 y/o male PMHx cirrhosis, ETOH, open reduction of metatarsal fracture 2022 presents c/o epigastric pain for a few hours. Vomited when he came to the ER. Last BM in the afternoon. Last flatus before the BM. Patient denies fever, chills, constipation, diarrhea, melena, hematochezia, dysuria, hematuria, chest pain, shortness of breath, dizziness, cough.  CT (I personally reviewed) Scattered foci of free intraperitoneal air, consistent with perforated viscus.  imaging also shows cirrhosis presumably from chronic alcohol use   was started on Zosyn and fluconazole   would start workup on cirrhosis as well   will follow cultures and target out therapy if able     2/15: cultures positive for several Streptococcus species, will continue antibiotics, still febrile and on pressors, weaning vent as tolerated. patient remains critically ill, 3rd spacing and getting albumin    216: remains intubated , requiring  sedation, on pressors, Streptococcus in cultures, remains on antibiotics, very quiet bowel sounds, no BMs on day shift thus far, little output on NGT  2/20: remains on vent, BP better, fever curve better, completed zosyn but will give 3 more days of ceftriaxone and fluconazole. if Qtc is prolonged can stop fluconazole, please keep the Qtc on monitor,   2/21: day 8 of intubation,  afebrile, UGIS cancelled, discussed with ICU the need for a CT scan which was done and did not show extravasation of contrast, no obvious leak, possibly starting tube feeds. nearly complete with antibiotics, would try to avoid a central line or TPN if able to tolerate feeds    2/23: completed antibiotics, discuss with surgery about drain and possible removal(could it be ascites that is draining)  2/26: persistently febrile, leukocytosis jumped quite high, blood cultures negative, UA not very impressive with mild pyuria, trend bili and wbc count   2/27: fever curve better, failed speech and swallow, continue to try to meet nutritional needs, please perform PT and OT with patient, follow cultures, continue meropenem. since having loose stool please stop miralax and senna unless there is a concern hepatic encephalopathy in which case lactulose should be used with a goal of 2-3 soft BMs per day.   2/28: NGT was in place sitting in chair off oxyegn, following commands, weak voice but stronger, grew NDM ecoli , absolutely no urinary symptoms. will not treat this highly resistant ecoli though he needs to be on contact precautions to prevent the spread. Will reach out to lab to test for sensitivities against novel antibiotics as he does not have many options if he develops an active infection with the ecoli isolate. Therefore, ok with continuing the meropenem      Plan:  continue meropenem  for now, plan for 7 days   completed fluconazole  HIV nonreactive  trend fevers and wbc count   Hepatitis A Igm negative, HbsAg negative, HbsAb nonreactive HbcAb IgM negative, HCV Ab negative   will offer vaccination for HBV once better closer to discharge  surgical site per surgery   FOX with fluid similar to ascites  Hepatosplenomegaly with evidence portal hypertension (known which will need to be addressed chronically as he recovers from this acute event)   physical and occupational therapy  incentive spirometry  optimize nutrition status     Discussed with CCM     Carrington Gilliam, DO  Infectious Disease Attending  Reachable via Microsoft Teams or ID office: [phone]  After 5pm/weekends please call [phone] for all inquiries and new consults

## 2024-02-28 NOTE — PROGRESS NOTE ADULT - SUBJECTIVE AND OBJECTIVE BOX
CHIEF COMPLAINT: ARDS, Perforeted ulcer    Interval Events: FOX drain clamped yesterday by surgery.     REVIEW OF SYSTEMS:  CONSTITUTIONAL: No fever, chills, night sweats, or fatigue  EYES: No eye pain, visual disturbances, or discharge  ENMT:  No difficulty hearing, tinnitus, vertigo; No sinus or throat pain  RESPIRATORY: No cough, wheezing, or hemoptysis; No shortness of breath  CARDIOVASCULAR: No chest pain, palpitations, dizziness, or leg swelling  GASTROINTESTINAL: No abdominal or epigastric pain. No nausea, vomiting, or hematemesis; No diarrhea or constipation. No melena or hematochezia.  GENITOURINARY: No dysuria, frequency, hematuria, or incontinence  NEUROLOGICAL: No headaches, memory loss, loss of strength, numbness, or tremors  SKIN: No itching, burning, rashes, or lesions   LYMPH NODES: No enlarged glands  ENDOCRINE: No heat or cold intolerance; No hair loss  MUSCULOSKELETAL: No joint pain or swelling; No muscle, back, or extremity pain  PSYCHIATRIC: No depression, anxiety, mood swings, or difficulty sleeping  HEME/LYMPH: No easy bruising, or bleeding gums  ALLERY AND IMMUNOLOGIC: No hives or eczema          OBJECTIVE:  ICU Vital Signs Last 24 Hrs  T(C): 38.3 (28 Feb 2024 12:00), Max: 38.3 (28 Feb 2024 12:00)  T(F): 100.9 (28 Feb 2024 12:00), Max: 100.9 (28 Feb 2024 12:00)  HR: 94 (28 Feb 2024 12:00) (77 - 96)  BP: 141/79 (28 Feb 2024 12:00) (115/65 - 141/79)  BP(mean): 96 (28 Feb 2024 12:00) (78 - 96)  ABP: --  ABP(mean): --  RR: 39 (28 Feb 2024 12:00) (18 - 39)  SpO2: 94% (28 Feb 2024 12:00) (91% - 100%)    O2 Parameters below as of 28 Feb 2024 11:39  Patient On (Oxygen Delivery Method): room air              02-27 @ 07:01  -  02-28 @ 07:00  --------------------------------------------------------  IN: 883.3 mL / OUT: 1435 mL / NET: -551.7 mL    02-28 @ 07:01  - 02-28 @ 14:16  --------------------------------------------------------  IN: 280 mL / OUT: 0 mL / NET: 280 mL      CAPILLARY BLOOD GLUCOSE      POCT Blood Glucose.: 90 mg/dL (28 Feb 2024 11:23)      PHYSICAL EXAM:  GENERAL: NAD, well-groomed, well-developed  EYES: EOMI, PERRLA, conjunctiva and sclera clear  ENMT: No tonsillar erythema, exudates, or enlargement; Moist mucous membranes, Good dentition, No lesions  CHEST/LUNG: Clear to auscultation bilaterally; No rales, rhonchi, wheezing, or rubs  HEART: Regular rate and rhythm; No murmurs, rubs, or gallops  ABDOMEN: Soft, Nontender, Nondistended; Bowel sounds present. Surgical staples in place. FOX drain is clamped   VASCULAR:  2+ Peripheral Pulses, No clubbing, cyanosis, or edema  LYMPH: No lymphadenopathy noted  SKIN: No rashes or lesions  NERVOUS SYSTEM:  Alert & Oriented X3, Good concentration    LINES:    HOSPITAL MEDICATIONS:  enoxaparin Injectable 40 milliGRAM(s) SubCutaneous every 24 hours    meropenem  IVPB 1000 milliGRAM(s) IV Intermittent every 8 hours        albuterol/ipratropium for Nebulization 3 milliLiter(s) Nebulizer every 6 hours  sodium chloride 3%  Inhalation 4 milliLiter(s) Inhalation every 6 hours    acetaminophen     Tablet .. 650 milliGRAM(s) Oral every 6 hours PRN  traZODone 25 milliGRAM(s) Oral at bedtime    pantoprazole    Tablet 40 milliGRAM(s) Oral before breakfast  polyethylene glycol 3350 17 Gram(s) Oral daily  senna 2 Tablet(s) Oral at bedtime        folic acid 1 milliGRAM(s) Oral daily  thiamine Injectable 100 milliGRAM(s) IV Push daily    influenza   Vaccine 0.5 milliLiter(s) IntraMuscular once    chlorhexidine 2% Cloths 1 Application(s) Topical <User Schedule>        LABS:                        11.2   21.61 )-----------( 200      ( 28 Feb 2024 02:50 )             34.9     Hgb Trend: 11.2<--, 11.3<--, 11.7<--, 12.5<--, 14.1<--  02-28    142  |  114<H>  |  15  ----------------------------<  88  4.2   |  24  |  0.46<L>    Ca    8.4<L>      28 Feb 2024 02:50  Phos  2.3     02-28  Mg     2.3     02-28    TPro  6.0  /  Alb  1.9<L>  /  TBili  3.7<H>  /  DBili  x   /  AST  134<H>  /  ALT  112<H>  /  AlkPhos  793<H>  02-28    Creatinine Trend: 0.46<--, 0.47<--, 0.62<--, 0.69<--, 0.66<--, 0.62<--    Urinalysis Basic - ( 28 Feb 2024 02:50 )    Color: x / Appearance: x / SG: x / pH: x  Gluc: 88 mg/dL / Ketone: x  / Bili: x / Urobili: x   Blood: x / Protein: x / Nitrite: x   Leuk Esterase: x / RBC: x / WBC x   Sq Epi: x / Non Sq Epi: x / Bacteria: x            MICROBIOLOGY:     RADIOLOGY:  [ ] Reviewed and interpreted by me    EKG:

## 2024-02-28 NOTE — PROGRESS NOTE ADULT - SUBJECTIVE AND OBJECTIVE BOX
Postoperative Day #: 15  Patient seen and examined at bedside, pt currently sitting in chair, pt's wife also at bedside, states he feels better, febrile overnight. tmax 100.9.   No new complaints offered. +Flatus and BM, Denies n/v, denies abd pain. Pt tolerating TF at goal of 65cc/hr  Denies chest pain, dyspnea, cough.    T(F): 100.9 (02-28-24 @ 12:00), Max: 100.9 (02-28-24 @ 12:00)  HR: 94 (02-28-24 @ 12:00) (77 - 96)  BP: 141/79 (02-28-24 @ 12:00) (115/65 - 141/79)  RR: 39 (02-28-24 @ 12:00) (18 - 39)  SpO2: 94% (02-28-24 @ 12:00) (91% - 100%)  Wt(kg): --  CAPILLARY BLOOD GLUCOSE      POCT Blood Glucose.: 90 mg/dL (28 Feb 2024 11:23)  POCT Blood Glucose.: 75 mg/dL (28 Feb 2024 08:11)    PHYSICAL EXAM:  CONSTITUTIONAL: NAD  HEENT: Trickle tube feeds  RESPIRATORY: Clear to ausculation, bilaterally   CARDIOVASCULAR: RRR S1S2  GASTROINTESTINAL: Staples and incision clean/dry/intact. Drain clamped. Nondistended, +BS, soft, non tender, no guarding  : condom cath in place with clear urine drainage  MUSCULOSKELETAL: calf soft, non tender b/l    LABS:                        11.2   21.61 )-----------( 200      ( 28 Feb 2024 02:50 )             34.9     02-28    142  |  114<H>  |  15  ----------------------------<  88  4.2   |  24  |  0.46<L>    Ca    8.4<L>      28 Feb 2024 02:50  Phos  2.3     02-28  Mg     2.3     02-28    TPro  6.0  /  Alb  1.9<L>  /  TBili  3.7<H>  /  DBili  x   /  AST  134<H>  /  ALT  112<H>  /  AlkPhos  793<H>  02-28      I&O's Detail    27 Feb 2024 07:01  -  28 Feb 2024 07:00  --------------------------------------------------------  IN:    Free Water: 500 mL    IV PiggyBack: 200 mL    Norepinephrine: 3.3 mL    Vital High Protein: 180 mL  Total IN: 883.3 mL    OUT:    Drain (mL): 360 mL    Emesis (mL): 0 mL    Voided (mL): 1075 mL  Total OUT: 1435 mL    Total NET: -551.7 mL      28 Feb 2024 07:01  -  28 Feb 2024 12:47  --------------------------------------------------------  IN:    Free Water: 200 mL    Vital High Protein: 80 mL  Total IN: 280 mL    OUT:    Drain (mL): 0 mL    Emesis (mL): 0 mL    Voided (mL): 0 mL  Total OUT: 0 mL    Total NET: 280 mL        Culture Results:   10,000 - 49,000 CFU/mL Escherichia coli (Carbapenem Resistant) *!* (02-26 @ 11:30)  Culture Results:   No growth at 48 Hours (02-25 @ 05:40)  Culture Results:   No growth at 48 Hours (02-25 @ 05:40)    49 Y/O male with duodenal perforation s/p ex lap, kyrie patch POD#15, now on tube feeds@ goal. Pt also has +UTI. Persistent fevers. Leukocytosis improving  PLAN:  - cont TF  - dvt/GI ppx  - Continue local wound care,  cont to clamp FOX  - ABx per ID  - Cont to monitor urine output  - Trend labs  - Continue ICU management  - discussed with Dr. Blanco Lower Extremity Pain

## 2024-02-29 LAB
-  AZTREONAM/AVIBACTAM: SIGNIFICANT CHANGE UP
-  CEFTAZIDIME/AVIBACTAM: SIGNIFICANT CHANGE UP
ANION GAP SERPL CALC-SCNC: 8 MMOL/L — SIGNIFICANT CHANGE UP (ref 5–17)
ANISOCYTOSIS BLD QL: SLIGHT — SIGNIFICANT CHANGE UP
BASOPHILS # BLD AUTO: 0 K/UL — SIGNIFICANT CHANGE UP (ref 0–0.2)
BASOPHILS NFR BLD AUTO: 0 % — SIGNIFICANT CHANGE UP (ref 0–2)
BUN SERPL-MCNC: 12 MG/DL — SIGNIFICANT CHANGE UP (ref 7–23)
CALCIUM SERPL-MCNC: 8.3 MG/DL — LOW (ref 8.5–10.1)
CHLORIDE SERPL-SCNC: 115 MMOL/L — HIGH (ref 96–108)
CO2 SERPL-SCNC: 16 MMOL/L — LOW (ref 22–31)
CREAT SERPL-MCNC: 0.46 MG/DL — LOW (ref 0.5–1.3)
DACRYOCYTES BLD QL SMEAR: SLIGHT — SIGNIFICANT CHANGE UP
EGFR: 128 ML/MIN/1.73M2 — SIGNIFICANT CHANGE UP
EOSINOPHIL # BLD AUTO: 0.45 K/UL — SIGNIFICANT CHANGE UP (ref 0–0.5)
EOSINOPHIL NFR BLD AUTO: 2 % — SIGNIFICANT CHANGE UP (ref 0–6)
GLUCOSE SERPL-MCNC: 96 MG/DL — SIGNIFICANT CHANGE UP (ref 70–99)
HCT VFR BLD CALC: 40.3 % — SIGNIFICANT CHANGE UP (ref 39–50)
HGB BLD-MCNC: 12.5 G/DL — LOW (ref 13–17)
HYPOCHROMIA BLD QL: SLIGHT — SIGNIFICANT CHANGE UP
LYMPHOCYTES # BLD AUTO: 1.59 K/UL — SIGNIFICANT CHANGE UP (ref 1–3.3)
LYMPHOCYTES # BLD AUTO: 7 % — LOW (ref 13–44)
MACROCYTES BLD QL: SLIGHT — SIGNIFICANT CHANGE UP
MAGNESIUM SERPL-MCNC: 2.4 MG/DL — SIGNIFICANT CHANGE UP (ref 1.6–2.6)
MANUAL SMEAR VERIFICATION: SIGNIFICANT CHANGE UP
MCHC RBC-ENTMCNC: 31 G/DL — LOW (ref 32–36)
MCHC RBC-ENTMCNC: 31.8 PG — SIGNIFICANT CHANGE UP (ref 27–34)
MCV RBC AUTO: 102.5 FL — HIGH (ref 80–100)
METHOD TYPE: SIGNIFICANT CHANGE UP
MICROCYTES BLD QL: SLIGHT — SIGNIFICANT CHANGE UP
MONOCYTES # BLD AUTO: 1.82 K/UL — HIGH (ref 0–0.9)
MONOCYTES NFR BLD AUTO: 8 % — SIGNIFICANT CHANGE UP (ref 2–14)
NEUTROPHILS # BLD AUTO: 18.87 K/UL — HIGH (ref 1.8–7.4)
NEUTROPHILS NFR BLD AUTO: 83 % — HIGH (ref 43–77)
NRBC # BLD: 0 /100 WBCS — SIGNIFICANT CHANGE UP (ref 0–0)
NRBC # BLD: SIGNIFICANT CHANGE UP /100 WBCS (ref 0–0)
PHOSPHATE SERPL-MCNC: 2.2 MG/DL — LOW (ref 2.5–4.5)
PLAT MORPH BLD: NORMAL — SIGNIFICANT CHANGE UP
PLATELET # BLD AUTO: 200 K/UL — SIGNIFICANT CHANGE UP (ref 150–400)
POTASSIUM SERPL-MCNC: 4.3 MMOL/L — SIGNIFICANT CHANGE UP (ref 3.5–5.3)
POTASSIUM SERPL-SCNC: 4.3 MMOL/L — SIGNIFICANT CHANGE UP (ref 3.5–5.3)
RBC # BLD: 3.93 M/UL — LOW (ref 4.2–5.8)
RBC # FLD: 15.9 % — HIGH (ref 10.3–14.5)
RBC BLD AUTO: ABNORMAL
SODIUM SERPL-SCNC: 139 MMOL/L — SIGNIFICANT CHANGE UP (ref 135–145)
STOMATOCYTES BLD QL SMEAR: SLIGHT — SIGNIFICANT CHANGE UP
TARGETS BLD QL SMEAR: SLIGHT — SIGNIFICANT CHANGE UP
TSH SERPL-MCNC: 2.37 UIU/ML — SIGNIFICANT CHANGE UP (ref 0.36–3.74)
WBC # BLD: 22.74 K/UL — HIGH (ref 3.8–10.5)
WBC # FLD AUTO: 22.74 K/UL — HIGH (ref 3.8–10.5)

## 2024-02-29 PROCEDURE — 99232 SBSQ HOSP IP/OBS MODERATE 35: CPT

## 2024-02-29 RX ORDER — NYSTATIN CREAM 100000 [USP'U]/G
1 CREAM TOPICAL
Refills: 0 | Status: DISCONTINUED | OUTPATIENT
Start: 2024-02-29 | End: 2024-03-07

## 2024-02-29 RX ORDER — THIAMINE MONONITRATE (VIT B1) 100 MG
100 TABLET ORAL DAILY
Refills: 0 | Status: DISCONTINUED | OUTPATIENT
Start: 2024-02-29 | End: 2024-03-07

## 2024-02-29 RX ORDER — SODIUM,POTASSIUM PHOSPHATES 278-250MG
1 POWDER IN PACKET (EA) ORAL EVERY 4 HOURS
Refills: 0 | Status: COMPLETED | OUTPATIENT
Start: 2024-02-29 | End: 2024-02-29

## 2024-02-29 RX ADMIN — Medication 3 MILLILITER(S): at 05:18

## 2024-02-29 RX ADMIN — Medication 125 MILLIGRAM(S): at 08:16

## 2024-02-29 RX ADMIN — Medication 125 MILLIGRAM(S): at 23:04

## 2024-02-29 RX ADMIN — Medication 3 MILLILITER(S): at 11:39

## 2024-02-29 RX ADMIN — Medication 1 PACKET(S): at 11:25

## 2024-02-29 RX ADMIN — Medication 3 MILLILITER(S): at 23:24

## 2024-02-29 RX ADMIN — Medication 1 MILLIGRAM(S): at 11:24

## 2024-02-29 RX ADMIN — Medication 125 MILLIGRAM(S): at 00:15

## 2024-02-29 RX ADMIN — Medication 3 MILLILITER(S): at 00:30

## 2024-02-29 RX ADMIN — MEROPENEM 100 MILLIGRAM(S): 1 INJECTION INTRAVENOUS at 14:21

## 2024-02-29 RX ADMIN — Medication 1 PACKET(S): at 08:15

## 2024-02-29 RX ADMIN — SODIUM CHLORIDE 4 MILLILITER(S): 9 INJECTION INTRAMUSCULAR; INTRAVENOUS; SUBCUTANEOUS at 17:37

## 2024-02-29 RX ADMIN — SODIUM CHLORIDE 4 MILLILITER(S): 9 INJECTION INTRAMUSCULAR; INTRAVENOUS; SUBCUTANEOUS at 00:30

## 2024-02-29 RX ADMIN — Medication 25 MILLIGRAM(S): at 23:05

## 2024-02-29 RX ADMIN — SODIUM CHLORIDE 4 MILLILITER(S): 9 INJECTION INTRAMUSCULAR; INTRAVENOUS; SUBCUTANEOUS at 05:18

## 2024-02-29 RX ADMIN — Medication 125 MILLIGRAM(S): at 11:25

## 2024-02-29 RX ADMIN — SODIUM CHLORIDE 4 MILLILITER(S): 9 INJECTION INTRAMUSCULAR; INTRAVENOUS; SUBCUTANEOUS at 23:25

## 2024-02-29 RX ADMIN — CHLORHEXIDINE GLUCONATE 1 APPLICATION(S): 213 SOLUTION TOPICAL at 06:00

## 2024-02-29 RX ADMIN — MEROPENEM 100 MILLIGRAM(S): 1 INJECTION INTRAVENOUS at 23:05

## 2024-02-29 RX ADMIN — PANTOPRAZOLE SODIUM 40 MILLIGRAM(S): 20 TABLET, DELAYED RELEASE ORAL at 08:15

## 2024-02-29 RX ADMIN — SODIUM CHLORIDE 4 MILLILITER(S): 9 INJECTION INTRAMUSCULAR; INTRAVENOUS; SUBCUTANEOUS at 11:39

## 2024-02-29 RX ADMIN — Medication 100 MILLIGRAM(S): at 11:25

## 2024-02-29 RX ADMIN — MEROPENEM 100 MILLIGRAM(S): 1 INJECTION INTRAVENOUS at 08:14

## 2024-02-29 RX ADMIN — Medication 3 MILLILITER(S): at 17:37

## 2024-02-29 RX ADMIN — ENOXAPARIN SODIUM 40 MILLIGRAM(S): 100 INJECTION SUBCUTANEOUS at 11:24

## 2024-02-29 RX ADMIN — Medication 125 MILLIGRAM(S): at 17:05

## 2024-02-29 RX ADMIN — NYSTATIN CREAM 1 APPLICATION(S): 100000 CREAM TOPICAL at 23:05

## 2024-02-29 NOTE — PHARMACOTHERAPY INTERVENTION NOTE - INTERVENTION TYPE RECOOMEND
Therapy Recommended - Drug indicated but not ordered
IV to PO
Therapy Recommended - Drug indicated but not ordered
Therapy Recommended - Drug indicated but not ordered

## 2024-02-29 NOTE — PROGRESS NOTE ADULT - SUBJECTIVE AND OBJECTIVE BOX
Patient seen and examined at bedside resting comfortably, now on contact isolation. Pt still with diarrhea, flexiseal in place. FOX clamped. TF resumed today after NGt placement confirmed, now at 30cchr. Pt denies N/V.  Denies abdominal pain. Afebrile  Denies chest pain, dyspnea, cough.    T(F): 99.1 (02-29-24 @ 16:00), Max: 99.7 (02-29-24 @ 06:00)  HR: 89 (02-29-24 @ 17:37) (78 - 109)  BP: 127/69 (02-29-24 @ 17:00) (115/68 - 132/71)  RR: 25 (02-29-24 @ 17:00) (17 - 33)  SpO2: 98% (02-29-24 @ 17:37) (95% - 98%)  Wt(kg): --  CAPILLARY BLOOD GLUCOSE          PHYSICAL EXAM:  CONSTITUTIONAL: NAD  HEENT: Trickle tube feeds  RESPIRATORY: Clear to ausculation, bilaterally   CARDIOVASCULAR: RRR S1S2  GASTROINTESTINAL: Staples and incision clean/dry/intact. Drain removed, clean dry dressing appplied. +BS, soft, nontender, no guarding  : condom cath in place with clear urine drainage  MUSCULOSKELETAL: calf soft, non tender b/l    LABS:                        12.5   22.74 )-----------( 200      ( 29 Feb 2024 03:40 )             40.3     02-29    139  |  115<H>  |  12  ----------------------------<  96  4.3   |  16<L>  |  0.46<L>    Ca    8.3<L>      29 Feb 2024 02:50  Phos  2.2     02-29  Mg     2.4     02-29    TPro  6.0  /  Alb  1.9<L>  /  TBili  3.7<H>  /  DBili  x   /  AST  134<H>  /  ALT  112<H>  /  AlkPhos  793<H>  02-28      I&O's Detail    28 Feb 2024 07:01  -  29 Feb 2024 07:00  --------------------------------------------------------  IN:    Free Water: 400 mL    Vital High Protein: 290 mL  Total IN: 690 mL    OUT:    Drain (mL): 0 mL    Emesis (mL): 0 mL    Voided (mL): 1200 mL  Total OUT: 1200 mL    Total NET: -510 mL      29 Feb 2024 07:01  -  29 Feb 2024 18:41  --------------------------------------------------------  IN:    Free Water: 400 mL    IV PiggyBack: 50 mL    Vital High Protein: 120 mL  Total IN: 570 mL    OUT:    Voided (mL): 600 mL  Total OUT: 600 mL    Total NET: -30 mL        Culture Results:   10,000 - 49,000 CFU/mL Escherichia coli (Carbapenem Resistant) *!* (02-26 @ 11:30)  Culture Results:   No growth at 4 days (02-25 @ 05:40)  Culture Results:   No growth at 4 days (02-25 @ 05:40)    49 Y/O Male with duodenal perforation s/p ex lap, kyrie patch POD#16, now on tube feeds@30cc/hr to increse to goal of 65cc/hr. +UTI. Leukocytosis improving  PLAN:  - cont TF  - dvt/GI ppx  - ABx per ID  - Cont to monitor urine output  - Trend labs  - Continue ICU management  - discussed with Dr. Blanco

## 2024-02-29 NOTE — PROGRESS NOTE ADULT - SUBJECTIVE AND OBJECTIVE BOX
Progress Note    PRIYANKA PITTMAN 48y (1975) Male [id number]  02-13-24 (16d)      Chief Complaint: Perforated viscus    Subjective:  Pt retaining urine overnight. States he feels generally unwell and hungry.    Review of Systems:  CONSTITUTIONAL: No fever, weight loss, or fatigue  EYES: No eye pain, visual disturbances, or discharge  ENMT:  No difficulty hearing, tinnitus, vertigo; No sinus or throat pain  NECK: No pain or stiffness  RESPIRATORY: No cough, wheezing, chills or hemoptysis; No shortness of breath  CARDIOVASCULAR: No chest pain, palpitations, dizziness, or leg swelling  GASTROINTESTINAL: No abdominal or epigastric pain.  GENITOURINARY: No dysuria, frequency, hematuria, or incontinence  NEUROLOGICAL: No headaches, memory loss  SKIN: No itching, burning, rashes, or lesions   MUSCULOSKELETAL: No joint pain or swelling; No muscle, back, or extremity pain      PAST MEDICAL & SURGICAL HISTORY:  No pertinent past medical history [536034332]    H/O cirrhosis [Z87.19]    S/P foot surgery [Z98.890]      acetaminophen     Tablet .. 650 milliGRAM(s) Oral every 6 hours PRN  albuterol/ipratropium for Nebulization 3 milliLiter(s) Nebulizer every 6 hours  chlorhexidine 2% Cloths 1 Application(s) Topical <User Schedule>  enoxaparin Injectable 40 milliGRAM(s) SubCutaneous every 24 hours  folic acid 1 milliGRAM(s) Oral daily  influenza   Vaccine 0.5 milliLiter(s) IntraMuscular once  meropenem  IVPB 1000 milliGRAM(s) IV Intermittent every 8 hours  pantoprazole    Tablet 40 milliGRAM(s) Oral before breakfast  potassium phosphate / sodium phosphate Powder (PHOS-NaK) 1 Packet(s) Oral every 4 hours  sodium chloride 3%  Inhalation 4 milliLiter(s) Inhalation every 6 hours  thiamine 100 milliGRAM(s) Enteral Tube daily  traZODone 25 milliGRAM(s) Oral at bedtime  vancomycin    Solution 125 milliGRAM(s) Oral every 6 hours    Objective:  T(C): 37.3 (02-29-24 @ 07:30), Max: 38.4 (02-28-24 @ 17:00)  HR: 93 (02-29-24 @ 09:00) (78 - 109)  BP: 123/69 (02-29-24 @ 09:00) (115/68 - 141/79)  RR: 30 (02-29-24 @ 09:00) (17 - 39)  SpO2: 97% (02-29-24 @ 09:00) (93% - 100%)    Physical exam:  GENERAL: NAD, pt has lost weight  HEAD:  Atraumatic, Normocephalic  EYES: Pupils reactive to light. Conjunctiva and sclera clear  NECK: Supple, No JVD  CHEST/LUNG: Clear to auscultation bilaterally; No wheeze  HEART: Regular rate and rhythm; No murmurs, rubs, or gallops  ABDOMEN: Soft, much less distended than my previous exam last week. Bowel sounds present. Surgical site looks clean, dry, and intact.  EXTREMITIES:  2+ Peripheral Pulses, No clubbing, cyanosis, or edema  NEUROLOGY: non-focal. Moving all 4 extremities, following commands. May be developing b/l foot drop?    02-28-24 @ 07:01  -  02-29-24 @ 07:00  --------------------------------------------------------  IN: 690 mL / OUT: 1200 mL / NET: -510 mL    02-29-24 @ 07:01  -  02-29-24 @ 10:57  --------------------------------------------------------  IN: 90 mL / OUT: 0 mL / NET: 90 mL        CAPILLARY BLOOD GLUCOSE      (02-29 @ 03:40)                      12.5  22.74 )-----------( 200                 40.3    Neutrophils = 18.87 (83.0%)  Lymphocytes = 1.59 (7.0%)  Eosinophils = 0.45 (2.0%)  Basophils = 0.00 (0.0%)  Monocytes = 1.82 (8.0%)  Bands = --%    02-29    139  |  115<H>  |  12  ----------------------------<  96  4.3   |  16<L>  |  0.46<L>    Ca    8.3<L>      29 Feb 2024 02:50  Phos  2.2     02-29  Mg     2.4     02-29    TPro  6.0  /  Alb  1.9<L>  /  TBili  3.7<H>  /  DBili  x   /  AST  134<H>  /  ALT  112<H>  /  AlkPhos  793<H>  02-28          RVP:(02-25 @ 07:10)  [facility]            Tox:         Urinalysis Basic - ( 29 Feb 2024 02:50 )    Color: x / Appearance: x / SG: x / pH: x  Gluc: 96 mg/dL / Ketone: x  / Bili: x / Urobili: x   Blood: x / Protein: x / Nitrite: x   Leuk Esterase: x / RBC: x / WBC x   Sq Epi: x / Non Sq Epi: x / Bacteria: x        WBC Trend: 22.74<--, 21.61<--, 31.96<--    Hb Trend: 12.5<--, 11.2<--, 11.3<--, 11.7<--, 12.5<--        New imaging in last 24 hours:  Consult notes reviewed:

## 2024-02-29 NOTE — PROGRESS NOTE ADULT - SUBJECTIVE AND OBJECTIVE BOX
[facility]  INFECTIOUS DISEASES   [address] [place] [address]  Tel: [phone]     Fax: [phone]  ==============================================================================  DO Jony Baron MD Alexandra Gutman, NP   ==============================================================================      PRIYANKA PITTMAN  MRN-[id number]  48y ([birth year])      Interval History: patient seen and examined. NGT was in place, remains on room air, rectal tube placed for loose stool though he received miralax daily up until today, PO vancomycin, staying on contact precautions due to MDR ecoli, denies pain, reports generalized weakness       ROS:    [ ] Unobtainable because: hard to talk   [X ] All other systems negative except as noted    Constitutional: no fever, no chills  Head: no trauma  Eyes: no vision changes, no eye pain  ENT:  no sore throat, no rhinorrhea  Cardiovascular:  no chest pain, no palpitation  Respiratory:  no SOB, no cough  GI:  no abd pain, no vomiting, no diarrhea  urinary: no dysuria, no hematuria, no flank pain  musculoskeletal:  no joint pain, no joint swelling  skin:  no rash  neurology:  no headache, no seizure, no change in mental status  psych: no anxiety, no depression         Allergies  No Known Allergies      ANTIMICROBIALS  influenza   Vaccine 0.5 once  meropenem  IVPB 1000 every 8 hours  vancomycin    Solution 125 every 6 hours      MEDICATIONS  (STANDING):  albuterol/ipratropium for Nebulization 3 every 6 hours  enoxaparin Injectable 40 every 24 hours  influenza   Vaccine 0.5 once  pantoprazole    Tablet 40 before breakfast  sodium chloride 3%  Inhalation 4 every 6 hours  traZODone 25 at bedtime      PRN  acetaminophen     Tablet .. 650 milliGRAM(s) Oral every 6 hours PRN      Physical Exam:  Vital Signs Last 24 Hrs  T(F): 98.9 (02-29-24 @ 11:30), Max: 101.1 (02-28-24 @ 17:00)  HR: 89 (02-29-24 @ 14:00)  BP: 126/66 (02-29-24 @ 14:00)  RR: 26 (02-29-24 @ 14:00)  SpO2: 98% (02-29-24 @ 14:00) (95% - 100%)  Wt(kg): --    General:    NAD,  non toxic, on nasal cannula   Head: atraumatic, normocephalic  Eye: scleral icterus, erythematous conjunctiva  ENT:    neck supple, +NGT  Cardio:     regular S1, S2,  no murmur  Respiratory:    less coarse BS b/l,    no wheezing  abd:     soft,   normoactive BS ,   no tenderness, surgical site intact, FOX drain with serous fluid   :   +gautam  Musculoskeletal:   no joint swelling,   +edema  vascular: central line has been removed, +PIV    Skin:    no rash, jaundiced  Neurologic:    awake followed simple commands   psych: awake ,and alert    WBC Count: 22.74 K/uL (02-29 @ 03:40)  WBC Count: 21.61 K/uL (02-28 @ 02:50)  WBC Count: 31.96 K/uL (02-27 @ 04:00)  WBC Count: 42.88 K/uL (02-26 @ 02:40)  WBC Count: 52.66 K/uL (02-25 @ 16:00)  WBC Count: 56.67 K/uL (02-25 @ 07:23)  WBC Count: 51.67 K/uL (02-25 @ 03:00)                            12.5   22.74 )-----------( 200      ( 29 Feb 2024 03:40 )             40.3       02-29    139  |  115<H>  |  12  ----------------------------<  96  4.3   |  16<L>  |  0.46<L>    Ca    8.3<L>      29 Feb 2024 02:50  Phos  2.2     02-29  Mg     2.4     02-29    TPro  6.0  /  Alb  1.9<L>  /  TBili  3.7<H>  /  DBili  x   /  AST  134<H>  /  ALT  112<H>  /  AlkPhos  793<H>  02-28      Creatinine Trend: 0.46<--, 0.46<--, 0.47<--, 0.62<--, 0.69<--, 0.66<--              MICROBIOLOGY:  Culture - Blood (02.25.24 @ 05:40)    Specimen Source: .Blood Blood   Culture Results:   No growth at 24 hours    Culture - Blood (02.25.24 @ 05:40)    Specimen Source: .Blood Blood   Culture Results:   No growth at 24 hours        Culture - Body Fluid with Gram Stain (02.13.24 @ 05:10)    Gram Stain:   Few polymorphonuclear leukocytes seen per low power field  No organisms seen per oil power field   Specimen Source: Peritoneal peritoneal fluida c/s   Culture Results:   Rare Streptococcus salivarius/vestibularis group  Rare Streptococcus mitis/oralis group        .Blood Blood  02-13-24   No growth at 24 hours  --  --      .Blood Blood  02-13-24   No growth at 24 hours  --  --      Peritoneal peritoneal fluida c/s  02-13-24 --  --    Few polymorphonuclear leukocytes seen per low power field  No organisms seen per oil power field      Clean Catch Clean Catch (Midstream)  02-13-24   No growth  --  --        RADIOLOGY:  Xray Chest 1 View- PORTABLE-Urgent (Xray Chest 1 View- PORTABLE-Urgent .) (02.15.24 @ 12:42) >  IMPRESSION: Endotracheal tube position much improved. Increasing advanced   bilateral infiltrates.    < from: CT Abdomen and Pelvis w/ Oral Cont and w/ IV Cont (02.21.24 @ 16:16) >  IMPRESSION:  Multifocal pneumonia.  Small amount of pneumoperitoneum possibly related to indwelling drain. No   associated collection.  Small right subphrenic ascites.  Hepatosplenomegaly with evidence portal hypertension    < end of copied text >

## 2024-02-29 NOTE — PROGRESS NOTE ADULT - ASSESSMENT
48M w/ cirrhosis, EtOH use disorder, open reduction metatarsal fracture p/w perforated duodenal ulcer w/ kyrie patch. Course complicated by ARDS, septic shock, and EMILIA. Now improving.     #Neuro - Doing well. On trazodone qhs  #CV - HD stable, remains off pressors  #Pulm - ARDS likely in setting of aspiration much improved. Extubated.  SBT trials going well. Continue duoneb.  #ID- MDR E Coli on UCx. Continuing meropenem as per discussion with ID. Completed abx for strep mitis and salavaris, diflucan for candida in peritoneal fluid. Loose stool. Covering for c diff empirically with vancomycin PO.  #Renal/metabolic - EMILIA likely ATN, now resolved; monitor I/Os, electrolytes. Phosphorous low this AM, repleting. Slowly increasing feeds as pt had several episodes of emesis previously.   #GI- s/p kyrie patch for perforated duodenum; per discussion w/ surgery. Uptitrating tube feeds as tolerated. Bowel regimen on hold while having loose stool. FOX drain is clamped. Pt starting to drain ascitic fluid around it. From the cirrhosis. Discussing with surgery regarding removing it. Pt will need serial mahesh likely when he goes home.   #Heme - hgb stable; monitor for now  #Endo -MVI, folate, thiamine. Glucose on AM BMPs WNL.  #Skin - surgical site care per surgery  #PPx - lovenox q24  #Dispo- remains in ICU in critical condition ventilator dependent; prognosis very guarded; full code  Aggressive PT/OT. May need b/l splints for foot drop.    Plan of care discussed w/ wife at bedside and sister in law over the phone.

## 2024-02-29 NOTE — PROGRESS NOTE ADULT - ASSESSMENT
49 y/o male PMHx cirrhosis, ETOH, open reduction of metatarsal fracture 2022 presents c/o epigastric pain for a few hours. Vomited when he came to the ER. Last BM in the afternoon. Last flatus before the BM. Patient denies fever, chills, constipation, diarrhea, melena, hematochezia, dysuria, hematuria, chest pain, shortness of breath, dizziness, cough.  CT (I personally reviewed) Scattered foci of free intraperitoneal air, consistent with perforated viscus.  imaging also shows cirrhosis presumably from chronic alcohol use   was started on Zosyn and fluconazole   would start workup on cirrhosis as well   will follow cultures and target out therapy if able     2/15: cultures positive for several Streptococcus species, will continue antibiotics, still febrile and on pressors, weaning vent as tolerated. patient remains critically ill, 3rd spacing and getting albumin    216: remains intubated , requiring  sedation, on pressors, Streptococcus in cultures, remains on antibiotics, very quiet bowel sounds, no BMs on day shift thus far, little output on NGT  2/20: remains on vent, BP better, fever curve better, completed zosyn but will give 3 more days of ceftriaxone and fluconazole. if Qtc is prolonged can stop fluconazole, please keep the Qtc on monitor,   2/21: day 8 of intubation,  afebrile, UGIS cancelled, discussed with ICU the need for a CT scan which was done and did not show extravasation of contrast, no obvious leak, possibly starting tube feeds. nearly complete with antibiotics, would try to avoid a central line or TPN if able to tolerate feeds    2/23: completed antibiotics, discuss with surgery about drain and possible removal(could it be ascites that is draining)  2/26: persistently febrile, leukocytosis jumped quite high, blood cultures negative, UA not very impressive with mild pyuria, trend bili and wbc count   2/27: fever curve better, failed speech and swallow, continue to try to meet nutritional needs, please perform PT and OT with patient, follow cultures, continue meropenem. since having loose stool please stop miralax and senna unless there is a concern hepatic encephalopathy in which case lactulose should be used with a goal of 2-3 soft BMs per day.   2/28: NGT was in place sitting in chair off oxyegn, following commands, weak voice but stronger, grew NDM ecoli , absolutely no urinary symptoms. will not treat this highly resistant ecoli though he needs to be on contact precautions to prevent the spread. Will reach out to lab to test for sensitivities against novel antibiotics as he does not have many options if he develops an active infection with the ecoli isolate. Therefore, ok with continuing the meropenem    2/29: day 5 of meropenem, loose stool while on miralax which has been stopped and rectal tube placed, cannot test for Clostridioides difficile due to laxative use,ok to empirically add PO vancomycin due to his serious illness and cirrhosis with recent GI surgery.     Plan:  continue meropenem  for now, plan for 7 days(Day #5)    trend fevers and wbc count   Not immune to HBV-will offer vaccination for HBV once better closer to discharge  surgical site per surgery   FOX with fluid similar to ascites  Hepatosplenomegaly with evidence portal hypertension (known which will need to be addressed chronically as he recovers from this acute event), outpatient hepatology follow up   physical and occupational therapy  incentive spirometry  optimize nutrition status   Ok to give PO vancomycin 125mg q6hrs as empiric treatment   d/c rectal tube once stool is more formed     Discussed with CCM     Carrington Gilliam, DO  Infectious Disease Attending  Reachable via [employer] Teams or ID office: [phone]  After 5pm/weekends please call [phone] for all inquiries and new consults

## 2024-02-29 NOTE — PHARMACOTHERAPY INTERVENTION NOTE - COMMENTS
Per policy, thiamine 100 mg IVP daily transitioned to oral formulation since the patient is tolerating feeding and/or other medications enterally.

## 2024-03-01 LAB
-  CEFIDEROCOL: SIGNIFICANT CHANGE UP
ALBUMIN SERPL ELPH-MCNC: 1.9 G/DL — LOW (ref 3.3–5)
ALP SERPL-CCNC: 832 U/L — HIGH (ref 40–120)
ALT FLD-CCNC: 135 U/L — HIGH (ref 12–78)
ANION GAP SERPL CALC-SCNC: 5 MMOL/L — SIGNIFICANT CHANGE UP (ref 5–17)
AST SERPL-CCNC: 106 U/L — HIGH (ref 15–37)
BILIRUB SERPL-MCNC: 3.3 MG/DL — HIGH (ref 0.2–1.2)
BUN SERPL-MCNC: 10 MG/DL — SIGNIFICANT CHANGE UP (ref 7–23)
CALCIUM SERPL-MCNC: 8.1 MG/DL — LOW (ref 8.5–10.1)
CHLORIDE SERPL-SCNC: 112 MMOL/L — HIGH (ref 96–108)
CO2 SERPL-SCNC: 25 MMOL/L — SIGNIFICANT CHANGE UP (ref 22–31)
CREAT SERPL-MCNC: 0.42 MG/DL — LOW (ref 0.5–1.3)
CULTURE RESULTS: ABNORMAL
CULTURE RESULTS: ABNORMAL
CULTURE RESULTS: SIGNIFICANT CHANGE UP
CULTURE RESULTS: SIGNIFICANT CHANGE UP
EGFR: 132 ML/MIN/1.73M2 — SIGNIFICANT CHANGE UP
GLUCOSE SERPL-MCNC: 103 MG/DL — HIGH (ref 70–99)
MAGNESIUM SERPL-MCNC: 2.4 MG/DL — SIGNIFICANT CHANGE UP (ref 1.6–2.6)
METHOD TYPE: SIGNIFICANT CHANGE UP
ORGANISM # SPEC MICROSCOPIC CNT: ABNORMAL
ORGANISM # SPEC MICROSCOPIC CNT: SIGNIFICANT CHANGE UP
PHOSPHATE SERPL-MCNC: 2.1 MG/DL — LOW (ref 2.5–4.5)
POTASSIUM SERPL-MCNC: 3.9 MMOL/L — SIGNIFICANT CHANGE UP (ref 3.5–5.3)
POTASSIUM SERPL-SCNC: 3.9 MMOL/L — SIGNIFICANT CHANGE UP (ref 3.5–5.3)
PROT SERPL-MCNC: 6.5 GM/DL — SIGNIFICANT CHANGE UP (ref 6–8.3)
SODIUM SERPL-SCNC: 142 MMOL/L — SIGNIFICANT CHANGE UP (ref 135–145)
SPECIMEN SOURCE: SIGNIFICANT CHANGE UP

## 2024-03-01 PROCEDURE — 99233 SBSQ HOSP IP/OBS HIGH 50: CPT

## 2024-03-01 RX ORDER — POTASSIUM PHOSPHATE, MONOBASIC POTASSIUM PHOSPHATE, DIBASIC 236; 224 MG/ML; MG/ML
15 INJECTION, SOLUTION INTRAVENOUS ONCE
Refills: 0 | Status: COMPLETED | OUTPATIENT
Start: 2024-03-01 | End: 2024-03-01

## 2024-03-01 RX ADMIN — Medication 3 MILLILITER(S): at 05:31

## 2024-03-01 RX ADMIN — Medication 125 MILLIGRAM(S): at 06:08

## 2024-03-01 RX ADMIN — CHLORHEXIDINE GLUCONATE 1 APPLICATION(S): 213 SOLUTION TOPICAL at 10:46

## 2024-03-01 RX ADMIN — SODIUM CHLORIDE 4 MILLILITER(S): 9 INJECTION INTRAMUSCULAR; INTRAVENOUS; SUBCUTANEOUS at 11:07

## 2024-03-01 RX ADMIN — MEROPENEM 100 MILLIGRAM(S): 1 INJECTION INTRAVENOUS at 14:43

## 2024-03-01 RX ADMIN — Medication 125 MILLIGRAM(S): at 11:31

## 2024-03-01 RX ADMIN — Medication 3 MILLILITER(S): at 11:07

## 2024-03-01 RX ADMIN — NYSTATIN CREAM 1 APPLICATION(S): 100000 CREAM TOPICAL at 21:26

## 2024-03-01 RX ADMIN — ENOXAPARIN SODIUM 40 MILLIGRAM(S): 100 INJECTION SUBCUTANEOUS at 10:46

## 2024-03-01 RX ADMIN — MEROPENEM 100 MILLIGRAM(S): 1 INJECTION INTRAVENOUS at 21:26

## 2024-03-01 RX ADMIN — Medication 25 MILLIGRAM(S): at 21:26

## 2024-03-01 RX ADMIN — Medication 100 MILLIGRAM(S): at 11:31

## 2024-03-01 RX ADMIN — MEROPENEM 100 MILLIGRAM(S): 1 INJECTION INTRAVENOUS at 06:05

## 2024-03-01 RX ADMIN — NYSTATIN CREAM 1 APPLICATION(S): 100000 CREAM TOPICAL at 10:46

## 2024-03-01 RX ADMIN — POTASSIUM PHOSPHATE, MONOBASIC POTASSIUM PHOSPHATE, DIBASIC 62.5 MILLIMOLE(S): 236; 224 INJECTION, SOLUTION INTRAVENOUS at 08:08

## 2024-03-01 RX ADMIN — Medication 125 MILLIGRAM(S): at 17:20

## 2024-03-01 RX ADMIN — PANTOPRAZOLE SODIUM 40 MILLIGRAM(S): 20 TABLET, DELAYED RELEASE ORAL at 06:07

## 2024-03-01 RX ADMIN — SODIUM CHLORIDE 4 MILLILITER(S): 9 INJECTION INTRAMUSCULAR; INTRAVENOUS; SUBCUTANEOUS at 05:33

## 2024-03-01 RX ADMIN — Medication 1 MILLIGRAM(S): at 11:31

## 2024-03-01 NOTE — PROGRESS NOTE ADULT - SUBJECTIVE AND OBJECTIVE BOX
PRIYANKA PITTMAN  MRN-[id number]    Follow Up:  carbapenem resistant infection, +  E. Coli in urine       Interval History: the pt was seen and examined earlier in ICU, pt's cousin is present, pt is awake and alert, able to state his name and birthday, knows he is in he hospital, able to state current year and president. Pt denies any discomfort at the moment, denies any sob, + occasional cough, no headache, no chest pain, denies having abd pain, no N/V. Pt had a fever yesterday evening 101.1, no fevers overnight, RA, WBC elevated but better overall.     PAST MEDICAL & SURGICAL HISTORY:  H/O cirrhosis      S/P foot surgery          ROS:    [ ] Unobtainable because:  [x ] All other systems negative - as above     Constitutional: no fever, no chills  Head: no trauma  Eyes: no vision changes, no eye pain  ENT:  no sore throat, no rhinorrhea  Cardiovascular:  no chest pain, no palpitation  Respiratory:  no SOB, no cough  GI:  no abd pain, no vomiting, no diarrhea  urinary: no dysuria, no hematuria, no flank pain  musculoskeletal:  no joint pain, no joint swelling  skin:  no rash  neurology:  no headache, no seizure, no change in mental status  psych: no anxiety, no depression         Allergies  No Known Allergies        ANTIMICROBIALS:  meropenem  IVPB 1000 every 8 hours  vancomycin    Solution 125 every 6 hours      OTHER MEDS:  acetaminophen     Tablet .. 650 milliGRAM(s) Oral every 6 hours PRN  chlorhexidine 2% Cloths 1 Application(s) Topical <User Schedule>  enoxaparin Injectable 40 milliGRAM(s) SubCutaneous every 24 hours  folic acid 1 milliGRAM(s) Oral daily  influenza   Vaccine 0.5 milliLiter(s) IntraMuscular once  nystatin Powder 1 Application(s) Topical two times a day  pantoprazole    Tablet 40 milliGRAM(s) Oral before breakfast  thiamine 100 milliGRAM(s) Enteral Tube daily  traZODone 25 milliGRAM(s) Oral at bedtime      Vital Signs Last 24 Hrs  T(C): 36.6 (01 Mar 2024 08:00), Max: 37.3 (29 Feb 2024 16:00)  T(F): 97.8 (01 Mar 2024 08:00), Max: 99.1 (29 Feb 2024 16:00)  HR: 90 (01 Mar 2024 13:00) (80 - 97)  BP: 124/66 (01 Mar 2024 13:00) (118/62 - 134/75)  BP(mean): 82 (01 Mar 2024 13:00) (79 - 93)  RR: 28 (01 Mar 2024 13:00) (0 - 30)  SpO2: 97% (01 Mar 2024 13:00) (94% - 99%)    Parameters below as of 01 Mar 2024 11:29  Patient On (Oxygen Delivery Method): room air        Physical Exam:  General:  NAD,  non toxic, on RA  Head: atraumatic, normocephalic  Eye: scleral icterus, erythematous conjunctiva  ENT:    neck supple, +NGT - clumped   Cardio:     regular S1, S2,  no murmur  Respiratory:   diminished BS b/l,    no wheezing, no rhonchi   abd:     soft,   mildly distended, staples intact, right sided dressing is on and serous drainage is draining on the dressing, normoactive BS ,   no tenderness, surgical site intact, FOX drain was removed yesterday  :   +gautam  Musculoskeletal:   no joint swelling,   trace edema  vascular: central line has been removed, +PIV    Skin:    no rash, jaundiced  Neurologic:    awake and alert, answers simple questions and follows simple commands   psych: appropriate, calm behavior     WBC Count: 22.74 K/uL (02-29 @ 03:40)  WBC Count: 21.61 K/uL (02-28 @ 02:50)  WBC Count: 31.96 K/uL (02-27 @ 04:00)  WBC Count: 42.88 K/uL (02-26 @ 02:40)  WBC Count: 52.66 K/uL (02-25 @ 16:00)  WBC Count: 56.67 K/uL (02-25 @ 07:23)  WBC Count: 51.67 K/uL (02-25 @ 03:00)                            12.5   22.74 )-----------( 200      ( 29 Feb 2024 03:40 )             40.3       03-01    142  |  112<H>  |  10  ----------------------------<  103<H>  3.9   |  25  |  0.42<L>    Ca    8.1<L>      01 Mar 2024 03:40  Phos  2.1     03-01  Mg     2.4     03-01    TPro  6.5  /  Alb  1.9<L>  /  TBili  3.3<H>  /  DBili  x   /  AST  106<H>  /  ALT  135<H>  /  AlkPhos  832<H>  03-01      Urinalysis Basic - ( 01 Mar 2024 03:40 )    Color: x / Appearance: x / SG: x / pH: x  Gluc: 103 mg/dL / Ketone: x  / Bili: x / Urobili: x   Blood: x / Protein: x / Nitrite: x   Leuk Esterase: x / RBC: x / WBC x   Sq Epi: x / Non Sq Epi: x / Bacteria: x        Creatinine Trend: 0.42<--, 0.46<--, 0.46<--, 0.47<--, 0.62<--, 0.69<--      MICROBIOLOGY:  v  Clean Catch Clean Catch (Midstream)  02-26-24   10,000 - 49,000 CFU/mL Escherichia coli (Carbapenem Resistant)  Recarbrio=32ug/mL (Resistant),  interpretations based on FDA breakpoints.  --  Escherichia coli (Carbapenem Resistant)      .Blood Blood  02-25-24   No growth at 5 days  --  --      ET Tube ET Tube  02-18-24   Normal Respiratory [place] present  --    No polymorphonuclear leukocytes seen per low power field  No Squamous epithelial cells seen per low power field  No organisms seen per oil power field      .Bronchial Bronchial Lavage  02-16-24   Normal Respiratory [place] present  --    Few polymorphonuclear leukocytes per low power field  No squamous epithelial cells per low power field  Rare Gram Negative Rods per oil power field      .Blood Blood-Peripheral  02-16-24   No growth at 5 days  --  --      .Blood Blood-Peripheral  02-16-24   No growth at 5 days  --  --      .Blood Blood  02-13-24   No growth at 5 days  --  --      .Blood Blood  02-13-24   No growth at 5 days  --  --      Peritoneal peritoneal fluida c/s  02-13-24   Rare Streptococcus salivarius/vestibularis group  Rare Streptococcus mitis/oralis group  Rare Candida albicans  --  Streptococcus mitis/oralis group  Streptococcus salivarius/vestibularis group  Candida albicans      Clean Catch Clean Catch (Midstream)  02-13-24   No growth  --  --          Rapid RVP Result: NotDetec (02-25 @ 07:10)    Procalcitonin, Serum: 0.71 (02-28-24 @ 02:50)  Procalcitonin, Serum: 1.62 (02-25-24 @ 07:23)  Procalcitonin, Serum: 0.35 (02-24-24 @ 11:20)      SARS-CoV-2: [facility] (25 Feb 2024 07:10)    RADIOLOGY:

## 2024-03-01 NOTE — PROGRESS NOTE ADULT - SUBJECTIVE AND OBJECTIVE BOX
INTERVAL HPI/OVERNIGHT EVENTS: no acute events overnight. FOX drained removed yesterday and pt started on PO vanc empirically as per ID recs     SUBJECTIVE: Patient seen and examined at bedside. Pt breathing comfortably on RA and denies any chest pain, SOB, abd pain or any other pain     ROS: All negative except as listed above.    VITAL SIGNS:  ICU Vital Signs Last 24 Hrs  T(C): 36.6 (01 Mar 2024 08:00), Max: 37.3 (29 Feb 2024 16:00)  T(F): 97.8 (01 Mar 2024 08:00), Max: 99.1 (29 Feb 2024 16:00)  HR: 83 (01 Mar 2024 11:29) (80 - 97)  BP: 126/66 (01 Mar 2024 11:00) (118/62 - 134/75)  BP(mean): 85 (01 Mar 2024 11:00) (79 - 93)  ABP: --  ABP(mean): --  RR: 25 (01 Mar 2024 11:00) (0 - 30)  SpO2: 97% (01 Mar 2024 11:29) (94% - 99%)    O2 Parameters below as of 01 Mar 2024 11:29  Patient On (Oxygen Delivery Method): room air      02-29 @ 07:01 - 03-01 @ 07:00  --------------------------------------------------------  IN: 620 mL / OUT: 1300 mL / NET: -680 mL    03-01 @ 07:01 - 03-01 @ 13:18  --------------------------------------------------------  IN: 590 mL / OUT: 0 mL / NET: 590 mL      CAPILLARY BLOOD GLUCOSE          ECG: reviewed.    PHYSICAL EXAM:    GENERAL: NAD, lying in bed comfortably  EYES: EOMI, PERRL  NECK: Supple, trachea midline, no JVD  HEART: Regular rate and rhythm  LUNGS: Unlabored respirations.  Clear to auscultation bilaterally, no crackles, wheezing, or rhonchi  ABDOMEN: Soft, nontender, mildly distended with lateral dressing damp with ascites fluid over previous FOX site  EXTREMITIES: 2+ peripheral pulses bilaterally, cap refill<2 secs. mild LE edema  NERVOUS SYSTEM:  A&Ox3, following commands, moving all extremities, no focal deficits       MEDICATIONS:  MEDICATIONS  (STANDING):  albuterol/ipratropium for Nebulization 3 milliLiter(s) Nebulizer every 6 hours  chlorhexidine 2% Cloths 1 Application(s) Topical <User Schedule>  enoxaparin Injectable 40 milliGRAM(s) SubCutaneous every 24 hours  folic acid 1 milliGRAM(s) Oral daily  influenza   Vaccine 0.5 milliLiter(s) IntraMuscular once  meropenem  IVPB 1000 milliGRAM(s) IV Intermittent every 8 hours  nystatin Powder 1 Application(s) Topical two times a day  pantoprazole    Tablet 40 milliGRAM(s) Oral before breakfast  sodium chloride 3%  Inhalation 4 milliLiter(s) Inhalation every 6 hours  thiamine 100 milliGRAM(s) Enteral Tube daily  traZODone 25 milliGRAM(s) Oral at bedtime  vancomycin    Solution 125 milliGRAM(s) Oral every 6 hours    MEDICATIONS  (PRN):  acetaminophen     Tablet .. 650 milliGRAM(s) Oral every 6 hours PRN Temp greater or equal to 38C (100.4F)      ALLERGIES:  Allergies    No Known Allergies    Intolerances        LABS:                        12.5   22.74 )-----------( 200      ( 29 Feb 2024 03:40 )             40.3     03-01    142  |  112<H>  |  10  ----------------------------<  103<H>  3.9   |  25  |  0.42<L>    Ca    8.1<L>      01 Mar 2024 03:40  Phos  2.1     03-01  Mg     2.4     03-01    TPro  6.5  /  Alb  1.9<L>  /  TBili  3.3<H>  /  DBili  x   /  AST  106<H>  /  ALT  135<H>  /  AlkPhos  832<H>  03-01      Urinalysis Basic - ( 01 Mar 2024 03:40 )    Color: x / Appearance: x / SG: x / pH: x  Gluc: 103 mg/dL / Ketone: x  / Bili: x / Urobili: x   Blood: x / Protein: x / Nitrite: x   Leuk Esterase: x / RBC: x / WBC x   Sq Epi: x / Non Sq Epi: x / Bacteria: x        Micro:    Culture - Blood (collected 02-25-24 @ 05:40)  Source: .Blood Blood  Final Report (03-01-24 @ 13:00):    No growth at 5 days    Culture - Blood (collected 02-25-24 @ 05:40)  Source: .Blood Blood  Final Report (03-01-24 @ 13:00):    No growth at 5 days    Culture - Blood (collected 02-16-24 @ 12:30)  Source: .Blood Blood-Peripheral  Final Report (02-21-24 @ 18:00):    No growth at 5 days    Culture - Blood (collected 02-16-24 @ 12:00)  Source: .Blood Blood-Peripheral  Final Report (02-21-24 @ 18:00):    No growth at 5 days    Culture - Blood (collected 02-13-24 @ 07:30)  Source: .Blood Blood  Final Report (02-18-24 @ 12:01):    No growth at 5 days    Culture - Blood (collected 02-13-24 @ 07:05)  Source: .Blood Blood  Final Report (02-18-24 @ 12:01):    No growth at 5 days        Culture - Sputum (collected 02-18-24 @ 10:00)  Source: ET Tube ET Tube  Gram Stain (02-19-24 @ 18:44):    No polymorphonuclear leukocytes seen per low power field    No Squamous epithelial cells seen per low power field    No organisms seen per oil power field  Final Report (02-19-24 @ 18:44):    Normal Respiratory [place] present        RADIOLOGY & ADDITIONAL TESTS: Reviewed.

## 2024-03-01 NOTE — SWALLOW BEDSIDE ASSESSMENT ADULT - H & P REVIEW
"47 y/o male PMHx cirrhosis, ETOH, open reduction of metatarsal fracture 2022 presents c/o epigastric pain for a few hours. Vomited when he came to the ER. Last BM in the afternoon. Last flatus before the BM. Patient denies fever, chills, constipation, diarrhea, melena, hematochezia, dysuria, hematuria, chest pain, shortness of breath, dizziness, cough"/yes
49 y/o male PMHx cirrhosis, ETOH, open reduction of metatarsal fracture 2022 presents c/o epigastric pain for a few hours. Vomited when he came to the ER. Last BM in the afternoon. Last flatus before the BM.

## 2024-03-01 NOTE — PROGRESS NOTE ADULT - SUBJECTIVE AND OBJECTIVE BOX
Postoperative Day #: 17  Patient seen and examined at bedside resting comfortably, pt had repeat swallow eval today and pureed diet was recommended TF held.  No new complaints offered. +BMs. Pt with high PVRs, however pt states he has a hard time urinating in bed, he wants to be able to walk to bathroom and stand to urinate. Barber to be replace per ICU team.  Denies nausea and vomiting. Tolerating TFs. Afebrile  Denies chest pain, dyspnea, cough.    T(F): 97.8 (03-01-24 @ 08:00), Max: 98.8 (02-29-24 @ 20:00)  HR: 89 (03-01-24 @ 15:00) (80 - 97)  BP: 119/69 (03-01-24 @ 15:00) (118/62 - 134/75)  RR: 28 (03-01-24 @ 15:00) (0 - 30)  SpO2: 98% (03-01-24 @ 15:00) (94% - 99%)  Wt(kg): --  CAPILLARY BLOOD GLUCOSE      PHYSICAL EXAM:  CONSTITUTIONAL: NAD  HEENT: Trickle tube feeds  RESPIRATORY: Clear to ausculation, bilaterally   CARDIOVASCULAR: RRR S1S2  GASTROINTESTINAL: Staples and incision clean/dry/intact. suture place in port site where drain was removed, clean dry dressing appplied. +BS, soft, nontender, no guarding    MUSCULOSKELETAL: calf soft, non tender b/l      LABS:               03-01    142  |  112<H>  |  10  ----------------------------<  103<H>  3.9   |  25  |  0.42<L>    Ca    8.1<L>      01 Mar 2024 03:40  Phos  2.1     03-01  Mg     2.4     03-01    TPro  6.5  /  Alb  1.9<L>  /  TBili  3.3<H>  /  DBili  x   /  AST  106<H>  /  ALT  135<H>  /  AlkPhos  832<H>  03-01      I&O's Detail    29 Feb 2024 07:01  -  01 Mar 2024 07:00  --------------------------------------------------------  IN:    Free Water: 400 mL    IV PiggyBack: 100 mL    Vital High Protein: 120 mL  Total IN: 620 mL    OUT:    Rectal Tube (mL): 300 mL    Voided (mL): 1000 mL  Total OUT: 1300 mL    Total NET: -680 mL      01 Mar 2024 07:01  -  01 Mar 2024 17:43  --------------------------------------------------------  IN:    Free Water: 200 mL    IV PiggyBack: 250 mL    IV PiggyBack: 50 mL    Optimental: 140 mL  Total IN: 640 mL    OUT:  Total OUT: 0 mL    Total NET: 640 mL        Culture Results:   10,000 - 49,000 CFU/mL Escherichia coli (Carbapenem Resistant)  Recarbrio=32ug/mL (Resistant),  interpretations based on FDA breakpoints. *!* (02-26 @ 11:30)  Culture Results:   No growth at 5 days (02-25 @ 05:40)  Culture Results:   No growth at 5 days (02-25 @ 05:40)    49 Y/O Male with duodenal perforation s/p ex lap, kyrie patch POD#17, now recommended for pureed diet. +UTI. Leukocytosis improving  PLAN:  - D/C NGT and TF  - advance to Pureed diet  - d/c restraints  - dvt/GI ppx  - ABx per ID  - Cont to monitor urine output  - Trend labs  - Continue ICU management, possible downgrade by ICU team today to monitored floor  - discussed with Dr. Blanco

## 2024-03-01 NOTE — PROGRESS NOTE ADULT - NS ATTEND AMEND GEN_ALL_CORE FT
Pt is a 49 yo M with h/o cirrhosis, EtOH use disorder, open reduction metatarsal fracture who presented 2 to perforated duodenal ulcer and taken to the OR; s/p Exploratory laparotomy with closure of perforated duodenum using omental patch. ICU course complicated by ARDS, septic shock, and EMILIA. s/p extubation with overall clinical improvement    Resp: Breathing comfortably on RA  ID: Finish course of Meropenem and empiric enteral Vanco for C. diff as per ID  HEME: DVT prophylaxis with Lovenox  FEN: Po diet consistency as per Speech/Swallow evaluation  GI: F/u as per Surg  Neuro/Psych: May cont nocturnal Trazadone   Social: Possible transfer to Med/Surg floor
I have reviewed all pertinent clinical information and agree with the NP's note.  Labs, new imaging (if applicable) and vitals reviewed.  Agree with the above assessment and plan.      Carrington Gilliam DO  Chief, Infectious Disease at [facility]  Reachable via [employer] Teams or ID office: [phone]  Weekdays: After 5pm, please call [phone] for all inquiries and new consults  Weekends: Message on-call infectious disease physician via teams (silvino Doran)

## 2024-03-01 NOTE — SWALLOW BEDSIDE ASSESSMENT ADULT - SWALLOW EVAL: DIAGNOSIS
pt presented with baseline cough/congestion therefore difficult to assess bedside. oropharyngeal dysphagia for puree/moderate & mild thick liquid. oral phase marked by adequate labial seal/oral containment, slightly prolonged but efficient bolus manipulation/formation and transport posterior. +initiation of pharyngeal swallow trigger with hyolaryngeal elevation to palpation. noted multiple swallows 2-3 and cough/wet vocal quality across textures. suggest pt not safe for po diet at risk for aspiration. oral means for nutrition/hydration and medication contraindicated at this time.
Pt presented with improved overt oropharyngeal skills as compared with initial swallow eval on 2/27/24. Pt trialed with puree, regular solid, moderately/mildly-thick, and thin liquid. He presented with oral skills marked by timely A-P transit for all consistencies, and adequate mastication/bolus formation for regular solid. Overtly adequate suspected oropharyngeal skills for puree, regular solid, moderately-thick, and mildly-thick liquid marked by suspected timely pharyngeal swallow trigger, clear vocal quality post-swallow, and no overt signs of airway protection deficits/aspiration. Suspected pharyngeal dysphagia for thin liquid marked by multiple swallows & wet vocal quality post swallow suggestive of airway protection deficits. Despite pt's overtly adequate demonstration of oropharyngeal skills for regular solid, recommendation for puree at this time d/t recent operation/vomiting; discussed with patient who verbalized agreement. Recommend initiation of puree/mildly-thick liquid

## 2024-03-01 NOTE — CHART NOTE - NSCHARTNOTEFT_GEN_A_CORE
***NOTE INCOMPLETE    MICU DOWN GRADE NOTE  Admitting attending:  Case discussed with:    Patient is a 48y old  Male who presents with a chief complaint of Perforated viscus (29 Feb 2024 15:31)    HPI:  47 y/o male PMHx cirrhosis, ETOH, open reduction of metatarsal fracture 2022 presents c/o epigastric pain for a few hours. Vomited when he came to the ER. Last BM in the afternoon. Last flatus before the BM. Patient denies fever, chills, constipation, diarrhea, melena, hematochezia, dysuria, hematuria, chest pain, shortness of breath, dizziness, cough.   (13 Feb 2024 03:01)    Brief Hospital Course:  Pt presented with abd pain found to havr a perforated viscus taken to OR for exlap on 2/3 where patient found with perforated duodenal ulcer s/p Aden patch. Pt admitted to ICU post-op intubated while in the ICU course c/b aspiration pneumonitis, hypoxic respiratory failure, ARDS, shock, EMILIA found with strep and candida in abscess cx.  Pt then extubated on 2/24 with no complications now on NC with O2 saturations >98%. Course further c/b distributive shock likely 2/2 sepsis in the setting of possible UTI requiring pressors support. Pt has now been transitioned off levophed (2/26-2/27) empirically being treated for UTI. Urine cx positive for CRE e.coli. In addition pt with multiple episodes of diarrhea with flexiseal in place empirically being treated for c-diff with enteral vancomycin as per ID now improving.     INTERVAL HPI/OVERNIGHT EVENTS:  Pt seen and examined at bedside. Pt laying comfortably in bed with NC in place. Pt awake and alert answering questions appropriately. FOX drain removed yesterday by surgery ACP. Pt remains with drainage from site likely ascites fluid. Pt retaining urine gautam placed this am. Pt with no acute complaints.     REVIEW OF SYSTEMS:  All negative other than what is stated above.     MEDICATIONS:  acetaminophen     Tablet .. 650 milliGRAM(s) Oral every 6 hours PRN  chlorhexidine 2% Cloths 1 Application(s) Topical <User Schedule>  enoxaparin Injectable 40 milliGRAM(s) SubCutaneous every 24 hours  folic acid 1 milliGRAM(s) Oral daily  influenza   Vaccine 0.5 milliLiter(s) IntraMuscular once  meropenem  IVPB 1000 milliGRAM(s) IV Intermittent every 8 hours  nystatin Powder 1 Application(s) Topical two times a day  pantoprazole    Tablet 40 milliGRAM(s) Oral before breakfast  thiamine 100 milliGRAM(s) Enteral Tube daily  traZODone 25 milliGRAM(s) Oral at bedtime  vancomycin    Solution 125 milliGRAM(s) Oral every 6 hours      T(C): 36.6 (03-01-24 @ 08:00), Max: 37.3 (02-29-24 @ 16:00)  HR: 83 (03-01-24 @ 11:29) (80 - 97)  BP: 126/66 (03-01-24 @ 11:00) (118/62 - 134/75)  RR: 25 (03-01-24 @ 11:00) (0 - 30)  SpO2: 97% (03-01-24 @ 11:29) (94% - 99%)  Wt(kg): --Vital Signs Last 24 Hrs  T(C): 36.6 (01 Mar 2024 08:00), Max: 37.3 (29 Feb 2024 16:00)  T(F): 97.8 (01 Mar 2024 08:00), Max: 99.1 (29 Feb 2024 16:00)  HR: 83 (01 Mar 2024 11:29) (80 - 97)  BP: 126/66 (01 Mar 2024 11:00) (118/62 - 134/75)  BP(mean): 85 (01 Mar 2024 11:00) (79 - 93)  RR: 25 (01 Mar 2024 11:00) (0 - 30)  SpO2: 97% (01 Mar 2024 11:29) (94% - 99%)    Parameters below as of 01 Mar 2024 11:29  Patient On (Oxygen Delivery Method): room air        PHYSICAL EXAM:  GENERAL: NAD, pt has lost weight, laying in bed comfortably  HEAD:  Atraumatic, Normocephalic  EYES: Pupils reactive to light. Conjunctiva and sclera clear  NECK: Supple, No JVD  CHEST/LUNG: Clear to auscultation bilaterally; No wheeze  HEART: Regular rate and rhythm; No murmurs, rubs, or gallops  ABDOMEN: Soft, much less distended than my previous exam last week. Bowel sounds present. Surgical site looks clean, dry, and intact.  EXTREMITIES:  2+ Peripheral Pulses, No clubbing, cyanosis, or edema  NEUROLOGY: Moving all 4 extremities, following commands.     Consultant(s) Notes Reviewed:  [x ] YES  [ ] NO  Care Discussed with Consultants/Other Providers [ x] YES  [ ] NO    LABS:                        12.5   22.74 )-----------( 200      ( 29 Feb 2024 03:40 )             40.3     03-01    142  |  112<H>  |  10  ----------------------------<  103<H>  3.9   |  25  |  0.42<L>    Ca    8.1<L>      01 Mar 2024 03:40  Phos  2.1     03-01  Mg     2.4     03-01    TPro  6.5  /  Alb  1.9<L>  /  TBili  3.3<H>  /  DBili  x   /  AST  106<H>  /  ALT  135<H>  /  AlkPhos  832<H>  03-01      Urinalysis Basic - ( 01 Mar 2024 03:40 )    Color: x / Appearance: x / SG: x / pH: x  Gluc: 103 mg/dL / Ketone: x  / Bili: x / Urobili: x   Blood: x / Protein: x / Nitrite: x   Leuk Esterase: x / RBC: x / WBC x   Sq Epi: x / Non Sq Epi: x / Bacteria: x      CAPILLARY BLOOD GLUCOSE            Urinalysis Basic - ( 01 Mar 2024 03:40 )    Color: x / Appearance: x / SG: x / pH: x  Gluc: 103 mg/dL / Ketone: x  / Bili: x / Urobili: x   Blood: x / Protein: x / Nitrite: x   Leuk Esterase: x / RBC: x / WBC x   Sq Epi: x / Non Sq Epi: x / Bacteria: x        RADIOLOGY & ADDITIONAL TESTS:    Imaging Personally Reviewed:  [x ] YES  [ ] NO    To follow up:  48M w/ cirrhosis, EtOH use disorder, open reduction metatarsal fracture p/w perforated duodenal ulcer w/ aden patch. Course complicated by ARDS, septic shock, and EMILIA. Now improving.     -ARDS likely in setting of aspiration much improved. Extubated on 2/24 to NC. Continue to titrate down oxygen supplementation as tolerated while maintaining a sat O2 >92%.   - ***NOTE INCOMPLETE    MICU DOWN GRADE NOTE  Admitting attending:  Case discussed with:    Patient is a 48y old  Male who presents with a chief complaint of Perforated viscus (29 Feb 2024 15:31)    HPI:  47 y/o male PMHx cirrhosis, ETOH, open reduction of metatarsal fracture 2022 presents c/o epigastric pain for a few hours. Vomited when he came to the ER. Last BM in the afternoon. Last flatus before the BM. Patient denies fever, chills, constipation, diarrhea, melena, hematochezia, dysuria, hematuria, chest pain, shortness of breath, dizziness, cough.   (13 Feb 2024 03:01)    Brief Hospital Course:  Pt presented with abd pain found to havr a perforated viscus taken to OR for exlap on 2/3 where patient found with perforated duodenal ulcer s/p Aden patch. Pt admitted to ICU post-op intubated while in the ICU course c/b aspiration pneumonitis, hypoxic respiratory failure, ARDS, shock, EMILIA found with strep and candida in abscess cx.  Pt then extubated on 2/24 with no complications now on NC with O2 saturations >98%. Course further c/b distributive shock likely 2/2 sepsis in the setting of possible UTI requiring pressors support. Pt has now been transitioned off levophed (2/26-2/27) empirically being treated for UTI. Urine cx positive for CRE e.coli. In addition pt with multiple episodes of diarrhea with flexiseal in place empirically being treated for c-diff with enteral vancomycin as per ID now improving.     INTERVAL HPI/OVERNIGHT EVENTS:  Pt seen and examined at bedside. Pt laying comfortably in bed with NC in place. Pt awake and alert answering questions appropriately. FOX drain removed yesterday by surgery ACP. Pt remains with drainage from site likely ascites fluid. Pt retaining urine gautam placed this am. Pt with no acute complaints.     REVIEW OF SYSTEMS:  All negative other than what is stated above.     MEDICATIONS:  acetaminophen     Tablet .. 650 milliGRAM(s) Oral every 6 hours PRN  chlorhexidine 2% Cloths 1 Application(s) Topical <User Schedule>  enoxaparin Injectable 40 milliGRAM(s) SubCutaneous every 24 hours  folic acid 1 milliGRAM(s) Oral daily  influenza   Vaccine 0.5 milliLiter(s) IntraMuscular once  meropenem  IVPB 1000 milliGRAM(s) IV Intermittent every 8 hours  nystatin Powder 1 Application(s) Topical two times a day  pantoprazole    Tablet 40 milliGRAM(s) Oral before breakfast  thiamine 100 milliGRAM(s) Enteral Tube daily  traZODone 25 milliGRAM(s) Oral at bedtime  vancomycin    Solution 125 milliGRAM(s) Oral every 6 hours      T(C): 36.6 (03-01-24 @ 08:00), Max: 37.3 (02-29-24 @ 16:00)  HR: 83 (03-01-24 @ 11:29) (80 - 97)  BP: 126/66 (03-01-24 @ 11:00) (118/62 - 134/75)  RR: 25 (03-01-24 @ 11:00) (0 - 30)  SpO2: 97% (03-01-24 @ 11:29) (94% - 99%)  Wt(kg): --Vital Signs Last 24 Hrs  T(C): 36.6 (01 Mar 2024 08:00), Max: 37.3 (29 Feb 2024 16:00)  T(F): 97.8 (01 Mar 2024 08:00), Max: 99.1 (29 Feb 2024 16:00)  HR: 83 (01 Mar 2024 11:29) (80 - 97)  BP: 126/66 (01 Mar 2024 11:00) (118/62 - 134/75)  BP(mean): 85 (01 Mar 2024 11:00) (79 - 93)  RR: 25 (01 Mar 2024 11:00) (0 - 30)  SpO2: 97% (01 Mar 2024 11:29) (94% - 99%)    Parameters below as of 01 Mar 2024 11:29  Patient On (Oxygen Delivery Method): room air        PHYSICAL EXAM:  GENERAL: NAD, pt has lost weight, laying in bed comfortably  HEAD:  Atraumatic, Normocephalic  EYES: Pupils reactive to light. Conjunctiva and sclera clear  NECK: Supple, No JVD  CHEST/LUNG: Clear to auscultation bilaterally; No wheeze  HEART: Regular rate and rhythm; No murmurs, rubs, or gallops  ABDOMEN: Soft, much less distended than my previous exam last week. Bowel sounds present. Surgical site looks clean, dry, and intact.  EXTREMITIES:  2+ Peripheral Pulses, No clubbing, cyanosis, or edema  NEUROLOGY: Moving all 4 extremities, following commands.     Consultant(s) Notes Reviewed:  [x ] YES  [ ] NO  Care Discussed with Consultants/Other Providers [ x] YES  [ ] NO    LABS:                        12.5   22.74 )-----------( 200      ( 29 Feb 2024 03:40 )             40.3     03-01    142  |  112<H>  |  10  ----------------------------<  103<H>  3.9   |  25  |  0.42<L>    Ca    8.1<L>      01 Mar 2024 03:40  Phos  2.1     03-01  Mg     2.4     03-01    TPro  6.5  /  Alb  1.9<L>  /  TBili  3.3<H>  /  DBili  x   /  AST  106<H>  /  ALT  135<H>  /  AlkPhos  832<H>  03-01      Urinalysis Basic - ( 01 Mar 2024 03:40 )    Color: x / Appearance: x / SG: x / pH: x  Gluc: 103 mg/dL / Ketone: x  / Bili: x / Urobili: x   Blood: x / Protein: x / Nitrite: x   Leuk Esterase: x / RBC: x / WBC x   Sq Epi: x / Non Sq Epi: x / Bacteria: x      CAPILLARY BLOOD GLUCOSE            Urinalysis Basic - ( 01 Mar 2024 03:40 )    Color: x / Appearance: x / SG: x / pH: x  Gluc: 103 mg/dL / Ketone: x  / Bili: x / Urobili: x   Blood: x / Protein: x / Nitrite: x   Leuk Esterase: x / RBC: x / WBC x   Sq Epi: x / Non Sq Epi: x / Bacteria: x        RADIOLOGY & ADDITIONAL TESTS:    Imaging Personally Reviewed:  [x ] YES  [ ] NO    To follow up:  48M w/ cirrhosis, EtOH use disorder, open reduction metatarsal fracture p/w perforated duodenal ulcer w/ aden patch. Course complicated by ARDS, septic shock, and EMILIA; pt now extubated, off pressors and clinically improved.    -ARDS likely in setting of aspiration much improved. Extubated on 2/24 to NC. Continue to titrate down oxygen supplementation as tolerated while maintaining a sat O2 >92%.   - Septic shock requiring pressor support likely 2/2 MDR E Coli on UCx. Now off pressors (2/26/-2/27). Continuing meropenem as per discussion with ID as pt is clinically improving. Completed abx for strep mitis and salavaris, diflucan for candida in peritoneal fluid s/p diflucan.   - Pt with multiple loose stools with miralax. Flexiseal placed. Covering for c diff empirically with vancomycin PO as per ID.   - EMILIA likely ATN, now resolved; indwelling gautam replaced for urinary retention  monitor I/Os and replete electrolytes  - Duodenal ulcer perforation s/p aden patch. FOX drain removed yesterday. Pt starting to drain ascitic fluid around it from the cirrhosis. Will discuss with surgery whether opening can be sutured to control drainage. Surgery following continue to f/u recs.   - Pt on TFs continue to slowly uptitrate TFs to goal as pt has had a hx of vomiting episodes in the past however no recurrent vomiting episodes. Please f/u repeat S/S consult as patient failed prior S/S.   - cont trazodone qhs and daily thiamine and folic acid for daily etoh use daily.    Pt no longer requires ICU level of care and is now hemodynamically stable for downgrade to medicine floor ***NOTE INCOMPLETE    MICU DOWN GRADE NOTE  Admitting attending:  Case discussed with:    Patient is a 48y old  Male who presents with a chief complaint of Perforated viscus (29 Feb 2024 15:31)    HPI:  47 y/o male PMHx cirrhosis, ETOH, open reduction of metatarsal fracture 2022 presents c/o epigastric pain for a few hours. Vomited when he came to the ER. Last BM in the afternoon. Last flatus before the BM. Patient denies fever, chills, constipation, diarrhea, melena, hematochezia, dysuria, hematuria, chest pain, shortness of breath, dizziness, cough.   (13 Feb 2024 03:01)    Brief Hospital Course:  Pt presented with abd pain found to havr a perforated viscus taken to OR for exlap on 2/3 where patient found with perforated duodenal ulcer s/p Aden patch. Pt admitted to ICU post-op intubated while in the ICU course c/b aspiration pneumonitis, hypoxic respiratory failure, ARDS, shock, EMILIA found with strep and candida in abscess cx.  Pt then extubated on 2/24 with no complications now on NC with O2 saturations >98%. Course further c/b distributive shock likely 2/2 sepsis in the setting of possible UTI requiring pressors support. Pt has now been transitioned off levophed (2/26-2/27) empirically being treated for UTI. Urine cx positive for CRE e.coli. In addition pt with multiple episodes of diarrhea with flexiseal in place empirically being treated for c-diff with enteral vancomycin as per ID now improving.     INTERVAL HPI/OVERNIGHT EVENTS:  Pt seen and examined at bedside. Pt laying comfortably in bed with NC in place. Pt awake and alert answering questions appropriately. FOX drain removed yesterday by surgery ACP. Pt remains with drainage from site likely ascites fluid. Pt retaining urine gautam placed this am. Pt with no acute complaints.     REVIEW OF SYSTEMS:  All negative other than what is stated above.     MEDICATIONS:  acetaminophen     Tablet .. 650 milliGRAM(s) Oral every 6 hours PRN  chlorhexidine 2% Cloths 1 Application(s) Topical <User Schedule>  enoxaparin Injectable 40 milliGRAM(s) SubCutaneous every 24 hours  folic acid 1 milliGRAM(s) Oral daily  influenza   Vaccine 0.5 milliLiter(s) IntraMuscular once  meropenem  IVPB 1000 milliGRAM(s) IV Intermittent every 8 hours  nystatin Powder 1 Application(s) Topical two times a day  pantoprazole    Tablet 40 milliGRAM(s) Oral before breakfast  thiamine 100 milliGRAM(s) Enteral Tube daily  traZODone 25 milliGRAM(s) Oral at bedtime  vancomycin    Solution 125 milliGRAM(s) Oral every 6 hours      T(C): 36.6 (03-01-24 @ 08:00), Max: 37.3 (02-29-24 @ 16:00)  HR: 83 (03-01-24 @ 11:29) (80 - 97)  BP: 126/66 (03-01-24 @ 11:00) (118/62 - 134/75)  RR: 25 (03-01-24 @ 11:00) (0 - 30)  SpO2: 97% (03-01-24 @ 11:29) (94% - 99%)  Wt(kg): --Vital Signs Last 24 Hrs  T(C): 36.6 (01 Mar 2024 08:00), Max: 37.3 (29 Feb 2024 16:00)  T(F): 97.8 (01 Mar 2024 08:00), Max: 99.1 (29 Feb 2024 16:00)  HR: 83 (01 Mar 2024 11:29) (80 - 97)  BP: 126/66 (01 Mar 2024 11:00) (118/62 - 134/75)  BP(mean): 85 (01 Mar 2024 11:00) (79 - 93)  RR: 25 (01 Mar 2024 11:00) (0 - 30)  SpO2: 97% (01 Mar 2024 11:29) (94% - 99%)    Parameters below as of 01 Mar 2024 11:29  Patient On (Oxygen Delivery Method): room air        PHYSICAL EXAM:  GENERAL: NAD, pt has lost weight, laying in bed comfortably  HEAD:  Atraumatic, Normocephalic  EYES: Pupils reactive to light. Conjunctiva and sclera clear  NECK: Supple, No JVD  CHEST/LUNG: Clear to auscultation bilaterally; No wheeze  HEART: Regular rate and rhythm; No murmurs, rubs, or gallops  ABDOMEN: Soft, much less distended than my previous exam last week. Bowel sounds present. Surgical site looks clean, dry, and intact.  EXTREMITIES:  2+ Peripheral Pulses, No clubbing, cyanosis, or edema  NEUROLOGY: Moving all 4 extremities, following commands.     Consultant(s) Notes Reviewed:  [x ] YES  [ ] NO  Care Discussed with Consultants/Other Providers [ x] YES  [ ] NO    LABS:                        12.5   22.74 )-----------( 200      ( 29 Feb 2024 03:40 )             40.3     03-01    142  |  112<H>  |  10  ----------------------------<  103<H>  3.9   |  25  |  0.42<L>    Ca    8.1<L>      01 Mar 2024 03:40  Phos  2.1     03-01  Mg     2.4     03-01    TPro  6.5  /  Alb  1.9<L>  /  TBili  3.3<H>  /  DBili  x   /  AST  106<H>  /  ALT  135<H>  /  AlkPhos  832<H>  03-01      Urinalysis Basic - ( 01 Mar 2024 03:40 )    Color: x / Appearance: x / SG: x / pH: x  Gluc: 103 mg/dL / Ketone: x  / Bili: x / Urobili: x   Blood: x / Protein: x / Nitrite: x   Leuk Esterase: x / RBC: x / WBC x   Sq Epi: x / Non Sq Epi: x / Bacteria: x      CAPILLARY BLOOD GLUCOSE            Urinalysis Basic - ( 01 Mar 2024 03:40 )    Color: x / Appearance: x / SG: x / pH: x  Gluc: 103 mg/dL / Ketone: x  / Bili: x / Urobili: x   Blood: x / Protein: x / Nitrite: x   Leuk Esterase: x / RBC: x / WBC x   Sq Epi: x / Non Sq Epi: x / Bacteria: x        RADIOLOGY & ADDITIONAL TESTS:    Imaging Personally Reviewed:  [x ] YES  [ ] NO    To follow up:  48M w/ cirrhosis, EtOH use disorder, open reduction metatarsal fracture p/w perforated duodenal ulcer w/ aden patch. Course complicated by ARDS, septic shock, and EMILIA; pt now extubated, off pressors and clinically improved.    -ARDS likely in setting of aspiration much improved. Extubated on 2/24 to NC. Continue to titrate down oxygen supplementation as tolerated while maintaining a sat O2 >92%.   - Septic shock requiring pressor support likely 2/2 MDR E Coli on UCx. Now off pressors (2/26/-2/27). Continuing meropenem as per discussion with ID as pt is clinically improving. Completed abx for strep mitis and salavaris, diflucan for candida in peritoneal fluid s/p diflucan.   - Pt with multiple loose stools with miralax. Flexiseal placed. Covering for c diff empirically with vancomycin PO as per ID.   - EMILIA likely ATN, now resolved; indwelling gautam replaced for urinary retention  monitor I/Os and replete electrolytes  - Duodenal ulcer perforation s/p aden patch. FOX drain removed yesterday. Pt starting to drain ascitic fluid around it from the cirrhosis. Will discuss with surgery whether opening can be sutured to control drainage. Surgery following continue to f/u recs.   - Pt on TFs continue to slowly uptitrate TFs to goal as pt has had a hx of vomiting episodes in the past however no recurrent vomiting episodes. Please f/u repeat S/S consult as patient failed prior S/S.   - cont trazodone qhs and daily thiamine and folic acid for daily etoh use daily.  - PT/OT    Pt no longer requires ICU level of care and is now hemodynamically stable for downgrade to medicine floor MICU DOWN GRADE NOTE  Accepting attending: Dr Whitlock  Case discussed with: Dr Capps    Patient is a 48y old  Male who presents with a chief complaint of Perforated viscus (29 Feb 2024 15:31)    HPI:  49 y/o male PMHx cirrhosis, ETOH, open reduction of metatarsal fracture 2022 presents c/o epigastric pain for a few hours. Vomited when he came to the ER. Last BM in the afternoon. Last flatus before the BM. Patient denies fever, chills, constipation, diarrhea, melena, hematochezia, dysuria, hematuria, chest pain, shortness of breath, dizziness, cough.   (13 Feb 2024 03:01)    Brief Hospital Course:  Pt presented with abd pain found to havr a perforated viscus taken to OR for exlap on 2/3 where patient found with perforated duodenal ulcer s/p Aden patch. Pt admitted to ICU post-op intubated while in the ICU course c/b aspiration pneumonitis, hypoxic respiratory failure, ARDS, shock, EMILIA found with strep and candida in abscess cx.  Pt then extubated on 2/24 with no complications now on NC with O2 saturations >98%. Course further c/b distributive shock likely 2/2 sepsis in the setting of possible UTI requiring pressors support. Pt has now been transitioned off levophed (2/26-2/27) empirically being treated for UTI. Urine cx positive for CRE e.coli. In addition pt with multiple episodes of diarrhea with flexiseal in place empirically being treated for c-diff with enteral vancomycin as per ID now improving.     INTERVAL HPI/OVERNIGHT EVENTS:  3/2: Pt seen and examined at bedside. Pt laying comfortably in bed with NC in place. Pt awake and alert answering questions appropriately. FOX drain removed yesterday by surgery ACP. Pt remains with drainage from site likely ascites fluid. Pt retaining urine gautam placed this am. Pt with no acute complaints.     3/3: suture placed by leaking drain site. now control of ascities drainage. ngt removed and pt tolerating purreed diet. pt voided and now no longer requiring gautam     REVIEW OF SYSTEMS:  All negative other than what is stated above.     MEDICATIONS:  acetaminophen     Tablet .. 650 milliGRAM(s) Oral every 6 hours PRN  chlorhexidine 2% Cloths 1 Application(s) Topical <User Schedule>  enoxaparin Injectable 40 milliGRAM(s) SubCutaneous every 24 hours  folic acid 1 milliGRAM(s) Oral daily  influenza   Vaccine 0.5 milliLiter(s) IntraMuscular once  meropenem  IVPB 1000 milliGRAM(s) IV Intermittent every 8 hours  nystatin Powder 1 Application(s) Topical two times a day  pantoprazole    Tablet 40 milliGRAM(s) Oral before breakfast  thiamine 100 milliGRAM(s) Enteral Tube daily  traZODone 25 milliGRAM(s) Oral at bedtime  vancomycin    Solution 125 milliGRAM(s) Oral every 6 hours      T(C): 36.6 (03-01-24 @ 08:00), Max: 37.3 (02-29-24 @ 16:00)  HR: 83 (03-01-24 @ 11:29) (80 - 97)  BP: 126/66 (03-01-24 @ 11:00) (118/62 - 134/75)  RR: 25 (03-01-24 @ 11:00) (0 - 30)  SpO2: 97% (03-01-24 @ 11:29) (94% - 99%)  Wt(kg): --Vital Signs Last 24 Hrs  T(C): 36.6 (01 Mar 2024 08:00), Max: 37.3 (29 Feb 2024 16:00)  T(F): 97.8 (01 Mar 2024 08:00), Max: 99.1 (29 Feb 2024 16:00)  HR: 83 (01 Mar 2024 11:29) (80 - 97)  BP: 126/66 (01 Mar 2024 11:00) (118/62 - 134/75)  BP(mean): 85 (01 Mar 2024 11:00) (79 - 93)  RR: 25 (01 Mar 2024 11:00) (0 - 30)  SpO2: 97% (01 Mar 2024 11:29) (94% - 99%)    Parameters below as of 01 Mar 2024 11:29  Patient On (Oxygen Delivery Method): room air        PHYSICAL EXAM:  GENERAL: NAD, pt has lost weight, laying in bed comfortably  HEAD:  Atraumatic, Normocephalic  EYES: Pupils reactive to light. Conjunctiva and sclera clear  NECK: Supple, No JVD  CHEST/LUNG: Clear to auscultation bilaterally; No wheeze  HEART: Regular rate and rhythm; No murmurs, rubs, or gallops  ABDOMEN: Soft, much less distended than my previous exam last week. Bowel sounds present. Surgical site looks clean, dry, and intact.  EXTREMITIES:  2+ Peripheral Pulses, No clubbing, cyanosis, or edema  NEUROLOGY: Moving all 4 extremities, following commands.     Consultant(s) Notes Reviewed:  [x ] YES  [ ] NO  Care Discussed with Consultants/Other Providers [ x] YES  [ ] NO    LABS:                        12.5   22.74 )-----------( 200      ( 29 Feb 2024 03:40 )             40.3     03-01    142  |  112<H>  |  10  ----------------------------<  103<H>  3.9   |  25  |  0.42<L>    Ca    8.1<L>      01 Mar 2024 03:40  Phos  2.1     03-01  Mg     2.4     03-01    TPro  6.5  /  Alb  1.9<L>  /  TBili  3.3<H>  /  DBili  x   /  AST  106<H>  /  ALT  135<H>  /  AlkPhos  832<H>  03-01      Urinalysis Basic - ( 01 Mar 2024 03:40 )    Color: x / Appearance: x / SG: x / pH: x  Gluc: 103 mg/dL / Ketone: x  / Bili: x / Urobili: x   Blood: x / Protein: x / Nitrite: x   Leuk Esterase: x / RBC: x / WBC x   Sq Epi: x / Non Sq Epi: x / Bacteria: x      CAPILLARY BLOOD GLUCOSE            Urinalysis Basic - ( 01 Mar 2024 03:40 )    Color: x / Appearance: x / SG: x / pH: x  Gluc: 103 mg/dL / Ketone: x  / Bili: x / Urobili: x   Blood: x / Protein: x / Nitrite: x   Leuk Esterase: x / RBC: x / WBC x   Sq Epi: x / Non Sq Epi: x / Bacteria: x        RADIOLOGY & ADDITIONAL TESTS:    Imaging Personally Reviewed:  [x ] YES  [ ] NO    To follow up:  48M w/ cirrhosis, EtOH use disorder, open reduction metatarsal fracture p/w perforated duodenal ulcer w/ aden patch. Course complicated by ARDS, septic shock, and EMILIA; pt now extubated, off pressors and clinically improved.    -ARDS likely in setting of aspiration much improved. Extubated on 2/24 to NC. Continue to titrate down oxygen supplementation as tolerated while maintaining a sat O2 >92%.   - Septic shock requiring pressor support likely 2/2 MDR E Coli on UCx. Now off pressors (2/26/-2/27). Continuing meropenem as per discussion with ID for 7 days as pt is clinically improving. Completed abx for strep mitis and salavaris, diflucan for candida in peritoneal fluid s/p diflucan.   - Pt with multiple loose stools with miralax. Flexiseal placed for loose stools. Covering for c diff empirically with vancomycin PO as per ID.   - EMILIA likely ATN, now resolved; voiding freely, monitor lytes   - Duodenal ulcer perforation s/p aden patch. FOX drain removed 2 days ago and site sutured.   - passed s/s. NGT removed and started on pureed diet   - cont trazodone qhs and daily thiamine and folic acid for daily etoh use daily.  - PT/OT as pt is very debilitated from critical illness     Pt no longer requires ICU level of care and is now hemodynamically stable for downgrade to medicine floor  d/w dr mora and dr capps

## 2024-03-01 NOTE — SWALLOW BEDSIDE ASSESSMENT ADULT - ASR SWALLOW RECOMMEND DIAG
to objectively assess swallow function, rule out aspiration, and determine safest/least restrictive diet consistencies/VFSS/MBS

## 2024-03-01 NOTE — PROGRESS NOTE ADULT - ASSESSMENT
49 y/o male PMHx cirrhosis, ETOH, open reduction of metatarsal fracture 2022 presents c/o epigastric pain for a few hours. Vomited when he came to the ER. Last BM in the afternoon. Last flatus before the BM. Patient denies fever, chills, constipation, diarrhea, melena, hematochezia, dysuria, hematuria, chest pain, shortness of breath, dizziness, cough.  CT (I personally reviewed) Scattered foci of free intraperitoneal air, consistent with perforated viscus.  imaging also shows cirrhosis presumably from chronic alcohol use   was started on Zosyn and fluconazole   would start workup on cirrhosis as well   will follow cultures and target out therapy if able     2/15: cultures positive for several Streptococcus species, will continue antibiotics, still febrile and on pressors, weaning vent as tolerated. patient remains critically ill, 3rd spacing and getting albumin    216: remains intubated , requiring  sedation, on pressors, Streptococcus in cultures, remains on antibiotics, very quiet bowel sounds, no BMs on day shift thus far, little output on NGT  2/20: remains on vent, BP better, fever curve better, completed zosyn but will give 3 more days of ceftriaxone and fluconazole. if Qtc is prolonged can stop fluconazole, please keep the Qtc on monitor,   2/21: day 8 of intubation,  afebrile, UGIS cancelled, discussed with ICU the need for a CT scan which was done and did not show extravasation of contrast, no obvious leak, possibly starting tube feeds. nearly complete with antibiotics, would try to avoid a central line or TPN if able to tolerate feeds    2/23: completed antibiotics, discuss with surgery about drain and possible removal(could it be ascites that is draining)  2/26: persistently febrile, leukocytosis jumped quite high, blood cultures negative, UA not very impressive with mild pyuria, trend bili and wbc count   2/27: fever curve better, failed speech and swallow, continue to try to meet nutritional needs, please perform PT and OT with patient, follow cultures, continue meropenem. since having loose stool please stop miralax and senna unless there is a concern hepatic encephalopathy in which case lactulose should be used with a goal of 2-3 soft BMs per day.   2/28: NGT was in place sitting in chair off oxyegn, following commands, weak voice but stronger, grew NDM ecoli , absolutely no urinary symptoms. will not treat this highly resistant ecoli though he needs to be on contact precautions to prevent the spread. Will reach out to lab to test for sensitivities against novel antibiotics as he does not have many options if he develops an active infection with the ecoli isolate. Therefore, ok with continuing the meropenem    2/29: day 5 of meropenem, loose stool while on miralax which has been stopped and rectal tube placed, cannot test for Clostridioides difficile due to laxative use,ok to empirically add PO vancomycin due to his serious illness and cirrhosis with recent GI surgery.   3/1: improving, seen in ICU, pt is awake and alert, good mental status, had fevers yesterday, none today so far, RA, WBC high but better overall 22.74, Cr ok, LFTs elevated, all BCs NGTD, UC with  E. Coli, pt on contact precautions. Meropenem IV continued, day #6, needs one more day. Vancomycin po continued, pt with rectal tube, no stool in bag, some soft stool in the tube.   Pt passed speech and swallow evaluation this morning, NGT most likely will be removed. PT f/up is pending, pt  is pending transfer to the medicine floor later today.     Plan:  continue meropenem  for now, plan for 7 days(Day #6)    trend fevers and wbc count   Not immune to HBV-will offer vaccination for HBV once better closer to discharge  surgical site per surgery   FOX was removed yesterday by surgery   Hepatosplenomegaly with evidence portal hypertension (known which will need to be addressed chronically as he recovers from this acute event), outpatient hepatology follow up   physical and occupational therapy  incentive spirometry  optimize nutrition status, passed speech and swallow evaluation today   Ok to continue PO vancomycin 125mg q6hrs as empiric treatment   d/c rectal tube once stool is more formed   maintain contact precautions     Discussed with Dr. Gilliam   Discussed with [facility]

## 2024-03-01 NOTE — SWALLOW BEDSIDE ASSESSMENT ADULT - SWALLOW EVAL: RECOMMENDED FEEDING/EATING TECHNIQUES
oral hygiene
allow for swallow between intakes/crush medication (when feasible)/maintain upright posture during/after eating for 30 mins/oral hygiene/position upright (90 degrees)/small sips/bites

## 2024-03-01 NOTE — PROGRESS NOTE ADULT - ASSESSMENT
48M w/ cirrhosis, EtOH use disorder, open reduction metatarsal fracture p/w perforated duodenal ulcer w/ kyrie patch. Course complicated by ARDS, septic shock, and EMILIA; pt now extubated, off pressors and clinically improved.    #Neuro - Doing well. cont trazodone qhs and daily thiamine and folic acid for daily etoh use  daily PT as pt is debilitated from prolonged intubation course and critical illness     #CV - HD stable, remains off pressors  TTE with nl LV function     #Pulm - ARDS likely in setting of aspiration much improved. Extubated and satting well on RA with better airway clearance   cxr wnl     #ID- MDR E Coli on UCx. Continuing meropenem as per discussion with ID as pt is clinincally improving. Completed abx for strep mitis and salavaris, diflucan for candida in peritoneal fluid s/p diflucan. Loose stool with miralax. Covering for c diff empirically with vancomycin PO as per ID     #Renal/metabolic - EMILIA likely ATN, now resolved; indwelling gautam replaced for urinary retention  monitor I/Os and replete electrolytes    #GI- s/p kyrie patch for perforated duodenum; per discussion w/ surgery FOX removed yesterday Pt starting to drain ascitic fluid around it from the cirrhosis. Will discuss with surgery whether opening can be sutured  to control drainage.  cont TF and uptitrate to goal and repeat s/s as failed in past     #Heme - hgb stable; monitor for now    #Endo - Glucose on AM BMPs WNL.  #PPx - lovenox q24    d/w dr mora

## 2024-03-01 NOTE — SWALLOW BEDSIDE ASSESSMENT ADULT - COMMENTS
Swallow History: Initial bedside swallow evaluation 2/27/24 with recommendation for NPO.    Observation: Pt approached Seated upright in bed in ICU, alert and orientedx3-4, able to follow commands, and despite dysarthric speech, able to communicate at least basic wants/needs. Pt's cousin present at bedside throughout evaluation.

## 2024-03-01 NOTE — SWALLOW BEDSIDE ASSESSMENT ADULT - SLP PERTINENT HISTORY OF CURRENT PROBLEM
p/w epigastric pain. Admitting Dx: perforated viscus. Brief Hospital Course as follows: taken to OR for exlap on 2/3 where patient found with perforated duodenal ulcer s/p Aden patch. Pt admitted to ICU post-op intubated while in the ICU course c/b aspiration pneumonitis, hypoxic respiratory failure, ARDS, shock, EMILIA found with strep and candida in abscess cx.  Pt then extubated on 2/24 with no complications now on NC with O2 saturations >98%. Course further c/b distributive shock likely 2/2 sepsis in the setting of possible UTI requiring pressors support.

## 2024-03-02 LAB
ALBUMIN SERPL ELPH-MCNC: 1.9 G/DL — LOW (ref 3.3–5)
ALP SERPL-CCNC: 727 U/L — HIGH (ref 40–120)
ALT FLD-CCNC: 99 U/L — HIGH (ref 12–78)
ANION GAP SERPL CALC-SCNC: 2 MMOL/L — LOW (ref 5–17)
AST SERPL-CCNC: 63 U/L — HIGH (ref 15–37)
BILIRUB SERPL-MCNC: 3 MG/DL — HIGH (ref 0.2–1.2)
BUN SERPL-MCNC: 7 MG/DL — SIGNIFICANT CHANGE UP (ref 7–23)
CALCIUM SERPL-MCNC: 8.1 MG/DL — LOW (ref 8.5–10.1)
CHLORIDE SERPL-SCNC: 108 MMOL/L — SIGNIFICANT CHANGE UP (ref 96–108)
CO2 SERPL-SCNC: 27 MMOL/L — SIGNIFICANT CHANGE UP (ref 22–31)
CREAT SERPL-MCNC: 0.37 MG/DL — LOW (ref 0.5–1.3)
EGFR: 137 ML/MIN/1.73M2 — SIGNIFICANT CHANGE UP
GLUCOSE SERPL-MCNC: 83 MG/DL — SIGNIFICANT CHANGE UP (ref 70–99)
HCT VFR BLD CALC: 37 % — LOW (ref 39–50)
HGB BLD-MCNC: 11.8 G/DL — LOW (ref 13–17)
MAGNESIUM SERPL-MCNC: 2.2 MG/DL — SIGNIFICANT CHANGE UP (ref 1.6–2.6)
MCHC RBC-ENTMCNC: 31.7 PG — SIGNIFICANT CHANGE UP (ref 27–34)
MCHC RBC-ENTMCNC: 31.9 G/DL — LOW (ref 32–36)
MCV RBC AUTO: 99.5 FL — SIGNIFICANT CHANGE UP (ref 80–100)
NRBC # BLD: 0 /100 WBCS — SIGNIFICANT CHANGE UP (ref 0–0)
PHOSPHATE SERPL-MCNC: 1.8 MG/DL — LOW (ref 2.5–4.5)
PLATELET # BLD AUTO: 235 K/UL — SIGNIFICANT CHANGE UP (ref 150–400)
POTASSIUM SERPL-MCNC: 4.2 MMOL/L — SIGNIFICANT CHANGE UP (ref 3.5–5.3)
POTASSIUM SERPL-SCNC: 4.2 MMOL/L — SIGNIFICANT CHANGE UP (ref 3.5–5.3)
PROT SERPL-MCNC: 6.7 GM/DL — SIGNIFICANT CHANGE UP (ref 6–8.3)
RBC # BLD: 3.72 M/UL — LOW (ref 4.2–5.8)
RBC # FLD: 15.3 % — HIGH (ref 10.3–14.5)
SODIUM SERPL-SCNC: 137 MMOL/L — SIGNIFICANT CHANGE UP (ref 135–145)
WBC # BLD: 15.39 K/UL — HIGH (ref 3.8–10.5)
WBC # FLD AUTO: 15.39 K/UL — HIGH (ref 3.8–10.5)

## 2024-03-02 PROCEDURE — 99232 SBSQ HOSP IP/OBS MODERATE 35: CPT

## 2024-03-02 PROCEDURE — 99233 SBSQ HOSP IP/OBS HIGH 50: CPT

## 2024-03-02 RX ADMIN — Medication 125 MILLIGRAM(S): at 23:52

## 2024-03-02 RX ADMIN — Medication 125 MILLIGRAM(S): at 00:03

## 2024-03-02 RX ADMIN — PANTOPRAZOLE SODIUM 40 MILLIGRAM(S): 20 TABLET, DELAYED RELEASE ORAL at 05:54

## 2024-03-02 RX ADMIN — CHLORHEXIDINE GLUCONATE 1 APPLICATION(S): 213 SOLUTION TOPICAL at 05:55

## 2024-03-02 RX ADMIN — ENOXAPARIN SODIUM 40 MILLIGRAM(S): 100 INJECTION SUBCUTANEOUS at 10:21

## 2024-03-02 RX ADMIN — NYSTATIN CREAM 1 APPLICATION(S): 100000 CREAM TOPICAL at 21:35

## 2024-03-02 RX ADMIN — MEROPENEM 100 MILLIGRAM(S): 1 INJECTION INTRAVENOUS at 13:47

## 2024-03-02 RX ADMIN — Medication 1 MILLIGRAM(S): at 11:10

## 2024-03-02 RX ADMIN — Medication 125 MILLIGRAM(S): at 11:10

## 2024-03-02 RX ADMIN — Medication 125 MILLIGRAM(S): at 05:54

## 2024-03-02 RX ADMIN — Medication 25 MILLIGRAM(S): at 21:34

## 2024-03-02 RX ADMIN — Medication 125 MILLIGRAM(S): at 17:11

## 2024-03-02 RX ADMIN — MEROPENEM 100 MILLIGRAM(S): 1 INJECTION INTRAVENOUS at 05:54

## 2024-03-02 RX ADMIN — NYSTATIN CREAM 1 APPLICATION(S): 100000 CREAM TOPICAL at 10:19

## 2024-03-02 RX ADMIN — MEROPENEM 100 MILLIGRAM(S): 1 INJECTION INTRAVENOUS at 21:34

## 2024-03-02 RX ADMIN — Medication 100 MILLIGRAM(S): at 11:10

## 2024-03-02 NOTE — PROGRESS NOTE ADULT - ASSESSMENT
47 Y/O Male with duodenal perforation s/p ex lap, kyrie patch POD#17, now recommended for pureed diet. +UTI. Leukocytosis improving      PLAN:    - D/C NGT and TF  - Continue pureed diet  - DVT/GI ppx  - ABx per ID  - Continue to monitor urine output  - Trend labs  - Continue ICU management, possible downgrade by ICU team to medicine   - Discussed with Dr. Blanco

## 2024-03-02 NOTE — CHART NOTE - NSCHARTNOTEFT_GEN_A_CORE
Please refer to the transfer note dated 3/1.  Hospitalist On Call  Patient deemed stable for transfer from ICU by critical care.    Chart briefly reviewed.      Seen with daughter at bedside.  No complaints.     PHYSICAL EXAMINATION:  GENERAL: NAD, Alert and Oriented x 3 CARDIOVASCULAR: S1, S2.  LUNGS: CTAB, - rales, - wheezing, - rhonchi.  BACK: - CVA tenderness.  ABDOMEN: Soft, - tenderness, - distension, + BS.  EXTREMITIES: - cyanosis, - clubbing, - edema.    VITALS:  Vital Signs Last 24 Hrs  T(C): 36.9 (02 Mar 2024 18:00), Max: 36.9 (02 Mar 2024 18:00)  T(F): 98.4 (02 Mar 2024 18:00), Max: 98.4 (02 Mar 2024 18:00)  HR: 87 (02 Mar 2024 18:00) (75 - 96)  BP: 118/67 (02 Mar 2024 18:00) (110/72 - 128/65)  BP(mean): 83 (02 Mar 2024 18:) (81 - 87)  RR: 27 (02 Mar 2024 18:00) (17 - 30)  SpO2: 94% (02 Mar 2024 18:00) (93% - 99%)    Parameters below as of 02 Mar 2024 18:00  Patient On (Oxygen Delivery Method): room air     Daily     Daily Weight in k.2 (02 Mar 2024 06:00)  CAPILLARY BLOOD GLUCOSE        I&O's Summary    01 Mar 2024 07:01  -  02 Mar 2024 07:00  --------------------------------------------------------  IN: 890 mL / OUT: 400 mL / NET: 490 mL    02 Mar 2024 07:01  -  02 Mar 2024 19:37  --------------------------------------------------------  IN: 600 mL / OUT: 650 mL / NET: -50 mL        LABS:                        11.8   15.39 )-----------( 235      ( 02 Mar 2024 07:25 )             37.0     03-02    137  |  108  |  7   ----------------------------<  83  4.2   |  27  |  0.37<L>    Ca    8.1<L>      02 Mar 2024 07:25  Phos  1.8     03-  Mg     2.2     03-    TPro  6.7  /  Alb  1.9<L>  /  TBili  3.0<H>  /  DBili  x   /  AST  63<H>  /  ALT  99<H>  /  AlkPhos  727<H>  03-      LIVER FUNCTIONS - ( 02 Mar 2024 07:25 )  Alb: 1.9 g/dL / Pro: 6.7 gm/dL / ALK PHOS: 727 U/L / ALT: 99 U/L / AST: 63 U/L / GGT: x           Urinalysis Basic - ( 02 Mar 2024 07:25 )    Color: x / Appearance: x / SG: x / pH: x  Gluc: 83 mg/dL / Ketone: x  / Bili: x / Urobili: x   Blood: x / Protein: x / Nitrite: x   Leuk Esterase: x / RBC: x / WBC x   Sq Epi: x / Non Sq Epi: x / Bacteria: x              MEDICATIONS:  acetaminophen     Tablet .. 650 milliGRAM(s) Oral every 6 hours PRN  chlorhexidine 2% Cloths 1 Application(s) Topical <User Schedule>  enoxaparin Injectable 40 milliGRAM(s) SubCutaneous every 24 hours  folic acid 1 milliGRAM(s) Oral daily  influenza   Vaccine 0.5 milliLiter(s) IntraMuscular once  meropenem  IVPB 1000 milliGRAM(s) IV Intermittent every 8 hours  nystatin Powder 1 Application(s) Topical two times a day  pantoprazole    Tablet 40 milliGRAM(s) Oral before breakfast  thiamine 100 milliGRAM(s) Enteral Tube daily  traZODone 25 milliGRAM(s) Oral at bedtime  vancomycin    Solution 125 milliGRAM(s) Oral every 6 hours        A/P  48M w/ cirrhosis, EtOH use disorder, open reduction metatarsal fracture p/w perforated duodenal ulcer w/ kyrie patch. Course complicated by ARDS, septic shock, and EMILIA; pt now extubated, off pressors and clinically improved.    # EtOH  # ARDS  # Perforated Duodenal Ulcer  # S/p septic Shock  # ATN  Diet advanced.    Continue Meropenem.    Diarrhea improving.  Continue Vancomycin.  OOB with PT.    Monitor BMP.   Encouraged on Alcohol cessation.       Above plan reviewed with critical care - continue plan per their recommendations.  Full review to follow by hospitalist service in am.

## 2024-03-02 NOTE — PROGRESS NOTE ADULT - SUBJECTIVE AND OBJECTIVE BOX
Patient seen and examined at bedside with no complaints.   NGT in place  Tolerating pureed diet   Denies pain, nausea/ vomiting, chills, SOB, chest pain, calf tenderness.          Vital Signs Last 24 Hrs  T(F): 98.3 (03-02-24 @ 11:34), Max: 98.3 (03-02-24 @ 11:34)  HR: 88 (03-02-24 @ 09:00)  BP: 114/65 (03-02-24 @ 09:00)  RR: 28 (03-02-24 @ 09:00)  SpO2: 96% (03-02-24 @ 09:00)  Wt(kg): --   CAPILLARY BLOOD GLUCOSE          GENERAL: Alert, NAD, NGT  CHEST/LUNG: Respirations non labored, good inspiratory effort  HEART: S1S2  HEENT: NCAT, EOMI, conjunctiva clear   ABDOMEN: Distended, + bowel sounds, nontender, midline incision staple line C/D/I   EXTREMITIES:  No calf tenderness, no edema    I&O's Detail    01 Mar 2024 07:01  -  02 Mar 2024 07:00  --------------------------------------------------------  IN:    Free Water: 200 mL    IV PiggyBack: 250 mL    IV PiggyBack: 50 mL    Optimental: 140 mL    Oral Fluid: 250 mL  Total IN: 890 mL    OUT:    Voided (mL): 400 mL  Total OUT: 400 mL    Total NET: 490 mL          LABS:                        11.8   15.39 )-----------( 235      ( 02 Mar 2024 07:25 )             37.0     03-02    137  |  108  |  7   ----------------------------<  83  4.2   |  27  |  0.37<L>    Ca    8.1<L>      02 Mar 2024 07:25  Phos  1.8     03-02  Mg     2.2     03-02    TPro  6.7  /  Alb  1.9<L>  /  TBili  3.0<H>  /  DBili  x   /  AST  63<H>  /  ALT  99<H>  /  AlkPhos  727<H>  03-02        RADIOLOGY & ADDITIONAL STUDIES:    < from: CT Abdomen and Pelvis w/ Oral Cont and w/ IV Cont (02.21.24 @ 16:16) >    ACC: [id number] EXAM:  CT ABDOMEN AND PELVIS OC IC   ORDERED BY: YAN GOMEZ     PROCEDURE DATE:  02/21/2024          INTERPRETATION:  CLINICAL INFORMATION: 48-year-old man status post repair   perforated duodenal ulcer 02/13/2024 with persistent fever    COMPARISON: CT scan 02/13/2024    CONTRAST/COMPLICATIONS:  IV Contrast: Omnipaque 350  99 cc administered   1 cc discarded  Oral Contrast: Omnipaque 240  Complications: None reported at time of study completion    PROCEDURE:  CT of the Abdomen and Pelvis was performed.  Sagittal and coronal reformats were performed.    FINDINGS:  LOWER CHEST: Patchy consolidation left upper and lower lobes and   extensive groundglass consolidation right middle lobe, likely pneumonia.   Bibasilar consolidation consistent with atelectasis and/or pneumonia.   Small bilateral pleural effusions. LIVER: Hepatomegaly.  BILE DUCTS: Normal caliber.  GALLBLADDER: Within normal limits.  SPLEEN: Splenomegaly with splenic span 14.4 cm  PANCREAS: Within normal limits.  ADRENALS: Within normal limits.  KIDNEYS/URETERS: Within normal limits.    BLADDER: Indwelling catheter.  REPRODUCTIVE ORGANS: Normal-sized prostate    BOWEL: No bowel obstruction. Duodenal edema. Esophagogastric tube ending   in the stomach. Edema ascending colon possible portal colopathy. Appendix   not visualized. No evidence appendicitis.  PERITONEUM: Small right subphrenic ascites. No intra-abdominal collection   otherwise visualized. Small amount free air possibly related to drain   ending in left anterior upper quadrant and inserted via right flank  VESSELS: Paraesophageal varices.  RETROPERITONEUM/LYMPH NODES: No lymphadenopathy.  ABDOMINAL WALL: Within normal limits.  BONES: Degenerative change.    IMPRESSION:  Multifocal pneumonia.  Small amount of pneumoperitoneum possibly related to indwelling drain. No   associated collection.  Small right subphrenic ascites.  Hepatosplenomegaly with evidence portal hypertension        --- End of Report ---            ANDRIA PEREZ MD; Attending Radiologist  This document has been electronically signed. Feb 21 2024  9:40PM    < end of copied text >

## 2024-03-02 NOTE — PROGRESS NOTE ADULT - SUBJECTIVE AND OBJECTIVE BOX
HPI:  Pt is a 49 yo M with h/o cirrhosis, EtOH use disorder, open reduction metatarsal fracture who presented 2 to perforated duodenal ulcer and taken to the OR; s/p Exploratory laparotomy with closure of perforated duodenum using omental patch. ICU course complicated by ARDS, septic shock, and EMILIA. s/p extubation with overall clinical improvement      ## Labs:  CBC:                        11.8   15.39 )-----------( 235      ( 02 Mar 2024 07:25 )             37.0     Chem:      137  |  108  |  7   ----------------------------<  83  4.2   |  27  |  0.37<L>    Ca    8.1<L>      02 Mar 2024 07:25  Phos  1.8       Mg     2.2         TPro  6.7  /  Alb  1.9<L>  /  TBili  3.0<H>  /  DBili  x   /  AST  63<H>  /  ALT  99<H>  /  AlkPhos  727<H>      Coags:          ## Imaging:    ## Medications:  meropenem  IVPB 1000 milliGRAM(s) IV Intermittent every 8 hours  vancomycin    Solution 125 milliGRAM(s) Oral every 6 hours          enoxaparin Injectable 40 milliGRAM(s) SubCutaneous every 24 hours    pantoprazole    Tablet 40 milliGRAM(s) Oral before breakfast    acetaminophen     Tablet .. 650 milliGRAM(s) Oral every 6 hours PRN  traZODone 25 milliGRAM(s) Oral at bedtime      ## Vitals:  T(C): 36.6 (24 @ 07:53), Max: 36.7 (24 @ 19:00)  HR: 88 (24 @ 09:00) (75 - 96)  BP: 114/65 (24 @ 09:00) (110/72 - 128/65)  BP(mean): 81 (24 @ 09:00) (81 - 91)  RR: 28 (24 @ 09:00) (17 - 28)  SpO2: 96% (24 @ 09:00) (93% - 99%)  Wt(kg): --  Vent:   AB-01 @ 07: @ 07:00  --------------------------------------------------------  IN: 890 mL / OUT: 400 mL / NET: 490 mL          ## P/E:  Gen: lying comfortably in bed in no apparent distress  Face: (+) NGT   Lungs: CTA  Heart: RRR  Abd: Soft/+BS/ Surgical wound clean   Ext: No edema  Neuro: AAo x3    CENTRAL LINE: [ ] YES [ ] NO  LOCATION:   DATE INSERTED:  REMOVE: [ ] YES [ ] NO      M[place]OR: [ ] YES [ ] NO    DATE INSERTED:  REMOVE:  [ ] YES [ ] NO      A-LINE:  [ ] YES [ ] NO  LOCATION:   DATE INSERTED:  REMOVE:  [ ] YES [ ] NO  EXPLAIN:    CODE STATUS: [x ] full code  [ ] DNR  [ ] DNI  [ ] MOLST  Goals of care discussion: [ ] yes

## 2024-03-02 NOTE — PROGRESS NOTE ADULT - ASSESSMENT
Pt is a 49 yo M with h/o cirrhosis, EtOH use disorder, open reduction metatarsal fracture who presented 2 to perforated duodenal ulcer and taken to the OR; s/p Exploratory laparotomy with closure of perforated duodenum using omental patch. ICU course complicated by ARDS, septic shock, and EMILIA. s/p extubation with overall clinical improvement    Resp: Breathing comfortably on RA  ID: Finish course of Meropenem and empiric enteral Vanco for C. diff as per ID  HEME: DVT prophylaxis with Lovenox  FEN: Severe protein-calorie malnutrition; po diet consistency as per Speech/Swallow/ Replace Phos; pt hypophosphatemic    GI: F/u as per Surg  Neuro/Psych: Cont nocturnal Trazadone   Social: PT/OT/ OOB->chair/ May transfer to [facility]

## 2024-03-03 LAB
ALBUMIN SERPL ELPH-MCNC: 1.8 G/DL — LOW (ref 3.3–5)
ALP SERPL-CCNC: 680 U/L — HIGH (ref 40–120)
ALT FLD-CCNC: 84 U/L — HIGH (ref 12–78)
ANION GAP SERPL CALC-SCNC: 8 MMOL/L — SIGNIFICANT CHANGE UP (ref 5–17)
AST SERPL-CCNC: 66 U/L — HIGH (ref 15–37)
BILIRUB SERPL-MCNC: 2.4 MG/DL — HIGH (ref 0.2–1.2)
BUN SERPL-MCNC: 7 MG/DL — SIGNIFICANT CHANGE UP (ref 7–23)
CALCIUM SERPL-MCNC: 7.9 MG/DL — LOW (ref 8.5–10.1)
CHLORIDE SERPL-SCNC: 103 MMOL/L — SIGNIFICANT CHANGE UP (ref 96–108)
CO2 SERPL-SCNC: 24 MMOL/L — SIGNIFICANT CHANGE UP (ref 22–31)
CREAT SERPL-MCNC: 0.35 MG/DL — LOW (ref 0.5–1.3)
EGFR: 139 ML/MIN/1.73M2 — SIGNIFICANT CHANGE UP
GLUCOSE SERPL-MCNC: 78 MG/DL — SIGNIFICANT CHANGE UP (ref 70–99)
HCT VFR BLD CALC: 31.5 % — LOW (ref 39–50)
HGB BLD-MCNC: 10 G/DL — LOW (ref 13–17)
MAGNESIUM SERPL-MCNC: 2 MG/DL — SIGNIFICANT CHANGE UP (ref 1.6–2.6)
MCHC RBC-ENTMCNC: 31.3 PG — SIGNIFICANT CHANGE UP (ref 27–34)
MCHC RBC-ENTMCNC: 31.7 G/DL — LOW (ref 32–36)
MCV RBC AUTO: 98.7 FL — SIGNIFICANT CHANGE UP (ref 80–100)
NRBC # BLD: 0 /100 WBCS — SIGNIFICANT CHANGE UP (ref 0–0)
PHOSPHATE SERPL-MCNC: 1.7 MG/DL — LOW (ref 2.5–4.5)
PLATELET # BLD AUTO: 237 K/UL — SIGNIFICANT CHANGE UP (ref 150–400)
POTASSIUM SERPL-MCNC: 3.7 MMOL/L — SIGNIFICANT CHANGE UP (ref 3.5–5.3)
POTASSIUM SERPL-SCNC: 3.7 MMOL/L — SIGNIFICANT CHANGE UP (ref 3.5–5.3)
PROT SERPL-MCNC: 6.2 GM/DL — SIGNIFICANT CHANGE UP (ref 6–8.3)
RBC # BLD: 3.19 M/UL — LOW (ref 4.2–5.8)
RBC # FLD: 14.9 % — HIGH (ref 10.3–14.5)
SODIUM SERPL-SCNC: 135 MMOL/L — SIGNIFICANT CHANGE UP (ref 135–145)
WBC # BLD: 14.94 K/UL — HIGH (ref 3.8–10.5)
WBC # FLD AUTO: 14.94 K/UL — HIGH (ref 3.8–10.5)

## 2024-03-03 PROCEDURE — 99232 SBSQ HOSP IP/OBS MODERATE 35: CPT

## 2024-03-03 RX ORDER — POTASSIUM PHOSPHATE, MONOBASIC POTASSIUM PHOSPHATE, DIBASIC 236; 224 MG/ML; MG/ML
30 INJECTION, SOLUTION INTRAVENOUS ONCE
Refills: 0 | Status: COMPLETED | OUTPATIENT
Start: 2024-03-03 | End: 2024-03-03

## 2024-03-03 RX ADMIN — Medication 100 MILLIGRAM(S): at 11:06

## 2024-03-03 RX ADMIN — Medication 1 MILLIGRAM(S): at 11:06

## 2024-03-03 RX ADMIN — NYSTATIN CREAM 1 APPLICATION(S): 100000 CREAM TOPICAL at 11:05

## 2024-03-03 RX ADMIN — Medication 125 MILLIGRAM(S): at 06:32

## 2024-03-03 RX ADMIN — Medication 125 MILLIGRAM(S): at 17:24

## 2024-03-03 RX ADMIN — PANTOPRAZOLE SODIUM 40 MILLIGRAM(S): 20 TABLET, DELAYED RELEASE ORAL at 05:46

## 2024-03-03 RX ADMIN — Medication 25 MILLIGRAM(S): at 22:14

## 2024-03-03 RX ADMIN — ENOXAPARIN SODIUM 40 MILLIGRAM(S): 100 INJECTION SUBCUTANEOUS at 11:49

## 2024-03-03 RX ADMIN — Medication 125 MILLIGRAM(S): at 11:10

## 2024-03-03 RX ADMIN — CHLORHEXIDINE GLUCONATE 1 APPLICATION(S): 213 SOLUTION TOPICAL at 05:47

## 2024-03-03 RX ADMIN — MEROPENEM 100 MILLIGRAM(S): 1 INJECTION INTRAVENOUS at 05:47

## 2024-03-03 RX ADMIN — NYSTATIN CREAM 1 APPLICATION(S): 100000 CREAM TOPICAL at 22:14

## 2024-03-03 RX ADMIN — POTASSIUM PHOSPHATE, MONOBASIC POTASSIUM PHOSPHATE, DIBASIC 83.33 MILLIMOLE(S): 236; 224 INJECTION, SOLUTION INTRAVENOUS at 05:47

## 2024-03-03 NOTE — PROGRESS NOTE ADULT - ASSESSMENT
47 yo M with h/o cirrhosis, EtOH use disorder, open reduction metatarsal fracture who presented 2 to perforated duodenal ulcer and taken to the OR; s/p Exploratory laparotomy with closure of perforated duodenum using omental patch. ICU course complicated by ARDS, septic shock, and EMILIA. s/p extubation with overall clinical improvement      Resp: Breathing comfortably on RA  ID: Finished course of Meropenem On  empiric enteral Vanco for C. diff as per ID  HEME: DVT prophylaxis with Lovenox  FEN: Severe protein-calorie malnutrition; po diet consistency as per Speech/Swallow/ Replace Phos; pt hypophosphatemic    GI: F/u as per Surg  Neuro/Psych: Cont nocturnal Trazadone   Social: PT/OT/ OOB->chair/

## 2024-03-03 NOTE — PROGRESS NOTE ADULT - SUBJECTIVE AND OBJECTIVE BOX
Patient is a 48y old  Male who presents with a chief complaint of Perforated viscus (02 Mar 2024 11:57)    INTERVAL HPI/OVERNIGHT EVENTS:    MEDICATIONS  (STANDING):  chlorhexidine 2% Cloths 1 Application(s) Topical <User Schedule>  enoxaparin Injectable 40 milliGRAM(s) SubCutaneous every 24 hours  folic acid 1 milliGRAM(s) Oral daily  influenza   Vaccine 0.5 milliLiter(s) IntraMuscular once  nystatin Powder 1 Application(s) Topical two times a day  pantoprazole    Tablet 40 milliGRAM(s) Oral before breakfast  thiamine 100 milliGRAM(s) Enteral Tube daily  traZODone 25 milliGRAM(s) Oral at bedtime  vancomycin    Solution 125 milliGRAM(s) Oral every 6 hours    MEDICATIONS  (PRN):  acetaminophen     Tablet .. 650 milliGRAM(s) Oral every 6 hours PRN Temp greater or equal to 38C (100.4F)    Allergies    No Known Allergies    Intolerances      REVIEW OF SYSTEMS:  All other systems reviewed and are negative    Vital Signs Last 24 Hrs  T(C): 37.1 (03 Mar 2024 08:00), Max: 37.3 (03 Mar 2024 04:00)  T(F): 98.8 (03 Mar 2024 08:00), Max: 99.2 (03 Mar 2024 04:00)  HR: 86 (03 Mar 2024 08:00) (82 - 96)  BP: 119/66 (03 Mar 2024 08:00) (113/59 - 125/67)  BP(mean): 82 (03 Mar 2024 08:00) (75 - 87)  RR: 26 (03 Mar 2024 08:00) (19 - 27)  SpO2: 97% (03 Mar 2024 08:00) (94% - 100%)    Parameters below as of 03 Mar 2024 08:00  Patient On (Oxygen Delivery Method): room air      Daily     Daily Weight in k.6 (03 Mar 2024 06:00)  I&O's Summary    02 Mar 2024 07:01  -  03 Mar 2024 07:00  --------------------------------------------------------  IN: 800 mL / OUT: 950 mL / NET: -150 mL      CAPILLARY BLOOD GLUCOSE        PHYSICAL EXAM:  GENERAL: NAD,    HEAD:  Atraumatic, Normocephalic  EYES: EOMI, PERRLA, conjunctiva and sclera clear  ENMT: No tonsillar erythema, exudates, or enlargement; Moist mucous membranes, Good dentition, No lesions  NECK: Supple, No JVD, Normal thyroid  NERVOUS SYSTEM:  Alert & Oriented X3, Good concentration; Motor Strength 5/5 B/L upper and lower extremities; DTRs 2+ intact and symmetric  CHEST/LUNG: Clear to percussion bilaterally; No rales, rhonchi, wheezing, or rubs  HEART: Regular rate and rhythm; No murmurs, rubs, or gallops  ABDOMEN: Soft, Nontender, Nondistended; Bowel sounds present  EXTREMITIES:  2+ Peripheral Pulses, No clubbing, cyanosis, or edema  LYMPH: No lymphadenopathy noted  SKIN: No rashes or lesions    Labs                          10.0   14.94 )-----------( 237      ( 03 Mar 2024 02:30 )             31.5     03-03    135  |  103  |  7   ----------------------------<  78  3.7   |  24  |  0.35<L>    Ca    7.9<L>      03 Mar 2024 02:30  Phos  1.7     03-03  Mg     2.0     03-03    TPro  6.2  /  Alb  1.8<L>  /  TBili  2.4<H>  /  DBili  x   /  AST  66<H>  /  ALT  84<H>  /  AlkPhos  680<H>  03-03          Urinalysis Basic - ( 03 Mar 2024 02:30 )    Color: x / Appearance: x / SG: x / pH: x  Gluc: 78 mg/dL / Ketone: x  / Bili: x / Urobili: x   Blood: x / Protein: x / Nitrite: x   Leuk Esterase: x / RBC: x / WBC x   Sq Epi: x / Non Sq Epi: x / Bacteria: x                  DVT prophylaxis: > Lovenox 40mg SQ daily  > Heparin   > SCD's

## 2024-03-03 NOTE — PROGRESS NOTE ADULT - ASSESSMENT
47 Y/O Male with duodenal perforation s/p ex lap, kyrie patch POD#18, now recommended for pureed diet. +UTI. Leukocytosis improving      PLAN:    - Continue pureed diet  - DVT/GI ppx  - ABx per ID  - Continue to monitor urine output  - Trend labs  - continue medical management  - Discussed with Dr. Blanco

## 2024-03-03 NOTE — PROGRESS NOTE ADULT - SUBJECTIVE AND OBJECTIVE BOX
Surgery NP note  Patient seen and examined bedside resting comfortably.  No complaints offered. Abdominal pain is well controlled.  Denies nausea and vomiting. Tolerating puree diet.  Normal flatus/BM. - flexiseal in place    T(F): 98.8 (03-03-24 @ 08:00), Max: 99.2 (03-03-24 @ 04:00)  HR: 86 (03-03-24 @ 08:00) (82 - 92)  BP: 119/66 (03-03-24 @ 08:00) (113/59 - 125/67)  RR: 26 (03-03-24 @ 08:00) (19 - 27)  SpO2: 97% (03-03-24 @ 08:00) (94% - 100%)  Wt(kg): --  CAPILLARY BLOOD GLUCOSE          PHYSICAL EXAM:  General: NAD, WDWN.   Neuro:  Alert & responsive  HEENT: NCAT, EOMI, conjunctiva clear  CV: +S1+S2 regular rate and rhythm  Lung: clear to ausculation bilaterally, respirations nonlabored, good inspiratory effort  Abdomen: soft, Non tender, No Distension. Normal active BS. Staples intact, no draininage  Extremities: no pedal edema or calf tenderness noted     LABS:                        10.0   14.94 )-----------( 237      ( 03 Mar 2024 02:30 )             31.5     03-03    135  |  103  |  7   ----------------------------<  78  3.7   |  24  |  0.35<L>    Ca    7.9<L>      03 Mar 2024 02:30  Phos  1.7     03-03  Mg     2.0     03-03    TPro  6.2  /  Alb  1.8<L>  /  TBili  2.4<H>  /  DBili  x   /  AST  66<H>  /  ALT  84<H>  /  AlkPhos  680<H>  03-03    LIVER FUNCTIONS - ( 03 Mar 2024 02:30 )  Alb: 1.8 g/dL / Pro: 6.2 gm/dL / ALK PHOS: 680 U/L / ALT: 84 U/L / AST: 66 U/L / GGT: x             I&O's Detail    02 Mar 2024 07:01  -  03 Mar 2024 07:00  --------------------------------------------------------  IN:    IV PiggyBack: 150 mL    Oral Fluid: 650 mL  Total IN: 800 mL    OUT:    Rectal Tube (mL): 350 mL    Voided (mL): 600 mL  Total OUT: 950 mL    Total NET: -150 mL            Impression:

## 2024-03-04 LAB
ALBUMIN SERPL ELPH-MCNC: 1.7 G/DL — LOW (ref 3.3–5)
ALP SERPL-CCNC: 676 U/L — HIGH (ref 40–120)
ALT FLD-CCNC: 79 U/L — HIGH (ref 12–78)
ANION GAP SERPL CALC-SCNC: 8 MMOL/L — SIGNIFICANT CHANGE UP (ref 5–17)
AST SERPL-CCNC: 70 U/L — HIGH (ref 15–37)
BILIRUB SERPL-MCNC: 2.3 MG/DL — HIGH (ref 0.2–1.2)
BUN SERPL-MCNC: 5 MG/DL — LOW (ref 7–23)
CALCIUM SERPL-MCNC: 7.9 MG/DL — LOW (ref 8.5–10.1)
CHLORIDE SERPL-SCNC: 102 MMOL/L — SIGNIFICANT CHANGE UP (ref 96–108)
CO2 SERPL-SCNC: 23 MMOL/L — SIGNIFICANT CHANGE UP (ref 22–31)
CREAT SERPL-MCNC: 0.34 MG/DL — LOW (ref 0.5–1.3)
EGFR: 140 ML/MIN/1.73M2 — SIGNIFICANT CHANGE UP
GLUCOSE SERPL-MCNC: 94 MG/DL — SIGNIFICANT CHANGE UP (ref 70–99)
HCT VFR BLD CALC: 32.1 % — LOW (ref 39–50)
HGB BLD-MCNC: 10.4 G/DL — LOW (ref 13–17)
MCHC RBC-ENTMCNC: 31.7 PG — SIGNIFICANT CHANGE UP (ref 27–34)
MCHC RBC-ENTMCNC: 32.4 G/DL — SIGNIFICANT CHANGE UP (ref 32–36)
MCV RBC AUTO: 97.9 FL — SIGNIFICANT CHANGE UP (ref 80–100)
NRBC # BLD: 0 /100 WBCS — SIGNIFICANT CHANGE UP (ref 0–0)
PLATELET # BLD AUTO: 221 K/UL — SIGNIFICANT CHANGE UP (ref 150–400)
POTASSIUM SERPL-MCNC: 3.8 MMOL/L — SIGNIFICANT CHANGE UP (ref 3.5–5.3)
POTASSIUM SERPL-SCNC: 3.8 MMOL/L — SIGNIFICANT CHANGE UP (ref 3.5–5.3)
PROT SERPL-MCNC: 6.3 GM/DL — SIGNIFICANT CHANGE UP (ref 6–8.3)
RBC # BLD: 3.28 M/UL — LOW (ref 4.2–5.8)
RBC # FLD: 14.8 % — HIGH (ref 10.3–14.5)
SODIUM SERPL-SCNC: 133 MMOL/L — LOW (ref 135–145)
WBC # BLD: 13.97 K/UL — HIGH (ref 3.8–10.5)
WBC # FLD AUTO: 13.97 K/UL — HIGH (ref 3.8–10.5)

## 2024-03-04 PROCEDURE — 99232 SBSQ HOSP IP/OBS MODERATE 35: CPT

## 2024-03-04 RX ORDER — SODIUM,POTASSIUM PHOSPHATES 278-250MG
1 POWDER IN PACKET (EA) ORAL ONCE
Refills: 0 | Status: COMPLETED | OUTPATIENT
Start: 2024-03-04 | End: 2024-03-04

## 2024-03-04 RX ADMIN — Medication 1 PACKET(S): at 05:12

## 2024-03-04 RX ADMIN — ENOXAPARIN SODIUM 40 MILLIGRAM(S): 100 INJECTION SUBCUTANEOUS at 11:34

## 2024-03-04 RX ADMIN — Medication 125 MILLIGRAM(S): at 23:28

## 2024-03-04 RX ADMIN — Medication 125 MILLIGRAM(S): at 11:34

## 2024-03-04 RX ADMIN — Medication 125 MILLIGRAM(S): at 00:40

## 2024-03-04 RX ADMIN — NYSTATIN CREAM 1 APPLICATION(S): 100000 CREAM TOPICAL at 23:09

## 2024-03-04 RX ADMIN — NYSTATIN CREAM 1 APPLICATION(S): 100000 CREAM TOPICAL at 11:36

## 2024-03-04 RX ADMIN — Medication 25 MILLIGRAM(S): at 23:09

## 2024-03-04 RX ADMIN — CHLORHEXIDINE GLUCONATE 1 APPLICATION(S): 213 SOLUTION TOPICAL at 05:12

## 2024-03-04 RX ADMIN — PANTOPRAZOLE SODIUM 40 MILLIGRAM(S): 20 TABLET, DELAYED RELEASE ORAL at 06:54

## 2024-03-04 RX ADMIN — Medication 125 MILLIGRAM(S): at 06:53

## 2024-03-04 RX ADMIN — Medication 125 MILLIGRAM(S): at 18:06

## 2024-03-04 RX ADMIN — Medication 1 MILLIGRAM(S): at 11:35

## 2024-03-04 RX ADMIN — Medication 100 MILLIGRAM(S): at 11:35

## 2024-03-04 NOTE — PROGRESS NOTE ADULT - ASSESSMENT
A/P    49 yo M with h/o cirrhosis, EtOH use disorder, open reduction metatarsal fracture who presented 2 to perforated duodenal ulcer and taken to the OR; s/p Exploratory laparotomy with closure of perforated duodenum using omental patch. ICU course complicated by ARDS, septic shock, and EMILIA. s/p extubation with overall clinical improvement      Resp: Breathing comfortably on RA  ID: Finished course of Meropenem On  empiric enteral Vanco for C. diff as per ID  HEME: DVT prophylaxis with Lovenox  FEN: Severe protein-calorie malnutrition; po diet consistency as per Speech/Swallow/ Replace Phos; pt hypophosphatemic    GI: F/u as per Surg  Neuro/Psych: Cont nocturnal Trazadone   Social: PT/OT/ OOB->chair/     Transfer out of ICU when bed available.           Bakari Galvan MD

## 2024-03-04 NOTE — PROGRESS NOTE ADULT - SUBJECTIVE AND OBJECTIVE BOX
Patient is a 48y old  Male who presents with a chief complaint of Perforated viscus (03 Mar 2024 12:09)      INTERVAL HPI/OVERNIGHT EVENTS: No acute events overnight, afebrile. Feels weak.     MEDICATIONS  (STANDING):  chlorhexidine 2% Cloths 1 Application(s) Topical <User Schedule>  enoxaparin Injectable 40 milliGRAM(s) SubCutaneous every 24 hours  folic acid 1 milliGRAM(s) Oral daily  influenza   Vaccine 0.5 milliLiter(s) IntraMuscular once  nystatin Powder 1 Application(s) Topical two times a day  pantoprazole    Tablet 40 milliGRAM(s) Oral before breakfast  thiamine 100 milliGRAM(s) Enteral Tube daily  traZODone 25 milliGRAM(s) Oral at bedtime  vancomycin    Solution 125 milliGRAM(s) Oral every 6 hours    MEDICATIONS  (PRN):  acetaminophen     Tablet .. 650 milliGRAM(s) Oral every 6 hours PRN Temp greater or equal to 38C (100.4F)      Allergies    No Known Allergies    Intolerances        REVIEW OF SYSTEMS:  CONSTITUTIONAL: +ve for fatigue  EYES: No eye pain, visual disturbances, or discharge  ENMT:  No difficulty hearing, tinnitus, vertigo; No sinus or throat pain  NECK: No pain or stiffness      Vital Signs Last 24 Hrs  T(C): 37.3 (04 Mar 2024 08:00), Max: 37.6 (03 Mar 2024 19:26)  T(F): 99.1 (04 Mar 2024 08:00), Max: 99.6 (03 Mar 2024 19:26)  HR: 88 (04 Mar 2024 08:00) (86 - 92)  BP: 109/62 (04 Mar 2024 08:00) (97/61 - 122/61)  BP(mean): 75 (04 Mar 2024 08:00) (73 - 86)  RR: 25 (04 Mar 2024 08:00) (21 - 31)  SpO2: 97% (04 Mar 2024 08:00) (94% - 98%)    Parameters below as of 04 Mar 2024 08:00  Patient On (Oxygen Delivery Method): room air        PHYSICAL EXAM:    HEAD:  Atraumatic, Normocephalic  EYES: EOMI, PERRLA, conjunctiva and sclera clear  ENMT: No tonsillar erythema, exudates, or enlargement; Moist mucous membranes, Good dentition, No lesions  NECK: Supple, No JVD, Normal thyroid  NERVOUS SYSTEM:  Alert & Oriented X3    LABS:                        10.4   13.97 )-----------( 221      ( 04 Mar 2024 04:00 )             32.1     03-04    133<L>  |  102  |  5<L>  ----------------------------<  94  3.8   |  23  |  0.34<L>    Ca    7.9<L>      04 Mar 2024 04:00  Phos  1.7     03-03  Mg     2.0     03-03    TPro  6.3  /  Alb  1.7<L>  /  TBili  2.3<H>  /  DBili  x   /  AST  70<H>  /  ALT  79<H>  /  AlkPhos  676<H>  03-04      Urinalysis Basic - ( 04 Mar 2024 04:00 )    Color: x / Appearance: x / SG: x / pH: x  Gluc: 94 mg/dL / Ketone: x  / Bili: x / Urobili: x   Blood: x / Protein: x / Nitrite: x   Leuk Esterase: x / RBC: x / WBC x   Sq Epi: x / Non Sq Epi: x / Bacteria: x

## 2024-03-04 NOTE — PROGRESS NOTE ADULT - SUBJECTIVE AND OBJECTIVE BOX
Patient seen and  examined at bedside resting comfortably.   Offers no complaints, tolerating pureed diet. With bowel function, denies diarrhea.   Denies fever, chills, N/V/D, CP, SOB.    Vital Signs Last 24 Hrs  T(F): 99.1 (03-04-24 @ 08:00), Max: 99.6 (03-03-24 @ 19:26)  HR: 88 (03-04-24 @ 08:00)  BP: 109/62 (03-04-24 @ 08:00)  RR: 25 (03-04-24 @ 08:00)  SpO2: 97% (03-04-24 @ 08:00)    PHYSICAL EXAM:  GENERAL: Alert, NAD  CHEST/LUNG: Clear to auscultation bilaterally, respirations nonlabored  HEART: Regular rate and rhythm; S1 & S2 appreciated  ABDOMEN: + Bowel sounds, soft, Nontender, Nondistended. Midline staples in place. Old FOX drain site c/d/i  EXTREMITIES:  no calf tenderness, No edema    I&O's Detail    03 Mar 2024 07:01  -  04 Mar 2024 07:00  --------------------------------------------------------  IN:  Total IN: 0 mL    OUT:    Rectal Tube (mL): 450 mL    Voided (mL): 700 mL  Total OUT: 1150 mL    Total NET: -1150 mL          LABS:                        10.4   13.97 )-----------( 221      ( 04 Mar 2024 04:00 )             32.1     03-04    133<L>  |  102  |  5<L>  ----------------------------<  94  3.8   |  23  |  0.34<L>    Ca    7.9<L>      04 Mar 2024 04:00  Phos  1.7     03-03  Mg     2.0     03-03    TPro  6.3  /  Alb  1.7<L>  /  TBili  2.3<H>  /  DBili  x   /  AST  70<H>  /  ALT  79<H>  /  AlkPhos  676<H>  03-04        RADIOLOGY & ADDITIONAL STUDIES:    A/P  47 Y/O Male with duodenal perforation s/p ex lap, kyrie patch POD#19, now recommended for pureed diet. Leukocytosis improving    - continue pureed diet  - abx per ID  - DVT ppx, OOB/AAT  - continue care per primary team   - d/w Dr. Blanco

## 2024-03-05 LAB
ANION GAP SERPL CALC-SCNC: 9 MMOL/L — SIGNIFICANT CHANGE UP (ref 5–17)
BUN SERPL-MCNC: 5 MG/DL — LOW (ref 7–23)
CALCIUM SERPL-MCNC: 7.9 MG/DL — LOW (ref 8.5–10.1)
CHLORIDE SERPL-SCNC: 101 MMOL/L — SIGNIFICANT CHANGE UP (ref 96–108)
CO2 SERPL-SCNC: 23 MMOL/L — SIGNIFICANT CHANGE UP (ref 22–31)
CREAT SERPL-MCNC: 0.34 MG/DL — LOW (ref 0.5–1.3)
EGFR: 140 ML/MIN/1.73M2 — SIGNIFICANT CHANGE UP
GLUCOSE SERPL-MCNC: 85 MG/DL — SIGNIFICANT CHANGE UP (ref 70–99)
HCT VFR BLD CALC: 31.8 % — LOW (ref 39–50)
HGB BLD-MCNC: 10.3 G/DL — LOW (ref 13–17)
MCHC RBC-ENTMCNC: 31.5 PG — SIGNIFICANT CHANGE UP (ref 27–34)
MCHC RBC-ENTMCNC: 32.4 G/DL — SIGNIFICANT CHANGE UP (ref 32–36)
MCV RBC AUTO: 97.2 FL — SIGNIFICANT CHANGE UP (ref 80–100)
NRBC # BLD: 0 /100 WBCS — SIGNIFICANT CHANGE UP (ref 0–0)
PLATELET # BLD AUTO: 232 K/UL — SIGNIFICANT CHANGE UP (ref 150–400)
POTASSIUM SERPL-MCNC: 3.7 MMOL/L — SIGNIFICANT CHANGE UP (ref 3.5–5.3)
POTASSIUM SERPL-SCNC: 3.7 MMOL/L — SIGNIFICANT CHANGE UP (ref 3.5–5.3)
RBC # BLD: 3.27 M/UL — LOW (ref 4.2–5.8)
RBC # FLD: 14.8 % — HIGH (ref 10.3–14.5)
SODIUM SERPL-SCNC: 133 MMOL/L — LOW (ref 135–145)
WBC # BLD: 15.38 K/UL — HIGH (ref 3.8–10.5)
WBC # FLD AUTO: 15.38 K/UL — HIGH (ref 3.8–10.5)

## 2024-03-05 PROCEDURE — 99232 SBSQ HOSP IP/OBS MODERATE 35: CPT

## 2024-03-05 RX ADMIN — Medication 1 MILLIGRAM(S): at 11:18

## 2024-03-05 RX ADMIN — Medication 125 MILLIGRAM(S): at 17:04

## 2024-03-05 RX ADMIN — PANTOPRAZOLE SODIUM 40 MILLIGRAM(S): 20 TABLET, DELAYED RELEASE ORAL at 08:45

## 2024-03-05 RX ADMIN — Medication 125 MILLIGRAM(S): at 22:25

## 2024-03-05 RX ADMIN — Medication 25 MILLIGRAM(S): at 22:25

## 2024-03-05 RX ADMIN — Medication 125 MILLIGRAM(S): at 05:36

## 2024-03-05 RX ADMIN — ENOXAPARIN SODIUM 40 MILLIGRAM(S): 100 INJECTION SUBCUTANEOUS at 11:28

## 2024-03-05 RX ADMIN — Medication 200 MILLIGRAM(S): at 22:26

## 2024-03-05 RX ADMIN — NYSTATIN CREAM 1 APPLICATION(S): 100000 CREAM TOPICAL at 11:19

## 2024-03-05 RX ADMIN — CHLORHEXIDINE GLUCONATE 1 APPLICATION(S): 213 SOLUTION TOPICAL at 05:36

## 2024-03-05 RX ADMIN — Medication 125 MILLIGRAM(S): at 11:18

## 2024-03-05 RX ADMIN — NYSTATIN CREAM 1 APPLICATION(S): 100000 CREAM TOPICAL at 22:24

## 2024-03-05 RX ADMIN — Medication 100 MILLIGRAM(S): at 11:18

## 2024-03-05 NOTE — PROGRESS NOTE ADULT - SUBJECTIVE AND OBJECTIVE BOX
PRIYANKA PITTMAN  MRN-[id number]      Interval History: the pt was seen and examined earlier, not in acute distress, awake and alert, appropriate, has no new complaints, pt's family member is present.     PAST MEDICAL & SURGICAL HISTORY:  H/O cirrhosis      S/P foot surgery          ROS:    [ ] Unobtainable because:  [x ] All other systems negative    Constitutional: no fever, no chills  Head: no trauma  Eyes: no vision changes, no eye pain  ENT:  no sore throat, no rhinorrhea  Cardiovascular:  no chest pain, no palpitation  Respiratory:  no SOB, no cough  GI:  no abd pain, no vomiting, no diarrhea  urinary: no dysuria, no hematuria, no flank pain  musculoskeletal:  no joint pain, no joint swelling  skin:  no rash  neurology:  no headache, no seizure, no change in mental status  psych: no anxiety, no depression         Allergies  No Known Allergies        ANTIMICROBIALS:  vancomycin    Solution 125 every 6 hours      OTHER MEDS:  acetaminophen     Tablet .. 650 milliGRAM(s) Oral every 6 hours PRN  chlorhexidine 2% Cloths 1 Application(s) Topical <User Schedule>  enoxaparin Injectable 40 milliGRAM(s) SubCutaneous every 24 hours  folic acid 1 milliGRAM(s) Oral daily  influenza   Vaccine 0.5 milliLiter(s) IntraMuscular once  nystatin Powder 1 Application(s) Topical two times a day  pantoprazole    Tablet 40 milliGRAM(s) Oral before breakfast  thiamine 100 milliGRAM(s) Enteral Tube daily  traZODone 25 milliGRAM(s) Oral at bedtime      Vital Signs Last 24 Hrs  T(C): 37 (05 Mar 2024 16:36), Max: 37.6 (05 Mar 2024 05:00)  T(F): 98.6 (05 Mar 2024 16:36), Max: 99.6 (05 Mar 2024 05:00)  HR: 91 (05 Mar 2024 16:36) (83 - 91)  BP: 116/73 (05 Mar 2024 16:36) (108/64 - 116/73)  BP(mean): --  RR: 18 (05 Mar 2024 16:36) (17 - 20)  SpO2: 97% (05 Mar 2024 16:36) (97% - 100%)    Parameters below as of 05 Mar 2024 16:36  Patient On (Oxygen Delivery Method): room air        Physical Exam:  General:  NAD,  non toxic, on RA  Head: atraumatic, normocephalic  Eye: scleral icterus, erythematous conjunctiva with some improvement   ENT:    neck supple, no oral lesions   Cardio:     regular S1, S2,  no murmur  Respiratory:  clear BS b/l,    no wheezing, no rhonchi   abd:     soft,   mildly distended, staples intact, no drainage   :  no Barber   Musculoskeletal:   no joint swelling,   no edema  vascular: no central lines, +PIV    Skin:    no rash, jaundiced - with improvement   Neurologic:  awake and alert, appropriate, answers questions and follows commands   psych: appropriate, calm behavior     WBC Count: 15.38 K/uL (03-05 @ 06:47)  WBC Count: 13.97 K/uL (03-04 @ 04:00)  WBC Count: 14.94 K/uL (03-03 @ 02:30)  WBC Count: 15.39 K/uL (03-02 @ 07:25)  WBC Count: 22.74 K/uL (02-29 @ 03:40)  WBC Count: 21.61 K/uL (02-28 @ 02:50)                            10.3   15.38 )-----------( 232      ( 05 Mar 2024 06:47 )             31.8       03-05    133<L>  |  101  |  5<L>  ----------------------------<  85  3.7   |  23  |  0.34<L>    Ca    7.9<L>      05 Mar 2024 06:47    TPro  6.3  /  Alb  1.7<L>  /  TBili  2.3<H>  /  DBili  x   /  AST  70<H>  /  ALT  79<H>  /  AlkPhos  676<H>  03-04      Urinalysis Basic - ( 05 Mar 2024 06:47 )    Color: x / Appearance: x / SG: x / pH: x  Gluc: 85 mg/dL / Ketone: x  / Bili: x / Urobili: x   Blood: x / Protein: x / Nitrite: x   Leuk Esterase: x / RBC: x / WBC x   Sq Epi: x / Non Sq Epi: x / Bacteria: x        Creatinine Trend: 0.34<--, 0.34<--, 0.35<--, 0.37<--, 0.42<--, 0.46<--      MICROBIOLOGY:  v  Clean Catch Clean Catch (Midstream)  02-26-24   10,000 - 49,000 CFU/mL Escherichia coli (Carbapenem Resistant)  Recarbrio=32ug/mL (Resistant),  interpretations based on FDA breakpoints.  --  Escherichia coli (Carbapenem Resistant)      .Blood Blood  02-25-24   No growth at 5 days  --  --      ET Tube ET Tube  02-18-24   Normal Respiratory [place] present  --    No polymorphonuclear leukocytes seen per low power field  No Squamous epithelial cells seen per low power field  No organisms seen per oil power field      .Bronchial Bronchial Lavage  02-16-24   Normal Respiratory [place] present  --    Few polymorphonuclear leukocytes per low power field  No squamous epithelial cells per low power field  Rare Gram Negative Rods per oil power field      .Blood Blood-Peripheral  02-16-24   No growth at 5 days  --  --      .Blood Blood-Peripheral  02-16-24   No growth at 5 days  --  --      .Blood Blood  02-13-24   No growth at 5 days  --  --      .Blood Blood  02-13-24   No growth at 5 days  --  --      Peritoneal peritoneal fluida c/s  02-13-24   Rare Streptococcus salivarius/vestibularis group  Rare Streptococcus mitis/oralis group  Rare Candida albicans  --  Streptococcus mitis/oralis group  Streptococcus salivarius/vestibularis group  Candida albicans      Clean Catch Clean Catch (Midstream)  02-13-24   No growth  --  --          Procalcitonin, Serum: 0.71 (02-28-24 @ 02:50)      SARS-CoV-2: [place] (25 Feb 2024 07:10)    RADIOLOGY:

## 2024-03-05 NOTE — PROGRESS NOTE ADULT - ASSESSMENT
A/P    49 yo M with h/o cirrhosis, EtOH use disorder, open reduction metatarsal fracture who presented 2 to perforated duodenal ulcer and taken to the OR; s/p Exploratory laparotomy with closure of perforated duodenum using omental patch. ICU course complicated by ARDS, septic shock, and EMILIA. s/p extubation with overall clinical improvement      Resp: Breathing comfortably on RA  ID: Finished course of Meropenem On  empiric enteral Vanco for C. diff as per ID  HEME: DVT prophylaxis with Lovenox  FEN: Severe protein-calorie malnutrition; po diet consistency as per Speech/Swallow/ Replace Phos; pt hypophosphatemic    GI: F/u as per Surg  Neuro/Psych: Cont nocturnal Trazadone   Social: PT/OT/ OOB->chair/     Needs acute rehab.            Bakari Galvan MD

## 2024-03-05 NOTE — CHART NOTE - NSCHARTNOTEFT_GEN_A_CORE
EVENT: Received telephone call from RN that pt is c/o of frequent dry cough    HPI: 49 yo M with h/o cirrhosis, EtOH use disorder, open reduction metatarsal fracture who presented 2 to perforated duodenal ulcer and taken to the OR; s/p Exploratory laparotomy with closure of perforated duodenum using omental patch. ICU course complicated by ARDS, septic shock, and EMILIA. s/p extubation with overall clinical improvement. Now c/o of frequent cough.    SUBJECTIVE: n/a      OBJECTIVE:  Vital Signs Last 24 Hrs  T(C): 37 (05 Mar 2024 16:36), Max: 37.6 (05 Mar 2024 05:00)  T(F): 98.6 (05 Mar 2024 16:36), Max: 99.6 (05 Mar 2024 05:00)  HR: 91 (05 Mar 2024 16:36) (83 - 91)  BP: 116/73 (05 Mar 2024 16:36) (108/64 - 116/73)  BP(mean): --  RR: 18 (05 Mar 2024 16:36) (17 - 20)  SpO2: 97% (05 Mar 2024 16:36) (97% - 100%)    Parameters below as of 05 Mar 2024 16:36  Patient On (Oxygen Delivery Method): room air      FOCUSED PHYSICAL EXAM:  Neuro: awake, alert, oriented x 3. No neuro deficit  Cardiovascular: Pulses +2 B/L in lower and upper extremities, HR regular, BP stable, No edema.  Respiratory: Respirations regular, unlabored, breath sounds clear B/L.   GI: Abdomen soft, non-tender, positive bowel sounds.  : no bladder distention noted. No complaints at this time.  Skin: Dry, intact, no bruising, no diaphoresis.    LABS:                        10.3   15.38 )-----------( 232      ( 05 Mar 2024 06:47 )             31.8     03-05    133<L>  |  101  |  5<L>  ----------------------------<  85  3.7   |  23  |  0.34<L>    Ca    7.9<L>      05 Mar 2024 06:47    TPro  6.3  /  Alb  1.7<L>  /  TBili  2.3<H>  /  DBili  x   /  AST  70<H>  /  ALT  79<H>  /  AlkPhos  676<H>  03-04      EKG:   IMAGING:    ASSESSMENT/PROBLEM: Dry cough      PLAN:   1. Guaifenesin 200 ml, PO, Q6hrs, PRN, cough ordered  2. Monitor response to treatment  3. Cont present care/treatment  4. Supportive care

## 2024-03-05 NOTE — PROGRESS NOTE ADULT - SUBJECTIVE AND OBJECTIVE BOX
Patient is a 48y old  Male who presents with a chief complaint of Perforated viscus (05 Mar 2024 10:36)      INTERVAL HPI/OVERNIGHT EVENTS: No acute events overnight, feels very weak.     MEDICATIONS  (STANDING):  chlorhexidine 2% Cloths 1 Application(s) Topical <User Schedule>  enoxaparin Injectable 40 milliGRAM(s) SubCutaneous every 24 hours  folic acid 1 milliGRAM(s) Oral daily  influenza   Vaccine 0.5 milliLiter(s) IntraMuscular once  nystatin Powder 1 Application(s) Topical two times a day  pantoprazole    Tablet 40 milliGRAM(s) Oral before breakfast  thiamine 100 milliGRAM(s) Enteral Tube daily  traZODone 25 milliGRAM(s) Oral at bedtime  vancomycin    Solution 125 milliGRAM(s) Oral every 6 hours    MEDICATIONS  (PRN):  acetaminophen     Tablet .. 650 milliGRAM(s) Oral every 6 hours PRN Temp greater or equal to 38C (100.4F)      Allergies    No Known Allergies    Intolerances        REVIEW OF SYSTEMS:  CONSTITUTIONAL: +ve for fatigue  EYES: No eye pain, visual disturbances, or discharge  ENMT:  No difficulty hearing, tinnitus, vertigo; No sinus or throat pain  NECK: No pain or stiffness      Vital Signs Last 24 Hrs  T(C): 36.7 (05 Mar 2024 11:00), Max: 37.6 (05 Mar 2024 05:00)  T(F): 98.1 (05 Mar 2024 11:00), Max: 99.6 (05 Mar 2024 05:00)  HR: 83 (05 Mar 2024 11:00) (83 - 88)  BP: 111/65 (05 Mar 2024 11:00) (108/64 - 116/74)  BP(mean): 88 (04 Mar 2024 15:40) (88 - 88)  RR: 17 (05 Mar 2024 11:00) (17 - 20)  SpO2: 97% (05 Mar 2024 11:00) (97% - 100%)    Parameters below as of 05 Mar 2024 11:00  Patient On (Oxygen Delivery Method): room air        PHYSICAL EXAM:    HEAD:  Atraumatic, Normocephalic  EYES: EOMI, PERRLA, conjunctiva and sclera clear  NECK: Supple, No JVD, Normal thyroid  NERVOUS SYSTEM:  Alert & Oriented X3    LABS:                        10.3   15.38 )-----------( 232      ( 05 Mar 2024 06:47 )             31.8     03-05    133<L>  |  101  |  5<L>  ----------------------------<  85  3.7   |  23  |  0.34<L>    Ca    7.9<L>      05 Mar 2024 06:47    TPro  6.3  /  Alb  1.7<L>  /  TBili  2.3<H>  /  DBili  x   /  AST  70<H>  /  ALT  79<H>  /  AlkPhos  676<H>  03-04      Urinalysis Basic - ( 05 Mar 2024 06:47 )    Color: x / Appearance: x / SG: x / pH: x  Gluc: 85 mg/dL / Ketone: x  / Bili: x / Urobili: x   Blood: x / Protein: x / Nitrite: x   Leuk Esterase: x / RBC: x / WBC x   Sq Epi: x / Non Sq Epi: x / Bacteria: x

## 2024-03-05 NOTE — PROGRESS NOTE ADULT - ASSESSMENT
49 y/o male PMHx cirrhosis, ETOH, open reduction of metatarsal fracture 2022 presents c/o epigastric pain for a few hours. Vomited when he came to the ER. Last BM in the afternoon. Last flatus before the BM. Patient denies fever, chills, constipation, diarrhea, melena, hematochezia, dysuria, hematuria, chest pain, shortness of breath, dizziness, cough.  CT (I personally reviewed) Scattered foci of free intraperitoneal air, consistent with perforated viscus.  imaging also shows cirrhosis presumably from chronic alcohol use   was started on Zosyn and fluconazole   would start workup on cirrhosis as well   will follow cultures and target out therapy if able     2/15: cultures positive for several Streptococcus species, will continue antibiotics, still febrile and on pressors, weaning vent as tolerated. patient remains critically ill, 3rd spacing and getting albumin    216: remains intubated , requiring  sedation, on pressors, Streptococcus in cultures, remains on antibiotics, very quiet bowel sounds, no BMs on day shift thus far, little output on NGT  2/20: remains on vent, BP better, fever curve better, completed zosyn but will give 3 more days of ceftriaxone and fluconazole. if Qtc is prolonged can stop fluconazole, please keep the Qtc on monitor,   2/21: day 8 of intubation,  afebrile, UGIS cancelled, discussed with ICU the need for a CT scan which was done and did not show extravasation of contrast, no obvious leak, possibly starting tube feeds. nearly complete with antibiotics, would try to avoid a central line or TPN if able to tolerate feeds    2/23: completed antibiotics, discuss with surgery about drain and possible removal(could it be ascites that is draining)  2/26: persistently febrile, leukocytosis jumped quite high, blood cultures negative, UA not very impressive with mild pyuria, trend bili and wbc count   2/27: fever curve better, failed speech and swallow, continue to try to meet nutritional needs, please perform PT and OT with patient, follow cultures, continue meropenem. since having loose stool please stop miralax and senna unless there is a concern hepatic encephalopathy in which case lactulose should be used with a goal of 2-3 soft BMs per day.   2/28: NGT was in place sitting in chair off oxyegn, following commands, weak voice but stronger, grew NDM ecoli , absolutely no urinary symptoms. will not treat this highly resistant ecoli though he needs to be on contact precautions to prevent the spread. Will reach out to lab to test for sensitivities against novel antibiotics as he does not have many options if he develops an active infection with the ecoli isolate. Therefore, ok with continuing the meropenem    2/29: day 5 of meropenem, loose stool while on miralax which has been stopped and rectal tube placed, cannot test for Clostridioides difficile due to laxative use,ok to empirically add PO vancomycin due to his serious illness and cirrhosis with recent GI surgery.   3/1: improving, seen in ICU, pt is awake and alert, good mental status, had fevers yesterday, none today so far, RA, WBC high but better overall 22.74, Cr ok, LFTs elevated, all BCs NGTD, UC with  E. Coli, pt on contact precautions. Meropenem IV continued, day #6, needs one more day. Vancomycin po continued, pt with rectal tube, no stool in bag, some soft stool in the tube.   Pt passed speech and swallow evaluation this morning, NGT most likely will be removed. PT f/up is pending, pt  is pending transfer to the medicine floor later today.   3/5: downgraded from ICU to the medical floor, doing well, no fevers, RA, WBC elevated 15.38, Cr ok, pod #21, monitored off IV abx, remains on po vanco - loose bowel movements improved, will continue po vanco for 10 days total.     Plan:  continue po vancomycin x 10 days total, last dose on 3/8/2024  trend temperatures and wbc  pending discharge to [facility] for PT  Not immune to HBV-will offer vaccination for HBV once better closer to discharge  surgical site per surgery   Hepatosplenomegaly with evidence portal hypertension (known which will need to be addressed chronically as he recovers from this acute event), outpatient hepatology follow up   maintain contact precautions     will sign off, re-consult as needed     Discussed with Dr. Gilliam   discussed with the  and     49 y/o male PMHx cirrhosis, ETOH, open reduction of metatarsal fracture 2022 presents c/o epigastric pain for a few hours. Vomited when he came to the ER. Last BM in the afternoon. Last flatus before the BM. Patient denies fever, chills, constipation, diarrhea, melena, hematochezia, dysuria, hematuria, chest pain, shortness of breath, dizziness, cough.  CT (I personally reviewed) Scattered foci of free intraperitoneal air, consistent with perforated viscus.  imaging also shows cirrhosis presumably from chronic alcohol use   was started on Zosyn and fluconazole   would start workup on cirrhosis as well   will follow cultures and target out therapy if able     2/15: cultures positive for several Streptococcus species, will continue antibiotics, still febrile and on pressors, weaning vent as tolerated. patient remains critically ill, 3rd spacing and getting albumin    216: remains intubated , requiring  sedation, on pressors, Streptococcus in cultures, remains on antibiotics, very quiet bowel sounds, no BMs on day shift thus far, little output on NGT  2/20: remains on vent, BP better, fever curve better, completed zosyn but will give 3 more days of ceftriaxone and fluconazole. if Qtc is prolonged can stop fluconazole, please keep the Qtc on monitor,   2/21: day 8 of intubation,  afebrile, UGIS cancelled, discussed with ICU the need for a CT scan which was done and did not show extravasation of contrast, no obvious leak, possibly starting tube feeds. nearly complete with antibiotics, would try to avoid a central line or TPN if able to tolerate feeds    2/23: completed antibiotics, discuss with surgery about drain and possible removal(could it be ascites that is draining)  2/26: persistently febrile, leukocytosis jumped quite high, blood cultures negative, UA not very impressive with mild pyuria, trend bili and wbc count   2/27: fever curve better, failed speech and swallow, continue to try to meet nutritional needs, please perform PT and OT with patient, follow cultures, continue meropenem. since having loose stool please stop miralax and senna unless there is a concern hepatic encephalopathy in which case lactulose should be used with a goal of 2-3 soft BMs per day.   2/28: NGT was in place sitting in chair off oxyegn, following commands, weak voice but stronger, grew NDM ecoli , absolutely no urinary symptoms. will not treat this highly resistant ecoli though he needs to be on contact precautions to prevent the spread. Will reach out to lab to test for sensitivities against novel antibiotics as he does not have many options if he develops an active infection with the ecoli isolate. Therefore, ok with continuing the meropenem    2/29: day 5 of meropenem, loose stool while on miralax which has been stopped and rectal tube placed, cannot test for Clostridioides difficile due to laxative use,ok to empirically add PO vancomycin due to his serious illness and cirrhosis with recent GI surgery.   3/1: improving, seen in ICU, pt is awake and alert, good mental status, had fevers yesterday, none today so far, RA, WBC high but better overall 22.74, Cr ok, LFTs elevated, all BCs NGTD, UC with  E. Coli, pt on contact precautions. Meropenem IV continued, day #6, needs one more day. Vancomycin po continued, pt with rectal tube, no stool in bag, some soft stool in the tube.   Pt passed speech and swallow evaluation this morning, NGT most likely will be removed. PT f/up is pending, pt  is pending transfer to the medicine floor later today.   3/5: downgraded from ICU to the medical floor, doing well, no fevers, RA, WBC elevated 15.38, Cr ok, pod #21, monitored off IV abx, remains on po vanco - loose bowel movements improved, will continue po vanco for 10 days total.     Plan:  continue po vancomycin x 10 days total, last dose on 3/8/2024  trend temperatures and wbc  pending discharge to [facility] for PT  Not immune to HBV-please give first dose of Havrix/Engerix  and can get repeat doses at either primary care, rehab or our office   surgical site per surgery   Hepatosplenomegaly with evidence portal hypertension (known which will need to be addressed chronically as he recovers from this acute event), outpatient hepatology follow up   maintain contact precautions   Office: [facility], [address] Office: [phone] Fax: [phone]     will sign off, re-consult as needed     Discussed with Dr. Gilliam   discussed with the  and

## 2024-03-05 NOTE — PROGRESS NOTE ADULT - SUBJECTIVE AND OBJECTIVE BOX
Patient seen and examined bedside resting comfortably.  No complaints offered. +flatus/+ liquid BM. .  Denies nausea, vomiting, diarrhea, abd pain, fevers, chills. Tolerating puree diet.        T(F): 99.6 (03-05-24 @ 05:00), Max: 99.6 (03-05-24 @ 05:00)  HR: 86 (03-05-24 @ 05:00) (83 - 88)  BP: 114/69 (03-05-24 @ 05:00) (108/64 - 116/74)  RR: 19 (03-05-24 @ 05:00) (19 - 20)  SpO2: 98% (03-05-24 @ 05:00) (98% - 100%)  Wt(kg): --  CAPILLARY BLOOD GLUCOSE          PHYSICAL EXAM:  General: NAD  Neuro:  Alert & oriented x 3  HEENT: NCAT, EOMI, conjunctiva clear  CV: +S1+S2 regular rate and rhythm  Lung:respirations nonlabored, good inspiratory effort  Abdomen: soft, nondistended, nontender, midline incision with staples   Extremities: no pedal edema or calf tenderness noted     LABS:                        10.3   15.38 )-----------( 232      ( 05 Mar 2024 06:47 )             31.8     03-05    133<L>  |  101  |  5<L>  ----------------------------<  85  3.7   |  23  |  0.34<L>    Ca    7.9<L>      05 Mar 2024 06:47    TPro  6.3  /  Alb  1.7<L>  /  TBili  2.3<H>  /  DBili  x   /  AST  70<H>  /  ALT  79<H>  /  AlkPhos  676<H>  03-04        I&O:     A/P: 47 Y/O Male POD21 s/p ex lap, kyrie patch 2/2 duodenal perforation   on puree diet  - continue pureed diet  - abx per ID  - DVT ppx, OOB/AAT  - continue care per primary team

## 2024-03-05 NOTE — CHART NOTE - NSCHARTNOTEFT_GEN_A_CORE
Pt presented c epigastric pain; found c perforated ulcer; s/p ex lap c closure of perforated duodenal ulcer using omental patch (2/13); course complicated by ARDS, septic shock, EMILIA.  Pt c hx of cirrhosis due to ETOH use.     Factors impacting intake: [ ] none [ ] nausea  [ ] vomiting [ ] diarrhea [ ] constipation  [ ]chewing problems [ ] swallowing issues  [X ] other: GI issue    Diet Prescription: Diet, Regular (03-04-24 @ 11:05)    Intake:  During course of admission pt was NPO x 9 days (2/13-2/22); on tube feeding 2/22-3/1; then advanced to PO diets- pureed (3/1-3/4) & then regular diet yesterday 3/4; unable to determine intake at this time    Current Weight:     74.2 kg (3/5); admission wt 84.9 kg (2/13)  % Weight Change:  12.6% wt loss x 3 weeks questionable accuracy as recorded wts c many inconsistencies    No edema noted since 2/29; at admission pt c 1+ generalized edema noted    Pertinent Medications: MEDICATIONS  (STANDING):  chlorhexidine 2% Cloths 1 Application(s) Topical <User Schedule>  enoxaparin Injectable 40 milliGRAM(s) SubCutaneous every 24 hours  folic acid 1 milliGRAM(s) Oral daily  influenza   Vaccine 0.5 milliLiter(s) IntraMuscular once  nystatin Powder 1 Application(s) Topical two times a day  pantoprazole    Tablet 40 milliGRAM(s) Oral before breakfast  thiamine 100 milliGRAM(s) Enteral Tube daily  traZODone 25 milliGRAM(s) Oral at bedtime  vancomycin    Solution 125 milliGRAM(s) Oral every 6 hours    MEDICATIONS  (PRN):  acetaminophen     Tablet .. 650 milliGRAM(s) Oral every 6 hours PRN Temp greater or equal to 38C (100.4F)    Pertinent Labs: 03-05 Na133 mmol/L<L> Glu 85 mg/dL K+ 3.7 mmol/L Cr  0.34 mg/dL<L> BUN 5 mg/dL<L> 03-03 Phos 1.7 mg/dL<L> 03-04 Alb 1.7 g/dL<L>    Skin:   no pressure ulcers noted    Estimated Needs:   [ X] no change since previous assessment (2/15)  [ ] recalculated:     Previous Nutrition Diagnosis:(02/28)  [x ]  Severe malnutrition in context of acute illness   Related to: inadequate protein-energy intake in setting of c perforated duodenal ulcer, s/p surgery to repair;, NPO x 9 days during course of admission; ARDS, septic shock, EMILIA.  Signs & Symptoms: <50% nutrition needs > 5 days, >5% wt. loss in 1 month    Previous GOAL: pt to meet >75% protein-energy needs via tolerated route  NEW GOAL:  Pt to meet >75% energy/protein needs via meals/supplements    Nutrition Diagnosis is [x ] ongoing    New Nutrition Diagnosis is [x] not applicable      Interventions:   continue regular diet as noted  Recommend  [ ] Change Diet To:  [X ] Nutrition Supplement:  add Ensure Plus High Protein x 1/day (provides 350 kcal, 20 g protein)   [ ] Nutrition Support  [ ] Other:     Monitoring and Evaluation:   [X ] PO intake [ x ] Tolerance to diet prescription [ x ] weights [ x ] labs[ x ] follow up per protocol  [ ] other:

## 2024-03-06 PROCEDURE — 99232 SBSQ HOSP IP/OBS MODERATE 35: CPT

## 2024-03-06 RX ADMIN — Medication 100 MILLIGRAM(S): at 12:13

## 2024-03-06 RX ADMIN — Medication 125 MILLIGRAM(S): at 17:41

## 2024-03-06 RX ADMIN — Medication 200 MILLIGRAM(S): at 23:01

## 2024-03-06 RX ADMIN — ENOXAPARIN SODIUM 40 MILLIGRAM(S): 100 INJECTION SUBCUTANEOUS at 12:13

## 2024-03-06 RX ADMIN — Medication 25 MILLIGRAM(S): at 21:51

## 2024-03-06 RX ADMIN — Medication 125 MILLIGRAM(S): at 12:12

## 2024-03-06 RX ADMIN — CHLORHEXIDINE GLUCONATE 1 APPLICATION(S): 213 SOLUTION TOPICAL at 05:53

## 2024-03-06 RX ADMIN — NYSTATIN CREAM 1 APPLICATION(S): 100000 CREAM TOPICAL at 21:50

## 2024-03-06 RX ADMIN — Medication 125 MILLIGRAM(S): at 05:54

## 2024-03-06 RX ADMIN — Medication 1 MILLIGRAM(S): at 12:13

## 2024-03-06 RX ADMIN — PANTOPRAZOLE SODIUM 40 MILLIGRAM(S): 20 TABLET, DELAYED RELEASE ORAL at 05:54

## 2024-03-06 RX ADMIN — NYSTATIN CREAM 1 APPLICATION(S): 100000 CREAM TOPICAL at 09:48

## 2024-03-06 RX ADMIN — Medication 125 MILLIGRAM(S): at 21:56

## 2024-03-06 NOTE — PROGRESS NOTE ADULT - ASSESSMENT
A/P    49 yo M with h/o cirrhosis, EtOH use disorder, open reduction metatarsal fracture who presented 2 to perforated duodenal ulcer and taken to the OR; s/p Exploratory laparotomy with closure of perforated duodenum using omental patch. ICU course complicated by ARDS, septic shock, and EMILIA. s/p extubation with overall clinical improvement      Resp: Breathing comfortably on RA  ID: Finished course of Meropenem On  empiric enteral Vanco for C. diff as per ID  HEME: DVT prophylaxis with Lovenox  FEN: Severe protein-calorie malnutrition; po diet consistency as per Speech/Swallow/ Replace Phos; pt hypophosphatemic    GI: F/u as per Surg  Neuro/Psych: Cont nocturnal Trazadone   Social: PT/OT/ OOB->chair    Needs acute rehab. Pending placement.           Bakari Galvan MD

## 2024-03-06 NOTE — PROGRESS NOTE ADULT - NUTRITIONAL ASSESSMENT
This patient has been assessed with a concern for Malnutrition and has been determined to have a diagnosis/diagnoses of Severe protein-calorie malnutrition.    This patient is being managed with:   Diet Pureed-  Mildly Thick Liquids (MILDTHICKLIQS)  No Pork  No Poultry  Entered: Mar  2 2024  9:35AM  
This patient has been assessed with a concern for Malnutrition and has been determined to have a diagnosis/diagnoses of Severe protein-calorie malnutrition.    This patient is being managed with:   Diet Regular-  Supplement Feeding Modality:  Oral  Ensure Plus High Protein Cans or Servings Per Day:  1       Frequency:  Daily  Entered: Mar  5 2024 12:32PM    Diet Regular-  Entered: Mar  4 2024 11:05AM    The following pending diet order is being considered for treatment of Severe protein-calorie malnutrition:null
This patient has been assessed with a concern for Malnutrition and has been determined to have a diagnosis/diagnoses of Severe protein-calorie malnutrition.    This patient is being managed with:   Diet Regular-  Supplement Feeding Modality:  Oral  Ensure Plus High Protein Cans or Servings Per Day:  1       Frequency:  Daily  Entered: Mar  5 2024 12:32PM  
This patient has been assessed with a concern for Malnutrition and has been determined to have a diagnosis/diagnoses of Severe protein-calorie malnutrition.    This patient is being managed with:   Diet Pureed-  Mildly Thick Liquids (MILDTHICKLIQS)  No Pork  No Poultry  Entered: Mar  2 2024  9:35AM
This patient has been assessed with a concern for Malnutrition and has been determined to have a diagnosis/diagnoses of Severe protein-calorie malnutrition.    This patient is being managed with:   Diet NPO with Tube Feed-  Tube Feeding Modality: Nasogastric  [employer] 1.5  Total Volume for 24 Hours (mL): 1170  Intermittent  Starting Tube Feed Rate {mL per Hour}: 20  Increase Tube Feed Rate by (mL): 5    Every 12 hours  Until Goal Tube Feed Rate (mL per Hour): 65  Tube Feeding Hours ON: 18  Tube Feeding OFF (Hours): 6  Tube Feed Start Time: 17:00  Entered: Feb 27 2024 10:50AM

## 2024-03-06 NOTE — PROGRESS NOTE ADULT - SUBJECTIVE AND OBJECTIVE BOX
Patient is a 48y old  Male who presents with a chief complaint of Perforated viscus (06 Mar 2024 10:27)      INTERVAL HPI/OVERNIGHT EVENTS: No acute events overnight.     MEDICATIONS  (STANDING):  chlorhexidine 2% Cloths 1 Application(s) Topical <User Schedule>  enoxaparin Injectable 40 milliGRAM(s) SubCutaneous every 24 hours  folic acid 1 milliGRAM(s) Oral daily  influenza   Vaccine 0.5 milliLiter(s) IntraMuscular once  nystatin Powder 1 Application(s) Topical two times a day  pantoprazole    Tablet 40 milliGRAM(s) Oral before breakfast  thiamine 100 milliGRAM(s) Enteral Tube daily  traZODone 25 milliGRAM(s) Oral at bedtime  vancomycin    Solution 125 milliGRAM(s) Oral every 6 hours    MEDICATIONS  (PRN):  acetaminophen     Tablet .. 650 milliGRAM(s) Oral every 6 hours PRN Temp greater or equal to 38C (100.4F)  guaiFENesin Oral Liquid (Sugar-Free) 200 milliGRAM(s) Oral every 6 hours PRN Cough      Allergies    No Known Allergies    Intolerances        REVIEW OF SYSTEMS:  CONSTITUTIONAL: +ve for fatigue  EYES: No eye pain, visual disturbances, or discharge  ENMT:  No difficulty hearing, tinnitus, vertigo; No sinus or throat pain  NECK: No pain or stiffness      Vital Signs Last 24 Hrs  T(C): 36.8 (06 Mar 2024 05:30), Max: 37 (05 Mar 2024 16:36)  T(F): 98.2 (06 Mar 2024 05:30), Max: 98.6 (05 Mar 2024 16:36)  HR: 86 (06 Mar 2024 05:30) (83 - 91)  BP: 118/67 (06 Mar 2024 05:30) (111/65 - 118/67)  BP(mean): --  RR: 18 (06 Mar 2024 05:30) (17 - 18)  SpO2: 96% (06 Mar 2024 05:30) (96% - 97%)    Parameters below as of 06 Mar 2024 05:30  Patient On (Oxygen Delivery Method): room air        PHYSICAL EXAM:    HEAD:  Atraumatic, Normocephalic  EYES: EOMI, PERRLA, conjunctiva and sclera clear  NECK: Supple, No JVD, Normal thyroid  NERVOUS SYSTEM:  Alert & Oriented X3    LABS:                        10.3   15.38 )-----------( 232      ( 05 Mar 2024 06:47 )             31.8     03-05    133<L>  |  101  |  5<L>  ----------------------------<  85  3.7   |  23  |  0.34<L>    Ca    7.9<L>      05 Mar 2024 06:47        Urinalysis Basic - ( 05 Mar 2024 06:47 )    Color: x / Appearance: x / SG: x / pH: x  Gluc: 85 mg/dL / Ketone: x  / Bili: x / Urobili: x   Blood: x / Protein: x / Nitrite: x   Leuk Esterase: x / RBC: x / WBC x   Sq Epi: x / Non Sq Epi: x / Bacteria: x

## 2024-03-06 NOTE — PROGRESS NOTE ADULT - SUBJECTIVE AND OBJECTIVE BOX
Patient seen and examined bedside resting comfortably.  No complaints offered. +flatus/+bm, denies any further diarrhea   Denies nausea, vomiting, diarrhea, fevers, chills. Tolerating diet.        T(F): 98.2 (03-06-24 @ 05:30), Max: 98.6 (03-05-24 @ 16:36)  HR: 86 (03-06-24 @ 05:30) (83 - 91)  BP: 118/67 (03-06-24 @ 05:30) (111/65 - 118/67)  RR: 18 (03-06-24 @ 05:30) (17 - 18)  SpO2: 96% (03-06-24 @ 05:30) (96% - 97%)  Wt(kg): --  CAPILLARY BLOOD GLUCOSE          PHYSICAL EXAM:  General: NAD  Neuro:  Alert & oriented x 3  HEENT: NCAT, EOMI, conjunctiva clear  Lung:respirations nonlabored, good inspiratory effort  Abdomen: soft,nondistended, nontender, midline incision with staples clean, no surrounding erythema, or drianage  Extremities: no pedal edema or calf tenderness noted     LABS:                        10.3   15.38 )-----------( 232      ( 05 Mar 2024 06:47 )             31.8     03-05    133<L>  |  101  |  5<L>  ----------------------------<  85  3.7   |  23  |  0.34<L>    Ca    7.9<L>      05 Mar 2024 06:47          I&O:       A/P: 49 Y/O Male POD22 s/p ex lap, kyrie patch 2/2 duodenal perforation   on puree diet. PT advised MICHELE  - continue pureed diet  - abx per ID  - DVT ppx, OOB/AAT  - continue care per primary team

## 2024-03-07 ENCOUNTER — TRANSCRIPTION ENCOUNTER (OUTPATIENT)
Age: 49
End: 2024-03-07

## 2024-03-07 VITALS
DIASTOLIC BLOOD PRESSURE: 71 MMHG | TEMPERATURE: 98 F | RESPIRATION RATE: 18 BRPM | HEART RATE: 78 BPM | OXYGEN SATURATION: 97 % | SYSTOLIC BLOOD PRESSURE: 114 MMHG

## 2024-03-07 PROCEDURE — 99238 HOSP IP/OBS DSCHRG MGMT 30/<: CPT

## 2024-03-07 RX ORDER — ACETAMINOPHEN 500 MG
2 TABLET ORAL
Qty: 0 | Refills: 0 | DISCHARGE
Start: 2024-03-07

## 2024-03-07 RX ORDER — FOLIC ACID 0.8 MG
1 TABLET ORAL
Qty: 0 | Refills: 0 | DISCHARGE
Start: 2024-03-07

## 2024-03-07 RX ORDER — NYSTATIN CREAM 100000 [USP'U]/G
1 CREAM TOPICAL
Qty: 0 | Refills: 0 | DISCHARGE
Start: 2024-03-07

## 2024-03-07 RX ORDER — THIAMINE MONONITRATE (VIT B1) 100 MG
1 TABLET ORAL
Qty: 0 | Refills: 0 | DISCHARGE
Start: 2024-03-07

## 2024-03-07 RX ORDER — PANTOPRAZOLE SODIUM 20 MG/1
1 TABLET, DELAYED RELEASE ORAL
Qty: 0 | Refills: 0 | DISCHARGE
Start: 2024-03-07

## 2024-03-07 RX ADMIN — PANTOPRAZOLE SODIUM 40 MILLIGRAM(S): 20 TABLET, DELAYED RELEASE ORAL at 05:52

## 2024-03-07 RX ADMIN — Medication 125 MILLIGRAM(S): at 12:08

## 2024-03-07 RX ADMIN — Medication 100 MILLIGRAM(S): at 12:07

## 2024-03-07 RX ADMIN — ENOXAPARIN SODIUM 40 MILLIGRAM(S): 100 INJECTION SUBCUTANEOUS at 12:07

## 2024-03-07 RX ADMIN — NYSTATIN CREAM 1 APPLICATION(S): 100000 CREAM TOPICAL at 12:09

## 2024-03-07 RX ADMIN — Medication 1 MILLIGRAM(S): at 12:07

## 2024-03-07 RX ADMIN — CHLORHEXIDINE GLUCONATE 1 APPLICATION(S): 213 SOLUTION TOPICAL at 05:54

## 2024-03-07 RX ADMIN — Medication 125 MILLIGRAM(S): at 05:53

## 2024-03-07 NOTE — DISCHARGE NOTE PROVIDER - DETAILS OF MALNUTRITION DIAGNOSIS/DIAGNOSES
This patient has been assessed with a concern for Malnutrition and was treated during this hospitalization for the following Nutrition diagnosis/diagnoses:     -  02/28/2024: Severe protein-calorie malnutrition

## 2024-03-07 NOTE — CHART NOTE - NSCHARTNOTEFT_GEN_A_CORE
Patient seen and examined at bedside.   20 staples removed from midline incision. Steri-strips placed and patient educated on not soaking midline incision. Steri-strips will fall off in about 7 days.

## 2024-03-07 NOTE — DISCHARGE NOTE NURSING/CASE MANAGEMENT/SOCIAL WORK - PATIENT PORTAL LINK FT
You can access the FollowMyHealth Patient Portal offered by [facility] by registering at the following website: [email or web address]. By joining [email or web address]’s FollowMyHealth portal, you will also be able to view your health information using other applications (apps) compatible with our system.

## 2024-03-07 NOTE — CHART NOTE - NSCHARTNOTESELECT_GEN_ALL_CORE
Event Note
Event/Event Note
Hospitalist Accept Note/Event Note
Nutrition Services
Nutrition Services
POCUS note/Event Note
Transfer Note
Event Note
Event Note
NGT replacement/Event Note
Nutrition Services
Nutrition Services
POCUS/Event Note
POCUS/Event Note
Event Note

## 2024-03-07 NOTE — PROGRESS NOTE ADULT - REASON FOR ADMISSION
Perforated viscus

## 2024-03-07 NOTE — PROGRESS NOTE ADULT - SUBJECTIVE AND OBJECTIVE BOX
Patient seen and examined at bedside with no complaints.   Tolerating regular diet.  Admits to flatus/BM.   Denies pain, nausea/ vomiting, chills, SOB, chest pain, calf tenderness.          Vital Signs Last 24 Hrs  T(F): 98 (03-07-24 @ 10:59), Max: 98.6 (03-06-24 @ 23:15)  HR: 90 (03-07-24 @ 10:59)  BP: 106/65 (03-07-24 @ 10:59)  RR: 18 (03-07-24 @ 10:59)  SpO2: 95% (03-07-24 @ 10:59)  Wt(kg): --   CAPILLARY BLOOD GLUCOSE          GENERAL: Alert, NAD  CHEST/LUNG: Respirations non labored, good inspiratory effort  HEART: S1S2  HEENT: NCAT, EOMI, conjunctiva clear   ABDOMEN: Distended, + bowel sounds, nontender, midline incision C/D/I with staples in place  EXTREMITIES:  No calf tenderness, no edema    I&O's Detail    06 Mar 2024 07:01  -  07 Mar 2024 07:00  --------------------------------------------------------  IN:    Oral Fluid: 300 mL  Total IN: 300 mL    OUT:  Total OUT: 0 mL    Total NET: 300 mL          LABS:              RADIOLOGY & ADDITIONAL STUDIES:    < from: CT Abdomen and Pelvis w/ Oral Cont and w/ IV Cont (02.21.24 @ 16:16) >    ACC: [id number] EXAM:  CT ABDOMEN AND PELVIS OC IC   ORDERED BY: YAN GOMEZ     PROCEDURE DATE:  02/21/2024          INTERPRETATION:  CLINICAL INFORMATION: 48-year-old man status post repair   perforated duodenal ulcer 02/13/2024 with persistent fever    COMPARISON: CT scan 02/13/2024    CONTRAST/COMPLICATIONS:  IV Contrast: Omnipaque 350  99 cc administered   1 cc discarded  Oral Contrast: Omnipaque 240  Complications: None reported at time of study completion    PROCEDURE:  CT of the Abdomen and Pelvis was performed.  Sagittal and coronal reformats were performed.    FINDINGS:  LOWER CHEST: Patchy consolidation left upper and lower lobes and   extensive groundglass consolidation right middle lobe, likely pneumonia.   Bibasilar consolidation consistent with atelectasis and/or pneumonia.   Small bilateral pleural effusions. LIVER: Hepatomegaly.  BILE DUCTS: Normal caliber.  GALLBLADDER: Within normal limits.  SPLEEN: Splenomegaly with splenic span 14.4 cm  PANCREAS: Within normal limits.  ADRENALS: Within normal limits.  KIDNEYS/URETERS: Within normal limits.    BLADDER: Indwelling catheter.  REPRODUCTIVE ORGANS: Normal-sized prostate    BOWEL: No bowel obstruction. Duodenal edema. Esophagogastric tube ending   in the stomach. Edema ascending colon possible portal colopathy. Appendix   not visualized. No evidence appendicitis.  PERITONEUM: Small right subphrenic ascites. No intra-abdominal collection   otherwise visualized. Small amount free air possibly related to drain   ending in left anterior upper quadrant and inserted via right flank  VESSELS: Paraesophageal varices.  RETROPERITONEUM/LYMPH NODES: No lymphadenopathy.  ABDOMINAL WALL: Within normal limits.  BONES: Degenerative change.    IMPRESSION:  Multifocal pneumonia.  Small amount of pneumoperitoneum possibly related to indwelling drain. No   associated collection.  Small right subphrenic ascites.  Hepatosplenomegaly with evidence portal hypertension        --- End of Report ---            ANDRIA PEREZ MD; Attending Radiologist  This document has been electronically signed. Feb 21 2024  9:40PM    < end of copied text >

## 2024-03-07 NOTE — DISCHARGE NOTE PROVIDER - NSDCCPCAREPLAN_GEN_ALL_CORE_FT
PRINCIPAL DISCHARGE DIAGNOSIS  Diagnosis: Intra-abdominal free air of unknown etiology  Assessment and Plan of Treatment: Please return to the hospital if your symptoms worsen or return.

## 2024-03-07 NOTE — PROGRESS NOTE ADULT - ASSESSMENT
49 Y/O male POD 23 s/p ex lap, kyrie patch 2/2 duodenal perforation   on regular diet. PT advised MICHELE. Awaiting placement.   VSS.   Staples to be removed today.     PLAN:     - Continue regular diet  - ABx per ID   - DVT ppx; OOB; ambulate as tolerated  - Continue care per primary team  - Discussed with Dr. Blanco

## 2024-03-07 NOTE — DISCHARGE NOTE PROVIDER - NSDCMRMEDTOKEN_GEN_ALL_CORE_FT
acetaminophen 325 mg oral tablet: 2 tab(s) orally every 6 hours As needed Temp greater or equal to 38C (100.4F)  folic acid 1 mg oral tablet: 1 tab(s) orally once a day  guaiFENesin 100 mg/5 mL oral liquid: 10 milliliter(s) orally every 6 hours As needed Cough  nystatin 100,000 units/g topical powder: 1 Apply topically to affected area 2 times a day  pantoprazole 40 mg oral delayed release tablet: 1 tab(s) orally once a day (before a meal)  thiamine 100 mg oral tablet: 1 tab(s) orally once a day

## 2024-03-07 NOTE — PROGRESS NOTE ADULT - PROVIDER SPECIALTY LIST ADULT
Critical Care
Hospitalist
Hospitalist
Infectious Disease
Surgery
Critical Care
Infectious Disease
Surgery
Critical Care
Gastroenterology
Gastroenterology
Hospitalist
Infectious Disease
Infectious Disease
Surgery
Critical Care
Infectious Disease
Surgery
Critical Care
Hospitalist
Critical Care

## 2024-03-07 NOTE — DISCHARGE NOTE PROVIDER - HOSPITAL COURSE
47 yo M with h/o cirrhosis, EtOH use disorder, open reduction metatarsal fracture who presented 2 to perforated duodenal ulcer and taken to the OR; s/p Exploratory laparotomy with closure of perforated duodenum using omental patch. ICU course complicated by ARDS, septic shock, and EMILIA. s/p extubation with overall clinical improvement. Patient optimized for discharge.  On 3/7/2024 discussed case with Dr Galvan, agrees with plan.       H/O cirrhosis  S/P foot surgery 47 yo M with h/o cirrhosis, EtOH use disorder, open reduction metatarsal fracture who presented 2 to perforated duodenal ulcer and taken to the OR; s/p Exploratory laparotomy with closure of perforated duodenum using omental patch. ICU course complicated by ARDS, septic shock, and EMILIA. s/p extubation with overall clinical improvement. Patient optimized for discharge. Feels weak at the time of discharge but has no other specific complaints.       H/O cirrhosis  S/P foot surgery 49 yo M with h/o cirrhosis, EtOH use disorder, open reduction metatarsal fracture who presented 2 to perforated duodenal ulcer and taken to the OR; s/p Exploratory laparotomy with closure of perforated duodenum using omental patch. ICU course complicated by ARDS, septic shock, and EMILIA. s/p extubation with overall clinical improvement. Patient optimized for discharge. Feels weak at the time of discharge but has no other specific complaints.       H/O cirrhosis  S/P foot surgery  Perforated duodenal ulcer   ARDS  Septic shock  EMILIA

## 2024-03-13 DIAGNOSIS — N17.0 ACUTE KIDNEY FAILURE WITH TUBULAR NECROSIS: ICD-10-CM

## 2024-03-13 DIAGNOSIS — N39.0 URINARY TRACT INFECTION, SITE NOT SPECIFIED: ICD-10-CM

## 2024-03-13 DIAGNOSIS — R16.2 HEPATOMEGALY WITH SPLENOMEGALY, NOT ELSEWHERE CLASSIFIED: ICD-10-CM
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
## 2024-03-13 DIAGNOSIS — E43 UNSPECIFIED SEVERE PROTEIN-CALORIE MALNUTRITION: ICD-10-CM

## 2024-03-13 DIAGNOSIS — E83.39 OTHER DISORDERS OF PHOSPHORUS METABOLISM: ICD-10-CM

## 2024-03-13 DIAGNOSIS — R05.9 COUGH, UNSPECIFIED: ICD-10-CM

## 2024-03-13 DIAGNOSIS — T85.628A DISPLACEMENT OF OTHER SPECIFIED INTERNAL PROSTHETIC DEVICES, IMPLANTS AND GRAFTS, INITIAL ENCOUNTER: ICD-10-CM

## 2024-03-13 DIAGNOSIS — K70.31 ALCOHOLIC CIRRHOSIS OF LIVER WITH ASCITES: ICD-10-CM

## 2024-03-13 DIAGNOSIS — F10.10 ALCOHOL ABUSE, UNCOMPLICATED: ICD-10-CM
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
## 2024-03-13 DIAGNOSIS — J95.821 ACUTE POSTPROCEDURAL RESPIRATORY FAILURE: ICD-10-CM

## 2024-03-13 DIAGNOSIS — Z16.24 RESISTANCE TO MULTIPLE ANTIBIOTICS: ICD-10-CM

## 2024-03-13 DIAGNOSIS — E87.6 HYPOKALEMIA: ICD-10-CM

## 2024-03-13 DIAGNOSIS — R10.13 EPIGASTRIC PAIN: ICD-10-CM

## 2024-03-13 DIAGNOSIS — J69.0 PNEUMONITIS DUE TO INHALATION OF FOOD AND VOMIT: ICD-10-CM

## 2024-03-13 DIAGNOSIS — K76.7 HEPATORENAL SYNDROME: ICD-10-CM

## 2024-03-13 DIAGNOSIS — A41.9 SEPSIS, UNSPECIFIED ORGANISM: ICD-10-CM

## 2024-03-13 DIAGNOSIS — R19.7 DIARRHEA, UNSPECIFIED: ICD-10-CM

## 2024-03-13 DIAGNOSIS — B96.20 UNSPECIFIED ESCHERICHIA COLI [E. COLI] AS THE CAUSE OF DISEASES CLASSIFIED ELSEWHERE: ICD-10-CM

## 2024-03-13 DIAGNOSIS — K76.6 PORTAL HYPERTENSION: ICD-10-CM

## 2024-03-13 DIAGNOSIS — E87.0 HYPEROSMOLALITY AND HYPERNATREMIA: ICD-10-CM

## 2024-03-13 DIAGNOSIS — R65.21 SEVERE SEPSIS WITH SEPTIC SHOCK: ICD-10-CM
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
## 2024-03-13 DIAGNOSIS — Z11.52 ENCOUNTER FOR SCREENING FOR COVID-19: ICD-10-CM

## 2024-03-13 DIAGNOSIS — K63.1 PERFORATION OF INTESTINE (NONTRAUMATIC): ICD-10-CM

## 2024-03-13 DIAGNOSIS — M79.A3 NONTRAUMATIC COMPARTMENT SYNDROME OF ABDOMEN: ICD-10-CM

## 2024-03-21 PROBLEM — Z87.19 PERSONAL HISTORY OF OTHER DISEASES OF THE DIGESTIVE SYSTEM: Chronic | Status: ACTIVE | Noted: 2024-02-13

## 2024-04-23 ENCOUNTER — TRANSCRIPTION ENCOUNTER (OUTPATIENT)
Age: 49
End: 2024-04-23

## 2024-04-23 ENCOUNTER — APPOINTMENT (OUTPATIENT)
Dept: GASTROENTEROLOGY | Facility: CLINIC | Age: 49
End: 2024-04-23
Payer: COMMERCIAL

## 2024-04-23 VITALS
HEIGHT: 68 IN | BODY MASS INDEX: 25.76 KG/M2 | SYSTOLIC BLOOD PRESSURE: 122 MMHG | DIASTOLIC BLOOD PRESSURE: 74 MMHG | WEIGHT: 170 LBS | OXYGEN SATURATION: 98 % | HEART RATE: 90 BPM

## 2024-04-23 DIAGNOSIS — K70.30 ALCOHOLIC CIRRHOSIS OF LIVER W/OUT ASCITES: ICD-10-CM

## 2024-04-23 DIAGNOSIS — K63.1 PERFORATION OF INTESTINE (NONTRAUMATIC): ICD-10-CM

## 2024-04-23 PROBLEM — Z00.00 ENCOUNTER FOR PREVENTIVE HEALTH EXAMINATION: Status: ACTIVE | Noted: 2024-04-23

## 2024-04-23 PROCEDURE — 99204 OFFICE O/P NEW MOD 45 MIN: CPT

## 2024-04-23 RX ORDER — LOPERAMIDE HYDROCHLORIDE 2 MG/1
2 CAPSULE ORAL
Refills: 0 | Status: ACTIVE | COMMUNITY

## 2024-04-23 RX ORDER — SPIRONOLACTONE 25 MG/1
25 TABLET ORAL DAILY
Refills: 0 | Status: ACTIVE | COMMUNITY

## 2024-04-23 RX ORDER — CHROMIUM 200 MCG
TABLET ORAL DAILY
Refills: 0 | Status: ACTIVE | COMMUNITY

## 2024-04-23 RX ORDER — SODIUM SULFATE, POTASSIUM SULFATE AND MAGNESIUM SULFATE 1.6; 3.13; 17.5 G/177ML; G/177ML; G/177ML
17.5-3.13-1.6 SOLUTION ORAL
Qty: 1 | Refills: 0 | Status: ACTIVE | COMMUNITY
Start: 2024-04-23 | End: 1900-01-01

## 2024-04-23 RX ORDER — FUROSEMIDE 40 MG/1
40 TABLET ORAL DAILY
Refills: 0 | Status: ACTIVE | COMMUNITY

## 2024-04-23 RX ORDER — PANTOPRAZOLE 40 MG/1
40 TABLET, DELAYED RELEASE ORAL DAILY
Refills: 0 | Status: ACTIVE | COMMUNITY

## 2024-04-23 NOTE — ASSESSMENT
[FreeTextEntry1] : 1. Perforated duodenal ulcer s/p Aden patch repair. Doing well.  - repeat labs - AP was considerably elevated at the end of the hospitalization - may have been med related - continue PPI - schedule EGD to further assess, check for H. pylori, variceal screening given cirrhosis  2. ETOH cirrhosis. Compensated. - US abdomen to assess for ascites, need for diuretics - continue diuretics for now - 2g Na diet - discussed in detail the need for complete ETOH cessation - he has had none since hospitalization  3. CRC screening - for colonoscopy at time of EGD.   Follow up after above complete.

## 2024-04-23 NOTE — PHYSICAL EXAM
[Alert] : alert [Healthy Appearing] : healthy appearing [No Acute Distress] : no acute distress [Sclera] : the sclera and conjunctiva were normal [+1] : edema: +1 [Abdomen Tenderness] : non-tender [Abdomen Soft] : soft [Oriented To Time, Place, And Person] : oriented to person, place, and time [Normal Affect] : the affect was normal [Normal Mood] : the mood was normal

## 2024-04-23 NOTE — HISTORY OF PRESENT ILLNESS
[FreeTextEntry1] : Has no PCP  48M with hx perforated ulcer s/p surgery 2/2024, that is when ETOH cirrhosis was identified in house at [facility], here for follow. s/p omental patch repair. Had no pain prior the acute issue. States he had drank a lot for Super Bowl and that night acute abdominal pain began. Takes no NSAIDs - rare for headache. After hospitalization was discharged to rehab and has now been home for three weeks. Is back to work as a .   ETOH cirrhosis. Identified on imaging in hospital - varices and re-canalized umbilical vein as evidence of portal HTN. Reports some swelling of the abdomen. No confusion. No hx of GI bleeding.   No prior endoscopy or colonoscopy  All CT abdomen and labs from 2024 hospitalization reviewed  PMHx - perforated PUD s/p surgery, ETOH cirrhosis PSHx - toe surgery All - NKDA Meds - folic acid, furosemide 40mg, spironolactone 25mg, pantoprazole 40mg Shx - 2-3 cigs/d, ETOH previously 2 beers and 2 shots every night for at least 10-15 years. No drugs. Works as a . Lives with parents, wife, 2 kids.  FHx - neg for colon cancer or colon problems

## 2024-04-24 NOTE — PHYSICAL THERAPY INITIAL EVALUATION ADULT - IMPAIRMENTS CONTRIBUTING IMPAIRED BED MOBILITY, REHAB EVAL
----- Message from Shannan Rodriguez MD sent at 4/24/2024  2:49 PM CDT -----  Regarding: RE: inhaler side effects  Yes we can go back to 100.     AK  ----- Message -----  From: Vandana Edward RN  Sent: 4/24/2024   2:27 PM CDT  To: Shannan Rodriguez MD  Subject: inhaler side effects                             Pt called in stating since increasing her dose of Arnuity Ellipta 200MCG she is coughing (dry) more and would like to go back to 100. Are you ok with this?     Candy   impaired balance/cognition/impaired coordination/decreased flexibility/impaired motor control/impaired postural control/decreased strength

## 2024-04-26 LAB
ALBUMIN SERPL ELPH-MCNC: 3.4 G/DL
ALP BLD-CCNC: 292 U/L
ALT SERPL-CCNC: 15 U/L
ANION GAP SERPL CALC-SCNC: 13 MMOL/L
AST SERPL-CCNC: 56 U/L
BILIRUB SERPL-MCNC: 1.1 MG/DL
BUN SERPL-MCNC: 7 MG/DL
CALCIUM SERPL-MCNC: 9 MG/DL
CHLORIDE SERPL-SCNC: 103 MMOL/L
CO2 SERPL-SCNC: 19 MMOL/L
CREAT SERPL-MCNC: 0.53 MG/DL
EGFR: 123 ML/MIN/1.73M2
GLUCOSE SERPL-MCNC: 86 MG/DL
HCT VFR BLD CALC: 35.3 %
HGB BLD-MCNC: 11 G/DL
MCHC RBC-ENTMCNC: 30.1 PG
MCHC RBC-ENTMCNC: 31.2 GM/DL
MCV RBC AUTO: 96.7 FL
PLATELET # BLD AUTO: 237 K/UL
POTASSIUM SERPL-SCNC: 4 MMOL/L
PROT SERPL-MCNC: 7.9 G/DL
RBC # BLD: 3.65 M/UL
RBC # FLD: 14.7 %
SODIUM SERPL-SCNC: 136 MMOL/L
WBC # FLD AUTO: 8.43 K/UL

## 2024-05-23 ENCOUNTER — RESULT REVIEW (OUTPATIENT)
Age: 49
End: 2024-05-23

## 2024-05-23 ENCOUNTER — TRANSCRIPTION ENCOUNTER (OUTPATIENT)
Age: 49
End: 2024-05-23

## 2024-05-23 ENCOUNTER — OUTPATIENT (OUTPATIENT)
Dept: OUTPATIENT SERVICES | Facility: HOSPITAL | Age: 49
LOS: 1 days | Discharge: HOME | End: 2024-05-23
Payer: COMMERCIAL

## 2024-05-23 VITALS
HEIGHT: 68 IN | SYSTOLIC BLOOD PRESSURE: 137 MMHG | HEART RATE: 91 BPM | TEMPERATURE: 98 F | OXYGEN SATURATION: 98 % | WEIGHT: 162.04 LBS | DIASTOLIC BLOOD PRESSURE: 80 MMHG | RESPIRATION RATE: 12 BRPM

## 2024-05-23 VITALS
DIASTOLIC BLOOD PRESSURE: 81 MMHG | OXYGEN SATURATION: 99 % | HEART RATE: 68 BPM | RESPIRATION RATE: 17 BRPM | SYSTOLIC BLOOD PRESSURE: 132 MMHG

## 2024-05-23 DIAGNOSIS — Z00.00 ENCOUNTER FOR GENERAL ADULT MEDICAL EXAMINATION WITHOUT ABNORMAL FINDINGS: ICD-10-CM

## 2024-05-23 DIAGNOSIS — K63.1 PERFORATION OF INTESTINE (NONTRAUMATIC): ICD-10-CM

## 2024-05-23 DIAGNOSIS — Z98.890 OTHER SPECIFIED POSTPROCEDURAL STATES: Chronic | ICD-10-CM

## 2024-05-23 DIAGNOSIS — K70.30 ALCOHOLIC CIRRHOSIS OF LIVER WITHOUT ASCITES: ICD-10-CM

## 2024-05-23 DIAGNOSIS — R52 PAIN, UNSPECIFIED: ICD-10-CM

## 2024-05-23 PROCEDURE — 45385 COLONOSCOPY W/LESION REMOVAL: CPT

## 2024-05-23 PROCEDURE — 88305 TISSUE EXAM BY PATHOLOGIST: CPT | Mod: 26

## 2024-05-23 PROCEDURE — 43239 EGD BIOPSY SINGLE/MULTIPLE: CPT

## 2024-05-23 PROCEDURE — 88312 SPECIAL STAINS GROUP 1: CPT | Mod: 26

## 2024-05-23 RX ORDER — ONDANSETRON 8 MG/1
4 TABLET, FILM COATED ORAL ONCE
Refills: 0 | Status: DISCONTINUED | OUTPATIENT
Start: 2024-05-23 | End: 2024-05-23

## 2024-05-23 RX ORDER — HYDROMORPHONE HYDROCHLORIDE 2 MG/ML
0.5 INJECTION INTRAMUSCULAR; INTRAVENOUS; SUBCUTANEOUS
Refills: 0 | Status: DISCONTINUED | OUTPATIENT
Start: 2024-05-23 | End: 2024-05-23

## 2024-05-23 RX ORDER — ACETAMINOPHEN 500 MG
1000 TABLET ORAL ONCE
Refills: 0 | Status: DISCONTINUED | OUTPATIENT
Start: 2024-05-23 | End: 2024-05-23

## 2024-05-23 NOTE — ASU PREOP CHECKLIST - NS PREOP CHK TRT ENDOSCOPY TIME
"Anesthesia Post Evaluation    Patient: Dianna Rodas    Procedure(s) Performed: Procedure(s) (LRB):  ARTHROPLASTY-KNEE (Right)    Final Anesthesia Type: spinal  Patient location during evaluation: PACU  Patient participation: Yes- Able to Participate  Level of consciousness: awake and alert and oriented  Post-procedure vital signs: reviewed and stable  Pain management: adequate  Airway patency: patent  PONV status at discharge: No PONV  Anesthetic complications: no      Cardiovascular status: blood pressure returned to baseline and hemodynamically stable  Respiratory status: unassisted, spontaneous ventilation and room air  Hydration status: euvolemic  Follow-up not needed.        Visit Vitals  BP (!) 191/81 (BP Location: Left arm, Patient Position: Lying)   Pulse 97   Temp 36.8 °C (98.3 °F) (Oral)   Resp 18   Ht 5' 5" (1.651 m)   Wt 108.9 kg (240 lb)   SpO2 99%   BMI 39.94 kg/m²       Pain/Juan Carlos Score: Pain Assessment Performed: Yes (10/25/2017  7:30 AM)  Presence of Pain: complains of pain/discomfort (10/25/2017  7:30 AM)  Pain Rating Prior to Med Admin: 10 (10/25/2017  4:51 PM)  Juan Carlos Score: 10 (10/24/2017  6:01 PM)      " 23-May-2024 08:11

## 2024-05-23 NOTE — BRIEF OPERATIVE NOTE - OPERATION/FINDINGS
EGD and colonoscopy report - see photos in paper chart  EGD - variceal screen, hx perforated ulcer 2/2024  Exam to d2  Esophagus - small esophageal varices that flatten. 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   Stomach - portal gastropathy. No gastric varices. Biopsies taken for H. pylori.  Duodenum - normal to d2    Colon - screening  Exam to cecum  Prep good  Withdrawal 8 minutes  Findings:  - portal colopathy in the right colon  - 5mm splenic flexure polyp removed with cold snare  - small rectal varices  Recommend:  - resume diet  - await pathology  - repeat EGD for variceal screening in 3 years; colon for screening in 7 years

## 2024-05-23 NOTE — ASU PATIENT PROFILE, ADULT - VISION (WITH CORRECTIVE LENSES IF THE PATIENT USUALLY WEARS THEM):
presription eyeglasses/Partially impaired: cannot see medication labels or newsprint, but can see obstacles in path, and the surrounding layout; can count fingers at arm's length

## 2024-05-23 NOTE — ASU PATIENT PROFILE, ADULT - FALL HARM RISK - HARM RISK INTERVENTIONS
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 Readmitted to ICU 7/26 due to respiratory failure and reintubated. CT scan showing retroperitoneal air concerning for perforation.     Summary of Events  7/4 negative ex-lap  7/5 washout and closure  7/9 pigtail catheter placed (removed 7/12)  7/12 Bronchoscopy  7/14 Paracentesis, negative SBP  7/17 Transfer to floor  7/21 Colonoscopy with biopsies taken  7/25 Transfer to ICU due to respiratory failure, retroperitoneal air  7/26 RTOR, ex-lap, right hemicolectomy with end-ileostomy and mucus fistula        PLAN:     Changes/Events 7/28:  - Extubated  - Fever w/u: UA, CXR, Blood Cx  - Stopped propofol, fentanyl. Started oxycodone  - Stop mIVF with tube feed advancement today, TKO fluids  - continue scheduled albumin 25g q6h to replace drain fluid losses, renew order 7/29 if needed after reassessment  - 2PM Hb recheck  - Swallow eval today  - Stay in ICU today, possible transfer to floor tomorrow.  - Red rubber catheter fell out of end-ileostomy, no longer needed per colorectal, good ostomy output      Neuro/ pain/ sedation:  - Monitor neurological status. Notify the MD for any acute changes in exam.  - Stopped propofol, fentanyl. Started oxycodone and Dilaudid PRN.     CV:   #Sinus tachycardia without hypotension   - No need for pressors at this time  - Continuous cardiac monitoring.   #History of Hypertension  - Holding PTA anti-hypertensives      Pulm:   #Acute respiratory failure  #Bilateral pleural effusions, pulmonary edema  - Extubated, doing well  - PRN Duo-nebs      FEN/ GI:   #Free air in retroperitoneum s/p right  hemicolectomy, end ileostomy, mucosa fistula  # Liver failure with continued ascites loss  - Follow drain output, replace with albumin  - Follow ostomy output, follow for need to replace/add fiber  - Keep drain for 5 days per colorectal  #Need for nutritional supplementation  - ICU electrolyte replacement protocol  - Abdominal x-ray to confirm placement  - Advance tube feeds  - Swallow eval today    Graft Function/Immunosuppression:  #s/p Liver transplant  - Unremarkable liver ultrasound  #Need for immunosuppression  - CellCept 250 mg BID: Held since 6/27/17 due to neutropenia..   - Prograf held per transplant due to EJ    Renal/fluids:  - Vazquez catheter to stay in place for strict intake and output monitoring  - Stop mIVF with tube feed advancement. TKO  # EJ  # Intraabdominal hypertension with concern for compartment syndome  - Low urine output, vazquez to remain for strict I&O measurement  - Creatinine elevated from 1.6 baseline  - No indications for dialysis at this time  - Nephrology consulted  - Monitor electrolytes  - continue scheduled albumin 25g q6h to replace drain fluid losses, renew order 7/29 if needed after reassessment      Endocrine:   #Diabetes mellitus T2  - Insulin gtt      ID/ Abx:  #Severe Sepsis, concern for abdominal source  #Fever  - Fever w/u: UA, CXR, Blood Cx  - Meropenem, Daptomycin, Micafungin IV per ID  - transplant ID following, appreciates recs  - EBV PCR every other week.   #CMV viremia, +Donor/-recipient transplant  - CMV D+R-, low viral count. MMF held.   - Increase IV Ganciclovir 7/25/2017 per ID (started 07/20).   - Repeat CMV PCR 7/27/2017.       Heme: Hemoglobin stable. Will recheck in AM or earlier if there is clinical evidence of active bleeding.  - Daily CBC. 2PM Hb recheck  # Right brachial arterial clot from art line  - Daily ASA  # Distal extremity ischemia secondary to pressors  - Monitor for signs of infection       Prophylaxis:    - DVT: SCDs, SubQ Heparin  - GI:  Protonix       MSK:  - PT/OT      Lines/Drains:  - R CVC, PIV, [place], [place], OG  - May need to change R CVC if bacteremic      Disposition: Surgical ICU      Patient seen, findings and plan discussed with surgical ICU staff, Dr. Feliz.      Walter Topete, MS4     Arden Page MD  PGY-2 General Surgery    ====================================    TODAY'S PROGRESS:   SUBJECTIVE:   - No acute events overnight. Extubated yesterday. Charles dia fell out, ileostomy still working.    OBJECTIVE:   1. VITAL SIGNS:   Temp:  [98.6  F (37  C)-103.5  F (39.7  C)] 98.6  F (37  C)  Heart Rate:  [] 96  Resp:  [14-30] 21  BP: (118-186)/() 142/77  FiO2 (%):  [40 %] 40 %  SpO2:  [95 %-100 %] 99 %  Ventilation Mode: CPAP/PS  FiO2 (%): 40 %  Rate Set (breaths/minute): 12 breaths/min  Tidal Volume Set (mL): 500 mL  PEEP (cm H2O): 5 cmH2O  Pressure Support (cm H2O): 7 cmH2O  Oxygen Concentration (%): 40 %  Resp: 21    2. INTAKE/ OUTPUT:   I/O last 3 completed shifts:  In: 3814.4 [I.V.:2794.4; NG/GT:540]  Out: 5510 [Urine:1925; Emesis/NG output:200; Drains:1835; Stool:1550]    3. PHYSICAL EXAMINATION:   Gen: Extubated  HEENT: MMM  CVS: NR/RR, no murmurs noted.  Pulm: CTA bilaterally.  Abd: soft, non-tender, distended, no guarding. Right colostomy with thin bilious output and some thicker liquids. Mucous fistula at superior midline incision.  Ext: warm, well-perfused; peripheral edema 2+, distal pulses b/l. Dusky tips on the right index and right large toe . Staples in place for midline incision.  - Incision: C/D/I without surrounding erythema, drainage, or fluctuance; staples in place for midline incision. Suture in place in LLQ at prior paracentesis site c/d/i.    4. INVESTIGATIONS:   Arterial Blood Gases     Recent Labs  Lab 07/26/17  2243 07/26/17  2017 07/25/17  1746 07/25/17  1037   PH Incorrect specimen typeNOTTIED BY WOJCIECH DEWITT, NEW ORDER TO REPLACE.7/26/17 AT 2346 BY ZAINAB.CORRECTED ON 07/26 AT 2350: PREVIOUSLY REPORTED AS  7.27 Canceled, Test credited Incorrect specimen type 7.32* 7.14*   PCO2 Incorrect specimen typeNOTTIED BY WOJCIECH DEWITT, NEW ORDER TO REPLACE.7/26/17 AT 2346 BY ZAINAB.CORRECTED ON 07/26 AT 2350: PREVIOUSLY REPORTED AS 45 Canceled, Test credited Incorrect specimen type 42 69*   PO2 Incorrect specimen typeNOTTIED BY WOJCIECH DEWITT, NEW ORDER TO REPLACE.7/26/17 AT 2346 BY ZAINAB.CORRECTED ON 07/26 AT 2350: PREVIOUSLY REPORTED AS 53 Canceled, Test credited Incorrect specimen type 81 103   HCO3 Incorrect specimen typeNOTTIED BY WOJCIECH DEWITT, NEW ORDER TO REPLACE.7/26/17 AT 2346 BY ZAINAB.CORRECTED ON 07/26 AT 2350: PREVIOUSLY REPORTED AS 20 Canceled, Test credited Incorrect specimen type 22 23     Complete Blood Count     Recent Labs  Lab 07/28/17 0410 07/27/17 0410 07/26/17 2243 07/26/17 2133 07/26/17 2017 07/26/17  0431   WBC 9.3 10.0 10.3  --   --   --   --  7.7   HGB 8.0* 9.3* 9.1* 9.5*  < > 9.8*  Canceled, Test credited Incorrect specimen type  < > 5.9*   * 144* 136*  --   --  138*  --  109*   < > = values in this interval not displayed.  Basic Metabolic Panel    Recent Labs  Lab 07/28/17 0410 07/27/17 2110 07/27/17 0410 07/26/17 2243    143 142 140   POTASSIUM 3.8 3.4 3.5 3.5   CHLORIDE 112* 111* 108 108   CO2 18* 23 20 21   BUN 71* 77* 72* 75*   CR 2.15* 2.45* 2.82* 2.76*   * 66* 164* 151*     Liver Function Tests    Recent Labs  Lab 07/27/17 0410 07/26/17 2243 07/26/17 2017 07/26/17  1620 07/25/17  1651 07/25/17  0945 07/24/17  0705   AST 27  --   --   --  61* 90* 86*   ALT 27  --   --   --  50 60 54   ALKPHOS 143  --   --   --  242* 317* 264*   BILITOTAL 1.3  --   --   --  0.6 0.5 0.4   ALBUMIN 2.4*  --   --   --  2.0* 1.9* 1.7*   INR  --  1.31* 1.31* 1.30* 1.23*  --  1.11     Pancreatic Enzymes  No lab results found in last 7 days.  Coagulation Profile    Recent Labs  Lab 07/26/17  2243 07/26/17 2017 07/26/17  1620 07/25/17  1651   INR 1.31* 1.31* 1.30* 1.23*   PTT  --  39* 44* 41*      Lactate  Invalid input(s): LACTATE    5. RADIOLOGY:   Recent Results (from the past 24 hour(s))   XR Chest Port 1 View    Narrative    EXAM: XR CHEST PORT 1 VW  7/27/2017 10:45 AM      HISTORY: interval change    COMPARISON: Chest radiograph 7/25/2017, 7/15/2017, CT 7/25/2017    FINDINGS:     Single AP view of the chest. Endotracheal tube projects approximately  6.3 cm above the chacorta. The endotracheal tube tip presses against the  left tracheal wall. Right IJ central venous catheter projects in the  mid SVC. Dual enteric tubes.    Stable large cardiac silhouette. Stable perihilar pulmonary opacities.    Stable bilateral small pleural effusions with overlying  atelectasis/consolidation. No pneumothorax.     Surgical clips project over the right upper quadrant.      Impression    IMPRESSION:   1.  Endotracheal tube projects approximately 6.3 cm above the chacorta.  2.  Stable cardiomegaly. Stable bilateral small pleural effusions with  overlying atelectasis/consolidation.  3.  Stable perihilar pulmonary opacities, edema versus infection.    I have personally reviewed the examination and initial interpretation  and I agree with the findings.    DALLIN RENAE MD   XR Abdomen Port 1 View    Narrative    EXAM: XR ABDOMEN PORT F1 VW  7/27/2017 10:46 AM      HISTORY: assess Feeding tube location.    COMPARISON: 7/26/2017    FINDINGS: Feeding tube tip over the duodenojejunal flexure. Residual  contrast in the colon. IVC filter. Surgical clips and drainage  catheter in the right upper quadrant. Surgical staples lower left  abdomen. Right lower quadrant stoma, with the catheter. Adequately  positioned gastric tube. Paucity of small bowel gas without additional  findings to suggest obstruction.      Impression    IMPRESSION: Feeding tube tip over duodenojejunal flexure.    I have personally reviewed the examination and initial interpretation  and I agree with the findings.    DIAZ NORMAN MD        =========================================       Communicate Risk of Fall with Harm to all staff/Reinforce activity limits and safety measures with patient and family/Tailored Fall Risk Interventions/Visual Cue: Yellow wristband and red socks/Bed in lowest position, wheels locked, appropriate side rails in place/Call bell, personal items and telephone in reach/Instruct patient to call for assistance before getting out of bed or chair/Non-slip footwear when patient is out of bed/Echo to call system/Physically safe environment - no spills, clutter or unnecessary equipment/Purposeful Proactive Rounding/Room/bathroom lighting operational, light cord in reach

## 2024-05-24 LAB — SURGICAL PATHOLOGY STUDY: SIGNIFICANT CHANGE UP

## 2024-05-30 DIAGNOSIS — K29.50 UNSPECIFIED CHRONIC GASTRITIS WITHOUT BLEEDING: ICD-10-CM

## 2024-05-30 DIAGNOSIS — D12.3 BENIGN NEOPLASM OF TRANSVERSE COLON: ICD-10-CM

## 2024-05-30 DIAGNOSIS — Z12.11 ENCOUNTER FOR SCREENING FOR MALIGNANT NEOPLASM OF COLON: ICD-10-CM

## 2024-05-30 DIAGNOSIS — K27.5 CHRONIC OR UNSPECIFIED PEPTIC ULCER, SITE UNSPECIFIED, WITH PERFORATION: ICD-10-CM

## 2024-08-06 ENCOUNTER — INPATIENT (INPATIENT)
Facility: HOSPITAL | Age: 49
LOS: 1 days | Discharge: HOME | End: 2024-08-08
Attending: HOSPITALIST | Admitting: HOSPITALIST
Payer: COMMERCIAL

## 2024-08-06 ENCOUNTER — RESULT REVIEW (OUTPATIENT)
Age: 49
End: 2024-08-06

## 2024-08-06 VITALS
TEMPERATURE: 98 F | OXYGEN SATURATION: 100 % | RESPIRATION RATE: 17 BRPM | HEIGHT: 72 IN | DIASTOLIC BLOOD PRESSURE: 60 MMHG | HEART RATE: 120 BPM | SYSTOLIC BLOOD PRESSURE: 99 MMHG | WEIGHT: 164.02 LBS

## 2024-08-06 DIAGNOSIS — K92.2 GASTROINTESTINAL HEMORRHAGE, UNSPECIFIED: ICD-10-CM

## 2024-08-06 DIAGNOSIS — D72.829 ELEVATED WHITE BLOOD CELL COUNT, UNSPECIFIED: ICD-10-CM

## 2024-08-06 DIAGNOSIS — D62 ACUTE POSTHEMORRHAGIC ANEMIA: ICD-10-CM

## 2024-08-06 DIAGNOSIS — Z98.890 OTHER SPECIFIED POSTPROCEDURAL STATES: Chronic | ICD-10-CM

## 2024-08-06 DIAGNOSIS — K70.30 ALCOHOLIC CIRRHOSIS OF LIVER WITHOUT ASCITES: ICD-10-CM

## 2024-08-06 LAB
ALBUMIN SERPL ELPH-MCNC: 2.6 G/DL — LOW (ref 3.3–5)
ALP SERPL-CCNC: 242 U/L — HIGH (ref 40–120)
ALT FLD-CCNC: 26 U/L — SIGNIFICANT CHANGE UP (ref 12–78)
AMYLASE P1 CFR SERPL: 11 U/L — LOW (ref 25–115)
ANION GAP SERPL CALC-SCNC: 8 MMOL/L — SIGNIFICANT CHANGE UP (ref 5–17)
APTT BLD: 32.5 SEC — SIGNIFICANT CHANGE UP (ref 24.5–35.6)
AST SERPL-CCNC: 56 U/L — HIGH (ref 15–37)
BASOPHILS # BLD AUTO: 0.03 K/UL — SIGNIFICANT CHANGE UP (ref 0–0.2)
BASOPHILS # BLD AUTO: 0.07 K/UL — SIGNIFICANT CHANGE UP (ref 0–0.2)
BASOPHILS NFR BLD AUTO: 0.3 % — SIGNIFICANT CHANGE UP (ref 0–2)
BASOPHILS NFR BLD AUTO: 0.3 % — SIGNIFICANT CHANGE UP (ref 0–2)
BILIRUB SERPL-MCNC: 2.5 MG/DL — HIGH (ref 0.2–1.2)
BLD GP AB SCN SERPL QL: SIGNIFICANT CHANGE UP
BUN SERPL-MCNC: 79 MG/DL — HIGH (ref 7–23)
CALCIUM SERPL-MCNC: 8.5 MG/DL — SIGNIFICANT CHANGE UP (ref 8.5–10.1)
CHLORIDE SERPL-SCNC: 103 MMOL/L — SIGNIFICANT CHANGE UP (ref 96–108)
CO2 SERPL-SCNC: 26 MMOL/L — SIGNIFICANT CHANGE UP (ref 22–31)
CREAT SERPL-MCNC: 1.24 MG/DL — SIGNIFICANT CHANGE UP (ref 0.5–1.3)
EGFR: 71 ML/MIN/1.73M2 — SIGNIFICANT CHANGE UP
EOSINOPHIL # BLD AUTO: 0.03 K/UL — SIGNIFICANT CHANGE UP (ref 0–0.5)
EOSINOPHIL # BLD AUTO: 0.03 K/UL — SIGNIFICANT CHANGE UP (ref 0–0.5)
EOSINOPHIL NFR BLD AUTO: 0.1 % — SIGNIFICANT CHANGE UP (ref 0–6)
EOSINOPHIL NFR BLD AUTO: 0.3 % — SIGNIFICANT CHANGE UP (ref 0–6)
GLUCOSE SERPL-MCNC: 150 MG/DL — HIGH (ref 70–99)
HCT VFR BLD CALC: 20.1 % — CRITICAL LOW (ref 39–50)
HCT VFR BLD CALC: 20.4 % — CRITICAL LOW (ref 39–50)
HCT VFR BLD CALC: 23.5 % — LOW (ref 39–50)
HGB BLD-MCNC: 6.5 G/DL — CRITICAL LOW (ref 13–17)
HGB BLD-MCNC: 6.7 G/DL — CRITICAL LOW (ref 13–17)
HGB BLD-MCNC: 7.9 G/DL — LOW (ref 13–17)
IMM GRANULOCYTES NFR BLD AUTO: 0.7 % — SIGNIFICANT CHANGE UP (ref 0–0.9)
IMM GRANULOCYTES NFR BLD AUTO: 1 % — HIGH (ref 0–0.9)
INR BLD: 1.33 RATIO — HIGH (ref 0.85–1.18)
LIDOCAIN IGE QN: 13 U/L — SIGNIFICANT CHANGE UP (ref 13–75)
LYMPHOCYTES # BLD AUTO: 1.12 K/UL — SIGNIFICANT CHANGE UP (ref 1–3.3)
LYMPHOCYTES # BLD AUTO: 10.1 % — LOW (ref 13–44)
LYMPHOCYTES # BLD AUTO: 10.9 % — LOW (ref 13–44)
LYMPHOCYTES # BLD AUTO: 2.52 K/UL — SIGNIFICANT CHANGE UP (ref 1–3.3)
MCHC RBC-ENTMCNC: 27.7 PG — SIGNIFICANT CHANGE UP (ref 27–34)
MCHC RBC-ENTMCNC: 28 PG — SIGNIFICANT CHANGE UP (ref 27–34)
MCHC RBC-ENTMCNC: 28.6 PG — SIGNIFICANT CHANGE UP (ref 27–34)
MCHC RBC-ENTMCNC: 31.6 G/DL — LOW (ref 32–36)
MCHC RBC-ENTMCNC: 33.3 G/DL — SIGNIFICANT CHANGE UP (ref 32–36)
MCHC RBC-ENTMCNC: 33.6 G/DL — SIGNIFICANT CHANGE UP (ref 32–36)
MCV RBC AUTO: 83.3 FL — SIGNIFICANT CHANGE UP (ref 80–100)
MCV RBC AUTO: 85.9 FL — SIGNIFICANT CHANGE UP (ref 80–100)
MCV RBC AUTO: 87.7 FL — SIGNIFICANT CHANGE UP (ref 80–100)
MONOCYTES # BLD AUTO: 0.21 K/UL — SIGNIFICANT CHANGE UP (ref 0–0.9)
MONOCYTES # BLD AUTO: 1.41 K/UL — HIGH (ref 0–0.9)
MONOCYTES NFR BLD AUTO: 1.9 % — LOW (ref 2–14)
MONOCYTES NFR BLD AUTO: 6.1 % — SIGNIFICANT CHANGE UP (ref 2–14)
NEUTROPHILS # BLD AUTO: 18.84 K/UL — HIGH (ref 1.8–7.4)
NEUTROPHILS # BLD AUTO: 9.66 K/UL — HIGH (ref 1.8–7.4)
NEUTROPHILS NFR BLD AUTO: 81.6 % — HIGH (ref 43–77)
NEUTROPHILS NFR BLD AUTO: 86.7 % — HIGH (ref 43–77)
NRBC # BLD: 0 /100 WBCS — SIGNIFICANT CHANGE UP (ref 0–0)
PLATELET # BLD AUTO: 113 K/UL — LOW (ref 150–400)
PLATELET # BLD AUTO: 140 K/UL — LOW (ref 150–400)
PLATELET # BLD AUTO: 216 K/UL — SIGNIFICANT CHANGE UP (ref 150–400)
POTASSIUM SERPL-MCNC: 5.3 MMOL/L — SIGNIFICANT CHANGE UP (ref 3.5–5.3)
POTASSIUM SERPL-SCNC: 5.3 MMOL/L — SIGNIFICANT CHANGE UP (ref 3.5–5.3)
PROT SERPL-MCNC: 6.2 GM/DL — SIGNIFICANT CHANGE UP (ref 6–8.3)
PROTHROM AB SERPL-ACNC: 15.7 SEC — HIGH (ref 9.5–13)
RBC # BLD: 2.34 M/UL — LOW (ref 4.2–5.8)
RBC # BLD: 2.35 M/UL — LOW (ref 4.2–5.8)
RBC # BLD: 2.82 M/UL — LOW (ref 4.2–5.8)
RBC # FLD: 16.9 % — HIGH (ref 10.3–14.5)
RBC # FLD: 17.1 % — HIGH (ref 10.3–14.5)
RBC # FLD: 18.6 % — HIGH (ref 10.3–14.5)
SODIUM SERPL-SCNC: 137 MMOL/L — SIGNIFICANT CHANGE UP (ref 135–145)
WBC # BLD: 11.13 K/UL — HIGH (ref 3.8–10.5)
WBC # BLD: 15.87 K/UL — HIGH (ref 3.8–10.5)
WBC # BLD: 23.11 K/UL — HIGH (ref 3.8–10.5)
WBC # FLD AUTO: 11.13 K/UL — HIGH (ref 3.8–10.5)
WBC # FLD AUTO: 15.87 K/UL — HIGH (ref 3.8–10.5)
WBC # FLD AUTO: 23.11 K/UL — HIGH (ref 3.8–10.5)

## 2024-08-06 PROCEDURE — 43239 EGD BIOPSY SINGLE/MULTIPLE: CPT

## 2024-08-06 PROCEDURE — 71045 X-RAY EXAM CHEST 1 VIEW: CPT | Mod: 26

## 2024-08-06 PROCEDURE — 99223 1ST HOSP IP/OBS HIGH 75: CPT

## 2024-08-06 PROCEDURE — 99292 CRITICAL CARE ADDL 30 MIN: CPT

## 2024-08-06 PROCEDURE — 74174 CTA ABD&PLVS W/CONTRAST: CPT | Mod: 26,MC

## 2024-08-06 PROCEDURE — ZZZZZ: CPT

## 2024-08-06 PROCEDURE — 88312 SPECIAL STAINS GROUP 1: CPT | Mod: 26

## 2024-08-06 PROCEDURE — 93010 ELECTROCARDIOGRAM REPORT: CPT

## 2024-08-06 PROCEDURE — 99291 CRITICAL CARE FIRST HOUR: CPT

## 2024-08-06 PROCEDURE — 88305 TISSUE EXAM BY PATHOLOGIST: CPT | Mod: 26

## 2024-08-06 RX ORDER — ONDANSETRON HCL/PF 4 MG/2 ML
4 VIAL (ML) INJECTION EVERY 6 HOURS
Refills: 0 | Status: DISCONTINUED | OUTPATIENT
Start: 2024-08-06 | End: 2024-08-08

## 2024-08-06 RX ORDER — OCTREOTIDE ACETATE 200 UG/ML
50 INJECTION INTRAVENOUS ONCE
Refills: 0 | Status: COMPLETED | OUTPATIENT
Start: 2024-08-06 | End: 2024-08-06

## 2024-08-06 RX ORDER — PANTOPRAZOLE SODIUM 20 MG/1
8 TABLET, DELAYED RELEASE ORAL
Qty: 80 | Refills: 0 | Status: DISCONTINUED | OUTPATIENT
Start: 2024-08-06 | End: 2024-08-06

## 2024-08-06 RX ORDER — DEXTROSE MONOHYDRATE, SODIUM CHLORIDE, SODIUM LACTATE, CALCIUM CHLORIDE, MAGNESIUM CHLORIDE 1.5; 538; 448; 18.4; 5.08 G/100ML; MG/100ML; MG/100ML; MG/100ML; MG/100ML
1000 SOLUTION INTRAPERITONEAL
Refills: 0 | Status: DISCONTINUED | OUTPATIENT
Start: 2024-08-06 | End: 2024-08-08

## 2024-08-06 RX ORDER — PANTOPRAZOLE SODIUM 20 MG/1
40 TABLET, DELAYED RELEASE ORAL ONCE
Refills: 0 | Status: COMPLETED | OUTPATIENT
Start: 2024-08-06 | End: 2024-08-06

## 2024-08-06 RX ORDER — TRANEXAMIC ACID 650 MG/1
1000 TABLET ORAL ONCE
Refills: 0 | Status: COMPLETED | OUTPATIENT
Start: 2024-08-06 | End: 2024-08-06

## 2024-08-06 RX ORDER — DEXTROSE MONOHYDRATE, SODIUM CHLORIDE, SODIUM LACTATE, CALCIUM CHLORIDE, MAGNESIUM CHLORIDE 1.5; 538; 448; 18.4; 5.08 G/100ML; MG/100ML; MG/100ML; MG/100ML; MG/100ML
1000 SOLUTION INTRAPERITONEAL
Refills: 0 | Status: DISCONTINUED | OUTPATIENT
Start: 2024-08-06 | End: 2024-08-06

## 2024-08-06 RX ORDER — MELATONIN 3 MG
3 TABLET ORAL AT BEDTIME
Refills: 0 | Status: DISCONTINUED | OUTPATIENT
Start: 2024-08-06 | End: 2024-08-08

## 2024-08-06 RX ORDER — ONDANSETRON HCL/PF 4 MG/2 ML
4 VIAL (ML) INJECTION ONCE
Refills: 0 | Status: COMPLETED | OUTPATIENT
Start: 2024-08-06 | End: 2024-08-06

## 2024-08-06 RX ORDER — BACTERIOSTATIC SODIUM CHLORIDE 0.9 %
1000 VIAL (ML) INJECTION ONCE
Refills: 0 | Status: COMPLETED | OUTPATIENT
Start: 2024-08-06 | End: 2024-08-06

## 2024-08-06 RX ORDER — OCTREOTIDE ACETATE 200 UG/ML
50 INJECTION INTRAVENOUS
Qty: 500 | Refills: 0 | Status: DISCONTINUED | OUTPATIENT
Start: 2024-08-06 | End: 2024-08-06

## 2024-08-06 RX ORDER — PANTOPRAZOLE SODIUM 20 MG/1
40 TABLET, DELAYED RELEASE ORAL
Refills: 0 | Status: DISCONTINUED | OUTPATIENT
Start: 2024-08-07 | End: 2024-08-08

## 2024-08-06 RX ADMIN — DEXTROSE MONOHYDRATE, SODIUM CHLORIDE, SODIUM LACTATE, CALCIUM CHLORIDE, MAGNESIUM CHLORIDE 150 MILLILITER(S): 1.5; 538; 448; 18.4; 5.08 SOLUTION INTRAPERITONEAL at 13:56

## 2024-08-06 RX ADMIN — Medication 4 MILLIGRAM(S): at 09:12

## 2024-08-06 RX ADMIN — DEXTROSE MONOHYDRATE, SODIUM CHLORIDE, SODIUM LACTATE, CALCIUM CHLORIDE, MAGNESIUM CHLORIDE 100 MILLILITER(S): 1.5; 538; 448; 18.4; 5.08 SOLUTION INTRAPERITONEAL at 19:01

## 2024-08-06 RX ADMIN — TRANEXAMIC ACID 220 MILLIGRAM(S): 650 TABLET ORAL at 09:27

## 2024-08-06 RX ADMIN — OCTREOTIDE ACETATE 50 MICROGRAM(S): 200 INJECTION INTRAVENOUS at 09:28

## 2024-08-06 RX ADMIN — Medication 100 MILLIGRAM(S): at 13:38

## 2024-08-06 RX ADMIN — OCTREOTIDE ACETATE 10 MICROGRAM(S)/HR: 200 INJECTION INTRAVENOUS at 09:32

## 2024-08-06 RX ADMIN — PANTOPRAZOLE SODIUM 10 MG/HR: 20 TABLET, DELAYED RELEASE ORAL at 09:31

## 2024-08-06 RX ADMIN — DEXTROSE MONOHYDRATE, SODIUM CHLORIDE, SODIUM LACTATE, CALCIUM CHLORIDE, MAGNESIUM CHLORIDE 100 MILLILITER(S): 1.5; 538; 448; 18.4; 5.08 SOLUTION INTRAPERITONEAL at 17:46

## 2024-08-06 RX ADMIN — Medication 1000 MILLILITER(S): at 09:14

## 2024-08-06 RX ADMIN — PANTOPRAZOLE SODIUM 40 MILLIGRAM(S): 20 TABLET, DELAYED RELEASE ORAL at 09:12

## 2024-08-06 NOTE — H&P ADULT - PROBLEM SELECTOR PLAN 1
present with vomiting of blood with clots, black stools, dizziness  Hb 6.5 ( 10.3 in 03/2024)  CTA  ( I personally review) with no active GI bleed  EGD 08/23/24-small esophageal varices that flatten. 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  Stomach - portal gastropathy. No gastric varices. Duodenum - normal to d2  Colonoscopy 8/23/24-portal colopathy in the right colon. 5mm splenic flexure polyp removed with cold snare, small rectal varices  Likely variceal bleed.   Received 1 unit of PRBC in ED. Repeat stat CBC ( ordered). Monitor H/H, transfuse as needed to keep Hb > 7 or if patient is symptomatic/ recurrent bleed or hemodynamically unstable  ICU consulted, does not need ICU care at this [place]  Continue IV PPI, IV octreotide  Start IV ceftriaxone  Closely monitor hemodynamic status  GI consult, plan for EGD  NPO

## 2024-08-06 NOTE — H&P ADULT - HISTORY OF PRESENT ILLNESS
49 years old male with alcoholic liver cirrhosis with esophageal varices present to ED with complain of vomiting of blood and black stook. Patient reported total around 4-5 episode of vomiting of blood with clots started around midnight, associated with a few episode of black stool. Reported dizziness. No chest pain or SOB  Episodes of hypotension in ED, improved with IV fluid and PRBC transfusion. Hb 6.5 ( 10.3 in 03/2024), WBC 23.11, plt 216, Cr 1.24 ( 0.34 in 03/2024). CTA with no active GI bleed      SH: stopped alcohol 1 and half month ago

## 2024-08-06 NOTE — H&P ADULT - ASSESSMENT
49 years old male with alcoholic liver cirrhosis with esophageal varices present to ED with complain of vomiting of blood and black stook. Patient reported total around 4-5 episode of vomiting of blood with clots started around midnight, associated with a few episode of black stool. Reported dizziness. No chest pain or SOB  Episodes of hypotension in ED, improved with IV fluid and PRBC transfusion. Hb 6.5 ( 10.3 in 03/2024), WBC 23.11, plt 216, Cr 1.24 ( 0.34 in 03/2024). CTA with no active GI bleed

## 2024-08-06 NOTE — ED ADULT NURSE NOTE - NSFALLRISKINTERV_ED_ALL_ED

## 2024-08-06 NOTE — PATIENT PROFILE ADULT - FALL HARM RISK - HARM RISK INTERVENTIONS

## 2024-08-06 NOTE — CHART NOTE - NSCHARTNOTEFT_GEN_A_CORE
Assessed patient at bedside in PACU s/p successful endoscopy and extubation.    Mr. Arvizu is a 49 years old male with alcoholic liver cirrhosis with esophageal varices present to ED with complain of vomiting of blood and black stool. Taken to OR for endoscopy. Found to have: Esophagus - small esophageal varices. LA Grade D esophagitis. Stomach - no fresh or old blood. Small gastric varices. Biopsies taken for H. pylori. Duodenum - 2 clean based ulcers, one ulcer with flat pigmented slot. Patient extubated uneventfully after procedure.  On my exam, patient is awake and alert. Very comfortable.   Regular rate and normal rhythm. BP is 90s/60s. Radial pulses palpable bilaterally.   Lungs are CTAB. No wheezes.  Abdomen is soft, nontender in epigastrium or anywhere else. Non-distended.  Lower extremities are not swollen.   Neuro: alert and oriented x3.      Patient does not require ICU level care. SBPs 80-90s probably better for him at this point for permissive hypotension. Please send repeat bloodwork now that patient has received 2 units of blood. Continue hydration.     Appreciate GI recs:  - resume diet  - trend H/H  - transfuse for hgb >7  - PO PPI 40mg BID  - can dc octreotide  - continue CTX daily    Please re-consult if clinical status worsens requiring ICU level care.    Time spent assessing patient: 35 minutes.

## 2024-08-06 NOTE — H&P ADULT - PROBLEM SELECTOR PLAN 3
WBC 23  has some level of baseline leukocytosis, likely exacerbated by acute GI bleed  Trend WBC count  Currently on ceftriaxone for variceal bleeding

## 2024-08-06 NOTE — H&P ADULT - NSHPPHYSICALEXAM_GEN_ALL_CORE
CONSTITUTIONAL: alert and cooperative, no acute distress.  EYES: PERRL, conjunctival pallor +  ENT: Mucosa moist, tongue normal  NECK: Neck supple, trachea midline, non-tender  CARDIAC: Normal S1 and S2. Regular rate and rhythms. No Pedal edema. Peripheral pulses intact  LUNGS: Equal air entry both lungs. No rales, rhonchi, wheezing. Normal respiratory effort.   ABDOMEN: Soft, nondistended, nontender. No guarding or rebound tenderness. No hepatomegaly or splenomegaly. Bowel sound normal.   MUSCULOSKELETAL: Normocephalic, atraumatic. No significant deformity or joint abnormality  NEUROLOGICAL: No gross motor or sensory deficits  SKIN: no lesions or eruptions. Normal turgor  PSYCHIATRIC: A&O x 3, appropriate mood and affect

## 2024-08-06 NOTE — ED ADULT TRIAGE NOTE - CHIEF COMPLAINT QUOTE
BIBA from home for 5 episodes of bloody vomitus and 4 episodes of bloody diarrhea since 12 midnight, dark red blood. patient denies pain and sob, complaining of dizziness. emt states patient was sating 94% on RA.  hx of abdominal surgery for "organ that is leaking"

## 2024-08-06 NOTE — H&P ADULT - PROBLEM SELECTOR PLAN 2
Hb 6.5 ( 10.3 in 03/2024)  Likely variceal bleeding  Received 1 unit of PRBC in ED. Repeat stat CBC ( ordered). Monitor H/H, transfuse as needed to keep Hb > 7 or if patient is symptomatic/ recurrent bleed or hemodynamically unstable  GI consulted, plan for EGD

## 2024-08-06 NOTE — ED PROVIDER NOTE - CLINICAL SUMMARY MEDICAL DECISION MAKING FREE TEXT BOX
Patient borderline critical with GI bleed.  BP soft, starting IVF, protonix, octreotide drips.  GI consulted early, labs & CTA pending. EKG sinus tach rate 109, no st e/d, no twi, qtc 474 Patient borderline critical with GI bleed.  BP soft, starting IVF, protonix, octreotide drips.  GI consulted early, labs & CTA pending. EKG sinus tach rate 109, no st e/d, no twi, qtc 474    Admit note:  Patient doing much better at this time.  1 unit emergently given.  GI in house, plan for scope at 4pm today.  Empiric abx given.  ICU consulted for admission however given improvement, admission to tele recommended at this time.  Patient is to be admitted to the hospital and the case was discussed with the admitting physician.  Any changes in plan, additional imaging/labs, consults, and further work up will be at the discretion of the admitting physician.

## 2024-08-06 NOTE — CONSULT NOTE ADULT - NS ATTEND AMEND GEN_ALL_CORE FT
48 yo man p/w hematemesis and bloody stools since last night. history of perforated ulcer, and varices.   On exam no appears well, with empty stomach, getting 1 u prbc at this time.    Needs endoscopic evaluation, may need ICU.  If able to get EGD would proceed now, in endoscopy but if unable or significant delay or any further instability please reconsult and we can accommodate.

## 2024-08-06 NOTE — BRIEF OPERATIVE NOTE - OPERATION/FINDINGS
EGD report - see photos in paper chart  Indication - hematemesis, melena  Exam to d2  Findings:  - Esophagus - small esophageal varices. LA Grade D esophagitis.   - Stomach - no fresh or old blood. Small gastric varices. Biopsies taken for H. pylori  - Duodenum - 2 clean based ulcers, one ulcer with flat pigmented slot    Recommend:  - resume diet  - trend H/H  - transfuse for hgb >7  - PO PPI 40mg BID  - can dc octreotide  - continue CTX daily

## 2024-08-06 NOTE — ED ADULT NURSE NOTE - OBJECTIVE STATEMENT
patient alert and oriented x4, came in for bloody stools. pt states he started having episodes of bloody stools and bloody vomiting since last night, pt has had 5x episodes of bloody emesis and 4x episodes of bloody stool, appearing to be a dark red blood. pt denies any pain or discomfort at this time, but complains of dizziness and weakness. as per family member pt had surgery in february from history of drinking alcohol, not a transplant. pt placed on continuous cardiac monitor and pulse ox, IV placed by EMS with blood return and EKG completed.

## 2024-08-06 NOTE — ED PROVIDER NOTE - OBJECTIVE STATEMENT
Pertinent PMH/PSH/FHx/SHx and Review of Systems contained within:  Patient with history significant for cirrhosis, EtOH use disorder (last drink several months ago), open reduction metatarsal fracture, and recent complicated admission for perforated duodenal ulcer in March 2024 p/w hematemesis and bloody stools since last night.  Patient reports multiple episodes of both since last night.  He denies any pain in chest or abdomen.  He denies recent use of alcohol.  Patient does not take blood thinners. +Smoker, denies use of other illict drugs or h/o alchol abuse.  Chart review shows patient had recent EGD with some esophageal varices.     ROS: No fever/chills, No headache/photophobia/eye pain/changes in vision, No ear pain/sore throat/dysphagia, No chest pain/palpitations, no SOB/cough/wheeze/stridor, No abdominal pain, no dysuria/frequency/discharge, No neck/back pain, no rash, no changes in neurological status/function.

## 2024-08-06 NOTE — CONSULT NOTE ADULT - SUBJECTIVE AND OBJECTIVE BOX
ICU CONSULT  ===============    Patient is a 49y old  Male who presents with a chief complaint of GI Bleed     HPI  Patient with history significant for cirrhosis, EtOH use disorder (last drink several months ago), open reduction metatarsal fracture, and recent complicated admission for perforated duodenal ulcer in March 2024 p/w hematemesis and bloody stools since last night.  Patient reports multiple episodes of both since last night.  He denies any pain in chest or abdomen.  He denies recent use of alcohol.  Patient does not take blood thinners. +Smoker, denies use of other illict drugs or h/o alchol abuse.  Chart review shows patient had recent EGD with some esophageal varices.       2/24 EGD - variceal screen, hx perforated ulcer 2/2024    Patient does not require critical care at this time,   patient remains hemodynamically stable ,   Please recall if the patients condition changes.       Allergies  No Known Allergies      Home Medications:  acetaminophen 325 mg oral tablet: 2 tab(s) orally every 6 hours As needed Temp greater or equal to 38C (100.4F) (23 May 2024 07:31)  folic acid 1 mg oral tablet: 1 tab(s) orally once a day (23 May 2024 07:31)  guaiFENesin 100 mg/5 mL oral liquid: 10 milliliter(s) orally every 6 hours As needed Cough (23 May 2024 07:31)  nystatin 100,000 units/g topical powder: 1 Apply topically to affected area 2 times a day (23 May 2024 07:31)  pantoprazole 40 mg oral delayed release tablet: 1 tab(s) orally once a day (before a meal) (23 May 2024 07:31)  thiamine 100 mg oral tablet: 1 tab(s) orally once a day (23 May 2024 07:31)      SOCIAL HX:     Smoking : [  ] Current daily  [ X ] former  [  ] never         ETOH/Illicit drugs: [  ] denies  [  ] current use :   former ETOH         Occupation:     PAST MEDICAL & SURGICAL HISTORY:  H/O cirrhosis  S/P foot surgery    FAMILY HISTORY:  FH: diabetes mellitus  No known cardiovascular or pulmonary family history     ROS:  See HPI     PHYSICAL EXAM  ============    ICU Vital Signs Last 24 Hrs  T(C): 36.7 (06 Aug 2024 11:40), Max: 36.7 (06 Aug 2024 08:26)  T(F): 98 (06 Aug 2024 11:40), Max: 98.1 (06 Aug 2024 08:26)  HR: 82 (06 Aug 2024 11:40) (82 - 120)  BP: 100/49 (06 Aug 2024 11:40) (82/51 - 100/49)  BP(mean): 61 (06 Aug 2024 09:37) (61 - 61)  RR: 20 (06 Aug 2024 11:40) (17 - 22)  SpO2: 100% (06 Aug 2024 11:40) (100% - 100%)    O2 Parameters below as of 06 Aug 2024 11:40  Patient On (Oxygen Delivery Method): room air    General: Not in distress  HEENT:  ABEBE              Lymphatic system: No LN  Lungs: Bilateral BS  Cardiovascular: Regular  Gastrointestinal: Soft, Positive BS  Musculoskeletal: No clubbing.  Moves all extremities.    Skin: Warm.  Intact  Neurological: No motor or sensory deficit         LABS:                          6.5    23.11 )-----------( 216      ( 06 Aug 2024 09:00 )             20.4      08-06    137  |  103  |  79<H>  ----------------------------<  150<H>  5.3   |  26  |  1.24    Ca    8.5      06 Aug 2024 09:00    TPro  6.2  /  Alb  2.6<L>  /  TBili  2.5<H>  /  DBili  x   /  AST  56<H>  /  ALT  26  /  AlkPhos  242<H>  08-06      PT/INR - ( 06 Aug 2024 09:00 )   PT: 15.7 sec;   INR: 1.33 ratio         PTT - ( 06 Aug 2024 09:00 )  PTT:32.5 sec     LIVER FUNCTIONS - ( 06 Aug 2024 09:00 )  Alb: 2.6 g/dL / Pro: 6.2 gm/dL / ALK PHOS: 242 U/L / ALT: 26 U/L / AST: 56 U/L / GGT: x                                                                                                                                  Urinalysis Basic - ( 06 Aug 2024 09:00 )    Color: x / Appearance: x / SG: x / pH: x  Gluc: 150 mg/dL / Ketone: x  / Bili: x / Urobili: x   Blood: x / Protein: x / Nitrite: x   Leuk Esterase: x / RBC: x / WBC x   Sq Epi: x / Non Sq Epi: x / Bacteria: x                                                 SARS-CoV-2: NotDetec (25 Feb 2024 07:10)    RADIOLOGY  ==============    MEDICATIONS  (STANDING):  octreotide  Infusion 50 MICROgram(s)/Hr (10 mL/Hr) IV Continuous <Continuous>  pantoprazole Infusion 8 mG/Hr (10 mL/Hr) IV Continuous <Continuous>    MEDICATIONS  (PRN):      ASSESSMENT :   ==============  Patient with history significant for cirrhosis, EtOH use disorder (last drink several months ago), open reduction metatarsal fracture, and recent complicated admission for perforated duodenal ulcer in March 2024 p/w hematemesis and bloody stools since last night.    ON admission h/h 6.5/20.4  PT; 15.7/INR 1.3/ptt 32.5    at this time patient remains hemodynamically stable, plan to have EGD after 1 PRNBC.   Patient does not require ICU at this time, if patients condition changes please recall.    PLAN:  ========    NEURO:  -neuro checks per routine     PULM:  - on room air   - supplemental O2  to maintain O2 saturation greater than 92      CV:  -Maintain MAP > 65    GI:  - NPO  -plan for EGD today   - continue pantoprazole Infusion 8 mG/Hr  -continue octreotide ggt     RENAL:  -maintain urine output > 0.5cc/kg/hr     :  MONTEMAYOR yes [  ] NO [ X ] insertion date       ID:  - continue ceftriaxone     ENDO:  - monitor blood glucose     HEME:  -ICPD     case discussed with Dr Hameed, Dr oneal by ICU team   
49M with hx perforated ulcer 2/2024 s/p patch, hx of ETOH cirrhosis, compensated, no ETOH since 2/2024, presenting for evaluation of hematemesis and melena. Reports had a few episodes of emesis overnight last night that were food contents, followed by emesis of bright red blood, and also had 3 episodes of loose black stool. Denies abdominal pain.     In ED, initially on triage was tachy to 120 and sbp 80s. Received 1L fluid and 1 unit PRBC and hemodynamics improved.   Hgb 6.5, BUN/Cr elevated. GI consulted for further evaluation.     Wife reports that he takes motrin from time to time.     Had EGD and colonoscopy with me a few months ago - EGD with small EV and PHG    PMH/PSH-  ETOH cirrhosis, compensated  Hx duodenal ulcer - perforated 2/2024  S/P foot surgery    Allergies--  No Known Allergies    Medications--  Other: lactated ringers.  melatonin PRN  ondansetron Injectable PRN  pantoprazole  Injectable    Social History--  EtOH: denies   Tobacco: denies   Drug Use: denies     Family/Marital History--  FH: diabetes mellitus    Review of Systems:  A >=10-point review of systems was obtained.     Pertinent positives and negatives--  Constitutional: No fevers. No Chills. No Rigors.   Eyes: No visual sx or eye pain  ENMT: No hearing trouble or throat pain  Cardiovascular: No chest pain. No palpitations.  Respiratory: No shortness of breath. No cough.  Gastrointestinal: +nausea or vomiting +melena  Musculoskeletal: No arthralgia or myalgia  Skin: No rashes or lesions  Neurologic: No weakness or disorientation  Psychiatric: Pleasant. Appropriate affect.  Endocrine: No polyuria or polydipsia  Heme/Lymphatic: No easy bruising or immune issues    Review of systems otherwise negative except as previously noted.    Physical Exam--  Vital Signs: T(F): 98 (08-06-24 @ 18:38), Max: 98.4 (08-06-24 @ 17:30)  HR: 74 (08-06-24 @ 18:38)  BP: 96/59 (08-06-24 @ 18:38)  RR: 20 (08-06-24 @ 18:38)  SpO2: 100% (08-06-24 @ 18:38)    General: Nontoxic-appearing in no acute distress.  HEENT: AT/NC. Anicteric. Conjunctiva pink and moist.   Neck: Not rigid. No sense of mass.  Nodes: None palpable.  Lungs: Clear bilaterally without rales, wheezing or rhonchi  Heart: Regular rate and rhythm. No Murmur.   Abdomen: Soft. Nondistended. Nontender.   Extremities: No cyanosis or clubbing.   Skin: Warm. Dry. Good turgor. No rash.   Psychiatric: Appropriate affect and mood for situation.     Laboratory & Imaging Data--  CBC                        6.7    15.87 )-----------( 140      ( 06 Aug 2024 13:30 )             20.1       Chemistries  08-06    137  |  103  |  79<H>  ----------------------------<  150<H>  5.3   |  26  |  1.24    Ca    8.5      06 Aug 2024 09:00    TPro  6.2  /  Alb  2.6<L>  /  TBili  2.5<H>  /  DBili  x   /  AST  56<H>  /  ALT  26  /  AlkPhos  242<H>  08-06    ACC: [id number] EXAM:  CT ANGIO ABD PELV (W)AW IC   ORDERED BY: Annamarie Jimenez     PROCEDURE DATE:  08/06/2024          INTERPRETATION:  CLINICAL INFORMATION: Gastrointestinal bleed    COMPARISON: 2/21/2024.    CONTRAST/COMPLICATIONS:  IV Contrast: Omnipaque 350  95 cc administered   5 cc discarded  Oral Contrast: NONE  Complications: None reported at time of study completion    PROCEDURE:  CT of the Abdomen and Pelvis was performed.  Precontrast, Arterial and Delayed phases were performed.  [place] coronal reformats were performed.    FINDINGS:  LOWER CHEST: Clear lung bases.    LIVER: Cirrhosis. Heterogeneous enhancement/fatty infiltration.  BILE DUCTS: Normal caliber.  GALLBLADDER: Within normal limits.  SPLEEN: Within normal limits.  PANCREAS: Within normal limits.  ADRENALS: Within normal limits.  KIDNEYS/URETERS: Right renal cyst. No hydronephrosis.    BLADDER: Within normal limits.  REPRODUCTIVE ORGANS: Prostate within normal limits.    BOWEL: No bowel obstruction. Appendix is within normal limits. Colonic   mural fat deposition, possibly secondary to chronic inflammation. No of   areas of active intravenous contrast extravasation identified within the   bowel.  PERITONEUM/RETROPERITONEUM: Within normal limits.  VESSELS: Atherosclerotic changes. Gastroesophageal varices.  LYMPH NODES: No lymphadenopathy. Subcentimeter retroperitoneal lymph   nodes.  ABDOMINAL WALL: Postsurgical changes.  BONES: Degenerative changes. Sclerotic foci, possibly bone islands.    IMPRESSION:  NoCT evidence of active gastrointestinal bleeding.    Impression: 49M with hematemesis and melena. Broad ddx including cirrhotic and non-cirrhotic causes of bleeding, most notably ulcers vs varices.     Recommend:  - NPO  - 2 large bore IVs  - transfuse to hgb >7  - PPI IV BID 40mg  - octreotide gtt  - ceftriaxone daily  - plan for EGD today after appropriate resuscitation    Discussed with ED and ICU attendings

## 2024-08-06 NOTE — ED PROVIDER NOTE - PHYSICAL EXAMINATION
Gen: Alert, NAD, ill appearing  Head: NC, AT, EOMI, normal lids/conjunctiva  ENT: normal hearing, patent oropharynx without erythema/exudate, uvula midline  Neck: +supple, no tenderness/meningismus/JVD, +Trachea midline  Pulm: Bilateral BS, normal resp effort, no wheeze/stridor/retractions  CV: RRR, no M/R/G, +dist pulses  Abd: soft, NT/ND, Negative [place] signs, +BS, no palpable masses  Mskel: no edema/erythema/cyanosis  Skin: no rash, warm/dry  Neuro: AAOx3, no apparent sensory/motor deficits, coordination intact

## 2024-08-07 ENCOUNTER — TRANSCRIPTION ENCOUNTER (OUTPATIENT)
Age: 49
End: 2024-08-07

## 2024-08-07 LAB
ALBUMIN SERPL ELPH-MCNC: 2.3 G/DL — LOW (ref 3.3–5)
ALP SERPL-CCNC: 171 U/L — HIGH (ref 40–120)
ALT FLD-CCNC: 41 U/L — SIGNIFICANT CHANGE UP (ref 12–78)
ANION GAP SERPL CALC-SCNC: 5 MMOL/L — SIGNIFICANT CHANGE UP (ref 5–17)
AST SERPL-CCNC: 99 U/L — HIGH (ref 15–37)
BILIRUB SERPL-MCNC: 1 MG/DL — SIGNIFICANT CHANGE UP (ref 0.2–1.2)
BUN SERPL-MCNC: 49 MG/DL — HIGH (ref 7–23)
CALCIUM SERPL-MCNC: 7.5 MG/DL — LOW (ref 8.5–10.1)
CHLORIDE SERPL-SCNC: 107 MMOL/L — SIGNIFICANT CHANGE UP (ref 96–108)
CO2 SERPL-SCNC: 25 MMOL/L — SIGNIFICANT CHANGE UP (ref 22–31)
CREAT SERPL-MCNC: 0.82 MG/DL — SIGNIFICANT CHANGE UP (ref 0.5–1.3)
EGFR: 107 ML/MIN/1.73M2 — SIGNIFICANT CHANGE UP
GLUCOSE SERPL-MCNC: 122 MG/DL — HIGH (ref 70–99)
HCT VFR BLD CALC: 21.7 % — LOW (ref 39–50)
HGB BLD-MCNC: 7.3 G/DL — LOW (ref 13–17)
MAGNESIUM SERPL-MCNC: 2 MG/DL — SIGNIFICANT CHANGE UP (ref 1.6–2.6)
MCHC RBC-ENTMCNC: 28.7 PG — SIGNIFICANT CHANGE UP (ref 27–34)
MCHC RBC-ENTMCNC: 33.6 G/DL — SIGNIFICANT CHANGE UP (ref 32–36)
MCV RBC AUTO: 85.4 FL — SIGNIFICANT CHANGE UP (ref 80–100)
NRBC # BLD: 0 /100 WBCS — SIGNIFICANT CHANGE UP (ref 0–0)
PHOSPHATE SERPL-MCNC: 2.6 MG/DL — SIGNIFICANT CHANGE UP (ref 2.5–4.5)
PLATELET # BLD AUTO: 110 K/UL — LOW (ref 150–400)
POTASSIUM SERPL-MCNC: 4 MMOL/L — SIGNIFICANT CHANGE UP (ref 3.5–5.3)
POTASSIUM SERPL-SCNC: 4 MMOL/L — SIGNIFICANT CHANGE UP (ref 3.5–5.3)
PROT SERPL-MCNC: 5.5 GM/DL — LOW (ref 6–8.3)
RBC # BLD: 2.54 M/UL — LOW (ref 4.2–5.8)
RBC # FLD: 17.9 % — HIGH (ref 10.3–14.5)
SODIUM SERPL-SCNC: 137 MMOL/L — SIGNIFICANT CHANGE UP (ref 135–145)
WBC # BLD: 11.9 K/UL — HIGH (ref 3.8–10.5)
WBC # FLD AUTO: 11.9 K/UL — HIGH (ref 3.8–10.5)

## 2024-08-07 PROCEDURE — 99232 SBSQ HOSP IP/OBS MODERATE 35: CPT

## 2024-08-07 RX ORDER — CEFPODOXIME PROXETIL 100 MG
1 TABLET ORAL
Qty: 10 | Refills: 0
Start: 2024-08-07 | End: 2024-08-11

## 2024-08-07 RX ORDER — PANTOPRAZOLE SODIUM 20 MG/1
1 TABLET, DELAYED RELEASE ORAL
Qty: 112 | Refills: 0
Start: 2024-08-07 | End: 2024-10-01

## 2024-08-07 RX ADMIN — Medication 100 MILLIGRAM(S): at 15:49

## 2024-08-07 RX ADMIN — PANTOPRAZOLE SODIUM 40 MILLIGRAM(S): 20 TABLET, DELAYED RELEASE ORAL at 06:10

## 2024-08-07 RX ADMIN — DEXTROSE MONOHYDRATE, SODIUM CHLORIDE, SODIUM LACTATE, CALCIUM CHLORIDE, MAGNESIUM CHLORIDE 100 MILLILITER(S): 1.5; 538; 448; 18.4; 5.08 SOLUTION INTRAPERITONEAL at 06:10

## 2024-08-07 RX ADMIN — DEXTROSE MONOHYDRATE, SODIUM CHLORIDE, SODIUM LACTATE, CALCIUM CHLORIDE, MAGNESIUM CHLORIDE 100 MILLILITER(S): 1.5; 538; 448; 18.4; 5.08 SOLUTION INTRAPERITONEAL at 08:05

## 2024-08-07 RX ADMIN — DEXTROSE MONOHYDRATE, SODIUM CHLORIDE, SODIUM LACTATE, CALCIUM CHLORIDE, MAGNESIUM CHLORIDE 100 MILLILITER(S): 1.5; 538; 448; 18.4; 5.08 SOLUTION INTRAPERITONEAL at 21:21

## 2024-08-07 RX ADMIN — PANTOPRAZOLE SODIUM 40 MILLIGRAM(S): 20 TABLET, DELAYED RELEASE ORAL at 17:53

## 2024-08-07 NOTE — DISCHARGE NOTE NURSING/CASE MANAGEMENT/SOCIAL WORK - PATIENT PORTAL LINK FT
You can access the FollowMyHealth Patient Portal offered by  by registering at the following website: [email or web address]. By joining [employer]’s FollowMyHealth portal, you will also be able to view your health information using other applications (apps) compatible with our system.

## 2024-08-07 NOTE — DISCHARGE NOTE PROVIDER - HOSPITAL COURSE
49 years old male with alcoholic liver cirrhosis with esophageal varices present to ED with complain of vomiting of blood and black stook. Patient reported total around 4-5 episode of vomiting of blood with clots started around midnight, associated with a few episode of black stool. Reported dizziness. No chest pain or SOB  Episodes of hypotension in ED, improved with IV fluid and PRBC transfusion. Hb 6.5 ( 10.3 in 03/2024), WBC 23.11, plt 216, Cr 1.24 ( 0.34 in 03/2024). CTA with no active GI bleed        Problem/Plan - 1   ·  Problem: Acute GI bleeding.   ·  Plan: present with vomiting of blood with clots, black stools, dizziness  Hb 6.5 ( 10.3 in 03/2024)  CTA  ( I personally review) with no active GI bleed  EGD 08/23/24-small esophageal varices that flatten. 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  Stomach - portal gastropathy. No gastric varices. Duodenum - normal to d2  Colonoscopy 8/23/24-portal colopathy in the right colon. 5mm splenic flexure polyp removed with cold snare, small rectal varices  Likely variceal bleed.   Received 1 unit of PRBC in ED. Repeat stat CBC ( ordered). Monitor H/H, transfuse as needed to keep Hb > 7 or if patient is symptomatic/ recurrent bleed or hemodynamically unstable  ICU consulted, does not need ICU care at this [place]  Continue IV PPI, IV octreotide  Start IV ceftriaxone  Closely monitor hemodynamic status  GI consult, plan for EGD  NPO.    Problem/Plan - 2   ·  Problem: Anemia due to acute blood loss.   ·  Plan: Hb 6.5 ( 10.3 in 03/2024)  Likely variceal bleeding  Received 1 unit of PRBC in ED. Repeat stat CBC ( ordered). Monitor H/H, transfuse as needed to keep Hb > 7 or if patient is symptomatic/ recurrent bleed or hemodynamically unstable  GI consulted, plan for EGD.    Problem/Plan - 3   ·  Problem: Leukocytosis.   ·  Plan: WBC 23  has some level of baseline leukocytosis, likely exacerbated by acute GI bleed  Trend WBC count  Currently on ceftriaxone for variceal bleeding.    Problem/Plan - 4   ·  Problem: Liver cirrhosis, alcoholic.   ·  Plan: Monitor LFT  stopped drinking alcohol for 1 and 1/2 month.

## 2024-08-07 NOTE — DISCHARGE NOTE PROVIDER - NSDCCPCAREPLAN_GEN_ALL_CORE_FT
PRINCIPAL DISCHARGE DIAGNOSIS  Diagnosis: GI bleed  Assessment and Plan of Treatment: You have been found with two stomach ulcers  Please take the antacids as directed for 8 weeks  additionally for liver disease/ cirrohsis prophalyxis, please complete the course of antibiotics.  We advise against any aspirin, motrin, blood thinners, spicy foods or alcohol while your stomach is healing

## 2024-08-07 NOTE — DISCHARGE NOTE NURSING/CASE MANAGEMENT/SOCIAL WORK - NSDCPEWEB_GEN_ALL_CORE
[facility] for Tobacco Control website --- [email or web address] website --- [email or web address]

## 2024-08-07 NOTE — DISCHARGE NOTE PROVIDER - CARE PROVIDER_API CALL
[facility] Christie  Gastroenterology  [address] [place] [address]  Phone: [phone]  Fax: [phone]  Follow Up Time: 1 week

## 2024-08-07 NOTE — DISCHARGE NOTE PROVIDER - NSDCMRMEDTOKEN_GEN_ALL_CORE_FT
cefpodoxime 200 mg oral tablet: 1 tab(s) orally 2 times a day  Protonix 40 mg oral delayed release tablet: 1 tab(s) orally 2 times a day Please take protonix 40mg BID for 8 weeks total

## 2024-08-07 NOTE — DISCHARGE NOTE PROVIDER - PROVIDER TOKENS
Hematology / Oncology Progress Note  [facility]      Patient ID:  Kam Parada  65 y.o.  1945    Admit Date: 8/2/2022    Assessment:     Principal Problem:    Acute exacerbation of COPD with asthma ([facility] ([facility] (10/8/2015)      Acute on chronic diastolic congestive heart failure ([facility] (10/12/2015)      Stage 3 acute kidney injury ([facility] (8/2/2022)      Supratherapeutic INR (8/2/2022)      Former smoker (8/2/2022)      Hyperkalemia (8/2/2022)      Factor V Leiden mutation ([facility] (Jesenia Robert MD MD  [facility] [id number] PROVIDER:[TOKEN:[[id number]],FOLLOWUP:[1 week]]

## 2024-08-07 NOTE — PROGRESS NOTE ADULT - SUBJECTIVE AND OBJECTIVE BOX
No further emesis. No bm. Tolerating PO diet. No abdominal pain.   Uses motrin 1-2 times per week for foot/leg pain after working long days.    T(C): 36.6 (08-07-24 @ 15:28), Max: 37 (08-07-24 @ 10:40)  HR: 68 (08-07-24 @ 15:28) (67 - 76)  BP: 91/53 (08-07-24 @ 15:28) (89/53 - 96/59)  RR: 17 (08-07-24 @ 15:28) (16 - 20)  SpO2: 97% (08-07-24 @ 15:28) (97% - 100%)    NAD  soft NT ND                          7.3    11.90 )-----------( 110      ( 07 Aug 2024 12:05 )             21.7   08-07    137  |  107  |  49<H>  ----------------------------<  122<H>  4.0   |  25  |  0.82    Ca    7.5<L>      07 Aug 2024 12:05  Phos  2.6     08-07  Mg     2.0     08-07    TPro  5.5<L>  /  Alb  2.3<L>  /  TBili  1.0  /  DBili  x   /  AST  99<H>  /  ALT  41  /  AlkPhos  171<H>  08-07    Impression: Recurrent DUs. Likely NSAID induced given motrin 1-2 times per week.    Recommend:  - await pathology  - Will need PPI BID 40mg x8 weeks given LA Grade D esophagitis   - trend H/H  - follow up in my office in 4 weeks
Patient seen and examined  sp EGD  tolerating diet  spoke to wife: no black or red stool  Labs in process. not collected this am unclear reason      Review of Systems:  General:denies fever chills, headache, weakness  HEENT: denies blurry vision,diffculty swallowing, difficulty hearing, tinnitus  Cardiovascular: denies chest pain  ,palpitations  Pulmonary:denies shortness of breath, cough, wheezing, hemoptysis  Gastrointestinal: denies abdominal pain, constipation, diarrhea,nausea , vomiting, hematochezia  : denies hematuria, dysuria, or incontinence  Neurological: denies weakness, numbness , tingling, dizziness, tremors  MSK: denies muscle pain, difficulty ambulating, swelling, back pain  skin: denies skin rash, itching, burning, or  skin lesions  Psychiatrical: denies mood disturbances, anxierty, feeling depressed, depression , or difficulty sleeping    Objective:  Vitals  T(C): 37 (08-07-24 @ 10:40), Max: 37 (08-07-24 @ 10:40)  HR: 70 (08-07-24 @ 10:40) (67 - 87)  BP: 90/46 (08-07-24 @ 10:40) (88/47 - 101/50)  RR: 16 (08-07-24 @ 10:40) (16 - 21)  SpO2: 100% (08-07-24 @ 10:40) (98% - 100%)    Physical Exam:  General: comfortable, no acute distress  HEENT: Atraumatic, no LAD, trachea midline, PERRLA  Cardiovascular: normal s1s2, no murmurs, gallops or fricition rubs  Pulmonary: clear to ausculation Bilaterally, no wheezing , rhonchi  Gastrointestinal: soft non tender non distended, no masses felt, no organomegally  Muscloskeletal: no lower extremity edema, intact bilateral lower extremity pulses  Neurological: CN II-12 intact. No focal weakness  Psychiatrical: normal mood, cooperative  SKIN: no rash, lesions or ulcers    Labs:                          7.9    11.13 )-----------( 113      ( 06 Aug 2024 21:03 )             23.5     08-06    137  |  103  |  79<H>  ----------------------------<  150<H>  5.3   |  26  |  1.24    Ca    8.5      06 Aug 2024 09:00    TPro  6.2  /  Alb  2.6<L>  /  TBili  2.5<H>  /  DBili  x   /  AST  56<H>  /  ALT  26  /  AlkPhos  242<H>  08-06    LIVER FUNCTIONS - ( 06 Aug 2024 09:00 )  Alb: 2.6 g/dL / Pro: 6.2 gm/dL / ALK PHOS: 242 U/L / ALT: 26 U/L / AST: 56 U/L / GGT: x           PT/INR - ( 06 Aug 2024 09:00 )   PT: 15.7 sec;   INR: 1.33 ratio         PTT - ( 06 Aug 2024 09:00 )  PTT:32.5 sec      Active Medications  MEDICATIONS  (STANDING):  cefTRIAXone   IVPB 1000 milliGRAM(s) IV Intermittent every 24 hours  lactated ringers. 1000 milliLiter(s) (100 mL/Hr) IV Continuous <Continuous>  pantoprazole  Injectable 40 milliGRAM(s) IV Push two times a day    MEDICATIONS  (PRN):  melatonin 3 milliGRAM(s) Oral at bedtime PRN Insomnia  ondansetron Injectable 4 milliGRAM(s) IV Push every 6 hours PRN Nausea and/or Vomiting

## 2024-08-08 VITALS — SYSTOLIC BLOOD PRESSURE: 102 MMHG | HEART RATE: 79 BPM | DIASTOLIC BLOOD PRESSURE: 65 MMHG

## 2024-08-08 LAB
BASOPHILS # BLD AUTO: 0.02 K/UL — SIGNIFICANT CHANGE UP (ref 0–0.2)
BASOPHILS NFR BLD AUTO: 0.3 % — SIGNIFICANT CHANGE UP (ref 0–2)
BILIRUB SERPL-MCNC: 1 MG/DL — SIGNIFICANT CHANGE UP (ref 0.2–1.2)
CREAT SERPL-MCNC: 0.67 MG/DL — SIGNIFICANT CHANGE UP (ref 0.5–1.3)
EGFR: 114 ML/MIN/1.73M2 — SIGNIFICANT CHANGE UP
EOSINOPHIL # BLD AUTO: 0.34 K/UL — SIGNIFICANT CHANGE UP (ref 0–0.5)
EOSINOPHIL NFR BLD AUTO: 4.7 % — SIGNIFICANT CHANGE UP (ref 0–6)
HCT VFR BLD CALC: 25 % — LOW (ref 39–50)
HGB BLD-MCNC: 8.3 G/DL — LOW (ref 13–17)
IMM GRANULOCYTES NFR BLD AUTO: 0.7 % — SIGNIFICANT CHANGE UP (ref 0–0.9)
INR BLD: 1.1 RATIO — SIGNIFICANT CHANGE UP (ref 0.85–1.18)
LYMPHOCYTES # BLD AUTO: 0.87 K/UL — LOW (ref 1–3.3)
LYMPHOCYTES # BLD AUTO: 11.9 % — LOW (ref 13–44)
MCHC RBC-ENTMCNC: 28.6 PG — SIGNIFICANT CHANGE UP (ref 27–34)
MCHC RBC-ENTMCNC: 33.2 G/DL — SIGNIFICANT CHANGE UP (ref 32–36)
MCV RBC AUTO: 86.2 FL — SIGNIFICANT CHANGE UP (ref 80–100)
MELD SCORE WITH DIALYSIS: 21 POINTS — SIGNIFICANT CHANGE UP
MELD SCORE WITHOUT DIALYSIS: 7 POINTS — SIGNIFICANT CHANGE UP
MONOCYTES # BLD AUTO: 0.59 K/UL — SIGNIFICANT CHANGE UP (ref 0–0.9)
MONOCYTES NFR BLD AUTO: 8.1 % — SIGNIFICANT CHANGE UP (ref 2–14)
NEUTROPHILS # BLD AUTO: 5.42 K/UL — SIGNIFICANT CHANGE UP (ref 1.8–7.4)
NEUTROPHILS NFR BLD AUTO: 74.3 % — SIGNIFICANT CHANGE UP (ref 43–77)
NRBC # BLD: 0 /100 WBCS — SIGNIFICANT CHANGE UP (ref 0–0)
PLATELET # BLD AUTO: 100 K/UL — LOW (ref 150–400)
PROTHROM AB SERPL-ACNC: 13.1 SEC — HIGH (ref 9.5–13)
RBC # BLD: 2.9 M/UL — LOW (ref 4.2–5.8)
RBC # FLD: 17.6 % — HIGH (ref 10.3–14.5)
SODIUM SERPL-SCNC: 138 MMOL/L — SIGNIFICANT CHANGE UP (ref 135–145)
SURGICAL PATHOLOGY STUDY: SIGNIFICANT CHANGE UP
WBC # BLD: 7.29 K/UL — SIGNIFICANT CHANGE UP (ref 3.8–10.5)
WBC # FLD AUTO: 7.29 K/UL — SIGNIFICANT CHANGE UP (ref 3.8–10.5)

## 2024-08-08 PROCEDURE — 99239 HOSP IP/OBS DSCHRG MGMT >30: CPT

## 2024-08-08 RX ADMIN — PANTOPRAZOLE SODIUM 40 MILLIGRAM(S): 20 TABLET, DELAYED RELEASE ORAL at 05:02

## 2024-08-21 DIAGNOSIS — K26.4 CHRONIC OR UNSPECIFIED DUODENAL ULCER WITH HEMORRHAGE: ICD-10-CM

## 2024-08-21 DIAGNOSIS — D62 ACUTE POSTHEMORRHAGIC ANEMIA: ICD-10-CM

## 2024-08-21 DIAGNOSIS — K70.30 ALCOHOLIC CIRRHOSIS OF LIVER WITHOUT ASCITES: ICD-10-CM

## 2024-08-21 DIAGNOSIS — K20.91 ESOPHAGITIS, UNSPECIFIED WITH BLEEDING: ICD-10-CM

## 2024-08-21 DIAGNOSIS — D72.829 ELEVATED WHITE BLOOD CELL COUNT, UNSPECIFIED: ICD-10-CM

## 2024-08-21 DIAGNOSIS — F17.200 NICOTINE DEPENDENCE, UNSPECIFIED, UNCOMPLICATED: ICD-10-CM

## 2024-08-21 DIAGNOSIS — I85.11 SECONDARY ESOPHAGEAL VARICES WITH BLEEDING: ICD-10-CM

## 2024-08-26 ENCOUNTER — NON-APPOINTMENT (OUTPATIENT)
Age: 49
End: 2024-08-26

## 2024-08-27 ENCOUNTER — APPOINTMENT (OUTPATIENT)
Dept: GASTROENTEROLOGY | Facility: CLINIC | Age: 49
End: 2024-08-27
Payer: COMMERCIAL

## 2024-08-27 VITALS
WEIGHT: 165 LBS | HEIGHT: 68 IN | OXYGEN SATURATION: 96 % | HEART RATE: 94 BPM | SYSTOLIC BLOOD PRESSURE: 112 MMHG | DIASTOLIC BLOOD PRESSURE: 79 MMHG | BODY MASS INDEX: 25.01 KG/M2

## 2024-08-27 PROCEDURE — 99213 OFFICE O/P EST LOW 20 MIN: CPT

## 2024-08-27 RX ORDER — PANTOPRAZOLE 40 MG/1
40 TABLET, DELAYED RELEASE ORAL TWICE DAILY
Qty: 60 | Refills: 1 | Status: ACTIVE | COMMUNITY
Start: 2024-08-27 | End: 1900-01-01

## 2024-08-27 NOTE — ASSESSMENT
[FreeTextEntry1] : LA Grade D esophagitis and duodenal ulcers 2/2 NSAIDs.  - is now only using tylenol for leg pain - may need repeat EGD after completion of PPI for LA D esophagitis healing, assess for BE  Follow up 2 months

## 2024-08-27 NOTE — REVIEW OF SYSTEMS
[Feeling Poorly] : not feeling poorly [Feeling Tired] : not feeling tired [Abdominal Pain] : no abdominal pain [Bleeding] : no bleeding

## 2024-08-27 NOTE — PHYSICAL EXAM
[Alert] : alert [Healthy Appearing] : healthy appearing [No Acute Distress] : no acute distress [Abdomen Tenderness] : non-tender [Abdomen Soft] : soft [Oriented To Time, Place, And Person] : oriented to person, place, and time [Normal Affect] : the affect was normal [Normal Mood] : the mood was normal

## 2024-08-27 NOTE — HISTORY OF PRESENT ILLNESS
[FreeTextEntry1] :  49M with hx perforated ulcer s/p surgery 2/2024, that is when ETOH cirrhosis was identified in house at [facility], here for follow up after hospitization for hematemesis and melena earlier this month.  EGD report 8/6/24  - Indication - hematemesis, melena Exam to d2 Findings: - Esophagus - small esophageal varices. LA Grade D esophagitis.  - Stomach - no fresh or old blood. Small gastric varices. Biopsies taken for H. pylori - Duodenum - 2 clean based ulcers, one ulcer with flat pigmented slot  Path HP negative.  Patient later reported that he takes motrin PRN for leg pain after work - so likely NSAID induced.  Has been on PPI for one month, has one more month to go. Is feeling back to normal. Stool has normalized. No abdominal pain.

## 2024-08-30 NOTE — ED ADULT NURSE NOTE - NSFALLRSKOUTCOME_ED_ALL_ED
Universal Safety Interventions #Dyspnea on exertion  #BLE edema  Presenting with SOB w/ exertion and hypoxia. CTA without PE or pleural effusion, but with increased burden of pulmonary mets and liver mets. Trp <6 x 2. BNP 76. Bedside POCUS with normal cardiac contractility. Inspiratory stridor on physical exam (now improved). Suspect his AHRF is in setting of worsening metastatic disease, lower suspicion for PNA as no infiltrates noted, and lower suspicion for ADHF as patient with low proBNP, no pulm edema/effusions.  - On RA now  - TTE difficult study  - BLE Duplex neg for DVT    Plan:  - Duonebs q6 prn  - May require d/c on home O2 for comfort

## 2024-10-15 ENCOUNTER — APPOINTMENT (OUTPATIENT)
Dept: GASTROENTEROLOGY | Facility: CLINIC | Age: 49
End: 2024-10-15
Payer: COMMERCIAL

## 2024-10-15 VITALS
HEIGHT: 68 IN | BODY MASS INDEX: 25.46 KG/M2 | WEIGHT: 168 LBS | OXYGEN SATURATION: 97 % | SYSTOLIC BLOOD PRESSURE: 132 MMHG | HEART RATE: 60 BPM | DIASTOLIC BLOOD PRESSURE: 62 MMHG

## 2024-10-15 PROCEDURE — 99214 OFFICE O/P EST MOD 30 MIN: CPT

## 2024-10-16 LAB
HCT VFR BLD CALC: 26.1 %
HGB BLD-MCNC: 7.7 G/DL
MCHC RBC-ENTMCNC: 23.2 PG
MCHC RBC-ENTMCNC: 29.5 GM/DL
MCV RBC AUTO: 78.6 FL
PLATELET # BLD AUTO: 191 K/UL
RBC # BLD: 3.32 M/UL
RBC # FLD: 17.2 %
WBC # FLD AUTO: 10.14 K/UL

## 2024-12-04 NOTE — PHYSICAL THERAPY INITIAL EVALUATION ADULT - BALANCE TRAINING, PT EVAL
Outreach attempts to coordinate scheduling on the patient's Service to Pain Management order in workqueue [id number] requested on 11/25/2024 have been conducted.    Please contact Yomaira if further coordination is needed.   
Independent sitting, transfers, standing and ambulation with good balance using appropriate assistive device and prevent falls.

## 2024-12-12 ENCOUNTER — OUTPATIENT (OUTPATIENT)
Dept: OUTPATIENT SERVICES | Facility: HOSPITAL | Age: 49
LOS: 1 days | Discharge: HOME | End: 2024-12-12
Payer: COMMERCIAL

## 2024-12-12 ENCOUNTER — TRANSCRIPTION ENCOUNTER (OUTPATIENT)
Age: 49
End: 2024-12-12

## 2024-12-12 VITALS
SYSTOLIC BLOOD PRESSURE: 130 MMHG | HEIGHT: 69 IN | RESPIRATION RATE: 17 BRPM | TEMPERATURE: 98 F | HEART RATE: 89 BPM | DIASTOLIC BLOOD PRESSURE: 71 MMHG | WEIGHT: 169.98 LBS | OXYGEN SATURATION: 99 %

## 2024-12-12 VITALS
HEART RATE: 83 BPM | DIASTOLIC BLOOD PRESSURE: 99 MMHG | SYSTOLIC BLOOD PRESSURE: 140 MMHG | RESPIRATION RATE: 22 BRPM | TEMPERATURE: 98 F | OXYGEN SATURATION: 100 %

## 2024-12-12 DIAGNOSIS — Z98.890 OTHER SPECIFIED POSTPROCEDURAL STATES: Chronic | ICD-10-CM

## 2024-12-12 DIAGNOSIS — K63.1 PERFORATION OF INTESTINE (NONTRAUMATIC): ICD-10-CM

## 2024-12-12 PROCEDURE — 43235 EGD DIAGNOSTIC BRUSH WASH: CPT

## 2024-12-12 RX ORDER — SODIUM CHLORIDE 9 MG/ML
1000 INJECTION, SOLUTION INTRAMUSCULAR; INTRAVENOUS; SUBCUTANEOUS
Refills: 0 | Status: DISCONTINUED | OUTPATIENT
Start: 2024-12-12 | End: 2024-12-13

## 2024-12-12 RX ORDER — CARVEDILOL 3.12 MG/1
3.12 TABLET, FILM COATED ORAL TWICE DAILY
Qty: 30 | Refills: 2 | Status: ACTIVE | COMMUNITY
Start: 2024-12-12 | End: 1900-01-01

## 2024-12-12 RX ORDER — CARVEDILOL 25 MG/1
1 TABLET, FILM COATED ORAL
Qty: 60 | Refills: 2
Start: 2024-12-12 | End: 2025-03-11

## 2024-12-12 RX ORDER — ONDANSETRON HYDROCHLORIDE 4 MG/1
4 TABLET, FILM COATED ORAL ONCE
Refills: 0 | Status: DISCONTINUED | OUTPATIENT
Start: 2024-12-12 | End: 2024-12-13

## 2024-12-12 NOTE — BRIEF OPERATIVE NOTE - OPERATION/FINDINGS
Procedure name: Upper endoscopy  Indication: Follow up of esophagitis s/p PPI use.   Regular upper scope was introduced in the mouth and advanced to the second portion of the duodenum. The procedure was not difficult and the patient tolerated the procedure well.     Findings:   Grade B esophagitis in the lower third of the esophagus.   Few islands present in the lower third of the esophagus, suspicious for Villavicencio's esophagus. Biopsies not obtained due to underlying varices and esophagitis.   Grade II esophageal varices in the lower third of the esophagus.   Moderate portal hypotensive gastropathy with mild hemorrhage throughout the stomach.   Gastroesophageal varices type 2 (GOV 2 seen) seen in retroflexion.   Superficial non bleeding duodenal ulcers seen in the duodenal sweep (forest classification Grade III)  The second portion of the duodenum was normal.     Recommendations:   Continue with Pantoprazole 40 mg twice a day for long term.   Please start Coreg 3.125 mg twice a day for primary prophylaxis for varices.   Resume low sodium diet.      Final endoscopy report - see photos in paper chart  Indication: Follow up of esophagitis s/p PPI use. Patient stopped PPI about one month ago  Regular upper scope was introduced in the mouth and advanced to the second portion of the duodenum. The procedure was not difficult and the patient tolerated the procedure well.     Findings:   Grade B esophagitis in the lower third of the esophagus.   Few islands present in the lower third of the esophagus, suspicious for Villavicencio's esophagus. Biopsies not obtained due to underlying varices and esophagitis.   Grade I esophageal varices in the lower third of the esophagus.   Moderate portal hypotensive gastropathy with mild hemorrhage throughout the stomach.   Gastroesophageal varices type 2 (GOV 2 seen) seen in retroflexion.   Superficial non bleeding duodenal ulcers seen in the duodenal sweep (forest classification Grade III)  The second portion of the duodenum was normal.     Recommendations:   Continue with Pantoprazole 40 mg twice a day for long term.   Please start Coreg 3.125 mg twice a day for primary prophylaxis for varices.   Resume low sodium diet.   Follow up with Dr. Shah in office

## 2024-12-12 NOTE — ASU DISCHARGE PLAN (ADULT/PEDIATRIC) - ASU DC SPECIAL INSTRUCTIONSFT
Continue with Pantoprazole 40 mg twice a day for long term.   Please start Coreg 3.125 mg twice a day for primary prophylaxis for varices. Prescription has been sent to [facility] pharmacy.   Resume low sodium diet (< 2 grams per day).   Follow up with GI clinic within one month.

## 2024-12-12 NOTE — ASU DISCHARGE PLAN (ADULT/PEDIATRIC) - FINANCIAL ASSISTANCE
[facility] provides services at a reduced cost to those who are determined to be eligible through [facility]’s financial assistance program. Information regarding [facility]’s financial assistance program can be found by going to [email or web address] or by calling [phone].

## 2024-12-18 DIAGNOSIS — K76.6 PORTAL HYPERTENSION: ICD-10-CM

## 2024-12-18 DIAGNOSIS — I85.10 SECONDARY ESOPHAGEAL VARICES WITHOUT BLEEDING: ICD-10-CM

## 2024-12-18 DIAGNOSIS — K22.89 OTHER SPECIFIED DISEASE OF ESOPHAGUS: ICD-10-CM

## 2024-12-18 DIAGNOSIS — K20.90 ESOPHAGITIS, UNSPECIFIED WITHOUT BLEEDING: ICD-10-CM

## 2024-12-18 DIAGNOSIS — K70.30 ALCOHOLIC CIRRHOSIS OF LIVER WITHOUT ASCITES: ICD-10-CM

## 2024-12-18 DIAGNOSIS — K26.9 DUODENAL ULCER, UNSPECIFIED AS ACUTE OR CHRONIC, WITHOUT HEMORRHAGE OR PERFORATION: ICD-10-CM

## 2025-01-14 ENCOUNTER — INPATIENT (INPATIENT)
Facility: HOSPITAL | Age: 50
LOS: 0 days | Discharge: HOME | End: 2025-01-15
Attending: INTERNAL MEDICINE | Admitting: INTERNAL MEDICINE
Payer: COMMERCIAL

## 2025-01-14 VITALS
SYSTOLIC BLOOD PRESSURE: 108 MMHG | TEMPERATURE: 99 F | RESPIRATION RATE: 18 BRPM | WEIGHT: 167.99 LBS | OXYGEN SATURATION: 100 % | DIASTOLIC BLOOD PRESSURE: 71 MMHG | HEART RATE: 112 BPM | HEIGHT: 69 IN

## 2025-01-14 DIAGNOSIS — K70.30 ALCOHOLIC CIRRHOSIS OF LIVER WITHOUT ASCITES: ICD-10-CM

## 2025-01-14 DIAGNOSIS — K92.2 GASTROINTESTINAL HEMORRHAGE, UNSPECIFIED: ICD-10-CM

## 2025-01-14 DIAGNOSIS — Z98.890 OTHER SPECIFIED POSTPROCEDURAL STATES: Chronic | ICD-10-CM

## 2025-01-14 LAB
ALBUMIN SERPL ELPH-MCNC: 2.6 G/DL — LOW (ref 3.3–5)
ALP SERPL-CCNC: 249 U/L — HIGH (ref 40–120)
ALT FLD-CCNC: 25 U/L — SIGNIFICANT CHANGE UP (ref 12–78)
ANION GAP SERPL CALC-SCNC: 5 MMOL/L — SIGNIFICANT CHANGE UP (ref 5–17)
APTT BLD: 33.7 SEC — SIGNIFICANT CHANGE UP (ref 24.5–35.6)
AST SERPL-CCNC: 64 U/L — HIGH (ref 15–37)
BASOPHILS # BLD AUTO: 0.04 K/UL — SIGNIFICANT CHANGE UP (ref 0–0.2)
BASOPHILS # BLD AUTO: 0.05 K/UL — SIGNIFICANT CHANGE UP (ref 0–0.2)
BASOPHILS NFR BLD AUTO: 0.4 % — SIGNIFICANT CHANGE UP (ref 0–2)
BASOPHILS NFR BLD AUTO: 0.5 % — SIGNIFICANT CHANGE UP (ref 0–2)
BILIRUB SERPL-MCNC: 2 MG/DL — HIGH (ref 0.2–1.2)
BLD GP AB SCN SERPL QL: SIGNIFICANT CHANGE UP
BUN SERPL-MCNC: 31 MG/DL — HIGH (ref 7–23)
CALCIUM SERPL-MCNC: 8.2 MG/DL — LOW (ref 8.5–10.1)
CHLORIDE SERPL-SCNC: 110 MMOL/L — HIGH (ref 96–108)
CO2 SERPL-SCNC: 26 MMOL/L — SIGNIFICANT CHANGE UP (ref 22–31)
CREAT SERPL-MCNC: 0.81 MG/DL — SIGNIFICANT CHANGE UP (ref 0.5–1.3)
EGFR: 107 ML/MIN/1.73M2 — SIGNIFICANT CHANGE UP
EOSINOPHIL # BLD AUTO: 0.15 K/UL — SIGNIFICANT CHANGE UP (ref 0–0.5)
EOSINOPHIL # BLD AUTO: 0.18 K/UL — SIGNIFICANT CHANGE UP (ref 0–0.5)
EOSINOPHIL NFR BLD AUTO: 1.4 % — SIGNIFICANT CHANGE UP (ref 0–6)
EOSINOPHIL NFR BLD AUTO: 1.7 % — SIGNIFICANT CHANGE UP (ref 0–6)
GLUCOSE SERPL-MCNC: 113 MG/DL — HIGH (ref 70–99)
HCT VFR BLD CALC: 18.9 % — CRITICAL LOW (ref 39–50)
HCT VFR BLD CALC: 21.3 % — LOW (ref 39–50)
HCT VFR BLD CALC: 25.7 % — LOW (ref 39–50)
HGB BLD-MCNC: 5.1 G/DL — CRITICAL LOW (ref 13–17)
HGB BLD-MCNC: 6.2 G/DL — CRITICAL LOW (ref 13–17)
HGB BLD-MCNC: 7.9 G/DL — LOW (ref 13–17)
IMM GRANULOCYTES NFR BLD AUTO: 0.4 % — SIGNIFICANT CHANGE UP (ref 0–0.9)
IMM GRANULOCYTES NFR BLD AUTO: 0.5 % — SIGNIFICANT CHANGE UP (ref 0–0.9)
INR BLD: 1.44 RATIO — HIGH (ref 0.85–1.16)
LACTATE SERPL-SCNC: 2.4 MMOL/L — HIGH (ref 0.7–2)
LIDOCAIN IGE QN: 37 U/L — SIGNIFICANT CHANGE UP (ref 13–75)
LYMPHOCYTES # BLD AUTO: 1.55 K/UL — SIGNIFICANT CHANGE UP (ref 1–3.3)
LYMPHOCYTES # BLD AUTO: 1.65 K/UL — SIGNIFICANT CHANGE UP (ref 1–3.3)
LYMPHOCYTES # BLD AUTO: 14.9 % — SIGNIFICANT CHANGE UP (ref 13–44)
LYMPHOCYTES # BLD AUTO: 15.7 % — SIGNIFICANT CHANGE UP (ref 13–44)
MCHC RBC-ENTMCNC: 20.9 PG — LOW (ref 27–34)
MCHC RBC-ENTMCNC: 22.3 PG — LOW (ref 27–34)
MCHC RBC-ENTMCNC: 23.6 PG — LOW (ref 27–34)
MCHC RBC-ENTMCNC: 27 G/DL — LOW (ref 32–36)
MCHC RBC-ENTMCNC: 29.1 G/DL — LOW (ref 32–36)
MCHC RBC-ENTMCNC: 30.7 G/DL — LOW (ref 32–36)
MCV RBC AUTO: 76.6 FL — LOW (ref 80–100)
MCV RBC AUTO: 76.7 FL — LOW (ref 80–100)
MCV RBC AUTO: 77.5 FL — LOW (ref 80–100)
MONOCYTES # BLD AUTO: 1.2 K/UL — HIGH (ref 0–0.9)
MONOCYTES # BLD AUTO: 1.31 K/UL — HIGH (ref 0–0.9)
MONOCYTES NFR BLD AUTO: 11.6 % — SIGNIFICANT CHANGE UP (ref 2–14)
MONOCYTES NFR BLD AUTO: 12.5 % — SIGNIFICANT CHANGE UP (ref 2–14)
NEUTROPHILS # BLD AUTO: 7.3 K/UL — SIGNIFICANT CHANGE UP (ref 1.8–7.4)
NEUTROPHILS # BLD AUTO: 7.36 K/UL — SIGNIFICANT CHANGE UP (ref 1.8–7.4)
NEUTROPHILS NFR BLD AUTO: 69.5 % — SIGNIFICANT CHANGE UP (ref 43–77)
NEUTROPHILS NFR BLD AUTO: 70.9 % — SIGNIFICANT CHANGE UP (ref 43–77)
NRBC # BLD: 0 /100 WBCS — SIGNIFICANT CHANGE UP (ref 0–0)
PLATELET # BLD AUTO: 129 K/UL — LOW (ref 150–400)
PLATELET # BLD AUTO: 131 K/UL — LOW (ref 150–400)
PLATELET # BLD AUTO: 153 K/UL — SIGNIFICANT CHANGE UP (ref 150–400)
POTASSIUM SERPL-MCNC: 5.1 MMOL/L — SIGNIFICANT CHANGE UP (ref 3.5–5.3)
POTASSIUM SERPL-SCNC: 5.1 MMOL/L — SIGNIFICANT CHANGE UP (ref 3.5–5.3)
PROT SERPL-MCNC: 7.2 GM/DL — SIGNIFICANT CHANGE UP (ref 6–8.3)
PROTHROM AB SERPL-ACNC: 16.2 SEC — HIGH (ref 9.9–13.4)
RBC # BLD: 2.44 M/UL — LOW (ref 4.2–5.8)
RBC # BLD: 2.78 M/UL — LOW (ref 4.2–5.8)
RBC # BLD: 3.35 M/UL — LOW (ref 4.2–5.8)
RBC # FLD: 16.6 % — HIGH (ref 10.3–14.5)
RBC # FLD: 17.2 % — HIGH (ref 10.3–14.5)
RBC # FLD: 17.6 % — HIGH (ref 10.3–14.5)
SODIUM SERPL-SCNC: 141 MMOL/L — SIGNIFICANT CHANGE UP (ref 135–145)
WBC # BLD: 10.38 K/UL — SIGNIFICANT CHANGE UP (ref 3.8–10.5)
WBC # BLD: 10.5 K/UL — SIGNIFICANT CHANGE UP (ref 3.8–10.5)
WBC # BLD: 10.87 K/UL — HIGH (ref 3.8–10.5)
WBC # FLD AUTO: 10.38 K/UL — SIGNIFICANT CHANGE UP (ref 3.8–10.5)
WBC # FLD AUTO: 10.5 K/UL — SIGNIFICANT CHANGE UP (ref 3.8–10.5)
WBC # FLD AUTO: 10.87 K/UL — HIGH (ref 3.8–10.5)

## 2025-01-14 PROCEDURE — 99222 1ST HOSP IP/OBS MODERATE 55: CPT | Mod: 25

## 2025-01-14 PROCEDURE — 93010 ELECTROCARDIOGRAM REPORT: CPT

## 2025-01-14 PROCEDURE — 71045 X-RAY EXAM CHEST 1 VIEW: CPT | Mod: 26

## 2025-01-14 PROCEDURE — 99285 EMERGENCY DEPT VISIT HI MDM: CPT

## 2025-01-14 PROCEDURE — 99222 1ST HOSP IP/OBS MODERATE 55: CPT

## 2025-01-14 PROCEDURE — 43235 EGD DIAGNOSTIC BRUSH WASH: CPT | Mod: GC

## 2025-01-14 RX ORDER — SODIUM CHLORIDE 9 MG/ML
1000 INJECTION, SOLUTION INTRAVENOUS
Refills: 0 | Status: DISCONTINUED | OUTPATIENT
Start: 2025-01-14 | End: 2025-01-14

## 2025-01-14 RX ORDER — SODIUM CHLORIDE 9 MG/ML
1000 INJECTION, SOLUTION INTRAMUSCULAR; INTRAVENOUS; SUBCUTANEOUS ONCE
Refills: 0 | Status: COMPLETED | OUTPATIENT
Start: 2025-01-14 | End: 2025-01-14

## 2025-01-14 RX ORDER — ONDANSETRON 4 MG/1
4 TABLET ORAL EVERY 6 HOURS
Refills: 0 | Status: DISCONTINUED | OUTPATIENT
Start: 2025-01-14 | End: 2025-01-15

## 2025-01-14 RX ORDER — PANTOPRAZOLE 40 MG/1
40 TABLET, DELAYED RELEASE ORAL
Refills: 0 | Status: DISCONTINUED | OUTPATIENT
Start: 2025-01-14 | End: 2025-01-15

## 2025-01-14 RX ORDER — CEFTRIAXONE SODIUM 1 G/1
1000 INJECTION, POWDER, FOR SOLUTION INTRAMUSCULAR; INTRAVENOUS EVERY 24 HOURS
Refills: 0 | Status: DISCONTINUED | OUTPATIENT
Start: 2025-01-15 | End: 2025-01-15

## 2025-01-14 RX ORDER — CEFTRIAXONE SODIUM 1 G/1
1000 INJECTION, POWDER, FOR SOLUTION INTRAMUSCULAR; INTRAVENOUS ONCE
Refills: 0 | Status: COMPLETED | OUTPATIENT
Start: 2025-01-14 | End: 2025-01-14

## 2025-01-14 RX ORDER — OCTREOTIDE ACETATE 20 MG
25 KIT INTRAMUSCULAR
Qty: 500 | Refills: 0 | Status: DISCONTINUED | OUTPATIENT
Start: 2025-01-14 | End: 2025-01-14

## 2025-01-14 RX ORDER — SODIUM CHLORIDE 9 MG/ML
1000 INJECTION, SOLUTION INTRAVENOUS
Refills: 0 | Status: DISCONTINUED | OUTPATIENT
Start: 2025-01-14 | End: 2025-01-15

## 2025-01-14 RX ORDER — CARVEDILOL 25 MG/1
3.12 TABLET, FILM COATED ORAL EVERY 12 HOURS
Refills: 0 | Status: DISCONTINUED | OUTPATIENT
Start: 2025-01-15 | End: 2025-01-15

## 2025-01-14 RX ORDER — ONDANSETRON 4 MG/1
4 TABLET ORAL ONCE
Refills: 0 | Status: COMPLETED | OUTPATIENT
Start: 2025-01-14 | End: 2025-01-14

## 2025-01-14 RX ORDER — PANTOPRAZOLE 40 MG/1
40 TABLET, DELAYED RELEASE ORAL ONCE
Refills: 0 | Status: COMPLETED | OUTPATIENT
Start: 2025-01-14 | End: 2025-01-14

## 2025-01-14 RX ADMIN — OCTREOTIDE ACETATE 5 MICROGRAM(S)/HR: KIT INTRAMUSCULAR at 12:36

## 2025-01-14 RX ADMIN — OCTREOTIDE ACETATE 5 MICROGRAM(S)/HR: KIT INTRAMUSCULAR at 19:44

## 2025-01-14 RX ADMIN — ONDANSETRON 4 MILLIGRAM(S): 4 TABLET ORAL at 08:03

## 2025-01-14 RX ADMIN — SODIUM CHLORIDE 100 MILLILITER(S): 9 INJECTION, SOLUTION INTRAVENOUS at 23:35

## 2025-01-14 RX ADMIN — SODIUM CHLORIDE 1000 MILLILITER(S): 9 INJECTION, SOLUTION INTRAMUSCULAR; INTRAVENOUS; SUBCUTANEOUS at 08:03

## 2025-01-14 RX ADMIN — CEFTRIAXONE SODIUM 100 MILLIGRAM(S): 1 INJECTION, POWDER, FOR SOLUTION INTRAMUSCULAR; INTRAVENOUS at 10:02

## 2025-01-14 RX ADMIN — SODIUM CHLORIDE 75 MILLILITER(S): 9 INJECTION, SOLUTION INTRAVENOUS at 19:44

## 2025-01-14 RX ADMIN — PANTOPRAZOLE 40 MILLIGRAM(S): 40 TABLET, DELAYED RELEASE ORAL at 19:45

## 2025-01-14 RX ADMIN — CEFTRIAXONE SODIUM 1000 MILLIGRAM(S): 1 INJECTION, POWDER, FOR SOLUTION INTRAMUSCULAR; INTRAVENOUS at 10:40

## 2025-01-14 RX ADMIN — SODIUM CHLORIDE 1000 MILLILITER(S): 9 INJECTION, SOLUTION INTRAMUSCULAR; INTRAVENOUS; SUBCUTANEOUS at 09:15

## 2025-01-14 RX ADMIN — PANTOPRAZOLE 40 MILLIGRAM(S): 40 TABLET, DELAYED RELEASE ORAL at 08:03

## 2025-01-14 RX ADMIN — CEFTRIAXONE SODIUM 100 MILLIGRAM(S): 1 INJECTION, POWDER, FOR SOLUTION INTRAMUSCULAR; INTRAVENOUS at 23:34

## 2025-01-14 NOTE — H&P ADULT - NSHPPHYSICALEXAM_GEN_ALL_CORE
CONSTITUTIONAL: alert and cooperative, no acute distress.   EYES: PERRL, no scleral icterus. Conjunctival pallor +  ENT: Mucosa moist, tongue normal.   NECK: Neck supple, trachea midline, non-tender  CARDIAC: Normal S1 and S2. Regular rate and rhythms. No Pedal edema  LUNGS: Equal air entry both lungs. No rales, rhonchi, wheezing. Normal respiratory effort.   ABDOMEN: Soft, nondistended, nontender. No guarding or rebound tenderness. No hepatomegaly or splenomegaly. Bowel sound normal.   MUSCULOSKELETAL: Normocephalic, atraumatic. No significant deformity or joint abnormality  NEUROLOGICAL: No gross motor or sensory deficits  SKIN: no lesions or eruptions. Normal turgor  PSYCHIATRIC: A&O x 3, appropriate mood and affect

## 2025-01-14 NOTE — ED ADULT NURSE NOTE - NSFALLUNIVINTERV_ED_ALL_ED
Bed/Stretcher in lowest position, wheels locked, appropriate side rails in place/Call bell, personal items and telephone in reach/Instruct patient to call for assistance before getting out of bed/chair/stretcher/Non-slip footwear applied when patient is off stretcher/[place] to call system/Physically safe environment - no spills, clutter or unnecessary equipment/Purposeful proactive rounding/Room/bathroom lighting operational, light cord in reach

## 2025-01-14 NOTE — H&P ADULT - NSHPLABSRESULTS_GEN_ALL_CORE
EGD 12/12/24  Grade B esophagitis in the lower third of the esophagus.   Few islands present in the lower third of the esophagus, suspicious for Villavicencio's esophagus. Biopsies not obtained due to underlying varices and esophagitis.   Grade I esophageal varices in the lower third of the esophagus.   Moderate portal hypotensive gastropathy with mild hemorrhage throughout the stomach.   Gastroesophageal varices type 2 (GOV 2 seen) seen in retroflexion.   Superficial non bleeding duodenal ulcers seen in the duodenal sweep (forest classification Grade III)  The second portion of the duodenum was normal.

## 2025-01-14 NOTE — H&P ADULT - HISTORY OF PRESENT ILLNESS
49 years old male with h/o alcoholic liver cirrhosis with esophageal varices ( no alcohol since 02/2024), h/o perforated ulcer 02/2024 s/p patch present to ED with coffee ground emesis. Patient reported coffee ground emesis which started yesterday 10 PM. Patient had 4 episode in total. Denied any abdominal pain, melena, dizziness or SOB.   Hemodynamically stable, afebrile, sat well at RA. H b 5.1, plt 129, Cr 0.81, lactate 2.4. CXR with no focal consolidation. EKG with NSR, now specific T wave changes.

## 2025-01-14 NOTE — ED PROVIDER NOTE - WR ORDER NAME 1
Xray Chest 1 View- PORTABLE-Urgent Topical Clindamycin Counseling: Patient counseled that this medication may cause skin irritation or allergic reactions.  In the event of skin irritation, the patient was advised to reduce the amount of the drug applied or use it less frequently.   The patient verbalized understanding of the proper use and possible adverse effects of clindamycin.  All of the patient's questions and concerns were addressed.

## 2025-01-14 NOTE — BRIEF OPERATIVE NOTE - NSICDXBRIEFPOSTOP_GEN_ALL_CORE_FT
POST-OP DIAGNOSIS:  Erosive esophagitis 14-Jan-2025 18:11:59  Darrell Yu  Esophageal varices 14-Jan-2025 18:12:09  Darrell Yu  Gastric varices 14-Jan-2025 18:12:17  Darrell Yu  Portal hypertensive gastropathy 14-Jan-2025 18:12:25  Darrell Yu

## 2025-01-14 NOTE — ED PROVIDER NOTE - PROGRESS NOTE DETAILS
Reviewed Endoscopy report from 12/12/24 - esophagitis / gastroesophageal varices present. Reviewed Endoscopy report from 12/12/24 - esophagitis / gastroesophageal varices present.  'Liver surgery' was patch for PUD. Hgb 5.1, pt consented for PRBC.   D/w Dr Shah (GI): No CTA, admit for endoscopy, transfuse 3u PRBC and post-CBC.

## 2025-01-14 NOTE — ED ADULT TRIAGE NOTE - CHIEF COMPLAINT QUOTE
Patient p/w 3 episodes of "dark" vomiting since last night. Denies abdominal pain or diarrhea. Pmh htn.

## 2025-01-14 NOTE — ED PROVIDER NOTE - OBJECTIVE STATEMENT
49M w/o significant PMH pw vomiting since last night. Pt states vomit appeared black in color, w/ + visible blood. Pt reports 4 total episodes of vomiting, last occurring 4hrs ago. Pt reports unable to tolerate PO intake w/o vomiting. Pt took Advil last night w/o relief. Pt reports hx similar 4mo ago, seen in ED, diagnosed w/ 'stomach ulcers' and told to avoid NSAIDs. Denies F/C, h/a, dizziness, CP, SOB, cough, abd pain, nausea, diarrhea / constipation, UTI sx, LE pain / swelling.     PMH as above, PSH 'liver', NKDA, Meds none. + cigarette smoker, denies EtOH use.  Chart reviews shows PMH cirrhosis, PSH foot. 49M w/o significant PMH pw vomiting since last night. Pt states vomit appeared black in color, w/ + visible blood. Pt reports 4 total episodes of vomiting, last occurring 2hrs ago. Pt reports unable to tolerate PO intake w/o vomiting. Pt took Advil last night w/o relief. Pt reports hx similar 4mo ago, seen in ED, diagnosed w/ 'stomach ulcers' and told to avoid NSAIDs. Denies F/C, h/a, dizziness, CP, SOB, cough, abd pain, nausea, diarrhea / constipation, UTI sx, LE pain / swelling. Pt follows w/ Dr Bartlett (GI) outpatient, had endoscopy 12/14, reportedly WNL.     PMH as above, PSH 'liver', NKDA, Meds none. + cigarette smoker, denies EtOH use.  Chart reviews shows PMH cirrhosis, PSH foot. 49M w/o significant PMH pw vomiting since last night. Pt states vomit appeared black in color, w/ + visible blood. Pt reports 4 total episodes of vomiting, last occurring 2hrs ago. Pt reports unable to tolerate PO intake w/o vomiting. Pt took Advil last night w/o relief. Pt reports hx similar 4mo ago, seen in ED, diagnosed w/ 'stomach ulcers' and told to avoid NSAIDs. Denies F/C, h/a, dizziness, CP, SOB, cough, abd pain, nausea, diarrhea / constipation, UTI sx, LE pain / swelling. Pt follows w/ Dr Shah (GI) outpatient, had endoscopy 12/14, reportedly WNL.     PMH as above, PSH 'liver', NKDA, Meds none. + cigarette smoker, denies EtOH use.  Chart reviews shows PMH cirrhosis, PSH foot.

## 2025-01-14 NOTE — CONSULT NOTE ADULT - SUBJECTIVE AND OBJECTIVE BOX
49M with ETOH cirrhosis, compensated, no ETOH for almost one year now, hx perforated PUD s/p Aden patch, hx duodenal ulcer bleed 2/2 NSAIDs (no longer taking), presenting for evaluation of coffee ground emesis that began out of the blue at home and melena x1 in ED. No abdominal pain. NO ETOH. No NSAID use.     Last EGD 12/2024 with esophagitis, gastric varices, PHG, superficial ulcers.     Wife reports patient is only taking one medication, not two -- is supposed to be taking PPI 40mg BID and and carvedilol 3.125 BID.    PMH/PSH--  H/O cirrhosis  S/P foot surgery    Allergies--  No Known Allergies    Medications--  Other: lactated ringers.  lactated ringers.  octreotide  Infusion  ondansetron Injectable PRN  pantoprazole  Injectable    Social History--  EtOH: former  Tobacco: denies   Drug Use: denies     Family/Marital History--  FH: diabetes mellitus    Review of Systems:  A >=10-point review of systems was obtained.     Pertinent positives and negatives--  Constitutional: No fevers. No Chills. No Rigors.   Eyes: No visual sx or eye pain  ENMT: No hearing trouble or throat pain  Cardiovascular: No chest pain. No palpitations.  Respiratory: No shortness of breath. No cough.  Gastrointestinal: +vomiting. No abdominal pain  Musculoskeletal: No arthralgia or myalgia   Skin: No rashes or lesions  Neurologic: No weakness or disorientation  Psychiatric: Pleasant. Appropriate affect.  Endocrine: No polyuria or polydipsia  Heme/Lymphatic: No easy bruising or immune issues    Review of systems otherwise negative except as previously noted.    Physical Exam--  Vital Signs: T(F): 98.3 (01-14-25 @ 18:55), Max: 99.2 (01-14-25 @ 04:29)  HR: 71 (01-14-25 @ 18:55)  BP: 119/66 (01-14-25 @ 18:55)  RR: 16 (01-14-25 @ 18:55)  SpO2: 96% (01-14-25 @ 18:55)    General: Nontoxic-appearing in no acute distress.  HEENT: AT/NC. Anicteric. Conjunctiva pink and moist.   Neck: Not rigid. No sense of mass.  Nodes: None palpable.  Lungs: Clear bilaterally without rales, wheezing or rhonchi  Heart: Regular rate and rhythm. No Murmur.  Abdomen: Soft. Nondistended. Nontender.   Extremities: No cyanosis or clubbing. No edema.   Skin: Warm. Dry. Good turgor. No rash.   Psychiatric: Appropriate affect and mood for situation.     Laboratory & Imaging Data--  CBC                        7.9    10.87 )-----------( 131      ( 14 Jan 2025 14:50 )             25.7       Chemistries  01-14    141  |  110[H]  |  31[H]  ----------------------------<  113[H]  5.1   |  26  |  0.81    Ca    8.2[L]      14 Jan 2025 07:58    TPro  7.2  /  Alb  2.6[L]  /  TBili  2.0[H]  /  DBili  x   /  AST  64[H]  /  ALT  25  /  AlkPhos  249[H]  01-14    PT/INR - ( 14 Jan 2025 07:58 )   PT: 16.2 sec;   INR: 1.44 ratio         PTT - ( 14 Jan 2025 07:58 )  PTT:33.7 sec    Impression: now s/p EGD with LA Grade C esophagitis (likely etiology of bleed), esophageal and gastric (GOV2) varices, and PHG. No ulcers.     Recommend:  - can stop octreotide gtt  - resume diet  - PO PPI 40mg BID - indefinitely  - carvedilol 3.125mg BID - indefinitely  - trend hgb, monitor bm  - can likely be discharged tomorrow if no further bleeding and hgb stable

## 2025-01-14 NOTE — ED PROVIDER NOTE - PATIENT'S PREFERRED PRONOUN
Interval History: No acute events overnight.  Seen and examined without any family present.  Reports he feels well.  Denies any complaint, has not had any further dyspnea with exertion.  IV Lasix discontinued as creatinine bumped up to 1.6 today, discussed with cardiology about restarting home dose Lasix tomorrow.      Review of Systems  Objective:     Vital Signs (Most Recent):  Temp: 97.5 °F (36.4 °C) (12/16/23 1133)  Pulse: (!) 41 (12/16/23 1133)  Resp: 16 (12/16/23 1133)  BP: (!) 116/54 (12/16/23 1133)  SpO2: 96 % (12/16/23 1133) Vital Signs (24h Range):  Temp:  [97.5 °F (36.4 °C)-98.4 °F (36.9 °C)] 97.5 °F (36.4 °C)  Pulse:  [40-92] 41  Resp:  [16-20] 16  SpO2:  [93 %-98 %] 96 %  BP: (102-137)/(53-71) 116/54     Weight: 135.6 kg (298 lb 15.1 oz)  Body mass index is 41.69 kg/m².    Intake/Output Summary (Last 24 hours) at 12/16/2023 1707  Last data filed at 12/16/2023 1230  Gross per 24 hour   Intake 720 ml   Output 600 ml   Net 120 ml         Physical Exam  Vitals and nursing note reviewed.   Constitutional:       General: He is not in acute distress.     Appearance: He is obese. He is not ill-appearing or diaphoretic.   HENT:      Head: Normocephalic and atraumatic.      Right Ear: External ear normal.      Left Ear: External ear normal.      Nose: Nose normal.      Mouth/Throat:      Mouth: Mucous membranes are moist.   Eyes:      Extraocular Movements: Extraocular movements intact.      Pupils: Pupils are equal, round, and reactive to light.   Cardiovascular:      Rate and Rhythm: Normal rate and regular rhythm.   Pulmonary:      Effort: Pulmonary effort is normal. No respiratory distress.      Breath sounds: No wheezing or rales.      Comments: On room air  Abdominal:      General: Bowel sounds are normal.      Palpations: Abdomen is soft.      Tenderness: There is no abdominal tenderness. There is no guarding or rebound.   Musculoskeletal:      Cervical back: Neck supple.      Right lower leg: Pitting  Edema present.      Left lower leg: Pitting Edema present.      Comments: Lymphedema noted on bilateral lower extremities.  Minimal edema on bilateral lower extremities significant wrinkling of lower extremities noted   Skin:     General: Skin is warm and dry.   Neurological:      General: No focal deficit present.      Mental Status: He is alert and oriented to person, place, and time.   Psychiatric:         Mood and Affect: Mood normal.             Significant Labs: All pertinent labs within the past 24 hours have been reviewed.  CBC:   Recent Labs   Lab 12/15/23  0500 12/16/23  0618   WBC 8.22 8.39   HGB 15.7 15.3   HCT 46.6 46.1    216     CMP:   Recent Labs   Lab 12/15/23  0500 12/16/23  0618    138   K 3.9 3.3*   CL 96 95   CO2 29 30*   * 128*   BUN 27* 33*   CREATININE 1.3 1.6*   CALCIUM 9.9 9.3   ANIONGAP 14 13     Magnesium:   Recent Labs   Lab 12/15/23  0500 12/16/23  0618   MG 2.0 2.1       Significant Imaging: I have reviewed all pertinent imaging results/findings within the past 24 hours.   Him/He

## 2025-01-14 NOTE — ED ADULT NURSE NOTE - NSFALLCONCLUSION_ED_ALL_ED
PDMP reviewed; no aberrant behavior identified, prescription authorized. Universal Safety Interventions

## 2025-01-14 NOTE — ED ADULT NURSE NOTE - OBJECTIVE STATEMENT
49yM A&Ox3 ambulatory with steady gait, presenting to ED with hematemesis. pt reports he has had several episodes of dark bloody emesis between last night and this morning, last episode being in the waiting room. pt denies all pain at this time. pt denies chest pain, sob, dizziness, weakness, blurred vision, N+T, increased work of breathing, abd pain, back pain, h/a, recent fever/cough,  symptoms. hx of esophageal varices, liver cirrhosis, sx for peptic ulcer, HTN

## 2025-01-14 NOTE — H&P ADULT - PROBLEM SELECTOR PLAN 1
present with coffee ground emesis  h/o esophageal varices  Recent EGD 12/12/24 reviewed  Hb 5.1---> 6.2 after 2 unit of PRBC. 3rd unit of PRBC ongoing.  Repeat CBC after 3rd PRBC ( ordered)  NPO, IV fluid  IV pantoprazole 40mg bid  continue ceftriaxone and octreotide   Closely monitor hemodynamic status  GI consulted

## 2025-01-14 NOTE — BRIEF OPERATIVE NOTE - NSICDXBRIEFPREOP_GEN_ALL_CORE_FT
PRE-OP DIAGNOSIS:  Coffee ground emesis 14-Jan-2025 18:11:24  Darrell Yu  Melena 14-Jan-2025 18:11:31  Darrell Yu  Anemia due to acute blood loss 14-Jan-2025 18:11:40  Darrell Yu

## 2025-01-14 NOTE — ED PROVIDER NOTE - CLINICAL SUMMARY MEDICAL DECISION MAKING FREE TEXT BOX
49M PMH cirrhosis pw 4 episodes of bloody emesis since last night. Afebrile, VSS other than borderline hypotension. Pt overall well-appearing, in NAD. Benign abd exam. Plan for CBC, CMP, lipase, T&S, coags, CTA AP. Give IVF, Zofran, Protonix. Re-eval. 49M PMH cirrhosis pw 4 episodes of bloody emesis since last night. Afebrile, VSS other than borderline hypotension. Pt overall well-appearing, in NAD. Benign abd exam. Plan for CBC, CMP, lipase, lactate, T&S, coags, CTA AP. Give IVF, Zofran, Protonix. Re-eval. 49M PMH cirrhosis pw 4 episodes of bloody emesis since last night. Afebrile, VSS other than borderline hypotension. Pt overall well-appearing, in NAD. Benign abd exam. Plan for CBC, CMP, lipase, lactate, T&S, coags, CTA AP. Give IVF, Zofran, Protonix, Ceftriaxone. Re-eval.  W/u significant for: INR 1.4, lactate 2.4, Hgb 5.1, pt consented for and transfused 3U PRBC. D/w GI (Dr Shah): request Octreotide infusion, admission to Tele / plan for endoscopy today. On re-eval, pt resting comfortably. Pt, wife updated to results, admission. They understand / agree w/ this plan.

## 2025-01-14 NOTE — BRIEF OPERATIVE NOTE - OPERATION/FINDINGS
Final Endoscopy Report  Indication: melena, coffee ground emesis, acute blood loss anemia  Findings:  - No active bleeding or blood was found in the esophagus, stomach or examined duodenum.   - A single large (> 5 mm) column of varices was noted in the mid to distal esophagus without stigmata of recent bleeding.   - LA Grade C esophagitis was found in the distal esophagus.   - Few islands were present in the distal esophagus, suspicious for Villavicencio's esophagus. Biopsies were not obtained due to underlying varices and esophagitis.   - Gastroesophageal varices type 2 (GOV 2) without high risk stigmata were found on retroflexion in the stomach.   - Moderate portal hypotensive gastropathy without hemorrhage was found throughout the stomach.   - The duodenal bulb and second portion of the duodenum were normal.   Impression:   - No active bleeding.   - LA Grade C esophagitis (suspected etiology of bleed)  - Esophageal varices  - Gastroesophageal varices (GOV2)  - Portal hypertensive gastropathy  Recommendations:  - Resume regular diet  - Oral PPI BID  - Carvedilol 3.125 mg BID for primary variceal prophylaxis  Final Endoscopy Report  Indication: melena, coffee ground emesis, acute blood loss anemia  Findings:  - No active bleeding or blood was found in the esophagus, stomach or examined duodenum.   - A single large (> 5 mm) column of varices was noted in the mid to distal esophagus without stigmata of recent bleeding.   - LA Grade C esophagitis was found in the distal esophagus.   - Few islands were present in the distal esophagus, suspicious for Villavicencio's esophagus. Biopsies were not obtained due to underlying varices and esophagitis.   - Gastroesophageal varices type 2 (GOV 2) without high risk stigmata were found on retroflexion in the stomach.   - Moderate portal hypotensive gastropathy without hemorrhage was found throughout the stomach.   - The duodenal bulb and second portion of the duodenum were normal.   Impression:   - No active bleeding.   - LA Grade C esophagitis (suspected etiology of bleed)  - Esophageal varices  - Gastroesophageal varices (GOV2)  - Portal hypertensive gastropathy  Recommendations:  - Resume regular diet  - Oral PPI 40mg BID - indefinitely  - Carvedilol 3.125 mg BID for primary variceal prophylaxis

## 2025-01-15 ENCOUNTER — TRANSCRIPTION ENCOUNTER (OUTPATIENT)
Age: 50
End: 2025-01-15

## 2025-01-15 ENCOUNTER — APPOINTMENT (OUTPATIENT)
Dept: URBAN - METROPOLITAN AREA CLINIC 207 | Age: 50
Setting detail: DERMATOLOGY
End: 2025-01-15

## 2025-01-15 VITALS
OXYGEN SATURATION: 98 % | DIASTOLIC BLOOD PRESSURE: 65 MMHG | SYSTOLIC BLOOD PRESSURE: 108 MMHG | TEMPERATURE: 98 F | RESPIRATION RATE: 18 BRPM | HEART RATE: 68 BPM

## 2025-01-15 LAB
ALBUMIN SERPL ELPH-MCNC: 2.6 G/DL — LOW (ref 3.3–5)
ALP SERPL-CCNC: 199 U/L — HIGH (ref 40–120)
ALT FLD-CCNC: 21 U/L — SIGNIFICANT CHANGE UP (ref 12–78)
ANION GAP SERPL CALC-SCNC: 5 MMOL/L — SIGNIFICANT CHANGE UP (ref 5–17)
AST SERPL-CCNC: 49 U/L — HIGH (ref 15–37)
BILIRUB SERPL-MCNC: 1.5 MG/DL — HIGH (ref 0.2–1.2)
BUN SERPL-MCNC: 22 MG/DL — SIGNIFICANT CHANGE UP (ref 7–23)
CALCIUM SERPL-MCNC: 8.5 MG/DL — SIGNIFICANT CHANGE UP (ref 8.5–10.1)
CHLORIDE SERPL-SCNC: 108 MMOL/L — SIGNIFICANT CHANGE UP (ref 96–108)
CO2 SERPL-SCNC: 25 MMOL/L — SIGNIFICANT CHANGE UP (ref 22–31)
CREAT SERPL-MCNC: 0.67 MG/DL — SIGNIFICANT CHANGE UP (ref 0.5–1.3)
EGFR: 114 ML/MIN/1.73M2 — SIGNIFICANT CHANGE UP
GLUCOSE SERPL-MCNC: 115 MG/DL — HIGH (ref 70–99)
HCT VFR BLD CALC: 27.1 % — LOW (ref 39–50)
HGB BLD-MCNC: 8.4 G/DL — LOW (ref 13–17)
MAGNESIUM SERPL-MCNC: 2 MG/DL — SIGNIFICANT CHANGE UP (ref 1.6–2.6)
MCHC RBC-ENTMCNC: 24 PG — LOW (ref 27–34)
MCHC RBC-ENTMCNC: 31 G/DL — LOW (ref 32–36)
MCV RBC AUTO: 77.4 FL — LOW (ref 80–100)
NRBC # BLD: 0 /100 WBCS — SIGNIFICANT CHANGE UP (ref 0–0)
PHOSPHATE SERPL-MCNC: 2.6 MG/DL — SIGNIFICANT CHANGE UP (ref 2.5–4.5)
PLATELET # BLD AUTO: 120 K/UL — LOW (ref 150–400)
POTASSIUM SERPL-MCNC: 4 MMOL/L — SIGNIFICANT CHANGE UP (ref 3.5–5.3)
POTASSIUM SERPL-SCNC: 4 MMOL/L — SIGNIFICANT CHANGE UP (ref 3.5–5.3)
PROT SERPL-MCNC: 6.8 GM/DL — SIGNIFICANT CHANGE UP (ref 6–8.3)
RBC # BLD: 3.5 M/UL — LOW (ref 4.2–5.8)
RBC # FLD: 17.2 % — HIGH (ref 10.3–14.5)
SODIUM SERPL-SCNC: 138 MMOL/L — SIGNIFICANT CHANGE UP (ref 135–145)
WBC # BLD: 8.15 K/UL — SIGNIFICANT CHANGE UP (ref 3.8–10.5)
WBC # FLD AUTO: 8.15 K/UL — SIGNIFICANT CHANGE UP (ref 3.8–10.5)

## 2025-01-15 PROCEDURE — 99239 HOSP IP/OBS DSCHRG MGMT >30: CPT

## 2025-01-15 PROCEDURE — 99232 SBSQ HOSP IP/OBS MODERATE 35: CPT

## 2025-01-15 RX ORDER — PANTOPRAZOLE 40 MG/1
1 TABLET, DELAYED RELEASE ORAL
Qty: 60 | Refills: 0
Start: 2025-01-15 | End: 2025-02-13

## 2025-01-15 RX ORDER — GUAIFENESIN 100 MG/5ML
200 SYRUP ORAL EVERY 6 HOURS
Refills: 0 | Status: DISCONTINUED | OUTPATIENT
Start: 2025-01-15 | End: 2025-01-15

## 2025-01-15 RX ORDER — CARVEDILOL 25 MG/1
1 TABLET, FILM COATED ORAL
Qty: 60 | Refills: 2
Start: 2025-01-15 | End: 2025-04-14

## 2025-01-15 RX ORDER — INFLUENZA A VIRUS A/WISCONSIN/588/2019 (H1N1) RECOMBINANT HEMAGGLUTININ ANTIGEN, INFLUENZA A VIRUS A/DARWIN/6/2021 (H3N2) RECOMBINANT HEMAGGLUTININ ANTIGEN, INFLUENZA B VIRUS B/AUSTRIA/1359417/2021 RECOMBINANT HEMAGGLUTININ ANTIGEN, AND INFLUENZA B VIRUS B/PHUKET/3073/2013 RECOMBINANT HEMAGGLUTININ ANTIGEN 45; 45; 45; 45 UG/.5ML; UG/.5ML; UG/.5ML; UG/.5ML
0.5 INJECTION INTRAMUSCULAR ONCE
Refills: 0 | Status: COMPLETED | OUTPATIENT
Start: 2025-01-15 | End: 2025-01-15

## 2025-01-15 RX ORDER — PANTOPRAZOLE 40 MG/1
1 TABLET, DELAYED RELEASE ORAL
Qty: 112 | Refills: 0
Start: 2025-01-15 | End: 2025-03-11

## 2025-01-15 RX ADMIN — PANTOPRAZOLE 40 MILLIGRAM(S): 40 TABLET, DELAYED RELEASE ORAL at 05:57

## 2025-01-15 RX ADMIN — Medication 200 MILLIGRAM(S): at 00:56

## 2025-01-15 RX ADMIN — Medication 200 MILLIGRAM(S): at 11:30

## 2025-01-15 RX ADMIN — SODIUM CHLORIDE 100 MILLILITER(S): 9 INJECTION, SOLUTION INTRAVENOUS at 11:32

## 2025-01-15 RX ADMIN — CARVEDILOL 3.12 MILLIGRAM(S): 25 TABLET, FILM COATED ORAL at 05:57

## 2025-01-15 NOTE — DISCHARGE NOTE NURSING/CASE MANAGEMENT/SOCIAL WORK - PATIENT PORTAL LINK FT
You can access the FollowMyHealth Patient Portal offered by [facility] by registering at the following website: [email or web address]. By joining [facility]’s FollowMyHealth portal, you will also be able to view your health information using other applications (apps) compatible with our system.

## 2025-01-15 NOTE — DISCHARGE NOTE PROVIDER - PROVIDER TOKENS
FREE:[LAST:[Your PCP],PHONE:[(   )    -],FAX:[(   )    -],FOLLOWUP:[2 weeks]],PROVIDER:[TOKEN:[[id number]],FOLLOWUP:[2 weeks]]

## 2025-01-15 NOTE — PATIENT PROFILE ADULT - FALL HARM RISK - HARM RISK INTERVENTIONS

## 2025-01-15 NOTE — DISCHARGE NOTE PROVIDER - NSDCMRMEDTOKEN_GEN_ALL_CORE_FT
Coreg 3.125 mg oral tablet: 1 tab(s) orally 2 times a day  Protonix 40 mg oral delayed release tablet: 1 tab(s) orally 2 times a day

## 2025-01-15 NOTE — DISCHARGE NOTE PROVIDER - CARE PROVIDER_API CALL
Your PCP,   Phone: (   )    -  Fax: (   )    -  Follow Up Time: 2 weeks    Tri Christie  Gastroenterology  [address] [place] [id number]  Phone: [phone]  Fax: [phone]  Follow Up Time: 2 weeks

## 2025-01-15 NOTE — DISCHARGE NOTE NURSING/CASE MANAGEMENT/SOCIAL WORK - NSDCPEFALRISK_GEN_ALL_CORE
For information on Fall & Injury Prevention, visit: [email or web address] OR  [email or web address] OR  https://www.cdc.gov/steadi/patient.html

## 2025-01-15 NOTE — PATIENT PROFILE ADULT - PRO INTERPRETER NEED 2
Excisional Biopsy Additional Text (Leave Blank If You Do Not Want): The margin was drawn around the clinically apparent lesion. An elliptical shape was then drawn on the skin incorporating the lesion and margins.  Incisions were then made along these lines to the appropriate tissue plane and the lesion was extirpated. English

## 2025-01-15 NOTE — DISCHARGE NOTE PROVIDER - HOSPITAL COURSE
49 years old male with history of alcoholic liver cirrhosis with esophageal varices ( no alcohol since 02/2024), h/o perforated ulcer 02/2024 s/p patch present to ED with coffee ground emesis and was admitted w/ an acute GI bleeding w/ acute blood loss anemia w/ Hgb 5.1. He was given a total of 4 unit of pRBC during the admission and his hgb was 8.4 today. Pt was put on IV pantoprazole 40mg bid, continue ceftriaxone and octreotide. GI did and EGD which showed erosive LA Grade C esophagitis in the distal esophagus w/ a single large (> 5 mm) column of varices in the mid to distal esophagus without stigmata of recent bleeding w/ portal hypertensive gastropathy but no active bleeding or blood in the esophagus, stomach or examined duodenum. A few islands were present in the distal esophagus, suspicious for Villavicencio's esophagus. Biopsies were not obtained due to underlying varices and esophagitis. There were also gastroesophageal varices type 2 (GOV 2) without high risk stigmata were found on retroflexion in the stomach and moderate portal hypotensive gastropathy without hemorrhage throughout the stomach. As per my covariation w/ Dr. Christie today, the patient was cleared for discharge on oral PPI 40mg BID &  Carvedilol 3.125 mg BID for primary variceal prophylaxis.    Discharge time: 43 minutes     Patient is a 49y old  Male who presents with a chief complaint of acute GI bleed (15 Cal 2025 16:10)      INTERVAL HPI/OVERNIGHT EVENTS:    MEDICATIONS  (STANDING):  carvedilol 3.125 milliGRAM(s) Oral every 12 hours  cefTRIAXone   IVPB 1000 milliGRAM(s) IV Intermittent every 24 hours  influenza   Vaccine 0.5 milliLiter(s) IntraMuscular once  lactated ringers. 1000 milliLiter(s) (100 mL/Hr) IV Continuous <Continuous>  pantoprazole  Injectable 40 milliGRAM(s) IV Push two times a day    MEDICATIONS  (PRN):  guaiFENesin Oral Liquid (Sugar-Free) 200 milliGRAM(s) Oral every 6 hours PRN Cough  ondansetron Injectable 4 milliGRAM(s) IV Push every 6 hours PRN Nausea and/or Vomiting      Allergies    No Known Allergies    Intolerances        Vital Signs Last 24 Hrs  T(C): 36.7 (15 Cal 2025 12:20), Max: 36.8 (14 Jan 2025 18:55)  T(F): 98.1 (15 Cal 2025 12:20), Max: 98.3 (14 Jan 2025 18:55)  HR: 70 (15 Cal 2025 12:20) (65 - 77)  BP: 101/57 (15 Cal 2025 12:20) (101/57 - 138/79)  BP(mean): 81 (14 Jan 2025 18:55) (75 - 81)  RR: 18 (15 Cal 2025 12:20) (13 - 22)  SpO2: 97% (15 Cal 2025 12:20) (96% - 100%)    Parameters below as of 15 Cal 2025 12:20  Patient On (Oxygen Delivery Method): room air        PHYSICAL EXAM:  GENERAL: NAD   HEAD:  Atraumatic, Normocephalic  EYES: EOMI, PERRLA   NECK: Supple   NERVOUS SYSTEM:  Alert & Oriented X3, Good concentration   CHEST/LUNG: Clear to auscultation bilaterally; No rales, rhonchi, wheezing, or rubs  HEART: Regular rate and rhythm; No murmurs, rubs, or gallops  ABDOMEN: Soft, Nontender, Nondistended; Bowel sounds present  EXTREMITIES: No clubbing, cyanosis, or edema

## 2025-01-15 NOTE — PROGRESS NOTE ADULT - SUBJECTIVE AND OBJECTIVE BOX
No recurrent emesis or melena  Tolerating PO    T(C): 36.7 (01-15-25 @ 12:20), Max: 36.8 (01-14-25 @ 18:55)  HR: 70 (01-15-25 @ 12:20) (65 - 77)  BP: 101/57 (01-15-25 @ 12:20) (101/57 - 138/79)  RR: 18 (01-15-25 @ 12:20) (13 - 22)  SpO2: 97% (01-15-25 @ 12:20) (96% - 100%)    NAD  soft NT ND                          8.4    8.15  )-----------( 120      ( 15 Cal 2025 06:00 )             27.1     01-15    138  |  108  |  22  ----------------------------<  115[H]  4.0   |  25  |  0.67    Ca    8.5      15 Cal 2025 06:00  Phos  2.6     01-15  Mg     2.0     01-15    TPro  6.8  /  Alb  2.6[L]  /  TBili  1.5[H]  /  DBili  x   /  AST  49[H]  /  ALT  21  /  AlkPhos  199[H]  01-15    PT/INR - ( 14 Jan 2025 07:58 )   PT: 16.2 sec;   INR: 1.44 ratio         PTT - ( 14 Jan 2025 07:58 )  PTT:33.7 sec    Impression:   1. UGIB 2/2 significant esophagitis  2. Esophageal and gastric varices, +PHG    Recommend:  - PPI 40mg BID for life - he has now proven that in the absence of PPI, rebleeds due to esophagitis  - carvedilol 3.125mg BID for variceal ppx    Instructed patient to follow up with me in the office

## 2025-01-15 NOTE — DISCHARGE NOTE NURSING/CASE MANAGEMENT/SOCIAL WORK - FINANCIAL ASSISTANCE
[facility] provides services at a reduced cost to those who are determined to be eligible through [facility]’s financial assistance program. Information regarding [facility]’s financial assistance program can be found by going to [email or web address] or by calling [phone].
Speaking Coherently

## 2025-01-22 DIAGNOSIS — D62 ACUTE POSTHEMORRHAGIC ANEMIA: ICD-10-CM

## 2025-01-22 DIAGNOSIS — K70.30 ALCOHOLIC CIRRHOSIS OF LIVER WITHOUT ASCITES: ICD-10-CM

## 2025-01-22 DIAGNOSIS — K20.91 ESOPHAGITIS, UNSPECIFIED WITH BLEEDING: ICD-10-CM

## 2025-01-22 DIAGNOSIS — I85.00 ESOPHAGEAL VARICES WITHOUT BLEEDING: ICD-10-CM

## 2025-01-22 DIAGNOSIS — K76.6 PORTAL HYPERTENSION: ICD-10-CM

## 2025-01-22 DIAGNOSIS — K31.89 OTHER DISEASES OF STOMACH AND DUODENUM: ICD-10-CM

## 2025-01-22 DIAGNOSIS — I86.4 GASTRIC VARICES: ICD-10-CM

## 2025-01-22 DIAGNOSIS — F10.21 ALCOHOL DEPENDENCE, IN REMISSION: ICD-10-CM

## 2025-01-22 DIAGNOSIS — F17.200 NICOTINE DEPENDENCE, UNSPECIFIED, UNCOMPLICATED: ICD-10-CM

## 2025-03-18 NOTE — DISCHARGE NOTE NURSING/CASE MANAGEMENT/SOCIAL WORK - NSDCPETBCESMAN_GEN_ALL_CORE
If you are a smoker, it is important for your health to stop smoking. Please be aware that second hand smoke is also harmful. supplement (specify)

## 2025-04-25 NOTE — DISCHARGE NOTE NURSING/CASE MANAGEMENT/SOCIAL WORK - HAS THE PATIENT USED TOBACCO IN THE PAST 30 DAYS?
- Most recent PCI 3/21/25  - Continue BB as above and Imdur. Patient was also on Ranexa at home.  - Continue daily ASA and Brilinta  - Continue high intensity statin therapy - Atorvastatin 80mg nightly. Yes

## 2025-06-09 NOTE — ED ADULT NURSE NOTE - NS ED NOTE ABUSE SUSPICION NEGLECT YN
attempted to contacted patient to review SBIRT results, and confirm current use of outpatient program for alcohol, drug or mental health if needed.  unable to leave message due to incorrect phone number. Phone number listed as patients is [facility].   
No

## 2025-07-16 ENCOUNTER — APPOINTMENT (OUTPATIENT)
Facility: CLINIC | Age: 50
End: 2025-07-16
Payer: COMMERCIAL

## 2025-07-16 VITALS
OXYGEN SATURATION: 94 % | BODY MASS INDEX: 26.22 KG/M2 | DIASTOLIC BLOOD PRESSURE: 65 MMHG | WEIGHT: 173 LBS | SYSTOLIC BLOOD PRESSURE: 122 MMHG | HEART RATE: 111 BPM | HEIGHT: 68 IN

## 2025-07-16 PROCEDURE — 99214 OFFICE O/P EST MOD 30 MIN: CPT

## 2025-07-16 PROCEDURE — G2211 COMPLEX E/M VISIT ADD ON: CPT | Mod: NC

## 2025-07-16 RX ORDER — PANTOPRAZOLE 40 MG/1
40 TABLET, DELAYED RELEASE ORAL DAILY
Qty: 30 | Refills: 5 | Status: ACTIVE | COMMUNITY
Start: 2025-07-16 | End: 1900-01-01

## 2025-07-16 RX ORDER — CARVEDILOL 6.25 MG/1
6.25 TABLET, FILM COATED ORAL TWICE DAILY
Qty: 60 | Refills: 5 | Status: ACTIVE | COMMUNITY
Start: 2025-07-16 | End: 1900-01-01

## 2025-08-19 ENCOUNTER — NON-APPOINTMENT (OUTPATIENT)
Age: 50
End: 2025-08-19

## (undated) DEVICE — SUT VICRYL 3-0 27" RB-1 UNDYED

## (undated) DEVICE — KIT ENDO PROCEDURE CUST W/VLV

## (undated) DEVICE — SUT NYLON 3-0 18" PS-2

## (undated) DEVICE — FRA-ESU BOVIE FORCE TRIAD T0E42286E: Type: DURABLE MEDICAL EQUIPMENT

## (undated) DEVICE — LAP PAD W RING 18 X 18"

## (undated) DEVICE — DRAPE U POLY BLUE 60"X60"

## (undated) DEVICE — DRSG COBAN 4"

## (undated) DEVICE — FORCEP RADIAL JAW 4 W NDL 2.4MM 2.8MM 240CM ORANGE DISP

## (undated) DEVICE — DRSG WEBRIL 3"

## (undated) DEVICE — SUT VICRYL 0 27" OS-6 UNDYED

## (undated) DEVICE — LABELS BLANK W PEN

## (undated) DEVICE — BITE BLOCK ADULT 20 X 27MM (GREEN)

## (undated) DEVICE — SUT MONOCRYL 4-0 27" PS-2 UNDYED

## (undated) DEVICE — TUBING IV SET GRAVITY 3Y 100" MACRO

## (undated) DEVICE — Device

## (undated) DEVICE — CONTAINER FORMALIN 80ML YELLOW

## (undated) DEVICE — LUBRICATING JELLY HR ONE SHOT 3G

## (undated) DEVICE — TUBING MEDI-VAC W MAXIGRIP CONNECTORS 1/4"X6'

## (undated) DEVICE — TOURNIQUET ESMARK 6"

## (undated) DEVICE — GOWN IMPERV BREATHABLE XL

## (undated) DEVICE — PREP CHLORAPREP HI-LITE ORANGE 26ML

## (undated) DEVICE — DRSG CURITY GAUZE SPONGE 4 X 4" 12-PLY NON-STERILE

## (undated) DEVICE — DRAPE IOBAN 33" X 23"

## (undated) DEVICE — STAPLER SKIN VISI-STAT 35 WIDE

## (undated) DEVICE — ELCTR GROUNDING PAD ADULT COVIDIEN

## (undated) DEVICE — DRAPE COVER SNAP 36X30"

## (undated) DEVICE — DRSG KERLIX ROLL 4.5"

## (undated) DEVICE — DRAPE SURGICAL #1010

## (undated) DEVICE — TUBING HYBRID CO2

## (undated) DEVICE — DRSG KLING 4"

## (undated) DEVICE — DRSG STOCKINETTE IMPERVIOUS XL

## (undated) DEVICE — BIOPSY FORCEP COLD DISP

## (undated) DEVICE — TOURNIQUET CUFF 18" DUAL PORT DUAL BLADDER W PLC (BLACK)

## (undated) DEVICE — CLAMP BX HOT RAD JAW 3

## (undated) DEVICE — VENODYNE/SCD SLEEVE CALF MEDIUM

## (undated) DEVICE — SUT VICRYL 2-0 27" FS-1 UNDYED

## (undated) DEVICE — TUBING ERBE CO2 OLYMPUS CONNECTOR

## (undated) DEVICE — PACK LIJ BASIC ORTHO

## (undated) DEVICE — DRSG CURITY GAUZE SPONGE 4 X 4" 12-PLY

## (undated) DEVICE — CANISTER DISPOSABLE THIN WALL 3000CC

## (undated) DEVICE — DRSG 2X2

## (undated) DEVICE — DRSG ACE BANDAGE 6"

## (undated) DEVICE — POSITIONER STRAP ARMBOARD VELCRO TS-30

## (undated) DEVICE — FRAZIER SUCTION TIP 8FR

## (undated) DEVICE — BLADE SURGICAL #15 CARBON